# Patient Record
Sex: FEMALE | Race: WHITE | NOT HISPANIC OR LATINO | Employment: FULL TIME | URBAN - METROPOLITAN AREA
[De-identification: names, ages, dates, MRNs, and addresses within clinical notes are randomized per-mention and may not be internally consistent; named-entity substitution may affect disease eponyms.]

---

## 2017-10-31 ENCOUNTER — LAB VISIT (OUTPATIENT)
Dept: LAB | Facility: OTHER | Age: 52
End: 2017-10-31
Attending: INTERNAL MEDICINE
Payer: COMMERCIAL

## 2017-10-31 DIAGNOSIS — R19.7 DIARRHEA: ICD-10-CM

## 2017-10-31 DIAGNOSIS — R10.31 ABDOMINAL PAIN, RIGHT LOWER QUADRANT: Primary | ICD-10-CM

## 2017-10-31 LAB
ALBUMIN SERPL BCP-MCNC: 4.1 G/DL
ALP SERPL-CCNC: 105 U/L
ALT SERPL W/O P-5'-P-CCNC: 23 U/L
ANION GAP SERPL CALC-SCNC: 9 MMOL/L
AST SERPL-CCNC: 16 U/L
BASOPHILS # BLD AUTO: 0.06 K/UL
BASOPHILS NFR BLD: 0.9 %
BILIRUB SERPL-MCNC: 1 MG/DL
BUN SERPL-MCNC: 15 MG/DL
CALCIUM SERPL-MCNC: 9.5 MG/DL
CHLORIDE SERPL-SCNC: 102 MMOL/L
CO2 SERPL-SCNC: 29 MMOL/L
CREAT SERPL-MCNC: 0.9 MG/DL
DIFFERENTIAL METHOD: NORMAL
EOSINOPHIL # BLD AUTO: 0.3 K/UL
EOSINOPHIL NFR BLD: 4.6 %
ERYTHROCYTE [DISTWIDTH] IN BLOOD BY AUTOMATED COUNT: 13 %
EST. GFR  (AFRICAN AMERICAN): >60 ML/MIN/1.73 M^2
EST. GFR  (NON AFRICAN AMERICAN): >60 ML/MIN/1.73 M^2
GLUCOSE SERPL-MCNC: 136 MG/DL
HCT VFR BLD AUTO: 43.1 %
HGB BLD-MCNC: 14.3 G/DL
LYMPHOCYTES # BLD AUTO: 2 K/UL
LYMPHOCYTES NFR BLD: 27.8 %
MCH RBC QN AUTO: 28.5 PG
MCHC RBC AUTO-ENTMCNC: 33.2 G/DL
MCV RBC AUTO: 86 FL
MONOCYTES # BLD AUTO: 0.5 K/UL
MONOCYTES NFR BLD: 7.4 %
NEUTROPHILS # BLD AUTO: 4.2 K/UL
NEUTROPHILS NFR BLD: 59.2 %
PLATELET # BLD AUTO: 319 K/UL
PMV BLD AUTO: 9.4 FL
POTASSIUM SERPL-SCNC: 3.4 MMOL/L
PROT SERPL-MCNC: 7.7 G/DL
RBC # BLD AUTO: 5.02 M/UL
SODIUM SERPL-SCNC: 140 MMOL/L
WBC # BLD AUTO: 7.02 K/UL

## 2017-10-31 PROCEDURE — 80053 COMPREHEN METABOLIC PANEL: CPT

## 2017-10-31 PROCEDURE — 85025 COMPLETE CBC W/AUTO DIFF WBC: CPT

## 2017-10-31 PROCEDURE — 36415 COLL VENOUS BLD VENIPUNCTURE: CPT

## 2017-11-09 ENCOUNTER — HOSPITAL ENCOUNTER (OUTPATIENT)
Dept: RADIOLOGY | Facility: OTHER | Age: 52
Discharge: HOME | End: 2017-11-09
Attending: INTERNAL MEDICINE
Payer: COMMERCIAL

## 2017-11-09 DIAGNOSIS — R10.31 ABDOMINAL PAIN, RIGHT LOWER QUADRANT: ICD-10-CM

## 2017-11-09 DIAGNOSIS — R19.7 DIARRHEA: ICD-10-CM

## 2017-11-09 PROCEDURE — 74177 CT ABD & PELVIS W/CONTRAST: CPT | Mod: TC

## 2017-11-09 PROCEDURE — 25500020 PHARM REV CODE 255: Performed by: INTERNAL MEDICINE

## 2017-11-09 PROCEDURE — 74177 CT ABD & PELVIS W/CONTRAST: CPT | Mod: 26,,, | Performed by: RADIOLOGY

## 2017-11-09 RX ADMIN — IOHEXOL 100 ML: 350 INJECTION, SOLUTION INTRAVENOUS at 12:11

## 2017-11-09 RX ADMIN — IOHEXOL 25 ML: 350 INJECTION, SOLUTION INTRAVENOUS at 12:11

## 2017-11-13 ENCOUNTER — HOSPITAL ENCOUNTER (OUTPATIENT)
Dept: RADIOLOGY | Facility: OTHER | Age: 52
Discharge: HOME | End: 2017-11-13
Attending: INTERNAL MEDICINE
Payer: COMMERCIAL

## 2017-11-13 DIAGNOSIS — R10.31 ABDOMINAL PAIN, RIGHT LOWER QUADRANT: ICD-10-CM

## 2017-11-13 DIAGNOSIS — R93.89 ABNORMAL CT SCAN: ICD-10-CM

## 2017-11-13 DIAGNOSIS — K56.1 INTUSSUSCEPTION OF INTESTINE: ICD-10-CM

## 2017-11-13 PROCEDURE — 74250 X-RAY XM SM INT 1CNTRST STD: CPT | Mod: 26,,, | Performed by: RADIOLOGY

## 2017-11-13 PROCEDURE — 74250 X-RAY XM SM INT 1CNTRST STD: CPT | Mod: TC

## 2017-12-07 ENCOUNTER — HOSPITAL ENCOUNTER (OUTPATIENT)
Dept: CARDIOLOGY | Facility: CLINIC | Age: 52
Discharge: HOME | End: 2017-12-07
Payer: COMMERCIAL

## 2017-12-07 ENCOUNTER — OFFICE VISIT (OUTPATIENT)
Dept: SURGERY | Facility: CLINIC | Age: 52
End: 2017-12-07
Payer: COMMERCIAL

## 2017-12-07 VITALS
DIASTOLIC BLOOD PRESSURE: 75 MMHG | BODY MASS INDEX: 41.96 KG/M2 | SYSTOLIC BLOOD PRESSURE: 130 MMHG | TEMPERATURE: 99 F | WEIGHT: 228 LBS | HEIGHT: 62 IN | HEART RATE: 86 BPM

## 2017-12-07 DIAGNOSIS — Z01.818 PREOP EXAMINATION: ICD-10-CM

## 2017-12-07 DIAGNOSIS — Z01.818 PREOP EXAMINATION: Primary | ICD-10-CM

## 2017-12-07 PROCEDURE — 99999 PR PBB SHADOW E&M-EST. PATIENT-LVL III: CPT | Mod: PBBFAC,,, | Performed by: SURGERY

## 2017-12-07 PROCEDURE — 99203 OFFICE O/P NEW LOW 30 MIN: CPT | Mod: S$GLB,,, | Performed by: SURGERY

## 2017-12-07 PROCEDURE — 93000 ELECTROCARDIOGRAM COMPLETE: CPT | Mod: S$GLB,,, | Performed by: INTERNAL MEDICINE

## 2017-12-07 RX ORDER — LOSARTAN POTASSIUM AND HYDROCHLOROTHIAZIDE 12.5; 1 MG/1; MG/1
1 TABLET ORAL DAILY
COMMUNITY
Start: 2017-11-21 | End: 2020-02-28

## 2017-12-07 RX ORDER — GLIPIZIDE 5 MG/1
10 TABLET, FILM COATED, EXTENDED RELEASE ORAL 2 TIMES DAILY
COMMUNITY
Start: 2017-11-16 | End: 2018-12-13

## 2017-12-07 RX ORDER — RIFAXIMIN 200 MG/1
TABLET ORAL
Refills: 0 | COMMUNITY
Start: 2017-11-08 | End: 2017-12-15 | Stop reason: ALTCHOICE

## 2017-12-07 RX ORDER — LEVOTHYROXINE SODIUM 100 UG/1
100 TABLET ORAL
COMMUNITY
Start: 2017-11-29 | End: 2023-01-09

## 2017-12-07 RX ORDER — ATORVASTATIN CALCIUM 20 MG/1
20 TABLET, FILM COATED ORAL DAILY
COMMUNITY
Start: 2017-11-19 | End: 2020-02-28

## 2017-12-07 RX ORDER — CANAGLIFLOZIN 300 MG/1
300 TABLET, FILM COATED ORAL DAILY
COMMUNITY
Start: 2017-11-24 | End: 2020-02-28

## 2017-12-07 RX ORDER — AZITHROMYCIN 250 MG/1
TABLET, FILM COATED ORAL
COMMUNITY
Start: 2017-11-17 | End: 2017-12-15 | Stop reason: ALTCHOICE

## 2017-12-07 RX ORDER — OMEPRAZOLE 40 MG/1
40 CAPSULE, DELAYED RELEASE ORAL EVERY MORNING
COMMUNITY
Start: 2017-11-15

## 2017-12-07 RX ORDER — DULAGLUTIDE 1.5 MG/.5ML
INJECTION, SOLUTION SUBCUTANEOUS WEEKLY
COMMUNITY
Start: 2017-11-27 | End: 2020-02-28

## 2017-12-15 ENCOUNTER — CLINICAL SUPPORT (OUTPATIENT)
Dept: CARDIOLOGY | Facility: CLINIC | Age: 52
End: 2017-12-15
Attending: INTERNAL MEDICINE
Payer: COMMERCIAL

## 2017-12-15 ENCOUNTER — OFFICE VISIT (OUTPATIENT)
Dept: CARDIOLOGY | Facility: CLINIC | Age: 52
End: 2017-12-15
Payer: COMMERCIAL

## 2017-12-15 VITALS
BODY MASS INDEX: 42.28 KG/M2 | SYSTOLIC BLOOD PRESSURE: 124 MMHG | HEART RATE: 84 BPM | WEIGHT: 229.75 LBS | DIASTOLIC BLOOD PRESSURE: 76 MMHG | HEIGHT: 62 IN

## 2017-12-15 DIAGNOSIS — R94.31 ABNORMAL ECG: ICD-10-CM

## 2017-12-15 DIAGNOSIS — I10 ESSENTIAL HYPERTENSION: ICD-10-CM

## 2017-12-15 DIAGNOSIS — E11.9 TYPE 2 DIABETES MELLITUS WITHOUT COMPLICATION, WITHOUT LONG-TERM CURRENT USE OF INSULIN: ICD-10-CM

## 2017-12-15 DIAGNOSIS — G47.33 OBSTRUCTIVE SLEEP APNEA SYNDROME: ICD-10-CM

## 2017-12-15 DIAGNOSIS — R94.31 ABNORMAL ECG: Primary | ICD-10-CM

## 2017-12-15 PROBLEM — G47.30 SLEEP APNEA: Status: ACTIVE | Noted: 2017-12-15

## 2017-12-15 LAB
DIASTOLIC DYSFUNCTION: NO
MITRAL VALVE MOBILITY: NORMAL
RETIRED EF AND QEF - SEE NOTES: 60 (ref 55–65)

## 2017-12-15 PROCEDURE — 99243 OFF/OP CNSLTJ NEW/EST LOW 30: CPT | Mod: S$GLB,,, | Performed by: INTERNAL MEDICINE

## 2017-12-15 PROCEDURE — 93351 STRESS TTE COMPLETE: CPT | Mod: S$GLB,,, | Performed by: INTERNAL MEDICINE

## 2017-12-15 PROCEDURE — 93321 DOPPLER ECHO F-UP/LMTD STD: CPT | Mod: S$GLB,,, | Performed by: INTERNAL MEDICINE

## 2017-12-15 PROCEDURE — 99999 PR PBB SHADOW E&M-EST. PATIENT-LVL III: CPT | Mod: PBBFAC,,, | Performed by: INTERNAL MEDICINE

## 2017-12-15 NOTE — Clinical Note
Dear Dr. Mei, Her stress echo is normal. She is cleared for surgery and anesthesia.  Homeyar BK Saunders, FACC, [facility]  Preventive Cardiology Tel: [phone]

## 2017-12-15 NOTE — LETTER
December 15, 2017      Lewis Lloyd MD  [address]  [place]  [place] [address]           [place] [facility]  [address] [facility]  [place] [address]  Phone: [phone]          Patient: Ailyn Ortiz   MR Number: [id number]   YOB: 1965   Date of Visit: 12/15/2017       Dear Dr. Lewis Lloyd:    Thank you for referring Ailyn Ortiz to me for evaluation. Attached you will find relevant portions of my assessment and plan of care.    If you have questions, please do not hesitate to call me. I look forward to following Ailyn Ortiz along with you.    Sincerely,    Godwin Wagoner MD    Enclosure  CC:  No Recipients    If you would like to receive this communication electronically, please contact [email or web address] or [phone] to request more information on [employer] Link access.    For providers and/or their staff who would like to refer a patient to [facility], please contact us through our one-stop-shop provider referral line, [facility], at [phone].    If you feel you have received this communication in error or would no longer like to receive these types of communications, please e-mail [email or web address]

## 2017-12-15 NOTE — PROGRESS NOTES
Subjective:     Patient ID:  Ailyn Ortiz is a 52 y.o. female who presents for evaluation of Abnormal ECG and preop clearance      Problem List:  Diabetes since her mid 40s  Hypertension since her early 40s  Sleep apnea  Obesity  Prior tobacco use - quit 2013    HPI:     Ailyn Ortiz is Dr. Lewis Lloyd's patient. She is being referred for an abnormal preop for ECG.  She is scheduled to undergo abdominal surgery by Dr. Mei in January.  The ECG abnormality consists of abnormal R wave progression. She does not have a h/o MI, CHF or CAD.She does not report chest discomfort or shortness of breath with exertion. Coronary risk factors are diabetes, hypertension, obesity and a h/o tobacco use. She has been diabetic for about 10 years. Started a statin 1 year ago.  She also has been hypertensive since her early 40s. She smoked cigarettes for 20 yrs and quit in 2013.  She has sleep apnea. Unable to tolerate CPAP. She underwent UPP and reports resolution of daytime somnolence.   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      Review of Systems   Constitution: Negative for weakness, malaise/fatigue, weight gain and weight loss.   Cardiovascular: Negative for chest pain, claudication, dyspnea on exertion, irregular heartbeat, leg swelling, near-syncope, palpitations and syncope.   Respiratory: Negative for cough, hemoptysis, sputum production and wheezing.    Hematologic/Lymphatic: Does not bruise/bleed easily.   Musculoskeletal: Positive for muscle cramps. Negative for back pain, joint pain, joint swelling and muscle weakness.   Gastrointestinal: Positive for abdominal pain and heartburn. Negative for change in bowel habit and melena.   Genitourinary: Positive for frequency and nocturia. Negative for hematuria.   Neurological: Positive for numbness. Negative for dizziness, headaches, light-headedness, loss of balance and paresthesias.  "  Psychiatric/Behavioral: Negative for depression. The patient is not nervous/anxious.          Current Outpatient Prescriptions   Medication Sig    atorvastatin (LIPITOR) 20 MG tablet Take 20 mg by mouth once daily.     glipiZIDE (GLUCOTROL) 5 MG TR24 Take 10 mg by mouth 2 (two) times daily.     INVOKANA 300 mg Tab tablet Take 300 mg by mouth once daily.     levothyroxine (SYNTHROID) 100 MCG tablet Take 100 mcg by mouth before breakfast.     losartan-hydrochlorothiazide 100-12.5 mg (HYZAAR) 100-12.5 mg Tab Take 1 tablet by mouth once daily.     omeprazole (PRILOSEC) 40 MG capsule Take 40 mg by mouth every morning.     TRULICITY 1.5 mg/0.5 mL PnIj Inject into the skin once a week.            Past Medical History:   Diagnosis Date    Diabetes mellitus, type 2 since the age of 42    Hypertension since the age of 42    JESSICA (obstructive sleep apnea)     S/P UPP         Social History   Substance Use Topics    Smoking status: Former Smoker     Packs/day: 0.00     Years: 20.00     Types: Cigarettes     Quit date: 2013    Smokeless tobacco: Never Used    Alcohol use 6 / year         Family History   Problem Relation Age of Onset    Hypertension Father     Diabetes Father     Heart disease Maternal Grandfather      CAD    Heart disease Paternal Grandmother      CAD    Heart disease Paternal Grandfather      CAD         Objective:     Physical Exam   Constitutional: She is oriented to person, place, and time. She appears well-developed and well-nourished.   /76   Pulse 84   Ht 5' 2" (1.575 m)   Wt 104.2 kg (229 lb 11.5 oz)   BMI 42.02 kg/m²      HENT:   Head: Normocephalic.   Neck: No JVD present. Carotid bruit is not present.   Cardiovascular: Normal rate, regular rhythm, S1 normal and S2 normal.  Exam reveals no gallop.    No murmur heard.  Pulses:       Radial pulses are 2+ on the right side, and 2+ on the left side.        Posterior tibial pulses are 2+ on the right side, and 1+ on the left " side.   Pulmonary/Chest: Effort normal. She has no wheezes. She has no rales. Chest wall is not dull to percussion.   Abdominal: Soft. She exhibits no abdominal bruit. There is no splenomegaly or hepatomegaly. There is no tenderness.   Musculoskeletal:        Right lower leg: She exhibits no edema.        Left lower leg: She exhibits no edema.   Neurological: She is alert and oriented to person, place, and time. Gait normal.   Skin: Skin is warm and dry. No bruising and no rash noted. No cyanosis. Nails show no clubbing.   Psychiatric: She has a normal mood and affect. Her speech is normal and behavior is normal. Judgment normal. Cognition and memory are normal.           Lab Results   Component Value Date    ALT 23 10/31/2017     Lab Results   Component Value Date    ALT 23 10/31/2017    AST 16 10/31/2017    ALKPHOS 105 10/31/2017    BILITOT 1.0 10/31/2017      Lab Results   Component Value Date    CREATININE 0.9 10/31/2017    BUN 15 10/31/2017     10/31/2017    K 3.4 (L) 10/31/2017     10/31/2017    CO2 29 10/31/2017         Assessment and Plan:     1. Abnormal ECG    2. Type 2 diabetes mellitus without complication, without long-term current use of insulin    3. Essential hypertension    4. BMI 40.0-44.9, adult    5. Obstructive sleep apnea syndrome         Abnormal ECG  -     Minimally abnormal ECG.  However in view of multiple coronary risk factors a stress test is indicated   Exercise stress echo; Future      Addendum:  Stress echo completed: normal. She is cleared for surgery and anesthesia.    CC: Lewis Lloyd MD

## 2018-01-03 ENCOUNTER — TELEPHONE (OUTPATIENT)
Dept: SURGERY | Facility: CLINIC | Age: 53
End: 2018-01-03

## 2018-01-03 NOTE — TELEPHONE ENCOUNTER
Pt calling with c/o severe body aches that is slowly getting better.  She states she has been worked up for the flu, strep, and rheumatoid arthritis.  She denies any fevers or a productive cough.  Pt had recent blood work with elevations with some levels. Pt to fax a copy.  She is wanting to make sure Dr. Mei is ok to proceed with surgery.

## 2018-01-03 NOTE — TELEPHONE ENCOUNTER
----- Message from Erica Krishnamurthy sent at 1/3/2018  1:32 PM CST -----  Contact: self  Ailyn States that she needs a call returned by a nurse in reference to her being sick before surgery.  Please call Ailyn @ [phone]. Thanks :)

## 2018-01-04 ENCOUNTER — ANESTHESIA EVENT (OUTPATIENT)
Dept: SURGERY | Facility: HOSPITAL | Age: 53
DRG: 394 | End: 2018-01-04
Payer: COMMERCIAL

## 2018-01-04 NOTE — PRE-PROCEDURE INSTRUCTIONS
LEFT MESSAGE WITH [facility] [phone]) FOR A CALL BACK IN AM.

## 2018-01-04 NOTE — ANESTHESIA PREPROCEDURE EVALUATION
2018  Ailyn Ortiz is a 52 y.o., female.    Pre-operative evaluation for Procedure(s) (LRB):  LAPAROSCOPY-DIAGNOSTIC w/ small bowel resection (N/A)  ESOPHAGOGASTRODUODENOSCOPY (EGD) intraop (N/A)    Ailyn Ortiz is a 52 y.o. female     LDA:     Prev airway:     Drips:     Patient Active Problem List   Diagnosis    Mass    Type 2 diabetes mellitus without complication, without long-term current use of insulin    Essential hypertension    BMI 40.0-44.9, adult    Sleep apnea       Review of patient's allergies indicates:   Allergen Reactions    Milk containing products      Congestion      Nuts [tree nut]     Avelox [moxifloxacin] Rash        No current facility-administered medications on file prior to encounter.      Current Outpatient Prescriptions on File Prior to Encounter   Medication Sig Dispense Refill    atorvastatin (LIPITOR) 20 MG tablet Take 20 mg by mouth once daily.       glipiZIDE (GLUCOTROL) 5 MG TR24 Take 10 mg by mouth 2 (two) times daily.       INVOKANA 300 mg Tab tablet Take 300 mg by mouth once daily.       levothyroxine (SYNTHROID) 100 MCG tablet Take 100 mcg by mouth before breakfast.       losartan-hydrochlorothiazide 100-12.5 mg (HYZAAR) 100-12.5 mg Tab Take 1 tablet by mouth once daily.       omeprazole (PRILOSEC) 40 MG capsule Take 40 mg by mouth every morning.       TRULICITY 1.5 mg/0.5 mL PnIj Inject into the skin once a week.          Past Surgical History:   Procedure Laterality Date    ADENOIDECTOMY       SECTION      times 2    CHOLECYSTECTOMY      GANGLION CYST EXCISION      TONSILLECTOMY         Social History     Social History    Marital status:      Spouse name: N/A    Number of children: N/A    Years of education: N/A     Occupational History    Not on file.     Social History Main Topics    Smoking status: Former  Smoker     Packs/day: 0.00     Years: 20.00     Types: Cigarettes     Quit date: 12/15/2013    Smokeless tobacco: Never Used    Alcohol use Not on file      Comment: rare    Drug use: Unknown    Sexual activity: Not on file     Other Topics Concern    Not on file     Social History Narrative    No narrative on file         Vital Signs Range (Last 24H):         CBC: No results for input(s): WBC, RBC, HGB, HCT, PLT, MCV, MCH, MCHC in the last 72 hours.    CMP: No results for input(s): NA, K, CL, CO2, BUN, CREATININE, GLU, MG, PHOS, CALCIUM, ALBUMIN, PROT, ALKPHOS, ALT, AST, BILITOT in the last 72 hours.    INR  No results for input(s): PT, INR, PROTIME, APTT in the last 72 hours.        Diagnostic Studies:      EKD Echo:12/15/2017  1 - Normal left ventricular systolic function (EF 60-65%).     2 - No wall motion abnormalities.     3 - Normal left ventricular diastolic function.     4 - Normal right ventricular systolic function .           Anesthesia Evaluation         Review of Systems  Anesthesia Hx:  Personal Hx of Anesthesia complications Pulmonary/Ventilatory Issues, laryngospasm       Physical Exam  General:  Well nourished, Obesity    Airway/Jaw/Neck:  Airway Findings: Mouth Opening: Normal Tongue: Normal  General Airway Assessment: Adult  Mallampati: II  Improves to II with phonation.  TM Distance: Normal, at least 6 cm            Mental Status:  Mental Status Findings:  Cooperative, Alert and Oriented         Anesthesia Plan  Type of Anesthesia, risks & benefits discussed:  Anesthesia Type:  general  Patient's Preference:   Intra-op Monitoring Plan: standard ASA monitors  Intra-op Monitoring Plan Comments:   Post Op Pain Control Plan:   Post Op Pain Control Plan Comments:   Induction:   IV  Beta Blocker:  Patient is not currently on a Beta-Blocker (No further documentation required).       Informed Consent: Patient understands risks and agrees with Anesthesia plan.  Questions answered.  Anesthesia consent signed with patient.  ASA Score: 3     Day of Surgery Review of History & Physical:    H&P update referred to the surgeon.     Anesthesia Plan Notes: Cleared by Dr. Rizwana Robertson For Surgery From Anesthesia Perspective.     PT REPORTS TO THIS RN THAT ~ 6 YEARS AGO DURING EXTUBATION SHE STARTED HAVING LARYNGOSPASMS. SHE WAS SUCCESSFULLY EXTUBATED AND DISCHARGED THE SAME DAY. THE PATIENT HAS NOT HAD GENERAL ANESTHESIA SINCE THAT SURGERY. WILL ATTEMPT TO OBTAIN ANESTHESIA RECORDS FROM [facility] [place]

## 2018-01-05 ENCOUNTER — TELEPHONE (OUTPATIENT)
Dept: SURGERY | Facility: CLINIC | Age: 53
End: 2018-01-05

## 2018-01-08 ENCOUNTER — ANESTHESIA (OUTPATIENT)
Dept: SURGERY | Facility: HOSPITAL | Age: 53
DRG: 394 | End: 2018-01-08
Payer: COMMERCIAL

## 2018-01-08 ENCOUNTER — HOSPITAL ENCOUNTER (INPATIENT)
Facility: HOSPITAL | Age: 53
LOS: 1 days | Discharge: HOME | DRG: 394 | End: 2018-01-09
Attending: SURGERY | Admitting: SURGERY
Payer: COMMERCIAL

## 2018-01-08 LAB
POCT GLUCOSE: 150 MG/DL (ref 70–110)
POCT GLUCOSE: 155 MG/DL (ref 70–110)

## 2018-01-08 PROCEDURE — 82962 GLUCOSE BLOOD TEST: CPT | Performed by: SURGERY

## 2018-01-08 PROCEDURE — 36000711: Performed by: SURGERY

## 2018-01-08 PROCEDURE — 25000003 PHARM REV CODE 250: Performed by: NURSE ANESTHETIST, CERTIFIED REGISTERED

## 2018-01-08 PROCEDURE — 36000710: Performed by: SURGERY

## 2018-01-08 PROCEDURE — D9220A PRA ANESTHESIA: Mod: CRNA,,, | Performed by: NURSE ANESTHETIST, CERTIFIED REGISTERED

## 2018-01-08 PROCEDURE — 63600175 PHARM REV CODE 636 W HCPCS: Performed by: ANESTHESIOLOGY

## 2018-01-08 PROCEDURE — 71000039 HC RECOVERY, EACH ADD'L HOUR: Performed by: SURGERY

## 2018-01-08 PROCEDURE — 71000033 HC RECOVERY, INTIAL HOUR: Performed by: SURGERY

## 2018-01-08 PROCEDURE — 88307 TISSUE EXAM BY PATHOLOGIST: CPT | Performed by: PATHOLOGY

## 2018-01-08 PROCEDURE — 27201423 OPTIME MED/SURG SUP & DEVICES STERILE SUPPLY: Performed by: SURGERY

## 2018-01-08 PROCEDURE — 36000708 HC OR TIME LEV III 1ST 15 MIN: Performed by: SURGERY

## 2018-01-08 PROCEDURE — C9113 INJ PANTOPRAZOLE SODIUM, VIA: HCPCS | Performed by: SURGERY

## 2018-01-08 PROCEDURE — 37000009 HC ANESTHESIA EA ADD 15 MINS: Performed by: SURGERY

## 2018-01-08 PROCEDURE — 25000003 PHARM REV CODE 250: Performed by: SURGERY

## 2018-01-08 PROCEDURE — 63600175 PHARM REV CODE 636 W HCPCS: Performed by: SURGERY

## 2018-01-08 PROCEDURE — 63600175 PHARM REV CODE 636 W HCPCS: Performed by: NURSE ANESTHETIST, CERTIFIED REGISTERED

## 2018-01-08 PROCEDURE — D9220A PRA ANESTHESIA: Mod: ANES,,, | Performed by: ANESTHESIOLOGY

## 2018-01-08 PROCEDURE — 37000008 HC ANESTHESIA 1ST 15 MINUTES: Performed by: SURGERY

## 2018-01-08 PROCEDURE — G0378 HOSPITAL OBSERVATION PER HR: HCPCS

## 2018-01-08 PROCEDURE — 36000709 HC OR TIME LEV III EA ADD 15 MIN: Performed by: SURGERY

## 2018-01-08 PROCEDURE — 0DB84ZX EXCISION OF SMALL INTESTINE, PERCUTANEOUS ENDOSCOPIC APPROACH, DIAGNOSTIC: ICD-10-PCS | Performed by: SURGERY

## 2018-01-08 PROCEDURE — 44202 LAP ENTERECTOMY: CPT | Mod: ,,, | Performed by: SURGERY

## 2018-01-08 PROCEDURE — 27000221 HC OXYGEN, UP TO 24 HOURS

## 2018-01-08 PROCEDURE — 88307 TISSUE EXAM BY PATHOLOGIST: CPT | Mod: 26,,, | Performed by: PATHOLOGY

## 2018-01-08 RX ORDER — DEXAMETHASONE SODIUM PHOSPHATE 4 MG/ML
INJECTION, SOLUTION INTRA-ARTICULAR; INTRALESIONAL; INTRAMUSCULAR; INTRAVENOUS; SOFT TISSUE
Status: DISCONTINUED | OUTPATIENT
Start: 2018-01-08 | End: 2018-01-08

## 2018-01-08 RX ORDER — ROCURONIUM BROMIDE 10 MG/ML
INJECTION, SOLUTION INTRAVENOUS
Status: DISCONTINUED | OUTPATIENT
Start: 2018-01-08 | End: 2018-01-08

## 2018-01-08 RX ORDER — SODIUM CHLORIDE 9 MG/ML
INJECTION, SOLUTION INTRAVENOUS CONTINUOUS
Status: DISCONTINUED | OUTPATIENT
Start: 2018-01-08 | End: 2018-01-09

## 2018-01-08 RX ORDER — FENTANYL CITRATE 50 UG/ML
INJECTION, SOLUTION INTRAMUSCULAR; INTRAVENOUS
Status: DISCONTINUED | OUTPATIENT
Start: 2018-01-08 | End: 2018-01-08

## 2018-01-08 RX ORDER — BUPIVACAINE HYDROCHLORIDE 2.5 MG/ML
INJECTION, SOLUTION EPIDURAL; INFILTRATION; INTRACAUDAL
Status: DISCONTINUED | OUTPATIENT
Start: 2018-01-08 | End: 2018-01-08 | Stop reason: HOSPADM

## 2018-01-08 RX ORDER — SODIUM CHLORIDE 9 MG/ML
INJECTION, SOLUTION INTRAVENOUS CONTINUOUS
Status: DISCONTINUED | OUTPATIENT
Start: 2018-01-08 | End: 2018-01-08

## 2018-01-08 RX ORDER — HYDROMORPHONE HCL IN 0.9% NACL 6 MG/30 ML
PATIENT CONTROLLED ANALGESIA SYRINGE INTRAVENOUS CONTINUOUS
Status: DISCONTINUED | OUTPATIENT
Start: 2018-01-08 | End: 2018-01-09

## 2018-01-08 RX ORDER — MIDAZOLAM HYDROCHLORIDE 1 MG/ML
INJECTION, SOLUTION INTRAMUSCULAR; INTRAVENOUS
Status: DISCONTINUED | OUTPATIENT
Start: 2018-01-08 | End: 2018-01-08

## 2018-01-08 RX ORDER — HYDROCODONE BITARTRATE AND ACETAMINOPHEN 7.5; 325 MG/15ML; MG/15ML
15 SOLUTION ORAL EVERY 4 HOURS PRN
Status: DISCONTINUED | OUTPATIENT
Start: 2018-01-09 | End: 2018-01-09 | Stop reason: HOSPADM

## 2018-01-08 RX ORDER — PANTOPRAZOLE SODIUM 40 MG/10ML
40 INJECTION, POWDER, LYOPHILIZED, FOR SOLUTION INTRAVENOUS DAILY
Status: DISCONTINUED | OUTPATIENT
Start: 2018-01-08 | End: 2018-01-09 | Stop reason: HOSPADM

## 2018-01-08 RX ORDER — NALOXONE HCL 0.4 MG/ML
0.02 VIAL (ML) INJECTION
Status: DISCONTINUED | OUTPATIENT
Start: 2018-01-08 | End: 2018-01-09 | Stop reason: HOSPADM

## 2018-01-08 RX ORDER — HYDROMORPHONE HYDROCHLORIDE 2 MG/ML
0.2 INJECTION, SOLUTION INTRAMUSCULAR; INTRAVENOUS; SUBCUTANEOUS EVERY 5 MIN PRN
Status: DISCONTINUED | OUTPATIENT
Start: 2018-01-08 | End: 2018-01-08 | Stop reason: HOSPADM

## 2018-01-08 RX ORDER — NEOSTIGMINE METHYLSULFATE 1 MG/ML
INJECTION, SOLUTION INTRAVENOUS
Status: DISCONTINUED | OUTPATIENT
Start: 2018-01-08 | End: 2018-01-08

## 2018-01-08 RX ORDER — LEVOTHYROXINE SODIUM 100 UG/1
100 TABLET ORAL
Status: DISCONTINUED | OUTPATIENT
Start: 2018-01-09 | End: 2018-01-09 | Stop reason: HOSPADM

## 2018-01-08 RX ORDER — ENOXAPARIN SODIUM 100 MG/ML
40 INJECTION SUBCUTANEOUS
Status: DISCONTINUED | OUTPATIENT
Start: 2018-01-08 | End: 2018-01-09 | Stop reason: HOSPADM

## 2018-01-08 RX ORDER — ACETAMINOPHEN 10 MG/ML
INJECTION, SOLUTION INTRAVENOUS
Status: DISCONTINUED | OUTPATIENT
Start: 2018-01-08 | End: 2018-01-08

## 2018-01-08 RX ORDER — DIPHENHYDRAMINE HYDROCHLORIDE 50 MG/ML
12.5 INJECTION INTRAMUSCULAR; INTRAVENOUS EVERY 4 HOURS PRN
Status: DISCONTINUED | OUTPATIENT
Start: 2018-01-08 | End: 2018-01-09 | Stop reason: HOSPADM

## 2018-01-08 RX ORDER — LIDOCAINE HYDROCHLORIDE 10 MG/ML
1 INJECTION, SOLUTION EPIDURAL; INFILTRATION; INTRACAUDAL; PERINEURAL ONCE
Status: COMPLETED | OUTPATIENT
Start: 2018-01-08 | End: 2018-01-08

## 2018-01-08 RX ORDER — GLYCOPYRROLATE 0.2 MG/ML
INJECTION INTRAMUSCULAR; INTRAVENOUS
Status: DISCONTINUED | OUTPATIENT
Start: 2018-01-08 | End: 2018-01-08

## 2018-01-08 RX ORDER — ONDANSETRON 2 MG/ML
INJECTION INTRAMUSCULAR; INTRAVENOUS
Status: DISCONTINUED | OUTPATIENT
Start: 2018-01-08 | End: 2018-01-08

## 2018-01-08 RX ORDER — LOSARTAN POTASSIUM AND HYDROCHLOROTHIAZIDE 12.5; 1 MG/1; MG/1
1 TABLET ORAL DAILY
Status: DISCONTINUED | OUTPATIENT
Start: 2018-01-09 | End: 2018-01-09 | Stop reason: HOSPADM

## 2018-01-08 RX ORDER — CEFAZOLIN SODIUM 1 G/3ML
2 INJECTION, POWDER, FOR SOLUTION INTRAMUSCULAR; INTRAVENOUS
Status: COMPLETED | OUTPATIENT
Start: 2018-01-08 | End: 2018-01-08

## 2018-01-08 RX ORDER — SODIUM CHLORIDE 0.9 % (FLUSH) 0.9 %
3 SYRINGE (ML) INJECTION
Status: DISCONTINUED | OUTPATIENT
Start: 2018-01-08 | End: 2018-01-08

## 2018-01-08 RX ORDER — LIDOCAINE HCL/PF 100 MG/5ML
SYRINGE (ML) INTRAVENOUS
Status: DISCONTINUED | OUTPATIENT
Start: 2018-01-08 | End: 2018-01-08

## 2018-01-08 RX ORDER — ATORVASTATIN CALCIUM 10 MG/1
20 TABLET, FILM COATED ORAL DAILY
Status: DISCONTINUED | OUTPATIENT
Start: 2018-01-09 | End: 2018-01-09 | Stop reason: HOSPADM

## 2018-01-08 RX ORDER — PROPOFOL 10 MG/ML
VIAL (ML) INTRAVENOUS
Status: DISCONTINUED | OUTPATIENT
Start: 2018-01-08 | End: 2018-01-08

## 2018-01-08 RX ORDER — ONDANSETRON 2 MG/ML
4 INJECTION INTRAMUSCULAR; INTRAVENOUS EVERY 6 HOURS PRN
Status: DISCONTINUED | OUTPATIENT
Start: 2018-01-08 | End: 2018-01-09 | Stop reason: HOSPADM

## 2018-01-08 RX ORDER — ACETAMINOPHEN 10 MG/ML
1000 INJECTION, SOLUTION INTRAVENOUS EVERY 8 HOURS
Status: COMPLETED | OUTPATIENT
Start: 2018-01-08 | End: 2018-01-09

## 2018-01-08 RX ADMIN — FENTANYL CITRATE 100 MCG: 50 INJECTION, SOLUTION INTRAMUSCULAR; INTRAVENOUS at 08:01

## 2018-01-08 RX ADMIN — PROPOFOL 180 MG: 10 INJECTION, EMULSION INTRAVENOUS at 09:01

## 2018-01-08 RX ADMIN — DEXAMETHASONE SODIUM PHOSPHATE 4 MG: 4 INJECTION, SOLUTION INTRAMUSCULAR; INTRAVENOUS at 09:01

## 2018-01-08 RX ADMIN — HYDROMORPHONE HYDROCHLORIDE 0.2 MG: 2 INJECTION INTRAMUSCULAR; INTRAVENOUS; SUBCUTANEOUS at 10:01

## 2018-01-08 RX ADMIN — ONDANSETRON 4 MG: 2 INJECTION INTRAMUSCULAR; INTRAVENOUS at 09:01

## 2018-01-08 RX ADMIN — ACETAMINOPHEN 1000 MG: 10 INJECTION, SOLUTION INTRAVENOUS at 09:01

## 2018-01-08 RX ADMIN — Medication: at 10:01

## 2018-01-08 RX ADMIN — GLYCOPYRROLATE 0.2 MG: 0.2 INJECTION, SOLUTION INTRAMUSCULAR; INTRAVENOUS at 08:01

## 2018-01-08 RX ADMIN — NEOSTIGMINE METHYLSULFATE 5 MG: 1 INJECTION INTRAVENOUS at 09:01

## 2018-01-08 RX ADMIN — LIDOCAINE HYDROCHLORIDE 80 MG: 20 INJECTION, SOLUTION INTRAVENOUS at 09:01

## 2018-01-08 RX ADMIN — HYDROMORPHONE HYDROCHLORIDE 0.2 MG: 2 INJECTION INTRAMUSCULAR; INTRAVENOUS; SUBCUTANEOUS at 11:01

## 2018-01-08 RX ADMIN — CEFAZOLIN 2 G: 330 INJECTION, POWDER, FOR SOLUTION INTRAMUSCULAR; INTRAVENOUS at 09:01

## 2018-01-08 RX ADMIN — SODIUM CHLORIDE: 0.9 INJECTION, SOLUTION INTRAVENOUS at 08:01

## 2018-01-08 RX ADMIN — ROCURONIUM BROMIDE 10 MG: 10 INJECTION, SOLUTION INTRAVENOUS at 09:01

## 2018-01-08 RX ADMIN — FENTANYL CITRATE 50 MCG: 50 INJECTION, SOLUTION INTRAMUSCULAR; INTRAVENOUS at 09:01

## 2018-01-08 RX ADMIN — LIDOCAINE HYDROCHLORIDE 1 MG: 10 INJECTION, SOLUTION EPIDURAL; INFILTRATION; INTRACAUDAL; PERINEURAL at 08:01

## 2018-01-08 RX ADMIN — PROPOFOL 20 MG: 10 INJECTION, EMULSION INTRAVENOUS at 09:01

## 2018-01-08 RX ADMIN — PANTOPRAZOLE SODIUM 40 MG: 40 INJECTION, POWDER, FOR SOLUTION INTRAVENOUS at 11:01

## 2018-01-08 RX ADMIN — GLYCOPYRROLATE 0.4 MG: 0.2 INJECTION, SOLUTION INTRAMUSCULAR; INTRAVENOUS at 09:01

## 2018-01-08 RX ADMIN — ROCURONIUM BROMIDE 40 MG: 10 INJECTION, SOLUTION INTRAVENOUS at 09:01

## 2018-01-08 RX ADMIN — Medication: at 11:01

## 2018-01-08 RX ADMIN — MIDAZOLAM HYDROCHLORIDE 2 MG: 1 INJECTION, SOLUTION INTRAMUSCULAR; INTRAVENOUS at 08:01

## 2018-01-08 RX ADMIN — ENOXAPARIN SODIUM 40 MG: 100 INJECTION SUBCUTANEOUS at 11:01

## 2018-01-08 RX ADMIN — SODIUM CHLORIDE: 0.9 INJECTION, SOLUTION INTRAVENOUS at 12:01

## 2018-01-08 RX ADMIN — SODIUM CHLORIDE, SODIUM GLUCONATE, SODIUM ACETATE, POTASSIUM CHLORIDE, MAGNESIUM CHLORIDE, SODIUM PHOSPHATE, DIBASIC, AND POTASSIUM PHOSPHATE: .53; .5; .37; .037; .03; .012; .00082 INJECTION, SOLUTION INTRAVENOUS at 09:01

## 2018-01-08 RX ADMIN — ACETAMINOPHEN 1000 MG: 10 INJECTION, SOLUTION INTRAVENOUS at 07:01

## 2018-01-08 NOTE — INTERVAL H&P NOTE
The patient has been examined and the H&P has been reviewed:     I concur with the findings and no changes have occurred since H&P was written.    Anesthesia/Surgery risks, benefits and alternative options discussed and understood by patient/family.          Active Hospital Problems    Diagnosis  POA    Mass [R22.9]  Yes      Resolved Hospital Problems    Diagnosis Date Resolved POA   No resolved problems to display.

## 2018-01-08 NOTE — H&P (VIEW-ONLY)
Subjective:     Patient ID:  Ailyn Ortiz is a 52 y.o. female who presents for evaluation of Abnormal ECG and preop clearance      Problem List:  Diabetes since her mid 40s  Hypertension since her early 40s  Sleep apnea  Obesity  Prior tobacco use - quit 2013    HPI:     Ailyn Ortiz is Dr. Lewis Lloyd's patient. She is being referred for an abnormal preop for ECG.  She is scheduled to undergo abdominal surgery by Dr. Mei in January.  The ECG abnormality consists of abnormal R wave progression. She does not have a h/o MI, CHF or CAD.She does not report chest discomfort or shortness of breath with exertion. Coronary risk factors are diabetes, hypertension, obesity and a h/o tobacco use. She has been diabetic for about 10 years. Started a statin 1 year ago.  She also has been hypertensive since her early 40s. She smoked cigarettes for 20 yrs and quit in 2013.  She has sleep apnea. Unable to tolerate CPAP. She underwent UPP and reports resolution of daytime somnolence.   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      Review of Systems   Constitution: Negative for weakness, malaise/fatigue, weight gain and weight loss.   Cardiovascular: Negative for chest pain, claudication, dyspnea on exertion, irregular heartbeat, leg swelling, near-syncope, palpitations and syncope.   Respiratory: Negative for cough, hemoptysis, sputum production and wheezing.    Hematologic/Lymphatic: Does not bruise/bleed easily.   Musculoskeletal: Positive for muscle cramps. Negative for back pain, joint pain, joint swelling and muscle weakness.   Gastrointestinal: Positive for abdominal pain and heartburn. Negative for change in bowel habit and melena.   Genitourinary: Positive for frequency and nocturia. Negative for hematuria.   Neurological: Positive for numbness. Negative for dizziness, headaches, light-headedness, loss of balance and paresthesias.  "  Psychiatric/Behavioral: Negative for depression. The patient is not nervous/anxious.          Current Outpatient Prescriptions   Medication Sig    atorvastatin (LIPITOR) 20 MG tablet Take 20 mg by mouth once daily.     glipiZIDE (GLUCOTROL) 5 MG TR24 Take 10 mg by mouth 2 (two) times daily.     INVOKANA 300 mg Tab tablet Take 300 mg by mouth once daily.     levothyroxine (SYNTHROID) 100 MCG tablet Take 100 mcg by mouth before breakfast.     losartan-hydrochlorothiazide 100-12.5 mg (HYZAAR) 100-12.5 mg Tab Take 1 tablet by mouth once daily.     omeprazole (PRILOSEC) 40 MG capsule Take 40 mg by mouth every morning.     TRULICITY 1.5 mg/0.5 mL PnIj Inject into the skin once a week.            Past Medical History:   Diagnosis Date    Diabetes mellitus, type 2 since the age of 42    Hypertension since the age of 42    JESSICA (obstructive sleep apnea)     S/P UPP         Social History   Substance Use Topics    Smoking status: Former Smoker     Packs/day: 0.00     Years: 20.00     Types: Cigarettes     Quit date: 2013    Smokeless tobacco: Never Used    Alcohol use 6 / year         Family History   Problem Relation Age of Onset    Hypertension Father     Diabetes Father     Heart disease Maternal Grandfather      CAD    Heart disease Paternal Grandmother      CAD    Heart disease Paternal Grandfather      CAD         Objective:     Physical Exam   Constitutional: She is oriented to person, place, and time. She appears well-developed and well-nourished.   /76   Pulse 84   Ht 5' 2" (1.575 m)   Wt 104.2 kg (229 lb 11.5 oz)   BMI 42.02 kg/m²      HENT:   Head: Normocephalic.   Neck: No JVD present. Carotid bruit is not present.   Cardiovascular: Normal rate, regular rhythm, S1 normal and S2 normal.  Exam reveals no gallop.    No murmur heard.  Pulses:       Radial pulses are 2+ on the right side, and 2+ on the left side.        Posterior tibial pulses are 2+ on the right side, and 1+ on the left " side.   Pulmonary/Chest: Effort normal. She has no wheezes. She has no rales. Chest wall is not dull to percussion.   Abdominal: Soft. She exhibits no abdominal bruit. There is no splenomegaly or hepatomegaly. There is no tenderness.   Musculoskeletal:        Right lower leg: She exhibits no edema.        Left lower leg: She exhibits no edema.   Neurological: She is alert and oriented to person, place, and time. Gait normal.   Skin: Skin is warm and dry. No bruising and no rash noted. No cyanosis. Nails show no clubbing.   Psychiatric: She has a normal mood and affect. Her speech is normal and behavior is normal. Judgment normal. Cognition and memory are normal.           Lab Results   Component Value Date    ALT 23 10/31/2017     Lab Results   Component Value Date    ALT 23 10/31/2017    AST 16 10/31/2017    ALKPHOS 105 10/31/2017    BILITOT 1.0 10/31/2017      Lab Results   Component Value Date    CREATININE 0.9 10/31/2017    BUN 15 10/31/2017     10/31/2017    K 3.4 (L) 10/31/2017     10/31/2017    CO2 29 10/31/2017         Assessment and Plan:     1. Abnormal ECG    2. Type 2 diabetes mellitus without complication, without long-term current use of insulin    3. Essential hypertension    4. BMI 40.0-44.9, adult    5. Obstructive sleep apnea syndrome         Abnormal ECG  -     Minimally abnormal ECG.  However in view of multiple coronary risk factors a stress test is indicated   Exercise stress echo; Future      Addendum:  Stress echo completed: normal. She is cleared for surgery and anesthesia.    CC: Lewis Lloyd MD

## 2018-01-08 NOTE — OP NOTE
DATE OF PROCEDURE:  01/08/2018.    DIAGNOSIS:  Small-bowel mass.    PROCEDURE PERFORMED:  Laparoscopic excision of small-bowel mass with small-bowel   resection.    SURGEON:  Stone Mei M.D.    ASSISTANT:  Stone Olivares M.D. (RES).    ANESTHESIA:  General.    PROCEDURE IN DETAIL:  The patient was placed under general anesthesia.  The   abdomen was prepped and draped in the usual manner.  Access to peritoneum was   gained through the umbilicus using Optiview trocar under direct vision.    Pneumoperitoneum to 15 mmHg with CO2 gas was obtained.  Two 5 mm trocars were   placed in the left mid abdomen and a 5 and 10 mm trocar was placed in the right   mid abdomen.  There were some omental adhesions to the midline.  These were   taken down bluntly.  The ligament of Treitz was identified.  The small bowel was   followed down approximately two-thirds of the way.  In the proximal small   bowel, we did find the lesion.  It was approximately 3.5 cm, but a little bit   hard to tell laparoscopically.  The area of the small bowel was resected with   Endo-KRISHAN.  The Harmonic scalpel was used to take the mesentery at the end of the   case.  It was placed in an EndoCatch bag and removed from the abdomen.  The two   small-bowel ends were attached to each other, enterotomies made in each limb   and Endo-KRISHAN were used to create a jejunojejunostomy.  A heel stitch was placed   and the remaining enterotomy was closed with an Endo-KRISHAN.  The mesenteric defect   was repaired with a running EndoStitch.  We inspected the abdomen for   hemostasis, removed the trocars under direct vision.  Prior to removing the last   trocar, pneumoperitoneum was allowed to escape.  The fascia at the navel was   closed with 0-Vicryl.  Skin incisions were infiltrated with Marcaine, closed   with 4-0 plain catgut and reinforced with Mastisol, Steri-Strips, and Band-Aids.    The patient tolerated procedure well and was brought to Recovery Room in    stable condition.  Sponge and needle counts were correct at the end of the case.      ARCHANA/ANAND  dd: 01/08/2018 11:02:37 (CST)  td: 01/08/2018 11:26:47 (CST)  Doc ID   #[id number]  Job ID #[id number]    CC:

## 2018-01-08 NOTE — BRIEF OP NOTE
Operative Note       Surgery Date: 1/8/2018     Surgeon(s) and Role:     * Stone Olivares MD - Resident - Assisting     * Stone Mei MD - Primary    Pre-op Diagnosis:  Mass [R22.9]    Post-op Diagnosis:  Mass [R22.9]    Procedure(s) (LRB):  LAPAROSCOPY-DIAGNOSTIC w/ small bowel resection (N/A)    Anesthesia: General    Procedure in Detail/Findings:  Proximal jejunal mass resected.    Estimated Blood Loss: Minimal           Specimens     Start     Ordered    01/08/18 1003  Specimen to Pathology - Surgery  Once      01/08/18 1002        Implants: * No implants in log *           Disposition: PACU - hemodynamically stable.           Condition: Good    Attestation:  I was present and scrubbed for the entire procedure.

## 2018-01-08 NOTE — NURSING TRANSFER
Nursing Transfer Note      1/8/2018     Transfer To: [address] From PACU    Transfer via stretcher    Transfer with IV and PCA pump    Transported by PCT    Medicines sent: PCA dilaudid    Chart send with patient: Yes    Notified: spouse    Patient reassessed at: 1/8/17 @ 1500     Upon arrival to floor:

## 2018-01-08 NOTE — ANESTHESIA POSTPROCEDURE EVALUATION
"Anesthesia Post Evaluation    Patient: Ailyn Ortiz    Procedure(s) Performed: Procedure(s) (LRB):  LAPAROSCOPY-DIAGNOSTIC w/ small bowel resection (N/A)    Final Anesthesia Type: general  Patient location during evaluation: PACU  Patient participation: Yes- Able to Participate  Level of consciousness: awake and alert  Post-procedure vital signs: reviewed and stable  Pain management: adequate  Airway patency: patent  PONV status at discharge: No PONV  Anesthetic complications: no      Cardiovascular status: hemodynamically stable  Respiratory status: room air, spontaneous ventilation and unassisted  Hydration status: euvolemic  Follow-up not needed.        Visit Vitals  BP (!) 143/63   Pulse 94   Temp 36.3 °C (97.3 °F) (Temporal)   Resp 17   Ht 5' 2" (1.575 m)   Wt 103.9 kg (229 lb)   SpO2 95%   Breastfeeding? No   BMI 41.88 kg/m²       Pain/Buddy Score: Pain Assessment Performed: Yes (1/8/2018  8:26 AM)  Presence of Pain: denies (1/8/2018  8:26 AM)      "

## 2018-01-08 NOTE — TRANSFER OF CARE
"Anesthesia Transfer of Care Note    Patient: Ailyn Ortiz    Procedure(s) Performed: Procedure(s) (LRB):  LAPAROSCOPY-DIAGNOSTIC w/ small bowel resection (N/A)    Patient location: PACU    Anesthesia Type: general    Transport from OR: Transported from OR on 6-10 L/min O2 by face mask with adequate spontaneous ventilation    Post pain: adequate analgesia    Post assessment: no apparent anesthetic complications and tolerated procedure well    Post vital signs: stable    Level of consciousness: awake    Nausea/Vomiting: no nausea/vomiting    Complications: none    Transfer of care protocol was followed      Last vitals:   Visit Vitals  BP (!) 143/63   Pulse 94   Temp 36.3 °C (97.3 °F) (Temporal)   Resp 17   Ht 5' 2" (1.575 m)   Wt 103.9 kg (229 lb)   SpO2 95%   Breastfeeding? No   BMI 41.88 kg/m²     "

## 2018-01-09 VITALS
OXYGEN SATURATION: 92 % | HEART RATE: 77 BPM | DIASTOLIC BLOOD PRESSURE: 58 MMHG | WEIGHT: 229 LBS | RESPIRATION RATE: 14 BRPM | BODY MASS INDEX: 42.14 KG/M2 | TEMPERATURE: 98 F | SYSTOLIC BLOOD PRESSURE: 125 MMHG | HEIGHT: 62 IN

## 2018-01-09 LAB
ANION GAP SERPL CALC-SCNC: 11 MMOL/L
ANION GAP SERPL CALC-SCNC: 7 MMOL/L
BASOPHILS # BLD AUTO: 0.03 K/UL
BASOPHILS # BLD AUTO: 0.03 K/UL
BASOPHILS # BLD AUTO: 0.04 K/UL
BASOPHILS NFR BLD: 0.4 %
BASOPHILS NFR BLD: 0.5 %
BASOPHILS NFR BLD: 0.5 %
BUN SERPL-MCNC: 20 MG/DL
BUN SERPL-MCNC: 22 MG/DL
CALCIUM SERPL-MCNC: 8.6 MG/DL
CALCIUM SERPL-MCNC: 9 MG/DL
CHLORIDE SERPL-SCNC: 101 MMOL/L
CHLORIDE SERPL-SCNC: 105 MMOL/L
CO2 SERPL-SCNC: 24 MMOL/L
CO2 SERPL-SCNC: 29 MMOL/L
CREAT SERPL-MCNC: 0.8 MG/DL
CREAT SERPL-MCNC: 0.8 MG/DL
DIFFERENTIAL METHOD: ABNORMAL
EOSINOPHIL # BLD AUTO: 0 K/UL
EOSINOPHIL # BLD AUTO: 0.1 K/UL
EOSINOPHIL # BLD AUTO: 0.2 K/UL
EOSINOPHIL NFR BLD: 0.5 %
EOSINOPHIL NFR BLD: 1.8 %
EOSINOPHIL NFR BLD: 2.7 %
ERYTHROCYTE [DISTWIDTH] IN BLOOD BY AUTOMATED COUNT: 12.5 %
ERYTHROCYTE [DISTWIDTH] IN BLOOD BY AUTOMATED COUNT: 12.5 %
ERYTHROCYTE [DISTWIDTH] IN BLOOD BY AUTOMATED COUNT: 12.6 %
EST. GFR  (AFRICAN AMERICAN): >60 ML/MIN/1.73 M^2
EST. GFR  (AFRICAN AMERICAN): >60 ML/MIN/1.73 M^2
EST. GFR  (NON AFRICAN AMERICAN): >60 ML/MIN/1.73 M^2
EST. GFR  (NON AFRICAN AMERICAN): >60 ML/MIN/1.73 M^2
GLUCOSE SERPL-MCNC: 100 MG/DL
GLUCOSE SERPL-MCNC: 79 MG/DL
HCT VFR BLD AUTO: 29.8 %
HCT VFR BLD AUTO: 31.4 %
HCT VFR BLD AUTO: 32 %
HGB BLD-MCNC: 10.3 G/DL
HGB BLD-MCNC: 10.4 G/DL
HGB BLD-MCNC: 9.8 G/DL
IMM GRANULOCYTES # BLD AUTO: 0.02 K/UL
IMM GRANULOCYTES # BLD AUTO: 0.02 K/UL
IMM GRANULOCYTES # BLD AUTO: 0.03 K/UL
IMM GRANULOCYTES NFR BLD AUTO: 0.2 %
IMM GRANULOCYTES NFR BLD AUTO: 0.3 %
IMM GRANULOCYTES NFR BLD AUTO: 0.4 %
LYMPHOCYTES # BLD AUTO: 0.8 K/UL
LYMPHOCYTES # BLD AUTO: 1.1 K/UL
LYMPHOCYTES # BLD AUTO: 1.2 K/UL
LYMPHOCYTES NFR BLD: 13.4 %
LYMPHOCYTES NFR BLD: 13.8 %
LYMPHOCYTES NFR BLD: 14.7 %
MAGNESIUM SERPL-MCNC: 2.2 MG/DL
MCH RBC QN AUTO: 27.4 PG
MCH RBC QN AUTO: 27.8 PG
MCH RBC QN AUTO: 28 PG
MCHC RBC AUTO-ENTMCNC: 32.5 G/DL
MCHC RBC AUTO-ENTMCNC: 32.8 G/DL
MCHC RBC AUTO-ENTMCNC: 32.9 G/DL
MCV RBC AUTO: 83 FL
MCV RBC AUTO: 85 FL
MCV RBC AUTO: 86 FL
MONOCYTES # BLD AUTO: 0.5 K/UL
MONOCYTES # BLD AUTO: 0.7 K/UL
MONOCYTES # BLD AUTO: 0.8 K/UL
MONOCYTES NFR BLD: 9.1 %
MONOCYTES NFR BLD: 9.3 %
MONOCYTES NFR BLD: 9.7 %
NEUTROPHILS # BLD AUTO: 4.3 K/UL
NEUTROPHILS # BLD AUTO: 5.7 K/UL
NEUTROPHILS # BLD AUTO: 6.1 K/UL
NEUTROPHILS NFR BLD: 74 %
NEUTROPHILS NFR BLD: 74.2 %
NEUTROPHILS NFR BLD: 74.5 %
NRBC BLD-RTO: 0 /100 WBC
PHOSPHATE SERPL-MCNC: 3.1 MG/DL
PLATELET # BLD AUTO: 299 K/UL
PLATELET # BLD AUTO: 325 K/UL
PLATELET # BLD AUTO: 331 K/UL
PMV BLD AUTO: 8.6 FL
PMV BLD AUTO: 8.7 FL
PMV BLD AUTO: 9 FL
POTASSIUM SERPL-SCNC: 3.5 MMOL/L
POTASSIUM SERPL-SCNC: 3.8 MMOL/L
RBC # BLD AUTO: 3.58 M/UL
RBC # BLD AUTO: 3.7 M/UL
RBC # BLD AUTO: 3.71 M/UL
SODIUM SERPL-SCNC: 137 MMOL/L
SODIUM SERPL-SCNC: 140 MMOL/L
WBC # BLD AUTO: 5.82 K/UL
WBC # BLD AUTO: 7.73 K/UL
WBC # BLD AUTO: 8.12 K/UL

## 2018-01-09 PROCEDURE — 80048 BASIC METABOLIC PNL TOTAL CA: CPT | Mod: 91

## 2018-01-09 PROCEDURE — 63600175 PHARM REV CODE 636 W HCPCS: Performed by: SURGERY

## 2018-01-09 PROCEDURE — C9113 INJ PANTOPRAZOLE SODIUM, VIA: HCPCS | Performed by: SURGERY

## 2018-01-09 PROCEDURE — 25000003 PHARM REV CODE 250: Performed by: STUDENT IN AN ORGANIZED HEALTH CARE EDUCATION/TRAINING PROGRAM

## 2018-01-09 PROCEDURE — 83735 ASSAY OF MAGNESIUM: CPT

## 2018-01-09 PROCEDURE — 12000002 HC ACUTE/MED SURGE SEMI-PRIVATE ROOM

## 2018-01-09 PROCEDURE — 85025 COMPLETE CBC W/AUTO DIFF WBC: CPT | Mod: 91

## 2018-01-09 PROCEDURE — 80048 BASIC METABOLIC PNL TOTAL CA: CPT

## 2018-01-09 PROCEDURE — 25000003 PHARM REV CODE 250: Performed by: SURGERY

## 2018-01-09 PROCEDURE — 36415 COLL VENOUS BLD VENIPUNCTURE: CPT

## 2018-01-09 PROCEDURE — 84100 ASSAY OF PHOSPHORUS: CPT

## 2018-01-09 RX ORDER — HYDROCODONE BITARTRATE AND ACETAMINOPHEN 7.5; 325 MG/15ML; MG/15ML
30 SOLUTION ORAL EVERY 4 HOURS PRN
Status: DISCONTINUED | OUTPATIENT
Start: 2018-01-09 | End: 2018-01-09 | Stop reason: HOSPADM

## 2018-01-09 RX ORDER — ACETAMINOPHEN 325 MG/1
650 TABLET ORAL EVERY 4 HOURS PRN
Status: DISCONTINUED | OUTPATIENT
Start: 2018-01-09 | End: 2018-01-09 | Stop reason: HOSPADM

## 2018-01-09 RX ORDER — OXYCODONE AND ACETAMINOPHEN 5; 325 MG/1; MG/1
1-2 TABLET ORAL EVERY 4 HOURS PRN
Qty: 31 TABLET | Refills: 0 | Status: SHIPPED | OUTPATIENT
Start: 2018-01-09 | End: 2018-06-19

## 2018-01-09 RX ORDER — DOCUSATE SODIUM 100 MG/1
100 CAPSULE, LIQUID FILLED ORAL 2 TIMES DAILY
Refills: 0 | COMMUNITY
Start: 2018-01-09 | End: 2018-06-19

## 2018-01-09 RX ADMIN — PANTOPRAZOLE SODIUM 40 MG: 40 INJECTION, POWDER, FOR SOLUTION INTRAVENOUS at 08:01

## 2018-01-09 RX ADMIN — ONDANSETRON 4 MG: 2 INJECTION INTRAMUSCULAR; INTRAVENOUS at 07:01

## 2018-01-09 RX ADMIN — LEVOTHYROXINE SODIUM 100 MCG: 100 TABLET ORAL at 06:01

## 2018-01-09 RX ADMIN — SODIUM CHLORIDE: 0.9 INJECTION, SOLUTION INTRAVENOUS at 03:01

## 2018-01-09 RX ADMIN — ENOXAPARIN SODIUM 40 MG: 100 INJECTION SUBCUTANEOUS at 01:01

## 2018-01-09 RX ADMIN — ACETAMINOPHEN 650 MG: 325 TABLET ORAL at 05:01

## 2018-01-09 RX ADMIN — LOSARTAN POTASSIUM AND HYDROCHLOROTHIAZIDE 1 TABLET: 12.5; 1 TABLET ORAL at 08:01

## 2018-01-09 RX ADMIN — ACETAMINOPHEN 1000 MG: 10 INJECTION, SOLUTION INTRAVENOUS at 08:01

## 2018-01-09 RX ADMIN — ACETAMINOPHEN 1000 MG: 10 INJECTION, SOLUTION INTRAVENOUS at 12:01

## 2018-01-09 NOTE — ASSESSMENT & PLAN NOTE
POD1 small bowel resection.  Doing well.  Some nausea. Possibly related to anesthesia vs narcotics    Continue IV antiemetics  Start sips of clears transition to fulls once better PO   OOB   Hycet  Transition to PO Meds    Possible DC later today pending PO tolerance

## 2018-01-09 NOTE — PLAN OF CARE
Patient lives in a 2 story house w/spouse & family. Spouse at . Not medically stable for discharge;POD # 1: + nausea, clear liquid diet, labs repeated at 12 Noon. No needs determined.        01/09/18 1255   Discharge Assessment   Assessment Type Discharge Planning Assessment   Confirmed/corrected address and phone number on facesheet? Yes   Assessment information obtained from? Patient;Medical Record   Expected Length of Stay (days) (2+)   Communicated expected length of stay with patient/caregiver no  (Per MD)   Prior to hospitilization cognitive status: Alert/Oriented;No Deficits   Prior to hospitalization functional status: Independent   Current cognitive status: Alert/Oriented;No Deficits   Current Functional Status: Independent;Needs Assistance   Facility Arrived From: (N/A)   Lives With spouse;child(kena), adult;grandchild(kena)  (Adult daughter, grandchild)   Able to Return to Prior Arrangements yes   Is patient able to care for self after discharge? Yes   Who are your caregiver(s) and their phone number(s)? (Perez Ortiz Spouse     [phone] )   Patient's perception of discharge disposition home or selfcare   Readmission Within The Last 30 Days no previous admission in last 30 days   Patient currently being followed by outpatient case management? No   Patient currently receives any other outside agency services? No   Equipment Currently Used at Home none   Do you have any problems affording any of your prescribed medications? No   Is the patient taking medications as prescribed? yes   Does the patient have transportation home? Yes   Transportation Available car;family or friend will provide   Dialysis Name and Scheduled days (N/A)   Does the patient receive services at the [facility]? No   Discharge Plan A Home with family   Discharge Plan B Home with family   Patient/Family In Agreement With Plan yes

## 2018-01-09 NOTE — PLAN OF CARE
Problem: Patient Care Overview  Goal: Plan of Care Review  Outcome: Ongoing (interventions implemented as appropriate)  Pt AAOx3, VSS, and has pain well controlled with PCA pump.  Pt able to ambulate and void without difficulty and is NPO.  Remains free from falls or injuries at this time, will continue to monitor.

## 2018-01-09 NOTE — SUBJECTIVE & OBJECTIVE
Interval History: No events.  Nauseated, likely 2/2 to anesthesia vs narcotics.  Otherwise feeling ok    Medications:  Continuous Infusions:  Scheduled Meds:   acetaminophen  1,000 mg Intravenous Q8H    atorvastatin  20 mg Oral Daily    enoxparin  40 mg Subcutaneous Q24H    levothyroxine  100 mcg Oral Before breakfast    losartan-hydrochlorothiazide 100-12.5 mg  1 tablet Oral Daily    pantoprazole  40 mg Intravenous Daily     PRN Meds:diphenhydrAMINE, hydrocodone-apap 7.5-325 MG/15 ML, hydrocodone-apap 7.5-325 MG/15 ML, naloxone, ondansetron, promethazine (PHENERGAN) IVPB     Review of patient's allergies indicates:   Allergen Reactions    Milk containing products      Congestion      Nuts [tree nut]     Avelox [moxifloxacin] Rash     Objective:     Vital Signs (Most Recent):  Temp: 96.8 °F (36 °C) (01/09/18 0013)  Pulse: 76 (01/09/18 0411)  Resp: 15 (01/09/18 0411)  BP: 127/67 (01/09/18 0411)  SpO2: (!) 92 % (01/09/18 0411) Vital Signs (24h Range):  Temp:  [96.7 °F (35.9 °C)-98.5 °F (36.9 °C)] 96.8 °F (36 °C)  Pulse:  [] 76  Resp:  [12-19] 15  SpO2:  [90 %-99 %] 92 %  BP: ()/(50-74) 127/67     Weight: 103.9 kg (229 lb)  Body mass index is 41.88 kg/m².    Intake/Output - Last 3 Shifts       01/07 0700 - 01/08 0659 01/08 0700 - 01/09 0659 01/09 0700 - 01/10 0659    I.V. (mL/kg)  1500 (14.4)     Total Intake(mL/kg)  1500 (14.4)     Urine (mL/kg/hr)  500     Total Output   500      Net   +1000                   Physical Exam   Gen: NAD  Abd: Soft, ATTP    Significant Labs:  CBC:   Recent Labs  Lab 01/09/18  0345   WBC 8.12   RBC 3.71*   HGB 10.4*   HCT 32.0*      MCV 86   MCH 28.0   MCHC 32.5     CMP:   Recent Labs  Lab 01/09/18  0345      CALCIUM 8.6*      K 3.8   CO2 24      BUN 22*   CREATININE 0.8

## 2018-01-09 NOTE — DISCHARGE SUMMARY
[facility]  General Surgery  Discharge Summary      Patient Name: Ailyn Ortiz  MRN: [id number]  Admission Date: 1/8/2018  Hospital Length of Stay: 0 days   Discharge Date and Time:  01/09/2018 5:55 PM  Attending Physician: Stone Mei MD   Discharging Provider: Giudo Burdick MD  Primary Care Provider: Lewis Lloyd MD     HPI: 51 y/o F presented for elective Mass resection.  Did well post op.  Tolerating PO. H/H trended given acute drop without other systemic signs of instability.  Pain controlled.  Ambulating.  Stable for DC    Procedure(s) (LRB):  LAPAROSCOPY-DIAGNOSTIC w/ small bowel resection (N/A)  RESECTION-SMALL BOWEL-LAPAROSCOPIC     Hospital Course: As above    Consults:     Significant Diagnostic Studies: Labs:   CMP   Recent Labs  Lab 01/09/18  0345      K 3.8      CO2 24      BUN 22*   CREATININE 0.8   CALCIUM 8.6*   ANIONGAP 11   ESTGFRAFRICA >60.0   EGFRNONAA >60.0    and CBC   Recent Labs  Lab 01/09/18  0345 01/09/18  1147 01/09/18  1654   WBC 8.12 7.73 5.82   HGB 10.4* 9.8* 10.3*   HCT 32.0* 29.8* 31.4*    331 299       Pending Diagnostic Studies:     Procedure Component Value Units Date/Time    Basic metabolic panel [[id number]] Collected:  01/09/18 1653    Order Status:  Sent Lab Status:  In process Updated:  01/09/18 1741    Specimen:  Blood from Blood         Final Active Diagnoses:    Diagnosis Date Noted POA    PRINCIPAL PROBLEM:  Mass [R22.9] 12/07/2017 Yes    Type 2 diabetes mellitus without complication, without long-term current use of insulin [E11.9] 12/15/2017 Yes      Problems Resolved During this Admission:    Diagnosis Date Noted Date Resolved POA      Discharged Condition: good    Disposition: Home or Self Care    Follow Up:  Follow-up Information     Stone Mei MD On 1/23/2018.    Specialties:  General Surgery, Bariatrics  Why:  For wound re-check/Appt 1/23/18 at 10:45 am  Contact information:  [address]  [facility] [address]  [phone]                 Patient Instructions:     Diet full liquid     Lifting restrictions   Order Comments: No more than 10 lbs     Notify your health care provider if you experience any of the following:  increased confusion or weakness     Notify your health care provider if you experience any of the following:  persistent dizziness, light-headedness, or visual disturbances     Notify your health care provider if you experience any of the following:  worsening rash     Notify your health care provider if you experience any of the following:  severe persistent headache     Notify your health care provider if you experience any of the following:  difficulty breathing or increased cough     Notify your health care provider if you experience any of the following:  redness, tenderness, or signs of infection (pain, swelling, redness, odor or green/yellow discharge around incision site)     Notify your health care provider if you experience any of the following:  severe uncontrolled pain     Notify your health care provider if you experience any of the following:  persistent nausea and vomiting or diarrhea     Notify your health care provider if you experience any of the following:  temperature >100.4     No dressing needed   Order Comments: Ok to remove outer dressing tomorrow and shower.  Steri strips stay on for 10-14 days or until they fall off on their own.  No soaking       Medications:  Reconciled Home Medications:   Current Discharge Medication List      START taking these medications    Details   docusate sodium (COLACE) 100 MG capsule Take 1 capsule (100 mg total) by mouth 2 (two) times daily.  Refills: 0      oxyCODONE-acetaminophen (PERCOCET) 5-325 mg per tablet Take 1-2 tablets by mouth every 4 (four) hours as needed.  Qty: 31 tablet, Refills: 0         CONTINUE these medications which have NOT CHANGED    Details   atorvastatin (LIPITOR) 20 MG tablet Take 20 mg by mouth once  daily.       glipiZIDE (GLUCOTROL) 5 MG TR24 Take 10 mg by mouth 2 (two) times daily.       INVOKANA 300 mg Tab tablet Take 300 mg by mouth once daily.       levothyroxine (SYNTHROID) 100 MCG tablet Take 100 mcg by mouth before breakfast.       losartan-hydrochlorothiazide 100-12.5 mg (HYZAAR) 100-12.5 mg Tab Take 1 tablet by mouth once daily.       omeprazole (PRILOSEC) 40 MG capsule Take 40 mg by mouth every morning.       TRULICITY 1.5 mg/0.5 mL PnIj Inject into the skin once a week.              Guido Burdick MD  General Surgery  [facility]

## 2018-01-12 ENCOUNTER — TELEPHONE (OUTPATIENT)
Dept: SURGERY | Facility: CLINIC | Age: 53
End: 2018-01-12

## 2018-01-12 NOTE — TELEPHONE ENCOUNTER
----- Message from Stone Mei MD sent at 1/11/2018  4:46 PM CST -----  Please let her know pathology is only lipoma, completely removed.

## 2018-01-23 ENCOUNTER — HOSPITAL ENCOUNTER (OUTPATIENT)
Dept: RADIOLOGY | Facility: HOSPITAL | Age: 53
Discharge: HOME | End: 2018-01-23
Attending: INTERNAL MEDICINE
Payer: COMMERCIAL

## 2018-01-23 ENCOUNTER — OFFICE VISIT (OUTPATIENT)
Dept: SURGERY | Facility: CLINIC | Age: 53
End: 2018-01-23
Payer: COMMERCIAL

## 2018-01-23 ENCOUNTER — OFFICE VISIT (OUTPATIENT)
Dept: RHEUMATOLOGY | Facility: CLINIC | Age: 53
End: 2018-01-23
Payer: COMMERCIAL

## 2018-01-23 VITALS
BODY MASS INDEX: 40.33 KG/M2 | HEART RATE: 101 BPM | HEIGHT: 62 IN | WEIGHT: 219.13 LBS | DIASTOLIC BLOOD PRESSURE: 72 MMHG | TEMPERATURE: 98 F | SYSTOLIC BLOOD PRESSURE: 130 MMHG

## 2018-01-23 VITALS
HEIGHT: 62 IN | BODY MASS INDEX: 40.76 KG/M2 | DIASTOLIC BLOOD PRESSURE: 69 MMHG | WEIGHT: 221.5 LBS | HEART RATE: 112 BPM | SYSTOLIC BLOOD PRESSURE: 119 MMHG

## 2018-01-23 DIAGNOSIS — M25.50 POLYARTHRALGIA: ICD-10-CM

## 2018-01-23 DIAGNOSIS — M25.50 POLYARTHRALGIA: Primary | ICD-10-CM

## 2018-01-23 DIAGNOSIS — Z09 POSTOP CHECK: Primary | ICD-10-CM

## 2018-01-23 PROCEDURE — 73562 X-RAY EXAM OF KNEE 3: CPT | Mod: 26,50,, | Performed by: RADIOLOGY

## 2018-01-23 PROCEDURE — 99999 PR PBB SHADOW E&M-EST. PATIENT-LVL III: CPT | Mod: PBBFAC,,, | Performed by: SURGERY

## 2018-01-23 PROCEDURE — 73130 X-RAY EXAM OF HAND: CPT | Mod: 26,50,, | Performed by: RADIOLOGY

## 2018-01-23 PROCEDURE — 73610 X-RAY EXAM OF ANKLE: CPT | Mod: 50,TC

## 2018-01-23 PROCEDURE — 73562 X-RAY EXAM OF KNEE 3: CPT | Mod: 50,TC

## 2018-01-23 PROCEDURE — 73610 X-RAY EXAM OF ANKLE: CPT | Mod: 26,50,, | Performed by: RADIOLOGY

## 2018-01-23 PROCEDURE — 99999 PR PBB SHADOW E&M-EST. PATIENT-LVL III: CPT | Mod: PBBFAC,,, | Performed by: INTERNAL MEDICINE

## 2018-01-23 PROCEDURE — 73630 X-RAY EXAM OF FOOT: CPT | Mod: 26,50,, | Performed by: RADIOLOGY

## 2018-01-23 PROCEDURE — 99024 POSTOP FOLLOW-UP VISIT: CPT | Mod: S$GLB,,, | Performed by: SURGERY

## 2018-01-23 PROCEDURE — 73630 X-RAY EXAM OF FOOT: CPT | Mod: 50,TC

## 2018-01-23 PROCEDURE — 73130 X-RAY EXAM OF HAND: CPT | Mod: 50,TC

## 2018-01-23 PROCEDURE — 99204 OFFICE O/P NEW MOD 45 MIN: CPT | Mod: S$GLB,,, | Performed by: INTERNAL MEDICINE

## 2018-01-23 RX ORDER — PREDNISONE 10 MG/1
TABLET ORAL
Qty: 120 TABLET | Refills: 1 | Status: SHIPPED | OUTPATIENT
Start: 2018-01-23 | End: 2018-03-06 | Stop reason: SDUPTHER

## 2018-01-23 RX ORDER — TRIAMCINOLONE ACETONIDE 1 MG/G
CREAM TOPICAL
Refills: 0 | COMMUNITY
Start: 2017-12-26 | End: 2018-02-27 | Stop reason: ALTCHOICE

## 2018-01-23 RX ORDER — AMOXICILLIN 500 MG/1
CAPSULE ORAL
Refills: 0 | COMMUNITY
Start: 2018-01-02 | End: 2018-01-23

## 2018-01-23 RX ORDER — PREDNISONE 10 MG/1
TABLET ORAL
Qty: 120 TABLET | Refills: 1 | Status: SHIPPED | OUTPATIENT
Start: 2018-01-23 | End: 2018-01-23 | Stop reason: SDUPTHER

## 2018-01-23 ASSESSMENT — ROUTINE ASSESSMENT OF PATIENT INDEX DATA (RAPID3)
TOTAL RAPID3 SCORE: 7.22
PAIN SCORE: 9
PSYCHOLOGICAL DISTRESS SCORE: 1.1
AM STIFFNESS SCORE: 1, YES
MDHAQ FUNCTION SCORE: 1.4
PATIENT GLOBAL ASSESSMENT SCORE: 8
FATIGUE SCORE: 9

## 2018-01-23 NOTE — LETTER
January 23, 2018        Lewis Lloyd MD  [address]  [place]  [place] [address]             [facility] - General Surgery  [address] [facility] [place] [address]  Phone: [phone]   Patient: Ailyn Ortiz   MR Number: [id number]   YOB: 1965   Date of Visit: 1/23/2018     Dear Dr. Lloyd:    Thank you for referring Ailyn Ortiz to me for evaluation. Below are the relevant portions of my assessment and plan of care.    Ailyn Ortiz is a 52-year-old female patient small bowel resection 1/8/18. She is having occasional cramping and otherwise no postop pain.  The bloating, nausea and vomiting she had prior to surgery is resolved but she continues to have RLQ pain.      1. Postop check      PLAN: Doing well after surgery.  Regular duty and RTC PRN.  If you have questions, please do not hesitate to call me. I look forward to following Ailyn along with you.    Sincerely,      Stone Mei MD   Section Head - General, Laparoscopic, Bariatric  Acute Care and Oncologic Surgery   - Surgical Weight Loss Program  [facility]    [email or web address]

## 2018-01-23 NOTE — PROGRESS NOTES
Chief Complaint   Patient presents with    Pain       Patient was referred by Dr.Jay JUDITH Lloyd     History of presenting illness    52 year old white female comes with the following presentation     December around Christmas she had the upper respiratory tract symptoms with myalgia and nasal congestion  Following this she started to experience severe joint pain  All her joints in the hands,elbows,knees,feet started to hurt and swell  EMS for 1 hour  Alleve helps minimally    Hands : thumbs and index fingers and the 3rd digits hurt the worse  She cant make a fist  Thinks that the left 2nd finger swells like a sausage  Swelling along the tendons as per her PCP,she says he thought she has tenosynovitis     Hips and shoulders ache but no stiffness,good ROM in them    Cold weather makes her symptoms worse     Claims to have had labs : she had ESR,CRP elevation apparently     Past history: asthma,diabetes,JESSICA,thyroid disease,HTN  Occasional tendinitis feet b/l    Has a rash every winter,dermatology thinks it is eczema  This time started in November on the elbows,back,torso,legs,didnt get better,but derm suggested stronger steroids  Has not been told to have psoriasis     Recently had small bowel resection for lipoma    Family history : psoriasis and psoriatic arthritis runs in the family     Social history : quit smoking a while ago,drinks occasionally     Review of Systems   Constitutional: Negative for activity change, appetite change, chills, diaphoresis, fatigue, fever and unexpected weight change.   HENT: Negative for congestion, dental problem, drooling, ear discharge, ear pain, facial swelling, hearing loss, mouth sores, nosebleeds, postnasal drip, rhinorrhea, sinus pain, sinus pressure, sneezing, sore throat, tinnitus, trouble swallowing and voice change.    Eyes: Negative for photophobia, pain, discharge, redness, itching and visual disturbance.   Respiratory: Negative for apnea, cough, choking, chest tightness,  shortness of breath, wheezing and stridor.    Cardiovascular: Negative for chest pain, palpitations and leg swelling.   Gastrointestinal: Negative for abdominal distention, abdominal pain, anal bleeding, blood in stool, constipation, diarrhea, nausea, rectal pain and vomiting.   Endocrine: Negative for cold intolerance, heat intolerance, polydipsia, polyphagia and polyuria.   Genitourinary: Negative for decreased urine volume, difficulty urinating, dysuria, enuresis, flank pain, frequency, genital sores, hematuria and urgency.   Musculoskeletal: Positive for arthralgias and joint swelling. Negative for back pain, gait problem, myalgias, neck pain and neck stiffness.   Skin: Positive for rash. Negative for color change, pallor and wound.   Allergic/Immunologic: Negative for environmental allergies, food allergies and immunocompromised state.   Neurological: Negative for dizziness, tremors, seizures, syncope, facial asymmetry, speech difficulty, weakness, light-headedness, numbness and headaches.   Hematological: Negative for adenopathy. Does not bruise/bleed easily.   Psychiatric/Behavioral: Negative for agitation, behavioral problems, confusion, decreased concentration, dysphoric mood, hallucinations, self-injury, sleep disturbance and suicidal ideas. The patient is not nervous/anxious and is not hyperactive.       Has dry eyes  Vision has been blurry  Has blood shot eyes    Physical Exam     Tenderness:   RUE: glenohumeral and ulnohumeral and radiohumeral  LUE: ulnohumeral and radiohumeral  Right hand: 1st MCP, 2nd MCP, 3rd MCP, 4th MCP, 5th MCP, 2nd PIP, 3rd PIP, 4th PIP, 5th PIP and 2nd DIP  Left hand: 2nd MCP, 3rd MCP, 4th MCP, 2nd PIP, 3rd PIP, 4th PIP, 5th PIP and 2nd DIP  RLE: tibiofemoral  LLE: tibiofemoral  Right foot: 1st MTP, 2nd MTP, 3rd MTP, 4th MTP and 5th MTP  Left foot: 1st MTP, 2nd MTP, 3rd MTP, 4th MTP and 5th MTP    Swelling:   Right hand: 3rd MCP, 4th MCP, 2nd PIP and 3rd PIP  Left hand: 2nd MCP,  3rd MCP, 4th MCP, 2nd PIP and 3rd PIP  Right foot: 1st MTP, 2nd MTP and 3rd MTP  Left foot: 1st MTP and 2nd MTP    MARTE-28 tender joint count: 21  MARTE-28 swollen joint count: 9       Physical Exam   Constitutional: She is oriented to person, place, and time and well-developed, well-nourished, and in no distress. No distress.   HENT:   Head: Normocephalic.   Mouth/Throat: Oropharynx is clear and moist.   Eyes: Conjunctivae are normal. Pupils are equal, round, and reactive to light. Right eye exhibits no discharge. Left eye exhibits no discharge. No scleral icterus.   Neck: Normal range of motion. No thyromegaly present.   Cardiovascular: Normal rate, regular rhythm, normal heart sounds and intact distal pulses.    Pulmonary/Chest: Effort normal and breath sounds normal. No stridor.   Abdominal: Soft. Bowel sounds are normal.   Lymphadenopathy:     She has no cervical adenopathy.   Neurological: She is alert and oriented to person, place, and time.   Skin: Skin is warm. No rash noted. She is not diaphoretic.     Psychiatric: Affect and judgment normal.   Musculoskeletal: She exhibits tenderness.       Laboratory abnormalities    Anemia 10.3/31.4  Normal BMP  We dont have her labs from PCP    Assessment     52 year old white female comes in with 1 month of polyarthralgias in the hands,elbows,knees and feet : s/p URT symptoms during [place] : is also s/p bowel resection for a lipoma : reports swelling in addition to pain and stiffness in her joints : significant EMS : symmetric involvement : in addition reports to having dactylitis   On exam : synovitis noted in many joints,no evidence of dactylitis.She is obese that makes joint exam less reliable.    Possibilities :  Inflammatory arthritis : rheumatoid vs psoriatic arthritis : rash flaring since November,thinks it is eczema,but unclear  Viral arthritis  Reactive arthritis    1. Polyarthralgia        New problem     Plan    Work up : RA panel,inflammatory  markers,HLAB27,uric acid    Xrays : hands,feet,ankles and wrists,knees look for arthritic changes to suggest RA vs psoriatic arthritis    Prednisone 20 mg bid for few weeks and then taper  No NSAIDs while on prednisone     Call and discuss labs and initiate DMARDs in 2 days if needed     Ailyn was seen today for pain.    Diagnoses and all orders for this visit:    Polyarthralgia  -     TAQUERIA; Future  -     Hepatitis B surface antibody; Future  -     Hepatitis B core antibody, IgM; Future  -     Hepatitis B surface antigen; Future  -     HLA B27 Antigen; Future  -     C-reactive protein; Future  -     CBC w/ Auto Differential; Future  -     Comprehensive metabolic panel; Future  -     Rheumatoid factor; Future  -     Sedimentation rate, manual; Future  -     Protein electrophoresis, serum; Future  -     Uric acid; Future  -     Vitamin D 25 hydroxy; Future  -     TSH; Future  -     Cyclic citrul peptide antibody, IgG - ACPA; Future  -     X-Ray Hand 3 View Bilateral; Future  -     X-Ray Foot Complete Bilateral; Future  -     X-Ray Ankle Complete Bilateral; Future  -     X-Ray Knee 3 View Bilateral; Future  -     CK; Future  -     Aldolase; Future    Other orders  -     Discontinue: predniSONE (DELTASONE) 10 MG tablet; 20 mg bid for 2 weeks,10 mg bid for 2 weeks  -     predniSONE (DELTASONE) 10 MG tablet; 20 mg bid for 2 weeks,10 mg bid for 2 weeks        Medication changes made  Refilled medications  Toxicity issues discussed    Old records reviewed and summarised  Records are from       Follow up in     Notes will be sent to PCP    Preventive medicine

## 2018-01-23 NOTE — LETTER
January 24, 2018      Lewis Lloyd MD  [address]  [place]  [place] [address]           [facility] - Rheumatology  [address]  [facility] [address]  Phone: [phone]  Fax: [phone]          Patient: Ailyn Ortiz   MR Number: [id number]   YOB: 1965   Date of Visit: 1/23/2018       Dear Dr. Lewis Lloyd:    Thank you for referring Ailyn Ortiz to me for evaluation. Attached you will find relevant portions of my assessment and plan of care.    If you have questions, please do not hesitate to call me. I look forward to following Ailyn Ortiz along with you.    Sincerely,    Brodie Fulton MD    Enclosure  CC:  No Recipients    If you would like to receive this communication electronically, please contact [email or web address] or [phone] to request more information on [employer] Link access.    For providers and/or their staff who would like to refer a patient to [facility], please contact us through our one-stop-shop provider referral line, [facility] [place], at [phone].    If you feel you have received this communication in error or would no longer like to receive these types of communications, please e-mail [email or web address]

## 2018-01-23 NOTE — PHYSICIAN QUERY
"PT Name: Ailyn Ortiz  MR #: [id number]    Physician Query Form - Pathology Findings Clarification     Cassie Batista RN, CCDS  Contact Info: [phone] [email or web address]      This form is a permanent document in the medical record.     Query Date: January 23, 2018      By submitting this query, we are merely seeking further clarification of documentation.  Please utilize your independent clinical judgment when addressing the question(s) below.      The medical record contains the following:     Findings Supporting Clinical Information Location in Medical Record   FINAL PATHOLOGIC DIAGNOSIS    Small bowel, resection:    - Benign submucosal lipoma with chronic reactive changes and ulceration.    - Surgical margin with no significant histologic abnormality.    - Negative for dysplasia and malignancy.    Per Pathology Results 1/8/2018 DIAGNOSIS:  Small-bowel mass.   PROCEDURE PERFORMED:  Laparoscopic excision of small-bowel mass with small-bowel  resection.     Op Note 1/108/2018     Please document the clinical significance of the Pathologists findings of "Benign submucosal lipoma".          [  x] I agree with the Pathology Findings        [  ] I do not agree with the Pathology Findings        [  ] Clinically Insignificant        [  ] Clinically Undetermined        [  ] Other/Clarification of Findings: ______________________________________________    Please document in your progress notes daily for the duration of treatment until resolved and include in your discharge summary.                   "

## 2018-01-23 NOTE — PROGRESS NOTES
"Ailyn Ortiz is a 52 y.o. female patient.   1. Postop check      Past Medical History:   Diagnosis Date    Asthma     Diabetes mellitus, type 2 since the age of 42    Hypertension since the age of 42    JESSICA (obstructive sleep apnea)     S/P UPP    Thyroid disease      No past surgical history pertinent negatives on file.  Scheduled Meds:  Continuous Infusions:  PRN Meds:    Review of patient's allergies indicates:   Allergen Reactions    Milk containing products      Congestion      Nuts [tree nut]     Avelox [moxifloxacin] Rash     There are no hospital problems to display for this patient.    Blood pressure 130/72, pulse 101, temperature 98 °F (36.7 °C), height 5' 2" (1.575 m), weight 99.4 kg (219 lb 2.2 oz).    Subjective S/p small bowel resection 1/8/18.  She is having occasional cramping and otherwise no postop pain.  The bloating, nausea and vomiting she had prior to surgery is resolved but she continues to have rlq pain.  Objective Abdomen benign, wounds clear, path is lipoma.   Assessment & Plan Doing well after surgery.  Regular duty and rtc prn.       Stone Mei MD  1/23/2018  "

## 2018-01-24 RX ORDER — ERGOCALCIFEROL 1.25 MG/1
50000 CAPSULE ORAL
Qty: 12 CAPSULE | Refills: 0 | Status: SHIPPED | OUTPATIENT
Start: 2018-01-24 | End: 2018-04-12

## 2018-01-24 NOTE — PROGRESS NOTES
Labs : very high ESR,CRP  Low vit d  Low K  Anemia  plt high    Called her : told her : eat potassium rich foods  Sent vit d    Prednisone is helping,so this is an inflammatory process  CCP negative    She says she has new blisters on her hands  She will call derm today

## 2018-01-26 ENCOUNTER — DOCUMENTATION ONLY (OUTPATIENT)
Dept: RHEUMATOLOGY | Facility: CLINIC | Age: 53
End: 2018-01-26

## 2018-01-26 ENCOUNTER — PATIENT MESSAGE (OUTPATIENT)
Dept: RHEUMATOLOGY | Facility: CLINIC | Age: 53
End: 2018-01-26

## 2018-01-26 NOTE — PROGRESS NOTES
Patient responding to steroids  RA panel negative  Told her 20 mg bid prednisone for 2 weeks 10 mg bid prednisone for 2 weeks and then come back in 4 weeks

## 2018-02-27 ENCOUNTER — OFFICE VISIT (OUTPATIENT)
Dept: RHEUMATOLOGY | Facility: CLINIC | Age: 53
End: 2018-02-27
Payer: COMMERCIAL

## 2018-02-27 VITALS
HEIGHT: 62 IN | WEIGHT: 211.38 LBS | SYSTOLIC BLOOD PRESSURE: 134 MMHG | BODY MASS INDEX: 38.9 KG/M2 | DIASTOLIC BLOOD PRESSURE: 86 MMHG | HEART RATE: 93 BPM

## 2018-02-27 DIAGNOSIS — M19.90 INFLAMMATORY ARTHRITIS: Primary | ICD-10-CM

## 2018-02-27 PROCEDURE — 99214 OFFICE O/P EST MOD 30 MIN: CPT | Mod: S$GLB,,, | Performed by: INTERNAL MEDICINE

## 2018-02-27 PROCEDURE — 3008F BODY MASS INDEX DOCD: CPT | Mod: S$GLB,,, | Performed by: INTERNAL MEDICINE

## 2018-02-27 PROCEDURE — 99999 PR PBB SHADOW E&M-EST. PATIENT-LVL IV: CPT | Mod: PBBFAC,,, | Performed by: INTERNAL MEDICINE

## 2018-02-27 RX ORDER — METHOTREXATE 2.5 MG/1
TABLET ORAL
Qty: 25 TABLET | Refills: 3 | Status: SHIPPED | OUTPATIENT
Start: 2018-02-27 | End: 2018-07-04 | Stop reason: SDUPTHER

## 2018-02-27 RX ORDER — PREDNISONE 1 MG/1
TABLET ORAL
Qty: 120 TABLET | Refills: 2 | Status: SHIPPED | OUTPATIENT
Start: 2018-02-27 | End: 2018-06-19

## 2018-02-27 RX ORDER — PREDNISONE 5 MG/1
TABLET ORAL
Qty: 30 TABLET | Refills: 2 | Status: SHIPPED | OUTPATIENT
Start: 2018-02-27 | End: 2018-06-19

## 2018-02-27 RX ORDER — FOLIC ACID 1 MG/1
1 TABLET ORAL DAILY
Qty: 30 TABLET | Refills: 3 | Status: SHIPPED | OUTPATIENT
Start: 2018-02-27 | End: 2018-07-05 | Stop reason: SDUPTHER

## 2018-02-27 ASSESSMENT — ROUTINE ASSESSMENT OF PATIENT INDEX DATA (RAPID3)
MDHAQ FUNCTION SCORE: 0
AM STIFFNESS SCORE: 0, NO
FATIGUE SCORE: 0
TOTAL RAPID3 SCORE: 0
PSYCHOLOGICAL DISTRESS SCORE: 0
PATIENT GLOBAL ASSESSMENT SCORE: 0
PAIN SCORE: 0

## 2018-02-27 NOTE — PROGRESS NOTES
Chief Complaint   Patient presents with    Appointment       Patient is here for a follow up    History of presenting illness    Labs     CBC : H/H 11.2/34.9  Plts 500 k/mcl  CMP : Low potassium,low albumin,normal liver and kidneys    TAQUERIA negative  RF,CCP negative  Hepatitis b and c negative  HLAB27 negative    CRP 96.9  ESR 92    SPEP no M spike    Uric acid 2.2    Vit d 8,started on supplements  TSH normal    Ck,aldolase normal    Xrays    Hands degenerative changes  Ankles degenerative changes and bone spur  Knees degenerative changes  Feet : degenerative changes     She has done well on prednisone 20 mg bid   She tried to taper the dose and she says she could go down to 10 mg daily but further down she started to experience joint pain,swelling and stiffness  She is on 10 mg daily prednisone    52 year old white female presented with the following symptoms :    December around Christmas she had the upper respiratory tract symptoms with myalgia and nasal congestion  Following this she started to experience severe joint pain  All her joints in the hands,elbows,knees,feet started to hurt and swell  EMS for 1 hour  Alleve helps minimally    Hands : thumbs and index fingers and the 3rd digits hurt the worse  She cant make a fist  Thinks that the left 2nd finger swells like a sausage  Swelling along the tendons as per her PCP,she says he thought she has tenosynovitis     Hips and shoulders ache but no stiffness,good ROM in them    Cold weather makes her symptoms worse     Claims to have had labs : she had ESR,CRP elevation apparently     Past history: asthma,diabetes,JESSICA,thyroid disease,HTN  Occasional tendinitis feet b/l    Has a rash every winter,dermatology thinks it is eczema  This time started in November on the elbows,back,torso,legs,didnt get better,but derm suggested stronger steroids  Has not been told to have psoriasis     Recently had small bowel resection for lipoma    Family history : psoriasis and  psoriatic arthritis runs in the family     Social history : quit smoking a while ago,drinks occasionally     Review of Systems   Constitutional: Negative for activity change, appetite change, chills, diaphoresis, fatigue, fever and unexpected weight change.   HENT: Negative for congestion, dental problem, drooling, ear discharge, ear pain, facial swelling, hearing loss, mouth sores, nosebleeds, postnasal drip, rhinorrhea, sinus pain, sinus pressure, sneezing, sore throat, tinnitus, trouble swallowing and voice change.    Eyes: Negative for photophobia, pain, discharge, redness, itching and visual disturbance.   Respiratory: Negative for apnea, cough, choking, chest tightness, shortness of breath, wheezing and stridor.    Cardiovascular: Negative for chest pain, palpitations and leg swelling.   Gastrointestinal: Negative for abdominal distention, abdominal pain, anal bleeding, blood in stool, constipation, diarrhea, nausea, rectal pain and vomiting.   Endocrine: Negative for cold intolerance, heat intolerance, polydipsia, polyphagia and polyuria.   Genitourinary: Negative for decreased urine volume, difficulty urinating, dysuria, enuresis, flank pain, frequency, genital sores, hematuria and urgency.   Musculoskeletal: Negative for back pain, gait problem, myalgias, neck pain and neck stiffness.   Skin: Negative for color change, pallor and wound.   Allergic/Immunologic: Negative for environmental allergies, food allergies and immunocompromised state.   Neurological: Negative for dizziness, tremors, seizures, syncope, facial asymmetry, speech difficulty, weakness, light-headedness, numbness and headaches.   Hematological: Negative for adenopathy. Does not bruise/bleed easily.   Psychiatric/Behavioral: Negative for agitation, behavioral problems, confusion, decreased concentration, dysphoric mood, hallucinations, self-injury, sleep disturbance and suicidal ideas. The patient is not nervous/anxious and is not  hyperactive.       Has dry eyes  Vision has been blurry  Has blood shot eyes    Physical Exam     MARTE-28 tender joint count: 0  MARTE-28 swollen joint count: 0       Physical Exam   Constitutional: She is oriented to person, place, and time and well-developed, well-nourished, and in no distress. No distress.   HENT:   Head: Normocephalic.   Mouth/Throat: Oropharynx is clear and moist.   Eyes: Conjunctivae are normal. Pupils are equal, round, and reactive to light. Right eye exhibits no discharge. Left eye exhibits no discharge. No scleral icterus.   Neck: Normal range of motion. No thyromegaly present.   Cardiovascular: Normal rate, regular rhythm, normal heart sounds and intact distal pulses.    Pulmonary/Chest: Effort normal and breath sounds normal. No stridor.   Abdominal: Soft. Bowel sounds are normal.   Lymphadenopathy:     She has no cervical adenopathy.   Neurological: She is alert and oriented to person, place, and time.   Skin: Skin is warm. No rash noted. She is not diaphoretic.     Psychiatric: Affect and judgment normal.   Musculoskeletal: She exhibits tenderness.         Assessment     52 year old white female presented to me with 1 month of polyarthralgias in the hands,elbows,knees and feet : s/p URT symptoms during [facility] : is also s/p bowel resection for a lipoma : reported swelling in addition to pain and stiffness in her joints : significant EMS : symmetric involvement : in addition reported to having dactylitis   On exam : synovitis noted in many joints,no evidence of dactylitis.    She has inflammatory arthritis,seronegative,unclear yet if she has psoriatic arthritis since the rash might be eczema vs psoriasis  Reactive arthritis possible too    Labs revealed very high inflammatory markers with negative RA and TAQUERIA and HLAB27 panel  Xrays with degenerative arthritis     Has done well on prednisone,but needs 10 mg daily prednisone  Seems like she needs a steroid sparing agent    1. Inflammatory  arthritis          Plan    Discussed initiating mtx 5 tabs weekly with folic acid  Common side effects of mtx discussed  Bone marrow suppression,aplastic anemia,pancytopenia  No renal dysfunction,ascites and pleural effusions limiting clearance of the drug  Hepatotoxicity,fibrosis and cirrhosis  Lung disease,cardiac issues like chest pain,thrombosis,effusions,pericarditis  Nausea,vomiting,diarrhea,ulcerative stomatitis, hemorrhagic enteritis and death from intestinal perforation   Malignant lymphomas  Rash,alopecia  Opportunistic infections like PCP pneumonia  Dizziness,headaches,cognitive issues    Taper prednisone slowly,1 mg a week only  She does mention that every time she tapered the dose she had a flare and also had muscle pains    CXR today  ID referral,tb/hiv/rpr ordered    Need for mtx labs discussed     Ailyn was seen today for appointment.    Diagnoses and all orders for this visit:    Inflammatory arthritis  -     HIV-1 and HIV-2 antibodies; Future  -     Quantiferon Gold TB; Future  -     RPR; Future  -     Ambulatory referral to Rheumatology  -     Ambulatory referral to Infectious Disease  -     X-Ray Chest PA And Lateral; Future    Other orders  -     methotrexate 2.5 MG Tab; 5 tabs weekly  -     folic acid (FOLVITE) 1 MG tablet; Take 1 tablet (1 mg total) by mouth once daily.  -     predniSONE (DELTASONE) 5 MG tablet; 9 mg for a week 8 for a week 7 for a week and so on down to 0  -     predniSONE (DELTASONE) 1 MG tablet; 9 mg for a week 8 for a week 7 for a week and so on down to 0      rtc in 2 months

## 2018-03-01 ENCOUNTER — HOSPITAL ENCOUNTER (OUTPATIENT)
Dept: RADIOLOGY | Facility: HOSPITAL | Age: 53
Discharge: HOME | End: 2018-03-01
Attending: INTERNAL MEDICINE
Payer: COMMERCIAL

## 2018-03-01 DIAGNOSIS — M19.90 INFLAMMATORY ARTHRITIS: ICD-10-CM

## 2018-03-01 PROCEDURE — 71046 X-RAY EXAM CHEST 2 VIEWS: CPT | Mod: 26,,, | Performed by: RADIOLOGY

## 2018-03-01 PROCEDURE — 71046 X-RAY EXAM CHEST 2 VIEWS: CPT | Mod: TC,FY

## 2018-03-06 ENCOUNTER — CLINICAL SUPPORT (OUTPATIENT)
Dept: INFECTIOUS DISEASES | Facility: CLINIC | Age: 53
End: 2018-03-06
Payer: COMMERCIAL

## 2018-03-06 ENCOUNTER — OFFICE VISIT (OUTPATIENT)
Dept: INFECTIOUS DISEASES | Facility: CLINIC | Age: 53
End: 2018-03-06
Payer: COMMERCIAL

## 2018-03-06 VITALS
HEART RATE: 89 BPM | WEIGHT: 213.63 LBS | BODY MASS INDEX: 39.31 KG/M2 | HEIGHT: 62 IN | DIASTOLIC BLOOD PRESSURE: 89 MMHG | SYSTOLIC BLOOD PRESSURE: 138 MMHG | TEMPERATURE: 98 F

## 2018-03-06 DIAGNOSIS — M19.90 INFLAMMATORY ARTHRITIS: ICD-10-CM

## 2018-03-06 DIAGNOSIS — Z23 NEED FOR VACCINATION FOR DTAP: ICD-10-CM

## 2018-03-06 DIAGNOSIS — Z23 NEED FOR VACCINATION: ICD-10-CM

## 2018-03-06 DIAGNOSIS — Z84.0 FAMILY HISTORY OF PSORIATIC ARTHRITIS: ICD-10-CM

## 2018-03-06 DIAGNOSIS — Z23 NEED FOR VACCINATION AGAINST HEPATITIS B VIRUS: ICD-10-CM

## 2018-03-06 PROCEDURE — 90715 TDAP VACCINE 7 YRS/> IM: CPT | Mod: S$GLB,,, | Performed by: INTERNAL MEDICINE

## 2018-03-06 PROCEDURE — 99203 OFFICE O/P NEW LOW 30 MIN: CPT | Mod: 25,S$GLB,, | Performed by: INTERNAL MEDICINE

## 2018-03-06 PROCEDURE — 3079F DIAST BP 80-89 MM HG: CPT | Mod: S$GLB,,, | Performed by: INTERNAL MEDICINE

## 2018-03-06 PROCEDURE — 90472 IMMUNIZATION ADMIN EACH ADD: CPT | Mod: S$GLB,,, | Performed by: INTERNAL MEDICINE

## 2018-03-06 PROCEDURE — 99999 PR PBB SHADOW E&M-EST. PATIENT-LVL III: CPT | Mod: PBBFAC,,, | Performed by: INTERNAL MEDICINE

## 2018-03-06 PROCEDURE — 3075F SYST BP GE 130 - 139MM HG: CPT | Mod: S$GLB,,, | Performed by: INTERNAL MEDICINE

## 2018-03-06 PROCEDURE — 90636 HEP A/HEP B VACC ADULT IM: CPT | Mod: S$GLB,,, | Performed by: INTERNAL MEDICINE

## 2018-03-06 PROCEDURE — 90471 IMMUNIZATION ADMIN: CPT | Mod: S$GLB,,, | Performed by: INTERNAL MEDICINE

## 2018-03-06 NOTE — PROGRESS NOTES
Subjective:      Patient ID: Ailyn Ortiz is a 52 y.o. female.    Chief Complaint:No chief complaint on file.      History of Present Illness    52-year-old female with inflammatory arthritis.  She is waiting to start methotrexate in conjunction with her prednisone.  She is sent here for evaluation for immunomodulator treatment.  She began having myalgias and joint pain after Antony - both hands elbows and knees.  She has a family history of psoriasis and psoriatic arthritis.  She was placed on methotrexate on February 27.  Her immunizations are not available for review in Epic.  On review of her laboratories she has extremely low vitamin D, ESR 92, CRP 96.9, negative hepatitis B, HIV, RPR, TAQUERIA, HLA-B27.  QuantiFERON gold is pending.Works for [employer] [employer]. Last TB test in January was negative.    Vaccinated for HBV 24 years ago - not effective.  Overdue for tetanus  Never vaccinated for shingles  Had mumps, chicken pox as child.    Always has diarrhea - thinks it is from food. Trying to control sugars on steroids.    Review of Systems   Constitution: Negative for chills, decreased appetite, fever, weakness, malaise/fatigue, night sweats, weight gain and weight loss.   HENT: Negative for congestion, ear pain, hearing loss, hoarse voice, sore throat and tinnitus.    Eyes: Negative for blurred vision, redness and visual disturbance.   Cardiovascular: Negative for chest pain, leg swelling and palpitations.   Respiratory: Negative for cough, hemoptysis, shortness of breath and sputum production.    Hematologic/Lymphatic: Negative for adenopathy. Does not bruise/bleed easily.   Skin: Negative for dry skin, itching, rash and suspicious lesions.   Musculoskeletal: Negative for back pain, joint pain, myalgias and neck pain.   Gastrointestinal: Positive for diarrhea. Negative for abdominal pain, constipation, heartburn, nausea and vomiting.   Genitourinary: Negative for dysuria, flank pain, frequency,  hematuria, hesitancy and urgency.   Neurological: Negative for dizziness, headaches, numbness and paresthesias.   Psychiatric/Behavioral: Negative for depression and memory loss. The patient does not have insomnia and is not nervous/anxious.      Objective:   Physical Exam   Constitutional: She is oriented to person, place, and time. She appears well-developed and well-nourished.   HENT:   Head: Normocephalic and atraumatic.   Eyes: Conjunctivae and EOM are normal. Pupils are equal, round, and reactive to light.   Neck: Normal range of motion. No thyromegaly present.   Cardiovascular: Normal rate, regular rhythm and normal heart sounds.    Pulmonary/Chest: Effort normal and breath sounds normal.   Abdominal: Soft. Bowel sounds are normal.   Musculoskeletal: Normal range of motion. She exhibits no edema.   Neurological: She is alert and oriented to person, place, and time.   Skin: Skin is warm and dry. No rash noted.   Nursing note and vitals reviewed.    Assessment:       1. Inflammatory arthritis    2. Family history of psoriatic arthritis    3. Need for vaccination against hepatitis B virus    4. Need for vaccination for DTaP    5. Need for vaccination          Plan:       Discussed need for vaccinations. Explained food precautions. Discussed need for shingles vaccine when available.  Patient will start MTX today. She will receive Twinrix, Tdap today. Encouraged patient to try elimination diet while symptoms are controlled.  I spent over 50% of a 30 minute encounter counseling the patient.

## 2018-03-06 NOTE — LETTER
March 6, 2018      Brodie Fulton MD  [address] [facility] [address]           [facility] - Infectious Diseases  [address]  [place] [address]  Phone: [phone]  Fax: [phone]          Patient: Ailyn Ortiz   MR Number: [id number]   YOB: 1965   Date of Visit: 3/6/2018       Dear Dr. Brodie Fulton:    Thank you for referring Ailyn Ortiz to me for evaluation. Attached you will find relevant portions of my assessment and plan of care.    If you have questions, please do not hesitate to call me. I look forward to following Ailyn Ortiz along with you.    Sincerely,    José Antonio Orozco MD    Enclosure  CC:  No Recipients    If you would like to receive this communication electronically, please contact [email or web address] or [phone] to request more information on [employer] Link access.    For providers and/or their staff who would like to refer a patient to [facility], please contact us through our one-stop-shop provider referral line, [facility], at [phone].    If you feel you have received this communication in error or would no longer like to receive these types of communications, please e-mail [email or web address]

## 2018-03-06 NOTE — PROGRESS NOTES
Pt received the Tdap and Hepatitis A/B vaccination. Return appts provided. Pt tolerated the injections well. Pt left the unit in NAD.

## 2018-04-03 ENCOUNTER — CLINICAL SUPPORT (OUTPATIENT)
Dept: INFECTIOUS DISEASES | Facility: CLINIC | Age: 53
End: 2018-04-03
Payer: COMMERCIAL

## 2018-04-03 PROCEDURE — 90471 IMMUNIZATION ADMIN: CPT | Mod: S$GLB,,, | Performed by: INTERNAL MEDICINE

## 2018-04-03 PROCEDURE — 90636 HEP A/HEP B VACC ADULT IM: CPT | Mod: S$GLB,,, | Performed by: INTERNAL MEDICINE

## 2018-04-03 NOTE — PROGRESS NOTES
Pt received the second dose of her Hepatitis A/B vaccination. Pt tolerated the injection well. Pt left the unit in NAD.

## 2018-04-26 ENCOUNTER — OFFICE VISIT (OUTPATIENT)
Dept: RHEUMATOLOGY | Facility: CLINIC | Age: 53
End: 2018-04-26
Payer: COMMERCIAL

## 2018-04-26 ENCOUNTER — PATIENT MESSAGE (OUTPATIENT)
Dept: RHEUMATOLOGY | Facility: CLINIC | Age: 53
End: 2018-04-26

## 2018-04-26 ENCOUNTER — LAB VISIT (OUTPATIENT)
Dept: LAB | Facility: HOSPITAL | Age: 53
End: 2018-04-26
Attending: INTERNAL MEDICINE
Payer: COMMERCIAL

## 2018-04-26 VITALS
SYSTOLIC BLOOD PRESSURE: 115 MMHG | HEART RATE: 88 BPM | BODY MASS INDEX: 39.56 KG/M2 | HEIGHT: 62 IN | DIASTOLIC BLOOD PRESSURE: 79 MMHG | WEIGHT: 215 LBS

## 2018-04-26 DIAGNOSIS — M19.90 INFLAMMATORY ARTHRITIS: Primary | ICD-10-CM

## 2018-04-26 DIAGNOSIS — M19.90 INFLAMMATORY ARTHRITIS: ICD-10-CM

## 2018-04-26 LAB
25(OH)D3+25(OH)D2 SERPL-MCNC: 24 NG/ML
ALBUMIN SERPL BCP-MCNC: 4.3 G/DL
ALP SERPL-CCNC: 105 U/L
ALT SERPL W/O P-5'-P-CCNC: 26 U/L
ANION GAP SERPL CALC-SCNC: 13 MMOL/L
AST SERPL-CCNC: 20 U/L
BASOPHILS # BLD AUTO: 0.06 K/UL
BASOPHILS NFR BLD: 0.8 %
BILIRUB SERPL-MCNC: 1 MG/DL
BUN SERPL-MCNC: 16 MG/DL
CALCIUM SERPL-MCNC: 10.5 MG/DL
CHLORIDE SERPL-SCNC: 99 MMOL/L
CO2 SERPL-SCNC: 28 MMOL/L
CREAT SERPL-MCNC: 0.9 MG/DL
CRP SERPL-MCNC: 3.2 MG/L
DIFFERENTIAL METHOD: ABNORMAL
EOSINOPHIL # BLD AUTO: 0.2 K/UL
EOSINOPHIL NFR BLD: 2.5 %
ERYTHROCYTE [DISTWIDTH] IN BLOOD BY AUTOMATED COUNT: 15.2 %
ERYTHROCYTE [SEDIMENTATION RATE] IN BLOOD BY WESTERGREN METHOD: 39 MM/HR
EST. GFR  (AFRICAN AMERICAN): >60 ML/MIN/1.73 M^2
EST. GFR  (NON AFRICAN AMERICAN): >60 ML/MIN/1.73 M^2
GLUCOSE SERPL-MCNC: 120 MG/DL
HCT VFR BLD AUTO: 43.5 %
HGB BLD-MCNC: 14 G/DL
IMM GRANULOCYTES # BLD AUTO: 0.02 K/UL
IMM GRANULOCYTES NFR BLD AUTO: 0.3 %
LYMPHOCYTES # BLD AUTO: 1.1 K/UL
LYMPHOCYTES NFR BLD: 14.3 %
MCH RBC QN AUTO: 27.9 PG
MCHC RBC AUTO-ENTMCNC: 32.2 G/DL
MCV RBC AUTO: 87 FL
MONOCYTES # BLD AUTO: 0.6 K/UL
MONOCYTES NFR BLD: 8.3 %
NEUTROPHILS # BLD AUTO: 5.5 K/UL
NEUTROPHILS NFR BLD: 73.8 %
NRBC BLD-RTO: 0 /100 WBC
PLATELET # BLD AUTO: 353 K/UL
PMV BLD AUTO: 8.7 FL
POTASSIUM SERPL-SCNC: 3.9 MMOL/L
PROT SERPL-MCNC: 7.8 G/DL
RBC # BLD AUTO: 5.01 M/UL
SODIUM SERPL-SCNC: 140 MMOL/L
WBC # BLD AUTO: 7.46 K/UL

## 2018-04-26 PROCEDURE — 3074F SYST BP LT 130 MM HG: CPT | Mod: CPTII,S$GLB,, | Performed by: INTERNAL MEDICINE

## 2018-04-26 PROCEDURE — 85651 RBC SED RATE NONAUTOMATED: CPT

## 2018-04-26 PROCEDURE — 85025 COMPLETE CBC W/AUTO DIFF WBC: CPT

## 2018-04-26 PROCEDURE — 80053 COMPREHEN METABOLIC PANEL: CPT

## 2018-04-26 PROCEDURE — 99999 PR PBB SHADOW E&M-EST. PATIENT-LVL III: CPT | Mod: PBBFAC,,, | Performed by: INTERNAL MEDICINE

## 2018-04-26 PROCEDURE — 99214 OFFICE O/P EST MOD 30 MIN: CPT | Mod: S$GLB,,, | Performed by: INTERNAL MEDICINE

## 2018-04-26 PROCEDURE — 3078F DIAST BP <80 MM HG: CPT | Mod: CPTII,S$GLB,, | Performed by: INTERNAL MEDICINE

## 2018-04-26 PROCEDURE — 86140 C-REACTIVE PROTEIN: CPT

## 2018-04-26 PROCEDURE — 36415 COLL VENOUS BLD VENIPUNCTURE: CPT

## 2018-04-26 PROCEDURE — 82306 VITAMIN D 25 HYDROXY: CPT

## 2018-04-26 RX ORDER — ERGOCALCIFEROL 1.25 MG/1
50000 CAPSULE ORAL
Qty: 12 CAPSULE | Refills: 0 | Status: SHIPPED | OUTPATIENT
Start: 2018-04-26 | End: 2018-07-26 | Stop reason: SDUPTHER

## 2018-04-26 ASSESSMENT — ROUTINE ASSESSMENT OF PATIENT INDEX DATA (RAPID3)
PSYCHOLOGICAL DISTRESS SCORE: 1.1
FATIGUE SCORE: 0
AM STIFFNESS SCORE: 0, NO
MDHAQ FUNCTION SCORE: 0
PAIN SCORE: 0
TOTAL RAPID3 SCORE: 1.17
PATIENT GLOBAL ASSESSMENT SCORE: 3.5

## 2018-04-26 NOTE — PROGRESS NOTES
Chief Complaint   Patient presents with    Disease Management     rheumatoid arthritis       Patient is here for a follow up    History of presenting illness    52 year old female with seronegative rheumatoid arthritis for a follow up  She has done well with no main complaints     She was on 20 mg prednisone bid at one point  Now down to 2 mg prednisone  On mtx 5 tabs weekly  She says she is doing better    Right wrist swelling didn't get better  It doesn't get larger  Computer work makes the hand fatigued    Dealing with some GI issues will get EGD soon    Labs     CBC : H/H 11.2/34.9  Plts 500 k/mcl  CMP : Low potassium,low albumin,normal liver and kidneys    TAQUERIA negative  RF,CCP negative  Hepatitis b and c negative  HLAB27 negative    CRP 96.9  ESR 92    SPEP no M spike    Uric acid 2.2    Vit d 8,started on supplements  TSH normal    Ck,aldolase normal    Xrays    Hands degenerative changes  Ankles degenerative changes and bone spur  Knees degenerative changes  Feet : degenerative changes     ID clearance done  Doing hepatitis A and B vaccine  Waiting for shingrix vaccine    52 year old white female presented with the following symptoms :    December around Christmas she had the upper respiratory tract symptoms with myalgia and nasal congestion  Following this she started to experience severe joint pain  All her joints in the hands,elbows,knees,feet started to hurt and swell  EMS for 1 hour  Alleve helps minimally    Hands : thumbs and index fingers and the 3rd digits hurt the worse  She cant make a fist  Thinks that the left 2nd finger swells like a sausage  Swelling along the tendons as per her PCP,she says he thought she has tenosynovitis     Hips and shoulders ache but no stiffness,good ROM in them    Cold weather makes her symptoms worse     Claims to have had labs : she had ESR,CRP elevation apparently     Past history: asthma,diabetes,JESSICA,thyroid disease,HTN  Occasional tendinitis feet b/l    Has a rash  every winter,dermatology thinks it is eczema  This time started in November on the elbows,back,torso,legs,didnt get better,but derm suggested stronger steroids  Has not been told to have psoriasis     Recently had small bowel resection for lipoma    Family history : psoriasis and psoriatic arthritis runs in the family     Social history : quit smoking a while ago,drinks occasionally     Review of Systems   Constitutional: Negative for activity change, appetite change, chills, diaphoresis, fatigue, fever and unexpected weight change.   HENT: Negative for congestion, dental problem, drooling, ear discharge, ear pain, facial swelling, hearing loss, mouth sores, nosebleeds, postnasal drip, rhinorrhea, sinus pain, sinus pressure, sneezing, sore throat, tinnitus, trouble swallowing and voice change.    Eyes: Negative for photophobia, pain, discharge, redness, itching and visual disturbance.   Respiratory: Negative for apnea, cough, choking, chest tightness, shortness of breath, wheezing and stridor.    Cardiovascular: Negative for chest pain, palpitations and leg swelling.   Gastrointestinal: Negative for abdominal distention, abdominal pain, anal bleeding, blood in stool, constipation, diarrhea, nausea, rectal pain and vomiting.   Endocrine: Negative for cold intolerance, heat intolerance, polydipsia, polyphagia and polyuria.   Genitourinary: Negative for decreased urine volume, difficulty urinating, dysuria, enuresis, flank pain, frequency, genital sores, hematuria and urgency.   Musculoskeletal: Negative for back pain, gait problem, myalgias, neck pain and neck stiffness.   Skin: Negative for color change, pallor and wound.   Allergic/Immunologic: Negative for environmental allergies, food allergies and immunocompromised state.   Neurological: Negative for dizziness, tremors, seizures, syncope, facial asymmetry, speech difficulty, weakness, light-headedness, numbness and headaches.   Hematological: Negative for  adenopathy. Does not bruise/bleed easily.   Psychiatric/Behavioral: Negative for agitation, behavioral problems, confusion, decreased concentration, dysphoric mood, hallucinations, self-injury, sleep disturbance and suicidal ideas. The patient is not nervous/anxious and is not hyperactive.       Has dry eyes  Vision has been blurry  Has blood shot eyes    Physical Exam     MARTE-28 tender joint count: 0  MARTE-28 swollen joint count: 0     Right wrist has a huge swelling which has clear borders,harder than soft     Physical Exam   Constitutional: She is oriented to person, place, and time and well-developed, well-nourished, and in no distress. No distress.   HENT:   Head: Normocephalic.   Mouth/Throat: Oropharynx is clear and moist.   Eyes: Conjunctivae are normal. Pupils are equal, round, and reactive to light. Right eye exhibits no discharge. Left eye exhibits no discharge. No scleral icterus.   Neck: Normal range of motion. No thyromegaly present.   Cardiovascular: Normal rate, regular rhythm, normal heart sounds and intact distal pulses.    Pulmonary/Chest: Effort normal and breath sounds normal. No stridor.   Abdominal: Soft. Bowel sounds are normal.   Lymphadenopathy:     She has no cervical adenopathy.   Neurological: She is alert and oriented to person, place, and time.   Skin: Skin is warm. No rash noted. She is not diaphoretic.     Psychiatric: Affect and judgment normal.   Musculoskeletal: She exhibits tenderness.         Assessment     52 year old white female presented to me with 1 month of polyarthralgias in the hands,elbows,knees and feet : s/p URT symptoms during Antony : is also s/p bowel resection for a lipoma : reported swelling in addition to pain and stiffness in her joints : significant EMS : symmetric involvement : in addition reported to having dactylitis   On exam : synovitis noted in many joints,no evidence of dactylitis.    She has inflammatory arthritis,seronegative,unclear yet if she has  psoriatic arthritis since the rash might be eczema vs psoriasis  Reactive arthritis possible too    Labs revealed very high inflammatory markers with negative RA and TAQUERIA and HLAB27 panel  Xrays with degenerative arthritis     She did well on prednisone  I then introduced mtx 5 tabs weekly and tapered her prednisone   She is down to 2 mg prednisone  She has done really well     1. Inflammatory arthritis          Plan    Will continue mtx 5 tabs weekly with folic acid     mtx labs today    Taper off prednisone completely    ID follow ups for the hepatitis and shingrix vaccines    Vit d follow ups  Labs today  She took 12 weeks of vit d    Right wrist MRI with/without for evaluation of the swelling     Ailyn was seen today for disease management.    Diagnoses and all orders for this visit:    Inflammatory arthritis  -     CBC auto differential; Future  -     Comprehensive metabolic panel; Future  -     Sedimentation rate, manual; Future  -     C-reactive protein; Standing  -     Vitamin D; Future  -     MRI Wrist Joint W WO Contrast Right; Future      rtc in 3 months  Refill requests updated

## 2018-05-10 ENCOUNTER — HOSPITAL ENCOUNTER (OUTPATIENT)
Dept: RADIOLOGY | Facility: HOSPITAL | Age: 53
Discharge: HOME | End: 2018-05-10
Attending: INTERNAL MEDICINE
Payer: COMMERCIAL

## 2018-05-10 DIAGNOSIS — M19.90 INFLAMMATORY ARTHRITIS: ICD-10-CM

## 2018-05-10 PROCEDURE — 73223 MRI JOINT UPR EXTR W/O&W/DYE: CPT | Mod: TC,RT

## 2018-05-10 PROCEDURE — A9585 GADOBUTROL INJECTION: HCPCS | Performed by: INTERNAL MEDICINE

## 2018-05-10 PROCEDURE — 25500020 PHARM REV CODE 255: Performed by: INTERNAL MEDICINE

## 2018-05-10 PROCEDURE — 73223 MRI JOINT UPR EXTR W/O&W/DYE: CPT | Mod: 26,RT,, | Performed by: RADIOLOGY

## 2018-05-10 RX ORDER — GADOBUTROL 604.72 MG/ML
10 INJECTION INTRAVENOUS
Status: COMPLETED | OUTPATIENT
Start: 2018-05-10 | End: 2018-05-10

## 2018-05-10 RX ADMIN — GADOBUTROL 10 ML: 604.72 INJECTION INTRAVENOUS at 12:05

## 2018-05-11 ENCOUNTER — PATIENT MESSAGE (OUTPATIENT)
Dept: RHEUMATOLOGY | Facility: CLINIC | Age: 53
End: 2018-05-11

## 2018-05-14 ENCOUNTER — DOCUMENTATION ONLY (OUTPATIENT)
Dept: RHEUMATOLOGY | Facility: CLINIC | Age: 53
End: 2018-05-14

## 2018-05-14 DIAGNOSIS — M25.431 SWELLING OF JOINT OF RIGHT WRIST: Primary | ICD-10-CM

## 2018-05-14 NOTE — PROGRESS NOTES
MRI wrist :     There is a masslike area of abnormal signal and enhancement within the dorsal subcutaneous fat of the wrist.  Diagnostic considerations include inflammatory process/cellulitis as well as neoplasm, primarily extra-abdominal desmoid tumor.    I will get her into ortho and see what their recommendations are

## 2018-06-12 ENCOUNTER — PATIENT MESSAGE (OUTPATIENT)
Dept: RHEUMATOLOGY | Facility: CLINIC | Age: 53
End: 2018-06-12

## 2018-06-13 RX ORDER — FOLIC ACID 1 MG/1
1 TABLET ORAL 3 TIMES DAILY
Qty: 90 TABLET | Refills: 3 | Status: SHIPPED | OUTPATIENT
Start: 2018-06-13 | End: 2018-12-06 | Stop reason: SDUPTHER

## 2018-06-19 ENCOUNTER — OFFICE VISIT (OUTPATIENT)
Dept: ORTHOPEDICS | Facility: CLINIC | Age: 53
End: 2018-06-19
Payer: COMMERCIAL

## 2018-06-19 VITALS
HEIGHT: 62 IN | WEIGHT: 215 LBS | DIASTOLIC BLOOD PRESSURE: 79 MMHG | HEART RATE: 83 BPM | SYSTOLIC BLOOD PRESSURE: 127 MMHG | BODY MASS INDEX: 39.56 KG/M2

## 2018-06-19 DIAGNOSIS — G56.03 BILATERAL CARPAL TUNNEL SYNDROME: Primary | ICD-10-CM

## 2018-06-19 PROCEDURE — 3008F BODY MASS INDEX DOCD: CPT | Mod: CPTII,S$GLB,, | Performed by: ORTHOPAEDIC SURGERY

## 2018-06-19 PROCEDURE — 99204 OFFICE O/P NEW MOD 45 MIN: CPT | Mod: S$GLB,,, | Performed by: ORTHOPAEDIC SURGERY

## 2018-06-19 PROCEDURE — 99999 PR PBB SHADOW E&M-EST. PATIENT-LVL III: CPT | Mod: PBBFAC,,, | Performed by: ORTHOPAEDIC SURGERY

## 2018-06-19 PROCEDURE — 3074F SYST BP LT 130 MM HG: CPT | Mod: CPTII,S$GLB,, | Performed by: ORTHOPAEDIC SURGERY

## 2018-06-19 PROCEDURE — 3078F DIAST BP <80 MM HG: CPT | Mod: CPTII,S$GLB,, | Performed by: ORTHOPAEDIC SURGERY

## 2018-06-19 NOTE — PROGRESS NOTES
Subjective:      Patient ID: Ailyn Ortiz is a 52 y.o. female.    Chief Complaint: Numbness of the Right Hand      HPI  Ailyn Ortiz is a right hand dominant 52 y.o. female presenting today for right wrist mass. Pt notes onset around 12/2017 of multiple areas of swollen joints in bilateral hands. She has been see rheumatology and was dx with seronegative RA. She is currently on Prednisone and MTX. She notes the areas of swelling and nodules in the hands have all resolved with the exception of the right dorsal wrist mass. Her rheumatologist obtained an MRI, results below. She has intermittent numbness/tingling.         Review of patient's allergies indicates:   Allergen Reactions    Milk containing products      Congestion      Nuts [tree nut]     Avelox [moxifloxacin] Rash         Current Outpatient Prescriptions   Medication Sig Dispense Refill    atorvastatin (LIPITOR) 20 MG tablet Take 20 mg by mouth once daily.       ergocalciferol (ERGOCALCIFEROL) 50,000 unit Cap Take 1 capsule (50,000 Units total) by mouth every 7 days. 12 capsule 0    folic acid (FOLVITE) 1 MG tablet Take 1 tablet (1 mg total) by mouth 3 (three) times daily. 90 tablet 3    glipiZIDE (GLUCOTROL) 5 MG TR24 Take 10 mg by mouth 2 (two) times daily.       INVOKANA 300 mg Tab tablet Take 300 mg by mouth once daily.       levothyroxine (SYNTHROID) 100 MCG tablet Take 100 mcg by mouth before breakfast.       losartan-hydrochlorothiazide 100-12.5 mg (HYZAAR) 100-12.5 mg Tab Take 1 tablet by mouth once daily.       methotrexate 2.5 MG Tab 5 tabs weekly 25 tablet 3    omeprazole (PRILOSEC) 40 MG capsule Take 40 mg by mouth every morning.       TRULICITY 1.5 mg/0.5 mL PnIj Inject into the skin once a week.        No current facility-administered medications for this visit.        Past Medical History:   Diagnosis Date    Asthma     Diabetes mellitus, type 2 since the age of 42    Hypertension since the age of 42    JESSICA  "(obstructive sleep apnea)     S/P UPP    Thyroid disease        Past Surgical History:   Procedure Laterality Date    ADENOIDECTOMY       SECTION      times 2    CHOLECYSTECTOMY      GANGLION CYST EXCISION      small bowel resection (lipoma)      TONSILLECTOMY      UVULOPALATOPHARYNGOPLASTY         Review of Systems:  Constitutional: Negative for chills and fever.   Respiratory: Negative for cough and shortness of breath.    Gastrointestinal: Negative for nausea and vomiting.   Skin: Negative for rash.   Neurological: Negative for dizziness and headaches.   Psychiatric/Behavioral: Negative for depression.   MSK as in HPI       OBJECTIVE:     PHYSICAL EXAM:  /79 (BP Location: Left arm, Patient Position: Sitting, BP Method: Small (Automatic))   Pulse 83   Ht 5' 2" (1.575 m)   Wt 97.5 kg (215 lb)   BMI 39.32 kg/m²     GEN:  NAD, well-developed, well-groomed.  NEURO: Awake, alert, and oriented. Normal attention and concentration.    PSYCH: Normal mood and affect. Behavior is normal.  HEENT: No cervical lymphadenopathy noted.  CARDIOVASCULAR: Radial pulses 2+ bilaterally. No LE edema noted.  PULMONARY: Breath sounds normal. No respiratory distress.  SKIN: Intact, no rashes.      MSK:   RUE:  Good active ROM of the wrist and fingers. There is a palpable mass over the dorsal wrist. It is located over the joint line. it is about 3 cm in length, firm, slightly mobile. AIN/PIN/Radial/Median/Ulnar Nerves assessed in isolation without deficit. Radial & Ulnar arteries palpated 2+. Capillary Refill <3s. Positive tinels at the wrist, positive durkans.       RADIOGRAPHS:  MRI right wrist 5/10/18   Impression   There is a masslike area of abnormal signal and enhancement within the dorsal subcutaneous fat of the wrist.  Diagnostic considerations include inflammatory process/cellulitis as well as neoplasm, primarily extra-abdominal desmoid tumor.     Xray bl hands 18  Narrative   3 views bilateral .  No " fracture or dislocation.  No bone destruction identified.  Mild DJD     Comments: I have personally reviewed the imaging and I agree with the above radiologist's report.    ASSESSMENT/PLAN:       ICD-10-CM ICD-9-CM   1. Bilateral carpal tunnel syndrome G56.03 354.0       Orders Placed This Encounter    EMG W/ ULTRASOUND AND NERVE CONDUCTION TEST 2 Extremities        Plan:   -EMG   -RTC for EMG results, mass f/u        The patient indicates understanding of these issues and agrees to the plan.    Ira Leonardo PA-C  [facility]   [facility]  [place], [place]

## 2018-06-19 NOTE — LETTER
June 20, 2018      Brodie Fulton MD  [address] [facility] [address]           [facility]  [address], [address]  [facility] [address]  Phone: [phone]          Patient: Ailyn Ortiz   MR Number: [id number]   YOB: 1965   Date of Visit: 6/19/2018       Dear Dr. Brodie Fulton:    Thank you for referring Ailyn Ortiz to me for evaluation. Attached you will find relevant portions of my assessment and plan of care.    If you have questions, please do not hesitate to call me. I look forward to following Ailyn Ortiz along with you.    Sincerely,    Anai Choudhary MD    Enclosure  CC:  No Recipients    If you would like to receive this communication electronically, please contact [email or web address] or [phone] to request more information on [employer] access.    For providers and/or their staff who would like to refer a patient to [facility], please contact us through our one-stop-shop provider referral line, [facility], at [phone].    If you feel you have received this communication in error or would no longer like to receive these types of communications, please e-mail [email or web address]

## 2018-06-20 NOTE — PROGRESS NOTES
I have personally taken the history and examined the patient. I agree with the Hand Surgery PA's note. The plan will be EMG/NCS. After testing will schedule for surgical excision of mass. Pt will RTC after testing

## 2018-06-22 ENCOUNTER — DOCUMENTATION ONLY (OUTPATIENT)
Dept: RHEUMATOLOGY | Facility: CLINIC | Age: 53
End: 2018-06-22

## 2018-06-22 NOTE — PROGRESS NOTES
Called patient  Fingers hurting more  Off prednisone  Pain worse over the 2 weeks  Titrate mtx suggested  6,then 7 and then 8   Max  Prn prednisone

## 2018-07-04 RX ORDER — METHOTREXATE 2.5 MG/1
TABLET ORAL
Qty: 25 TABLET | Refills: 3 | Status: SHIPPED | OUTPATIENT
Start: 2018-07-04 | End: 2018-07-05 | Stop reason: SDUPTHER

## 2018-07-05 ENCOUNTER — PATIENT MESSAGE (OUTPATIENT)
Dept: RHEUMATOLOGY | Facility: CLINIC | Age: 53
End: 2018-07-05

## 2018-07-05 ENCOUNTER — OFFICE VISIT (OUTPATIENT)
Dept: RHEUMATOLOGY | Facility: CLINIC | Age: 53
End: 2018-07-05
Attending: INTERNAL MEDICINE
Payer: COMMERCIAL

## 2018-07-05 VITALS
HEIGHT: 62 IN | HEART RATE: 81 BPM | DIASTOLIC BLOOD PRESSURE: 68 MMHG | WEIGHT: 218.31 LBS | SYSTOLIC BLOOD PRESSURE: 100 MMHG | BODY MASS INDEX: 40.17 KG/M2

## 2018-07-05 DIAGNOSIS — M65.312 TRIGGER FINGER OF LEFT THUMB: ICD-10-CM

## 2018-07-05 DIAGNOSIS — M06.09 RHEUMATOID ARTHRITIS OF MULTIPLE SITES WITH NEGATIVE RHEUMATOID FACTOR: Primary | ICD-10-CM

## 2018-07-05 PROCEDURE — 3074F SYST BP LT 130 MM HG: CPT | Mod: CPTII,S$GLB,, | Performed by: INTERNAL MEDICINE

## 2018-07-05 PROCEDURE — 3008F BODY MASS INDEX DOCD: CPT | Mod: CPTII,S$GLB,, | Performed by: INTERNAL MEDICINE

## 2018-07-05 PROCEDURE — 99999 PR PBB SHADOW E&M-EST. PATIENT-LVL III: CPT | Mod: PBBFAC,,, | Performed by: INTERNAL MEDICINE

## 2018-07-05 PROCEDURE — 99214 OFFICE O/P EST MOD 30 MIN: CPT | Mod: 25,S$GLB,, | Performed by: INTERNAL MEDICINE

## 2018-07-05 PROCEDURE — 3078F DIAST BP <80 MM HG: CPT | Mod: CPTII,S$GLB,, | Performed by: INTERNAL MEDICINE

## 2018-07-05 PROCEDURE — 20600 DRAIN/INJ JOINT/BURSA W/O US: CPT | Mod: LT,S$GLB,, | Performed by: INTERNAL MEDICINE

## 2018-07-05 RX ORDER — TRIAMCINOLONE ACETONIDE 40 MG/ML
10 INJECTION, SUSPENSION INTRA-ARTICULAR; INTRAMUSCULAR
Status: DISCONTINUED | OUTPATIENT
Start: 2018-07-05 | End: 2019-04-10 | Stop reason: CLARIF

## 2018-07-05 RX ORDER — METHOTREXATE 2.5 MG/1
TABLET ORAL
Qty: 40 TABLET | Refills: 3 | Status: SHIPPED | OUTPATIENT
Start: 2018-07-05 | End: 2018-11-27 | Stop reason: SDUPTHER

## 2018-07-05 RX ORDER — FOLIC ACID 1 MG/1
1 TABLET ORAL DAILY
Qty: 30 TABLET | Refills: 3 | Status: SHIPPED | OUTPATIENT
Start: 2018-07-05 | End: 2018-09-25

## 2018-07-05 RX ORDER — LIDOCAINE HYDROCHLORIDE 10 MG/ML
0.1 INJECTION INFILTRATION; PERINEURAL
Status: DISCONTINUED | OUTPATIENT
Start: 2018-07-05 | End: 2019-04-10 | Stop reason: CLARIF

## 2018-07-05 RX ORDER — FOLIC ACID 1 MG/1
1 TABLET ORAL DAILY
Qty: 30 TABLET | Refills: 3 | Status: SHIPPED | OUTPATIENT
Start: 2018-07-05 | End: 2018-07-05 | Stop reason: SDUPTHER

## 2018-07-05 NOTE — PROGRESS NOTES
Chief Complaint   Patient presents with    Follow-up     rheumatoid arthritis       Patient is here for a follow up    History of presenting illness    52 year old female with seronegative rheumatoid arthritis for a follow up    She is on mtx 6 tabs weekly with folic acid  On prednisone 5 mg daily  She has trigger finger in the thumb left hand    She was doing well on 5 tabs weekly mtx  Few weeks ago,she flared : polyarthralgias with swelling and stiffness,now on prednisone  Interestingly,also started to notice vision changes,saw opthalmology,thinks she has uveitis,she is not sure,but she needed steroid drops.  High dose prednisone 20 mg or so also offered for the joints and eyes and she has done well  She will follow with opthalmology next week  Joints better on 6 tabs weekly mtx and prednisone 5 mg  She will titrate mtx to 8 tabs weekly in the next few weeks      Right wrist MRI : mass like area of abnormal signal and enhancement within dorsal subcutaneous fat of the wrist : inflammatory process vs cellulitis vs neoplasm/desmoid tumor  Sent her to ortho : she wants to do EMG/NCS first for CTS and both surgeries will be done at the same time    Dealing with some GI issues will get EGD soon    Labs     CBC : H/H 11.2/34.9  Plts 500 k/mcl  CMP : Low potassium,low albumin,normal liver and kidneys    TAQUERIA negative  RF,CCP negative  Hepatitis b and c negative  HLAB27 negative    CRP 96.9  ESR 92    SPEP no M spike    Uric acid 2.2    Vit d 8,started on supplements  TSH normal    Ck,aldolase normal    Xrays    Hands degenerative changes  Ankles degenerative changes and bone spur  Knees degenerative changes  Feet : degenerative changes     ID clearance done  Doing hepatitis A and B vaccine  Waiting for shingrix vaccine    52 year old white female presented with the following symptoms :    December around Christmas she had the upper respiratory tract symptoms with myalgia and nasal congestion  Following this she started to  experience severe joint pain  All her joints in the hands,elbows,knees,feet started to hurt and swell  EMS for 1 hour  Alleve helps minimally    Hands : thumbs and index fingers and the 3rd digits hurt the worse  She cant make a fist  Thinks that the left 2nd finger swells like a sausage  Swelling along the tendons as per her PCP,she says he thought she has tenosynovitis     Hips and shoulders ache but no stiffness,good ROM in them    Cold weather makes her symptoms worse     Claims to have had labs : she had ESR,CRP elevation apparently     Past history: asthma,diabetes,JESSICA,thyroid disease,HTN  Occasional tendinitis feet b/l    Has a rash every winter,dermatology thinks it is eczema  This time started in November on the elbows,back,torso,legs,didnt get better,but derm suggested stronger steroids  Has not been told to have psoriasis     Recently had small bowel resection for lipoma    Family history : psoriasis and psoriatic arthritis runs in the family     Social history : quit smoking a while ago,drinks occasionally     Review of Systems   Constitutional: Negative for activity change, appetite change, chills, diaphoresis, fatigue, fever and unexpected weight change.   HENT: Negative for congestion, dental problem, drooling, ear discharge, ear pain, facial swelling, hearing loss, mouth sores, nosebleeds, postnasal drip, rhinorrhea, sinus pain, sinus pressure, sneezing, sore throat, tinnitus, trouble swallowing and voice change.    Eyes: Negative for photophobia, pain, discharge, redness, itching and visual disturbance.   Respiratory: Negative for apnea, cough, choking, chest tightness, shortness of breath, wheezing and stridor.    Cardiovascular: Negative for chest pain, palpitations and leg swelling.   Gastrointestinal: Negative for abdominal distention, abdominal pain, anal bleeding, blood in stool, constipation, diarrhea, nausea, rectal pain and vomiting.   Endocrine: Negative for cold intolerance, heat  intolerance, polydipsia, polyphagia and polyuria.   Genitourinary: Negative for decreased urine volume, difficulty urinating, dysuria, enuresis, flank pain, frequency, genital sores, hematuria and urgency.   Musculoskeletal: Negative for back pain, gait problem, myalgias, neck pain and neck stiffness.   Skin: Negative for color change, pallor, rash and wound.   Allergic/Immunologic: Negative for environmental allergies, food allergies and immunocompromised state.   Neurological: Negative for dizziness, tremors, seizures, syncope, facial asymmetry, speech difficulty, weakness, light-headedness, numbness and headaches.   Hematological: Negative for adenopathy. Does not bruise/bleed easily.   Psychiatric/Behavioral: Negative for agitation, behavioral problems, confusion, decreased concentration, dysphoric mood, hallucinations, self-injury, sleep disturbance and suicidal ideas. The patient is not nervous/anxious and is not hyperactive.        Physical Exam     Tenderness:   RUE: glenohumeral  Right hand: 4th MCP and 3rd PIP  Left hand: 3rd PIP    MARTE-28 tender joint count: 4  MARTE-28 swollen joint count: 0     Right wrist has a huge swelling which has clear borders,harder than soft     Left thumb triggering,tender flexor tendon noted     Physical Exam   Constitutional: She is oriented to person, place, and time and well-developed, well-nourished, and in no distress. No distress.   HENT:   Head: Normocephalic.   Mouth/Throat: Oropharynx is clear and moist.   Eyes: Conjunctivae are normal. Pupils are equal, round, and reactive to light. Right eye exhibits no discharge. Left eye exhibits no discharge. No scleral icterus.   Neck: Normal range of motion. No thyromegaly present.   Cardiovascular: Normal rate, regular rhythm, normal heart sounds and intact distal pulses.    Pulmonary/Chest: Effort normal and breath sounds normal. No stridor.   Abdominal: Soft. Bowel sounds are normal.   Lymphadenopathy:     She has no cervical  adenopathy.   Neurological: She is alert and oriented to person, place, and time.   Skin: Skin is warm. No rash noted. She is not diaphoretic.     Psychiatric: Affect and judgment normal.   Musculoskeletal: She exhibits tenderness.         Assessment     52 year old white female presented to me with 1 month of polyarthralgias in the hands,elbows,knees and feet : s/p URT symptoms during [place] : is also s/p bowel resection for a lipoma : reported swelling in addition to pain and stiffness in her joints : significant EMS : symmetric involvement : in addition reported to having dactylitis   On exam : synovitis noted in many joints,no evidence of dactylitis.    She has inflammatory arthritis,seronegative,unclear yet if she has psoriatic arthritis since the rash might be eczema vs psoriasis  Reactive arthritis possible too    Labs revealed very high inflammatory markers with negative RA and TAQUERIA and HLAB27 panel  Xrays with degenerative arthritis     She did well on prednisone  I then introduced mtx 5 tabs weekly and tapered her prednisone   She was doing well until she had a polyarticular flare in addition to visual disturbances which she thinks is related to uveitis    We titrated mtx,she will take 8 tabs weekly with folic acid in the next few weeks  We have given her high dose prednisone,which we will taper down to 0 mg   She is taking 5 mg prednisone now    1. Rheumatoid arthritis of multiple sites with negative rheumatoid factor    2. Trigger finger of left thumb          Plan    Get all records from opthalmology    Discussed that she might be a candidate for biologics given the eye involvement    mtx labs today    Taper off prednisone once symptoms resolve     ID follow ups for the hepatitis and shingrix vaccines    Vit d follow ups  Labs today    Right wrist f/u as per ortho    Inject trigger finger today    Ailyn was seen today for follow-up.    Diagnoses and all orders for this visit:    Rheumatoid arthritis  of multiple sites with negative rheumatoid factor  -     CBC auto differential; Future  -     Comprehensive metabolic panel; Future  -     Sedimentation rate; Future  -     C-reactive protein; Standing  -     Vitamin D; Future  -     Ambulatory Referral to Physical/Occupational Therapy    Trigger finger of left thumb  -     Ambulatory Referral to Physical/Occupational Therapy    Other orders  -     methotrexate 2.5 MG Tab; 8 TABS WEEKLY  -     folic acid (FOLVITE) 1 MG tablet; Take 1 tablet (1 mg total) by mouth once daily.  -     lidocaine HCL 10 mg/ml (1%) injection 0.1 mL; 0.1 mLs by Other route one time.  -     triamcinolone acetonide injection 12 mg; Inject 0.3 mLs (12 mg total) into the muscle one time.      rtc in 3 months  Refill requests updated     Procedure note    Left thumb trigger finger injection  Verbal consent taken  Risks and benefits explained  Point of maximal tenderness identified  Site marked  Local anesthetic sprayed  0.1 ml lidocaine with 10 mg kenalog injected  She did well  No complications

## 2018-07-09 ENCOUNTER — TELEPHONE (OUTPATIENT)
Dept: ENDOCRINOLOGY | Facility: CLINIC | Age: 53
End: 2018-07-09

## 2018-07-09 NOTE — TELEPHONE ENCOUNTER
----- Message from Stephanie Machuca sent at 7/6/2018  4:06 PM CDT -----  Contact: patient  Patient called to schedule an appointment specifically with Dr Cobian  And wishes to speak with a nurse regarding this matter.    She can be reached at [phone]    Thanks  KB

## 2018-07-19 ENCOUNTER — CLINICAL SUPPORT (OUTPATIENT)
Dept: REHABILITATION | Facility: HOSPITAL | Age: 53
End: 2018-07-19
Attending: INTERNAL MEDICINE
Payer: COMMERCIAL

## 2018-07-19 DIAGNOSIS — M06.09 RHEUMATOID ARTHRITIS OF MULTIPLE SITES WITH NEGATIVE RHEUMATOID FACTOR: ICD-10-CM

## 2018-07-19 DIAGNOSIS — M79.641 PAIN IN BOTH HANDS: ICD-10-CM

## 2018-07-19 DIAGNOSIS — M79.642 PAIN IN BOTH HANDS: ICD-10-CM

## 2018-07-19 DIAGNOSIS — M65.312 TRIGGER FINGER OF LEFT THUMB: ICD-10-CM

## 2018-07-19 PROCEDURE — 97018 PARAFFIN BATH THERAPY: CPT

## 2018-07-19 PROCEDURE — 97165 OT EVAL LOW COMPLEX 30 MIN: CPT

## 2018-07-19 PROCEDURE — L3933 FO W/O JOINTS CF: HCPCS

## 2018-07-19 NOTE — PATIENT INSTRUCTIONS
[facility]  Orthosis Instructions and Proof of Delivery        Date: 7/19/2018  Name: Ailyn Ortiz  MRN: [id number]  YOB: 1965  Referring Provider: Brodie Fulton*  Diagnosis: Left trigger thumb        [facility] and Description      Orthosis Information  (x) Custom Fabricated              () Prefabricated  () Right                                    (x) Left                                         () Bilateral  (x) Static                                   () Static Progressive                  () Dynamic    Orthosis Instructions    (x) Wear the orthosis as needed to minimize your symptoms  (x) Remove for hygiene, exercises, dressing changes    Cleaning and Maintenance  Keep your orthosis away from any heat sources. Do not leave in your car.  Keep your orthosis away from pets.  You may clean your orthosis with cool water and soap or a mild cleanser.  Your orthosis may need adjustments due to changes in your medical condition (swelling, dressing size, additional surgery, etc.)  Monitor your skin color and integrity.  Do not try to adjust the orthosis on your own.     If you have any questions or concerns, please contact the therapy office at [phone].     I, Ailyn Ortiz , have personally received the above described orthosis along with instructions on the wear, care, and precautions related to this orthosis. I understand that I am responsible for payment to [facility] of my deductible, coinsurance, or other portion of my charges not paid in full by my insurance plans, including any item that is not covered under the insurance plan.     Signature: _________________________________ Date: __________________    Therapist: JANY Stephenson CHT

## 2018-07-19 NOTE — PLAN OF CARE
"[facility] Occupational Therapy  Initial Evaluation     Name: Ailyn Ortiz  Clinic Number: [id number]    Medical Diagnosis:   M06.09 (ICD-10-CM) - Rheumatoid arthritis of multiple sites with negative rheumatoid factor   M65.312 (ICD-10-CM) - Trigger finger of left thumb   Date of Onset: Arthritis flare up a few weeks ago  Date of Surgery: N/A  Surgical Procedure: N/A  Therapy Diagnosis: No diagnosis found.  Precautions: Standard and Diabetes    Physician: Brodie Fulton*  Physician Orders: LEFT HAND : THUMB TRIGGERING ;NEEDS OCCUPATIONAL THERAPY  Date of Return to MD: 8/16/2018 EMG with Ultrasound and Nerve conduction test                                          8/21/2018: Dr. Rankin-Manohar EMG f/u    Evaluation Date: 7/19/2018  Plan of Care Certification Period: 7/19/2018     Visit #: 1 / Visits authorized: 20     (60 PT/OT/ST VISITS COMBINED PER BETO/YR)  Insurance Authorization period Expiration: 12/31/2018    Time In: 3:00 PM  Time Out: 4:00 PM  Total Billable Time: 60 minutes  Charges for this Visit: 1 eval, 1 Paraffin, 1  custom orthosis    Subjective   Involved Side: Bilateral  Dominant Side: Right  Imaging: Right wrist MRI on 5/10/2018: "Mass like area of abnormal signal and enhancement within dorsal subcutaneous fat of the right wrist : inflammatory process vs cellulitis vs neoplasm/desmoid tumor. Sent her to ortho: she wants to do EMG/NCS first for CTS and both surgeries will be done at the same time."  Additional Info per MD: Polyarthralgias in the hands,elbows,knees and feet. Symmetric involvement. Synovitis noted in many joints. She has inflammatory arthritis,seronegative,unclear yet if she has psoriatic arthritis. Reactive arthritis possible too. Labs revealed very high inflammatory markers with negative RA. Trigger thumb injected with cortisone 7/05/2018  Intermittent numbness/tingling in R hand.  History of Current Condition: Trigger thumb started 1 month ago. " Arthritis flare-up since Dec/2018.  Previous Therapy: None    Past Medical History/Physical Systems Review:   Past Medical History:   Diagnosis Date    Asthma     Diabetes mellitus, type 2 since the age of 42    Hypertension since the age of 42    JESSICA (obstructive sleep apnea)     S/P UPP    Thyroid disease      Ailyn Ortiz  has a past surgical history that includes Ganglion cyst excision;  section; Cholecystectomy; Adenoidectomy; Tonsillectomy; Uvulopalatopharyngoplasty; and small bowel resection (lipoma).    Ailyn has a current medication list which includes the following prescription(s): atorvastatin, folic acid, glipizide, invokana, levothyroxine, losartan-hydrochlorothiazide 100-12.5 mg, methotrexate, omeprazole, and trulicity, and the following Facility-Administered Medications: lidocaine hcl 10 mg/ml (1%) and triamcinolone acetonide.    Review of patient's allergies indicates:   Allergen Reactions    Milk containing products      Congestion      Nuts [tree nut]     Avelox [moxifloxacin] Rash      Pain:  Functional Pain Scale Rating 0-10:   0/10 at current       1/10 with activity  0/10 at best   7/10 at worst     Location: All joints in B hands   Description: stiff  Aggravating Factors: flexing and extending joints  Easing Factors: rest    Patient's Goals for Therapy: See Assessment chart below.    Occupation:  See Assessment chart below.     Functional Limitations/Social History: See Assessment chart below.     Objective     Observation/Appearance: Palpable nodule on R dorsal wrist.    Edema. None    Elbow, Wrist, and Hand ROM: WNL     Strength (Dyanmometer) and Pinch Strength (Pinch Gauge)  Measured in pounds.   2018    Left Right   Rung II 45 51   Key Pinch 14 15   3pt Pinch 11 12   2pt Pinch 9 8     Sensation: Pt reports occasional numbness in bilateral IF and LF.    Special Tests  Pain with palpation under left thumb MP and IP joints Positive     CMS  Impairment/Limitation/Restriction for FOTO Survey  Therapist reviewed FOTO scores for Ailyn Ortiz on 7/19/2018.   FOTO documents entered into [employer] - see Media section.    Limitation Score: 33%  Category: Carrying    Current : CJ = at least 20% but < 40% impaired, limited or restricted  Goal: CJ = at least 20% but < 40% impaired, limited or restricted  Discharge: CJ = at least 20% but < 40% impaired, limited or restricted     Treatment     Treatment Time In (separate from total time): 3:30 PM  Treatment Time Out (separate from total time: 4:00 PM  Total Treatment Time: 30 min    Paraffin with MHP X 10 min to   For improved circulation and increased tissue flexibility prior to exercises     Pt provided with arthritis and trigger finger educational booklets.     Recommend home paraffin bath for arthritis pain relief. Educated pt about paraffin bath. Recommended built-up pens to decrease pain with writing.    Fabricated  static custom trigger thumb orthosis to prevent triggering of left thumb. Educated pt regarding wear schedule and precautions. Pt advised to call clinic if she had any problems with the orthosis. Pt instructed to wear orthosis during the day, christopher. when driving. Pt demonstrated independence with donning and doffing orthosis.     Issued HEP and educated on modality use for pain management (see patient instructions). Pt verbally understood the instructions as they were given and demonstrated proper form and technique during therapy. Pt was advised to perform these exercises free of pain, and to stop performing them if pain occurs.    Patient/Family Education: role of OT, goals for OT, scheduling/cancellations - pt verbalized understanding. Discussed insurance limitations with patient.    Assessment     Ailyn Ortiz is a 52 y.o. female referred to outpatient occupational/hand therapy and presents with a medical diagnosis of Rheumatoid arthritis of multiple sites with negative rheumatoid  factor and Trigger finger of left thumb and demonstrates limitations as described in the chart below. Following brief medical record review it is determined that pt will benefit from occupational therapy services in order to maximize pain free and/or functional use of bilateral hands/wrists. The following goals were discussed with the patient and patient is in agreement with them as to be addressed in the treatment plan. The patient's rehab potential is Good   Anticipated barriers to occupational therapy: none  Pt has no cultural, educational or language barriers to learning provided.    Profile and History Assessment of Occupational Performance Level of Clinical Decision Making Complexity Score   Occupational Profile:   Ailyn Ortiz is a 52 y.o. 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 Job duties include Taking blood pressure,  Dressing changes,  Typing on tablet, and   Driving.    Ailyn Ortiz has difficulty withdriving/transportation management, shopping and housework/household chores   Folding laundry  Picking up Laundry basket  affecting his/her daily functional abilities. His/her main goal for therapy is reduced triggering and reduced pain.     Previous functional status: Independent with all ADLs.    Comorbidities:   See PMH and Physical Systems Review Section above.    Medical and Therapy History Review:   Brief               Performance Deficits    Physical:  Pain    Cognitive:  No Deficits    Psychosocial:    No Deficits     Clinical Decision Making:  low    Assessment Process:  Problem-Focused Assessments    Modification/Need for Assistance:  Not Necessary    Intervention Selection:  Limited Treatment Options       low  Based on PMHX, co morbidities , data from assessments and functional level of assistance required with task and clinical presentation directly impacting function.       Goals     Short Term Goals (STGs); to be met  by discharge.  STG #1: Pt will demonstrate independence with issued HEP. MET  STG #2: Pt will verbalize good understanding of joint protection techniques. MET  STG #3: Fabricate trigger finger orthosis to reduce triggering of left thumb. MET    Plan     Pt was treated by Occupational Therapy 1 time during the certification period on 7/19/2018 to achieve the established goals.     Discharge to HEP and custom fabricated trigger finger orthosis. Pt instructed to call clinic if she has any difficulties with the orthosis.     Therapist: ASRAH Dao     I certify the need for these services furnished under this plan of treatment and while under my care    ____________________________________                         __________________  Physician/Referring Practitioner                                               Date of Signature

## 2018-07-20 DIAGNOSIS — M65.312 TRIGGER FINGER OF LEFT THUMB: Primary | ICD-10-CM

## 2018-07-20 PROBLEM — M79.642 PAIN IN BOTH HANDS: Status: ACTIVE | Noted: 2018-07-20

## 2018-07-20 PROBLEM — M79.641 PAIN IN BOTH HANDS: Status: ACTIVE | Noted: 2018-07-20

## 2018-07-26 RX ORDER — ERGOCALCIFEROL 1.25 MG/1
50000 CAPSULE ORAL
Qty: 12 CAPSULE | Refills: 0 | Status: SHIPPED | OUTPATIENT
Start: 2018-07-26 | End: 2018-09-17 | Stop reason: SDUPTHER

## 2018-08-16 ENCOUNTER — PROCEDURE VISIT (OUTPATIENT)
Dept: NEUROLOGY | Facility: CLINIC | Age: 53
End: 2018-08-16
Payer: COMMERCIAL

## 2018-08-16 DIAGNOSIS — G56.03 BILATERAL CARPAL TUNNEL SYNDROME: ICD-10-CM

## 2018-08-16 PROCEDURE — 95885 MUSC TST DONE W/NERV TST LIM: CPT | Mod: 59,LT,S$GLB, | Performed by: PSYCHIATRY & NEUROLOGY

## 2018-08-16 PROCEDURE — 95911 NRV CNDJ TEST 9-10 STUDIES: CPT | Mod: S$GLB,,, | Performed by: PSYCHIATRY & NEUROLOGY

## 2018-08-16 PROCEDURE — 95886 MUSC TEST DONE W/N TEST COMP: CPT | Mod: RT,S$GLB,, | Performed by: PSYCHIATRY & NEUROLOGY

## 2018-08-21 ENCOUNTER — OFFICE VISIT (OUTPATIENT)
Dept: ORTHOPEDICS | Facility: CLINIC | Age: 53
End: 2018-08-21
Payer: COMMERCIAL

## 2018-08-21 VITALS
WEIGHT: 218.25 LBS | DIASTOLIC BLOOD PRESSURE: 78 MMHG | BODY MASS INDEX: 40.16 KG/M2 | HEIGHT: 62 IN | HEART RATE: 88 BPM | SYSTOLIC BLOOD PRESSURE: 114 MMHG

## 2018-08-21 DIAGNOSIS — G56.03 BILATERAL CARPAL TUNNEL SYNDROME: Primary | ICD-10-CM

## 2018-08-21 PROCEDURE — 3008F BODY MASS INDEX DOCD: CPT | Mod: CPTII,S$GLB,, | Performed by: ORTHOPAEDIC SURGERY

## 2018-08-21 PROCEDURE — 3074F SYST BP LT 130 MM HG: CPT | Mod: CPTII,S$GLB,, | Performed by: ORTHOPAEDIC SURGERY

## 2018-08-21 PROCEDURE — 99999 PR PBB SHADOW E&M-EST. PATIENT-LVL III: CPT | Mod: PBBFAC,,, | Performed by: ORTHOPAEDIC SURGERY

## 2018-08-21 PROCEDURE — 3078F DIAST BP <80 MM HG: CPT | Mod: CPTII,S$GLB,, | Performed by: ORTHOPAEDIC SURGERY

## 2018-08-21 PROCEDURE — 99214 OFFICE O/P EST MOD 30 MIN: CPT | Mod: S$GLB,,, | Performed by: ORTHOPAEDIC SURGERY

## 2018-08-21 NOTE — PROGRESS NOTES
Subjective:      Patient ID: Ailyn Ortiz is a 52 y.o. female.    Chief Complaint: Pain of the Left Wrist and Pain of the Right Wrist      HPI  Ailyn Ortiz is a right hand dominant 52 y.o. female presenting today for right wrist mass. Pt notes onset around 12/2017 of multiple areas of swollen joints in bilateral hands. She has been see rheumatology and was dx with seronegative RA. She is currently on Prednisone and MTX. She notes the areas of swelling and nodules in the hands have all resolved with the exception of the right dorsal wrist mass. Her rheumatologist obtained an MRI, results below. She has intermittent numbness/tingling.     Interval   Pt presents for EMG results. She states the mass over the dorsum of the right wrist has decreased in size. Denies pain. She does continues to have numbness and tingling in bl hands, worse on the right. Numbness is frequent but not constant on the right.       Review of patient's allergies indicates:   Allergen Reactions    Milk containing products      Congestion      Nuts [tree nut]     Avelox [moxifloxacin] Rash         Current Outpatient Medications   Medication Sig Dispense Refill    atorvastatin (LIPITOR) 20 MG tablet Take 20 mg by mouth once daily.       folic acid (FOLVITE) 1 MG tablet Take 1 tablet (1 mg total) by mouth once daily. 30 tablet 3    glipiZIDE (GLUCOTROL) 5 MG TR24 Take 10 mg by mouth 2 (two) times daily.       INVOKANA 300 mg Tab tablet Take 300 mg by mouth once daily.       levothyroxine (SYNTHROID) 100 MCG tablet Take 100 mcg by mouth before breakfast.       losartan-hydrochlorothiazide 100-12.5 mg (HYZAAR) 100-12.5 mg Tab Take 1 tablet by mouth once daily.       methotrexate 2.5 MG Tab 8 TABS WEEKLY 40 tablet 3    omeprazole (PRILOSEC) 40 MG capsule Take 40 mg by mouth every morning.       TRULICITY 1.5 mg/0.5 mL PnIj Inject into the skin once a week.       VITAMIN D2 50,000 unit capsule TAKE 1 CAPSULE (50,000 UNITS TOTAL)  "BY MOUTH EVERY 7 DAYS. 12 capsule 0     Current Facility-Administered Medications   Medication Dose Route Frequency Provider Last Rate Last Dose    lidocaine HCL 10 mg/ml (1%) injection 0.1 mL  0.1 mL Other 1 time in Clinic/HOD Brodie Fulton MD        triamcinolone acetonide injection 12 mg  12 mg Intramuscular 1 time in Clinic/HOD Brodie Fulton MD           Past Medical History:   Diagnosis Date    Asthma     Diabetes mellitus, type 2 since the age of 42    Hypertension since the age of 42    JESSICA (obstructive sleep apnea)     S/P UPP    Thyroid disease        Past Surgical History:   Procedure Laterality Date    ADENOIDECTOMY       SECTION      times 2    CHOLECYSTECTOMY      GANGLION CYST EXCISION      small bowel resection (lipoma)      TONSILLECTOMY      UVULOPALATOPHARYNGOPLASTY         Review of Systems:  Constitutional: Negative for chills and fever.   Respiratory: Negative for cough and shortness of breath.    Gastrointestinal: Negative for nausea and vomiting.   Skin: Negative for rash.   Neurological: Negative for dizziness and headaches.   Psychiatric/Behavioral: Negative for depression.   MSK as in HPI       OBJECTIVE:     PHYSICAL EXAM:  /78   Pulse 88   Ht 5' 2" (1.575 m)   Wt 99 kg (218 lb 4.1 oz)   BMI 39.92 kg/m²     GEN:  NAD, well-developed, well-groomed.  NEURO: Awake, alert, and oriented. Normal attention and concentration.    PSYCH: Normal mood and affect. Behavior is normal.  HEENT: No cervical lymphadenopathy noted.  CARDIOVASCULAR: Radial pulses 2+ bilaterally. No LE edema noted.  PULMONARY: Breath sounds normal. No respiratory distress.  SKIN: Intact, no rashes.      MSK:   RUE:  Good active ROM of the wrist and fingers. There is a palpable mass over the dorsal wrist, decreased in size from prior, non-ttp. AIN/PIN/Radial/Median/Ulnar Nerves assessed in isolation without deficit. Radial & Ulnar arteries palpated 2+. Capillary Refill " <3s. Positive tinels at the wrist, positive durkans.       RADIOGRAPHS:  MRI right wrist 5/10/18   Impression   There is a masslike area of abnormal signal and enhancement within the dorsal subcutaneous fat of the wrist.  Diagnostic considerations include inflammatory process/cellulitis as well as neoplasm, primarily extra-abdominal desmoid tumor.     Xray bl hands 1/23/18  Narrative   3 views bilateral .  No fracture or dislocation.  No bone destruction identified.  Mild DJD     Comments: I have personally reviewed the imaging and I agree with the above radiologist's report.    ASSESSMENT/PLAN:       ICD-10-CM ICD-9-CM   1. Bilateral carpal tunnel syndrome G56.03 354.0        Plan:   -EMG reviewed with pt, treatment options discussed. She wishes to proceed with a right carpal tunnel release. She would like to do this at the end of the year.  -RTC to sign consents for right carpal tunnel         The patient indicates understanding of these issues and agrees to the plan.    Ira Leonardo PA-C  [facility]   [facility]  [place], [place]

## 2018-08-27 NOTE — PROGRESS NOTES
I have personally taken the history and examined the patient. I agree with the Hand Surgery PA's note. The plan will be surgery. Reviewed nerve testing with pt in great detail. Pt understands risks and benefits. Will RTC closer to surgery date for consents

## 2018-09-04 ENCOUNTER — CLINICAL SUPPORT (OUTPATIENT)
Dept: INFECTIOUS DISEASES | Facility: CLINIC | Age: 53
End: 2018-09-04
Payer: COMMERCIAL

## 2018-09-04 PROCEDURE — 90471 IMMUNIZATION ADMIN: CPT | Mod: S$GLB,,, | Performed by: INTERNAL MEDICINE

## 2018-09-04 PROCEDURE — 90636 HEP A/HEP B VACC ADULT IM: CPT | Mod: S$GLB,,, | Performed by: INTERNAL MEDICINE

## 2018-09-17 RX ORDER — ERGOCALCIFEROL 1.25 MG/1
50000 CAPSULE ORAL
Qty: 12 CAPSULE | Refills: 0 | Status: SHIPPED | OUTPATIENT
Start: 2018-09-17 | End: 2019-02-27 | Stop reason: SDUPTHER

## 2018-09-25 ENCOUNTER — OFFICE VISIT (OUTPATIENT)
Dept: ENDOCRINOLOGY | Facility: CLINIC | Age: 53
End: 2018-09-25
Payer: COMMERCIAL

## 2018-09-25 VITALS
HEIGHT: 62 IN | BODY MASS INDEX: 40.99 KG/M2 | SYSTOLIC BLOOD PRESSURE: 110 MMHG | WEIGHT: 222.75 LBS | HEART RATE: 68 BPM | DIASTOLIC BLOOD PRESSURE: 78 MMHG

## 2018-09-25 DIAGNOSIS — E11.628 TYPE 2 DIABETES, CONTROLLED, WITH CELLULITIS OF FOOT: Primary | ICD-10-CM

## 2018-09-25 DIAGNOSIS — I10 HYPERTENSION, UNSPECIFIED TYPE: ICD-10-CM

## 2018-09-25 DIAGNOSIS — E11.9 CONTROLLED TYPE 2 DIABETES MELLITUS WITHOUT COMPLICATION, WITHOUT LONG-TERM CURRENT USE OF INSULIN: ICD-10-CM

## 2018-09-25 DIAGNOSIS — E78.00 HYPERCHOLESTEROLEMIA: ICD-10-CM

## 2018-09-25 DIAGNOSIS — E11.65 CONTROLLED TYPE 2 DIABETES MELLITUS WITH HYPERGLYCEMIA, WITHOUT LONG-TERM CURRENT USE OF INSULIN: Primary | ICD-10-CM

## 2018-09-25 DIAGNOSIS — L03.119 TYPE 2 DIABETES, CONTROLLED, WITH CELLULITIS OF FOOT: Primary | ICD-10-CM

## 2018-09-25 PROCEDURE — 99204 OFFICE O/P NEW MOD 45 MIN: CPT | Mod: S$GLB,,, | Performed by: INTERNAL MEDICINE

## 2018-09-25 PROCEDURE — 99999 PR PBB SHADOW E&M-EST. PATIENT-LVL III: CPT | Mod: PBBFAC,,, | Performed by: INTERNAL MEDICINE

## 2018-09-25 PROCEDURE — 3078F DIAST BP <80 MM HG: CPT | Mod: CPTII,S$GLB,, | Performed by: INTERNAL MEDICINE

## 2018-09-25 PROCEDURE — 3044F HG A1C LEVEL LT 7.0%: CPT | Mod: CPTII,S$GLB,, | Performed by: INTERNAL MEDICINE

## 2018-09-25 PROCEDURE — 3074F SYST BP LT 130 MM HG: CPT | Mod: CPTII,S$GLB,, | Performed by: INTERNAL MEDICINE

## 2018-09-25 PROCEDURE — 3008F BODY MASS INDEX DOCD: CPT | Mod: CPTII,S$GLB,, | Performed by: INTERNAL MEDICINE

## 2018-09-25 NOTE — PROGRESS NOTES
Ms. Ortiz is a 33 year old woman who is here for evaluation of type 2 diabetes that has been well controlled in the recent past on three agents. Currently not taking glipizide as she feels this makes her urinate during the night.   Has gained weight over the past year, had bowel obstruction earlier in the year. Stopped metformin at the time and does not wish to restart.   Body mass index is 40.75 kg/m².  Currently on atorvastatin for hyperlipidemia  Arb and hctz for BP control - K and creat are normal with good blood pressure control.    1) ok to stop glipizide  2) ok to repeat A1C today;   3) discussed diet and exercise not sure if she can commit to a highly restrictive diet, so will consider other options.   4) stay on invokana and trulicity.     I have personally taken the history and examined this patient and agree with the resident's note as stated above.

## 2018-09-25 NOTE — PROGRESS NOTES
Clinic Note  9/25/2018      Subjective:       Patient ID:  Ailyn is a 53 y.o. female being seen for an established visit.    Chief Complaint: Diabetes and Hypothyroidism    Ailyn Ortiz is a 53 year old female with hypertension, HLD, hypothyroidism, vitamin D deficiency, psoriatic arthritis, and DM-2 who presents for better control of diabetes and diabetes medication management. She has only seen her family doctor for diabetes management up until this time. She reports her last A1c was 6.1 around the beginning of the year. Patient has been on Glipizide, Invokana, and Trulicity. Has had diabetes for about 12 years now. 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 Denies polyuria, polydipsia, blurry vision, and neuropathy.           Review of Systems   Constitutional: Negative for malaise/fatigue and weight loss.   HENT: Negative.    Eyes: Negative for blurred vision.   Respiratory: Negative.    Cardiovascular: Negative.  Negative for chest pain.   Gastrointestinal: Negative.    Genitourinary: Negative.    Musculoskeletal: Positive for joint pain.   Skin: Negative.    Neurological: Negative for sensory change, weakness and headaches.   Endo/Heme/Allergies: Negative for polydipsia.   Psychiatric/Behavioral: Negative.        Past Medical History:   Diagnosis Date    Asthma     Diabetes mellitus, type 2 since the age of 42    Hypertension since the age of 42    JESSICA (obstructive sleep apnea)     S/P UPP    Thyroid disease        Family History   Problem Relation Age of Onset    Hypertension Father     Diabetes Father     Heart disease Maternal Grandfather         CAD    Heart disease Paternal Grandmother         CAD    Diabetes Paternal Grandmother     Heart disease Paternal Grandfather         CAD        reports that she quit smoking about 4 years ago. Her smoking use included cigarettes. She smoked 0.00 packs per day for 20.00 years. she has never used smokeless tobacco. She reports that she drinks  "alcohol. She reports that she does not use drugs.       Medication List           Accurate as of 9/25/18 10:29 AM. If you have any questions, ask your nurse or doctor.               CONTINUE taking these medications    atorvastatin 20 MG tablet  Commonly known as:  LIPITOR     folic acid 1 MG tablet  Commonly known as:  FOLVITE  Take 1 tablet (1 mg total) by mouth once daily.     glipiZIDE 5 MG Tr24  Commonly known as:  GLUCOTROL     INVOKANA 300 mg Tab tablet  Generic drug:  canagliflozin     levothyroxine 100 MCG tablet  Commonly known as:  SYNTHROID     losartan-hydrochlorothiazide 100-12.5 mg 100-12.5 mg Tab  Commonly known as:  HYZAAR     methotrexate 2.5 MG Tab  8 TABS WEEKLY     omeprazole 40 MG capsule  Commonly known as:  PRILOSEC     TRULICITY 1.5 mg/0.5 mL Pnij  Generic drug:  dulaglutide     VITAMIN D2 50,000 unit Cap  Generic drug:  ergocalciferol  TAKE 1 CAPSULE (50,000 UNITS TOTAL) BY MOUTH EVERY 7 DAYS.          Review of patient's allergies indicates:   Allergen Reactions    Milk containing products      Congestion      Nuts [tree nut]     Avelox [moxifloxacin] Rash       Patient Active Problem List   Diagnosis    Type 2 diabetes mellitus without complication, without long-term current use of insulin    Essential hypertension    BMI 40.0-44.9, adult    Sleep apnea    Inflammatory arthritis    Family history of psoriatic arthritis    Need for vaccination against hepatitis B virus    Need for vaccination for DTaP    Need for vaccination    Pain in both hands           Objective:      /78   Pulse 68   Ht 5' 2" (1.575 m)   Wt 101.1 kg (222 lb 12.4 oz)   BMI 40.75 kg/m²   Estimated body mass index is 40.75 kg/m² as calculated from the following:    Height as of this encounter: 5' 2" (1.575 m).    Weight as of this encounter: 101.1 kg (222 lb 12.4 oz).      Physical Exam   Constitutional: She is oriented to person, place, and time and well-developed, well-nourished, and in no " distress. No distress.   HENT:   Head: Normocephalic and atraumatic.   Eyes: EOM are normal.   Neck: Normal range of motion. No thyromegaly present.   Cardiovascular: Normal rate, regular rhythm and normal heart sounds.   Pulmonary/Chest: Effort normal and breath sounds normal. No respiratory distress.   Abdominal: There is no tenderness.   Musculoskeletal: Normal range of motion.   Neurological: She is alert and oriented to person, place, and time.   Skin: Skin is warm and dry. No rash noted. She is not diaphoretic. No erythema. No pallor.   Psychiatric: Mood, memory, affect and judgment normal.   Nursing note and vitals reviewed.        Assessment and Plan:         Ailyn was seen today for diabetes and hypothyroidism.    Diagnoses and all orders for this visit:    Controlled type 2 diabetes mellitus with hyperglycemia, without long-term current use of insulin  -     Comprehensive metabolic panel; Future  -     TSH; Future  -     Hemoglobin A1c; Future  -     Will stop Glipizide at this time  -     Continue Invokana and Trulicity     Hypothyroidism       -      Last TSH 0.672       -      Will check TSH today        -      Continue Synthroid 100 mcg           Other Orders Placed This Visit:  Orders Placed This Encounter   Procedures    Comprehensive metabolic panel    TSH    Hemoglobin A1c             Interview, Assessment, Findings, and Plan discussed with Dr. Cobian.     No Follow-up on file.         Miles Christensen MD  Internal Medicine

## 2018-11-13 ENCOUNTER — OFFICE VISIT (OUTPATIENT)
Dept: ORTHOPEDICS | Facility: CLINIC | Age: 53
End: 2018-11-13
Payer: COMMERCIAL

## 2018-11-13 VITALS
DIASTOLIC BLOOD PRESSURE: 79 MMHG | WEIGHT: 222 LBS | SYSTOLIC BLOOD PRESSURE: 116 MMHG | BODY MASS INDEX: 40.85 KG/M2 | HEART RATE: 90 BPM | HEIGHT: 62 IN

## 2018-11-13 DIAGNOSIS — G56.01 CARPAL TUNNEL SYNDROME OF RIGHT WRIST: Primary | ICD-10-CM

## 2018-11-13 PROCEDURE — 3008F BODY MASS INDEX DOCD: CPT | Mod: CPTII,S$GLB,, | Performed by: PHYSICIAN ASSISTANT

## 2018-11-13 PROCEDURE — 99214 OFFICE O/P EST MOD 30 MIN: CPT | Mod: S$GLB,,, | Performed by: PHYSICIAN ASSISTANT

## 2018-11-13 PROCEDURE — 3074F SYST BP LT 130 MM HG: CPT | Mod: CPTII,S$GLB,, | Performed by: PHYSICIAN ASSISTANT

## 2018-11-13 PROCEDURE — 3078F DIAST BP <80 MM HG: CPT | Mod: CPTII,S$GLB,, | Performed by: PHYSICIAN ASSISTANT

## 2018-11-13 PROCEDURE — 99999 PR PBB SHADOW E&M-EST. PATIENT-LVL III: CPT | Mod: PBBFAC,,, | Performed by: PHYSICIAN ASSISTANT

## 2018-11-13 RX ORDER — MUPIROCIN 20 MG/G
OINTMENT TOPICAL
Status: CANCELLED | OUTPATIENT
Start: 2018-11-13

## 2018-11-13 NOTE — PROGRESS NOTES
Subjective:      Patient ID: Ailyn Ortiz is a 53 y.o. female.    Chief Complaint: Pain of the Right Hand      HPI  Ailyn Ortiz is a right hand dominant 53 y.o. female presenting today for follow up of surgical planning. She has intermittent numbness/tingling in both hands, worse at night.  The numbness has become constant on the right side since her last visit. She recently underwent EMG, confirming carpal tunnel syndrome bilaterally.  She also has rheumatoid arthritis, she has been followed by Rheumatology.  She was previously seen by Dr. Choudhary for a dorsal right wrist mass, it resolved prior to her last visit.        Review of patient's allergies indicates:   Allergen Reactions    Milk containing products      Congestion      Nuts [tree nut]     Avelox [moxifloxacin] Rash         Current Outpatient Medications   Medication Sig Dispense Refill    atorvastatin (LIPITOR) 20 MG tablet Take 20 mg by mouth once daily.       glipiZIDE (GLUCOTROL) 5 MG TR24 Take 10 mg by mouth 2 (two) times daily.       INVOKANA 300 mg Tab tablet Take 300 mg by mouth once daily.       levothyroxine (SYNTHROID) 100 MCG tablet Take 100 mcg by mouth before breakfast.       losartan-hydrochlorothiazide 100-12.5 mg (HYZAAR) 100-12.5 mg Tab Take 1 tablet by mouth once daily.       methotrexate 2.5 MG Tab 8 TABS WEEKLY 40 tablet 3    omeprazole (PRILOSEC) 40 MG capsule Take 40 mg by mouth every morning.       TRULICITY 1.5 mg/0.5 mL PnIj Inject into the skin once a week.       VITAMIN D2 50,000 unit capsule TAKE 1 CAPSULE (50,000 UNITS TOTAL) BY MOUTH EVERY 7 DAYS. 12 capsule 0    folic acid (FOLVITE) 1 MG tablet Take 1 tablet (1 mg total) by mouth once daily. 30 tablet 3     Current Facility-Administered Medications   Medication Dose Route Frequency Provider Last Rate Last Dose    lidocaine HCL 10 mg/ml (1%) injection 0.1 mL  0.1 mL Other 1 time in Clinic/HOD Brodie Fulton MD        triamcinolone  "acetonide injection 12 mg  12 mg Intramuscular 1 time in Clinic/HOD Brodie Fulton MD           Past Medical History:   Diagnosis Date    Asthma     Diabetes mellitus, type 2 since the age of 42    Hypertension since the age of 42    JESSICA (obstructive sleep apnea)     S/P UPP    Thyroid disease        Past Surgical History:   Procedure Laterality Date    ADENOIDECTOMY       SECTION      times 2    CHOLECYSTECTOMY      GANGLION CYST EXCISION      LAPAROSCOPY-DIAGNOSTIC w/ small bowel resection N/A 2018    [facility]formed [facility] 2ND FLR    RESECTION-SMALL BOWEL-LAPAROSCOPIC  2018    [facility] at [facility] 2ND FLR    small bowel resection (lipoma)      TONSILLECTOMY      UVULOPALATOPHARYNGOPLASTY           Review of Systems:  Review of Systems   Constitution: Negative for chills and fever.   Skin: Negative for rash and suspicious lesions.   Musculoskeletal:        See HPI   Neurological: Positive for numbness. Negative for dizziness, headaches and light-headedness.   Psychiatric/Behavioral: Negative for depression. The patient is not nervous/anxious.          OBJECTIVE:     PHYSICAL EXAM:  Height: 5' 2" (157.5 cm) Weight: 100.7 kg (222 lb)  Vitals:    18 1336   BP: 116/79   Pulse: 90   Weight: 100.7 kg (222 lb)   Height: 5' 2" (1.575 m)   PainSc:   4     General    Vitals reviewed.  Constitutional: She is oriented to person, place, and time. She appears well-developed and well-nourished.   HENT:   Head: Normocephalic and atraumatic.   Neck: Normal range of motion.   Cardiovascular: Normal rate.    Pulmonary/Chest: Effort normal. No respiratory distress.   Neurological: She is alert and oriented to person, place, and time.   Psychiatric: She has a normal mood and affect. Her behavior is normal. Judgment and thought content normal.             Musculoskeletal:  No scars or edema appreciated.  No cyst/mass noted dorsal " right wrist. Nontender to palpation at the hand and wrist, good range of motion. Mildly tender to palpation posteriorly, laterally, and medially at the right elbow.  Neurovascularly intact-decreased sensation median distribution on the right compared to left.  Good motor function bilaterally, 2+ radial pulses, good capillary refill.  No increasing right hand numbness with Tinel's, negative Tinel's on the left, negative Tinel's bilaterally at the cubital tunnel.  Positive Durkan's bilaterally.    EMG, 8/16/18:  Impression   This study is abnormal. There is electrophysiologic evidence for bilateral median neuropathies at the wrist (carpal tunnel syndrome), right worse than left.     ASSESSMENT/PLAN:   Ailyn was seen today for pain.    Diagnoses and all orders for this visit:    Carpal tunnel syndrome of right wrist        - again reviewed patient's EMG and symptoms as well as physical exam findings.  Discussed that patient numbness has increased and is now constant.  We discussed that surgery may not relieve her constant numbness.  Discussed the extent of nerve damage associated with constant numbness may not resolve after carpal tunnel release.  Patient is still interested in proceeding with carpal tunnel release surgery. Indications and risks were discussed, her questions were answered.  Consent form signed today in clinic.  - discussed continued bracing, at least nighttime use, for both wrists to attempt to improve symptoms  - discussed no current lump on the dorsal wrist, therefore she will continue to observe rather than moving forward with discussion of surgery, as discussed at her last visit with Dr. Rankin.  - call with any questions or concerns    Disclaimer: This note has been generated using voice-recognition software. There may be typographical errors that have been missed during proof-reading.

## 2018-11-21 ENCOUNTER — TELEPHONE (OUTPATIENT)
Dept: ORTHOPEDICS | Facility: CLINIC | Age: 53
End: 2018-11-21

## 2018-11-21 DIAGNOSIS — G56.01 CARPAL TUNNEL SYNDROME ON RIGHT: Primary | ICD-10-CM

## 2018-11-21 NOTE — TELEPHONE ENCOUNTER
Spoke w/pt in regards to surgery date change per LS to 12/26/18 in [facility] at [facility]. Pt verbalized understanding and okay with date change.

## 2018-11-27 ENCOUNTER — PATIENT MESSAGE (OUTPATIENT)
Dept: RHEUMATOLOGY | Facility: CLINIC | Age: 53
End: 2018-11-27

## 2018-11-27 RX ORDER — METHOTREXATE 2.5 MG/1
TABLET ORAL
Qty: 40 TABLET | Refills: 3 | Status: SHIPPED | OUTPATIENT
Start: 2018-11-27 | End: 2018-12-13

## 2018-12-06 RX ORDER — FOLIC ACID 1 MG/1
1 TABLET ORAL 3 TIMES DAILY
Qty: 90 TABLET | Refills: 3 | Status: SHIPPED | OUTPATIENT
Start: 2018-12-06 | End: 2018-12-13

## 2018-12-13 ENCOUNTER — LAB VISIT (OUTPATIENT)
Dept: LAB | Facility: HOSPITAL | Age: 53
End: 2018-12-13
Attending: INTERNAL MEDICINE
Payer: COMMERCIAL

## 2018-12-13 ENCOUNTER — OFFICE VISIT (OUTPATIENT)
Dept: RHEUMATOLOGY | Facility: CLINIC | Age: 53
End: 2018-12-13
Payer: COMMERCIAL

## 2018-12-13 VITALS
WEIGHT: 226.19 LBS | HEIGHT: 62 IN | HEART RATE: 83 BPM | DIASTOLIC BLOOD PRESSURE: 75 MMHG | BODY MASS INDEX: 41.62 KG/M2 | SYSTOLIC BLOOD PRESSURE: 116 MMHG

## 2018-12-13 DIAGNOSIS — E11.9 CONTROLLED TYPE 2 DIABETES MELLITUS WITHOUT COMPLICATION, WITHOUT LONG-TERM CURRENT USE OF INSULIN: ICD-10-CM

## 2018-12-13 DIAGNOSIS — M06.09 RHEUMATOID ARTHRITIS OF MULTIPLE SITES WITH NEGATIVE RHEUMATOID FACTOR: Primary | ICD-10-CM

## 2018-12-13 LAB
ESTIMATED AVG GLUCOSE: 140 MG/DL
HBA1C MFR BLD HPLC: 6.5 %

## 2018-12-13 PROCEDURE — 99214 OFFICE O/P EST MOD 30 MIN: CPT | Mod: S$GLB,,, | Performed by: INTERNAL MEDICINE

## 2018-12-13 PROCEDURE — 36415 COLL VENOUS BLD VENIPUNCTURE: CPT

## 2018-12-13 PROCEDURE — 3078F DIAST BP <80 MM HG: CPT | Mod: CPTII,S$GLB,, | Performed by: INTERNAL MEDICINE

## 2018-12-13 PROCEDURE — 99999 PR PBB SHADOW E&M-EST. PATIENT-LVL III: CPT | Mod: PBBFAC,,, | Performed by: INTERNAL MEDICINE

## 2018-12-13 PROCEDURE — 83036 HEMOGLOBIN GLYCOSYLATED A1C: CPT

## 2018-12-13 PROCEDURE — 3074F SYST BP LT 130 MM HG: CPT | Mod: CPTII,S$GLB,, | Performed by: INTERNAL MEDICINE

## 2018-12-13 PROCEDURE — 3008F BODY MASS INDEX DOCD: CPT | Mod: CPTII,S$GLB,, | Performed by: INTERNAL MEDICINE

## 2018-12-13 RX ORDER — FOLIC ACID 1 MG/1
1 TABLET ORAL 3 TIMES DAILY
Qty: 270 TABLET | Refills: 2 | Status: SHIPPED | OUTPATIENT
Start: 2018-12-13 | End: 2019-03-15

## 2018-12-13 RX ORDER — METHOTREXATE 2.5 MG/1
TABLET ORAL
Qty: 120 TABLET | Refills: 3 | Status: SHIPPED | OUTPATIENT
Start: 2018-12-13 | End: 2019-03-15

## 2018-12-13 RX ORDER — AZITHROMYCIN 250 MG/1
TABLET, FILM COATED ORAL
Qty: 6 TABLET | Refills: 0 | Status: SHIPPED | OUTPATIENT
Start: 2018-12-13 | End: 2018-12-18

## 2018-12-13 ASSESSMENT — ROUTINE ASSESSMENT OF PATIENT INDEX DATA (RAPID3)
MDHAQ FUNCTION SCORE: 0
PSYCHOLOGICAL DISTRESS SCORE: 0
PATIENT GLOBAL ASSESSMENT SCORE: 2
AM STIFFNESS SCORE: 1, YES
FATIGUE SCORE: 4
PAIN SCORE: 1
TOTAL RAPID3 SCORE: 1
WHEN YOU AWAKENED IN THE MORNING OVER THE LAST WEEK, PLEASE INDICATE THE AMOUNT OF TIME IT TAKES UNTIL YOU ARE AS LIMBER AS YOU WILL BE FOR THE DAY: 30 MINUTES

## 2018-12-13 NOTE — PROGRESS NOTES
Chief Complaint   Patient presents with    Disease Management       Patient is here for a follow up    History of presenting illness    53 year old female with seronegative rheumatoid arthritis for a follow up    On mtx 8 tabs weekly/folic acid 1 mg daily   Off prednisone    Joints are doing really well  Thumb aches some times    She has a diagnosis of uveitis  Has blurry vision,needs eye exam  She is not sure if the vision changes are due to uveitis     No mtx labs today    Right wrist MRI : mass like area of abnormal signal and enhancement within dorsal subcutaneous fat of the wrist : inflammatory process vs cellulitis vs neoplasm/desmoid tumor  Sent her to ortho : she wants to do EMG/NCS first for CTS and both surgeries will be done at the same time    Dealing with some GI issues will get EGD soon    TAQUERIA negative  RF,CCP negative  Hepatitis b and c negative  HLAB27 negative    CRP 96.9  ESR 92    SPEP no M spike    Uric acid 2.2    Vit d 8,went upto 26  TSH normal    Ck,aldolase normal    Xrays    Hands degenerative changes  Ankles degenerative changes and bone spur  Knees degenerative changes  Feet : degenerative changes     ID clearance done  Did hepatitis A and B vaccine  Waiting for shingrix vaccine    53 year old white female presented with the following symptoms :    December around Christmas she had the upper respiratory tract symptoms with myalgia and nasal congestion  Following this she started to experience severe joint pain  All her joints in the hands,elbows,knees,feet started to hurt and swell  EMS for 1 hour  Alleve helps minimally    Hands : thumbs and index fingers and the 3rd digits hurt the worse  She cant make a fist  Thinks that the left 2nd finger swells like a sausage  Swelling along the tendons as per her PCP,she says he thought she has tenosynovitis     Hips and shoulders ache but no stiffness,good ROM in them    Cold weather makes her symptoms worse     Claims to have had labs : she had  ESR,CRP elevation apparently     Past history: asthma,diabetes,JESSICA,thyroid disease,HTN  Occasional tendinitis feet b/l    Has a rash every winter,dermatology thinks it is eczema  This time started in November on the elbows,back,torso,legs,didnt get better,but derm suggested stronger steroids  Has not been told to have psoriasis     Recently had small bowel resection for lipoma    Family history : psoriasis and psoriatic arthritis runs in the family     Social history : quit smoking a while ago,drinks occasionally     Review of Systems   Constitutional: Negative for activity change, appetite change, chills, diaphoresis, fatigue, fever and unexpected weight change.   HENT: Negative for congestion, dental problem, drooling, ear discharge, ear pain, facial swelling, hearing loss, mouth sores, nosebleeds, postnasal drip, rhinorrhea, sinus pressure, sinus pain, sneezing, sore throat, tinnitus, trouble swallowing and voice change.    Eyes: Negative for photophobia, pain, discharge, redness, itching and visual disturbance.   Respiratory: Negative for apnea, cough, choking, chest tightness, shortness of breath, wheezing and stridor.    Cardiovascular: Negative for chest pain, palpitations and leg swelling.   Gastrointestinal: Negative for abdominal distention, abdominal pain, anal bleeding, blood in stool, constipation, diarrhea, nausea, rectal pain and vomiting.   Endocrine: Negative for cold intolerance, heat intolerance, polydipsia, polyphagia and polyuria.   Genitourinary: Negative for decreased urine volume, difficulty urinating, dysuria, enuresis, flank pain, frequency, genital sores, hematuria and urgency.   Musculoskeletal: Negative for back pain, gait problem, myalgias, neck pain and neck stiffness.   Skin: Negative for color change, pallor, rash and wound.   Allergic/Immunologic: Negative for environmental allergies, food allergies and immunocompromised state.   Neurological: Negative for dizziness, tremors,  seizures, syncope, facial asymmetry, speech difficulty, weakness, light-headedness, numbness and headaches.   Hematological: Negative for adenopathy. Does not bruise/bleed easily.   Psychiatric/Behavioral: Negative for agitation, behavioral problems, confusion, decreased concentration, dysphoric mood, hallucinations, self-injury, sleep disturbance and suicidal ideas. The patient is not nervous/anxious and is not hyperactive.        Physical Exam     MARTE-28 tender joint count: 0  MARTE-28 swollen joint count: 0     Right wrist has a huge swelling which has clear borders,harder than soft     Left thumb triggering,tender flexor tendon noted     Physical Exam   Constitutional: She is oriented to person, place, and time and well-developed, well-nourished, and in no distress. No distress.   HENT:   Head: Normocephalic.   Mouth/Throat: Oropharynx is clear and moist.   Eyes: Conjunctivae are normal. Pupils are equal, round, and reactive to light. Right eye exhibits no discharge. Left eye exhibits no discharge. No scleral icterus.   Neck: Normal range of motion. No thyromegaly present.   Cardiovascular: Normal rate, regular rhythm, normal heart sounds and intact distal pulses.    Pulmonary/Chest: Effort normal and breath sounds normal. No stridor.   Abdominal: Soft. Bowel sounds are normal.   Lymphadenopathy:     She has no cervical adenopathy.   Neurological: She is alert and oriented to person, place, and time.   Skin: Skin is warm. No rash noted. She is not diaphoretic.     Psychiatric: Affect and judgment normal.   Musculoskeletal: She exhibits tenderness.         Assessment     53 year old white female presented to me with 1 month of polyarthralgias in the hands,elbows,knees and feet : s/p URT symptoms during [facility] : is also s/p bowel resection for a lipoma : reported swelling in addition to pain and stiffness in her joints : significant EMS : symmetric involvement : in addition reported to having dactylitis   On exam  : synovitis noted in many joints,no evidence of dactylitis.    She has inflammatory arthritis,seronegative,unclear yet if she has psoriatic arthritis since the rash might be eczema vs psoriasis  Reactive arthritis possible too    Labs revealed very high inflammatory markers with negative RA and TAQUERIA and HLAB27 panel  Xrays with degenerative arthritis     She did well on prednisone  I then introduced mtx 5 tabs weekly and tapered her prednisone     She has a diagnosis of uveitis   Blurry vision unknown etiology     On mtx 8 tabs weekly/folic acid 1 mg daily     1. Rheumatoid arthritis of multiple sites with negative rheumatoid factor          Plan    See ophthalmology asap  Get all records from opthalmology    Discussed that she might be a candidate for biologics given the eye involvement    mtx labs today    ID follow ups for the hepatitis and shingrix vaccines    Vit d maintanence    Right wrist f/u as per ortho    Trigger finger doing ok    Ailyn was seen today for disease management.    Diagnoses and all orders for this visit:    Rheumatoid arthritis of multiple sites with negative rheumatoid factor  -     CBC auto differential; Future  -     Comprehensive metabolic panel; Future  -     Sedimentation rate; Future  -     C-reactive protein; Standing    Other orders  -     folic acid (FOLVITE) 1 MG tablet; Take 1 tablet (1 mg total) by mouth 3 (three) times daily.  -     methotrexate 2.5 MG Tab; TAKE 8 TABLETS BY MOUTH WEEKLY  -     azithromycin (Z-MYRA) 250 MG tablet; Take 2 tablets by mouth on day 1; Take 1 tablet by mouth on days 2-5      rtc in 3 months  Refill requests updated

## 2018-12-21 ENCOUNTER — TELEPHONE (OUTPATIENT)
Dept: ORTHOPEDICS | Facility: CLINIC | Age: 53
End: 2018-12-21

## 2018-12-21 NOTE — TELEPHONE ENCOUNTER
Ailyn Ortiz notified of arrival time 0615 for surgery on 12/26/18 with Dr. CORNELIA Choudhary. At [facility]. Post Op appointment made, slip in mail. Reminded of need for a ride home from surgery.

## 2018-12-24 RX ORDER — FLUTICASONE PROPIONATE 50 MCG
1 SPRAY, SUSPENSION (ML) NASAL DAILY
COMMUNITY
End: 2019-12-10

## 2018-12-24 RX ORDER — LORATADINE 10 MG/1
10 TABLET ORAL DAILY PRN
COMMUNITY

## 2018-12-24 RX ORDER — ASPIRIN 325 MG
50000 TABLET, DELAYED RELEASE (ENTERIC COATED) ORAL WEEKLY
COMMUNITY
End: 2019-12-10

## 2018-12-25 ENCOUNTER — ANESTHESIA EVENT (OUTPATIENT)
Dept: SURGERY | Facility: HOSPITAL | Age: 53
End: 2018-12-25
Payer: COMMERCIAL

## 2018-12-26 ENCOUNTER — HOSPITAL ENCOUNTER (OUTPATIENT)
Facility: HOSPITAL | Age: 53
Discharge: HOME | End: 2018-12-26
Attending: ORTHOPAEDIC SURGERY | Admitting: ORTHOPAEDIC SURGERY
Payer: COMMERCIAL

## 2018-12-26 ENCOUNTER — ANESTHESIA (OUTPATIENT)
Dept: SURGERY | Facility: HOSPITAL | Age: 53
End: 2018-12-26
Payer: COMMERCIAL

## 2018-12-26 VITALS
WEIGHT: 225 LBS | RESPIRATION RATE: 19 BRPM | TEMPERATURE: 98 F | BODY MASS INDEX: 41.41 KG/M2 | SYSTOLIC BLOOD PRESSURE: 120 MMHG | DIASTOLIC BLOOD PRESSURE: 69 MMHG | HEART RATE: 81 BPM | OXYGEN SATURATION: 96 % | HEIGHT: 62 IN

## 2018-12-26 DIAGNOSIS — G56.01 CARPAL TUNNEL SYNDROME OF RIGHT WRIST: Primary | ICD-10-CM

## 2018-12-26 LAB
POCT GLUCOSE: 133 MG/DL (ref 70–110)
POCT GLUCOSE: 167 MG/DL (ref 70–110)

## 2018-12-26 PROCEDURE — 63600175 PHARM REV CODE 636 W HCPCS: Performed by: NURSE ANESTHETIST, CERTIFIED REGISTERED

## 2018-12-26 PROCEDURE — 63600175 PHARM REV CODE 636 W HCPCS: Performed by: PHYSICIAN ASSISTANT

## 2018-12-26 PROCEDURE — D9220A PRA ANESTHESIA: Mod: CRNA,,, | Performed by: NURSE ANESTHETIST, CERTIFIED REGISTERED

## 2018-12-26 PROCEDURE — 25000003 PHARM REV CODE 250: Performed by: PHYSICIAN ASSISTANT

## 2018-12-26 PROCEDURE — D9220A PRA ANESTHESIA: Mod: ANES,,, | Performed by: ANESTHESIOLOGY

## 2018-12-26 PROCEDURE — 25000003 PHARM REV CODE 250: Performed by: ANESTHESIOLOGY

## 2018-12-26 PROCEDURE — 27201423 OPTIME MED/SURG SUP & DEVICES STERILE SUPPLY: Performed by: ORTHOPAEDIC SURGERY

## 2018-12-26 PROCEDURE — 25000003 PHARM REV CODE 250

## 2018-12-26 PROCEDURE — 25000003 PHARM REV CODE 250: Performed by: NURSE ANESTHETIST, CERTIFIED REGISTERED

## 2018-12-26 PROCEDURE — 71000016 HC POSTOP RECOV ADDL HR: Performed by: ORTHOPAEDIC SURGERY

## 2018-12-26 PROCEDURE — 37000009 HC ANESTHESIA EA ADD 15 MINS: Performed by: ORTHOPAEDIC SURGERY

## 2018-12-26 PROCEDURE — S0020 INJECTION, BUPIVICAINE HYDRO: HCPCS | Performed by: ORTHOPAEDIC SURGERY

## 2018-12-26 PROCEDURE — 25000003 PHARM REV CODE 250: Performed by: ORTHOPAEDIC SURGERY

## 2018-12-26 PROCEDURE — 36000706: Performed by: ORTHOPAEDIC SURGERY

## 2018-12-26 PROCEDURE — 64721 CARPAL TUNNEL SURGERY: CPT | Mod: RT,,, | Performed by: ORTHOPAEDIC SURGERY

## 2018-12-26 PROCEDURE — 71000015 HC POSTOP RECOV 1ST HR: Performed by: ORTHOPAEDIC SURGERY

## 2018-12-26 PROCEDURE — 71000044 HC DOSC ROUTINE RECOVERY FIRST HOUR: Performed by: ORTHOPAEDIC SURGERY

## 2018-12-26 PROCEDURE — 82962 GLUCOSE BLOOD TEST: CPT | Performed by: ORTHOPAEDIC SURGERY

## 2018-12-26 PROCEDURE — 37000008 HC ANESTHESIA 1ST 15 MINUTES: Performed by: ORTHOPAEDIC SURGERY

## 2018-12-26 PROCEDURE — S0028 INJECTION, FAMOTIDINE, 20 MG: HCPCS | Performed by: NURSE ANESTHETIST, CERTIFIED REGISTERED

## 2018-12-26 PROCEDURE — 36000707: Performed by: ORTHOPAEDIC SURGERY

## 2018-12-26 RX ORDER — HYDROCODONE BITARTRATE AND ACETAMINOPHEN 5; 325 MG/1; MG/1
1 TABLET ORAL EVERY 4 HOURS PRN
Status: DISCONTINUED | OUTPATIENT
Start: 2018-12-26 | End: 2018-12-26 | Stop reason: HOSPADM

## 2018-12-26 RX ORDER — PROPOFOL 10 MG/ML
VIAL (ML) INTRAVENOUS
Status: DISCONTINUED | OUTPATIENT
Start: 2018-12-26 | End: 2018-12-26

## 2018-12-26 RX ORDER — CEFAZOLIN SODIUM 1 G/3ML
2 INJECTION, POWDER, FOR SOLUTION INTRAMUSCULAR; INTRAVENOUS
Status: COMPLETED | OUTPATIENT
Start: 2018-12-26 | End: 2018-12-26

## 2018-12-26 RX ORDER — LIDOCAINE HYDROCHLORIDE 10 MG/ML
INJECTION, SOLUTION EPIDURAL; INFILTRATION; INTRACAUDAL; PERINEURAL
Status: DISCONTINUED | OUTPATIENT
Start: 2018-12-26 | End: 2018-12-26 | Stop reason: HOSPADM

## 2018-12-26 RX ORDER — SODIUM CHLORIDE 9 MG/ML
INJECTION, SOLUTION INTRAVENOUS CONTINUOUS
Status: DISCONTINUED | OUTPATIENT
Start: 2018-12-26 | End: 2018-12-26 | Stop reason: HOSPADM

## 2018-12-26 RX ORDER — BACITRACIN ZINC 500 UNIT/G
OINTMENT (GRAM) TOPICAL
Status: DISCONTINUED | OUTPATIENT
Start: 2018-12-26 | End: 2018-12-26 | Stop reason: HOSPADM

## 2018-12-26 RX ORDER — FENTANYL CITRATE 50 UG/ML
INJECTION, SOLUTION INTRAMUSCULAR; INTRAVENOUS
Status: DISCONTINUED | OUTPATIENT
Start: 2018-12-26 | End: 2018-12-26

## 2018-12-26 RX ORDER — MIDAZOLAM HYDROCHLORIDE 1 MG/ML
INJECTION, SOLUTION INTRAMUSCULAR; INTRAVENOUS
Status: DISCONTINUED | OUTPATIENT
Start: 2018-12-26 | End: 2018-12-26

## 2018-12-26 RX ORDER — BUPIVACAINE HYDROCHLORIDE 5 MG/ML
INJECTION, SOLUTION EPIDURAL; INTRACAUDAL
Status: DISCONTINUED
Start: 2018-12-26 | End: 2018-12-26 | Stop reason: HOSPADM

## 2018-12-26 RX ORDER — LIDOCAINE HYDROCHLORIDE 10 MG/ML
1 INJECTION, SOLUTION EPIDURAL; INFILTRATION; INTRACAUDAL; PERINEURAL ONCE
Status: COMPLETED | OUTPATIENT
Start: 2018-12-26 | End: 2018-12-26

## 2018-12-26 RX ORDER — PROMETHAZINE HYDROCHLORIDE 12.5 MG/1
12.5 TABLET ORAL EVERY 4 HOURS PRN
Qty: 60 TABLET | Refills: 0 | Status: SHIPPED | OUTPATIENT
Start: 2018-12-26 | End: 2019-04-10 | Stop reason: CLARIF

## 2018-12-26 RX ORDER — PROPOFOL 10 MG/ML
VIAL (ML) INTRAVENOUS CONTINUOUS PRN
Status: DISCONTINUED | OUTPATIENT
Start: 2018-12-26 | End: 2018-12-26

## 2018-12-26 RX ORDER — METOCLOPRAMIDE HYDROCHLORIDE 5 MG/ML
5 INJECTION INTRAMUSCULAR; INTRAVENOUS EVERY 6 HOURS PRN
Status: DISCONTINUED | OUTPATIENT
Start: 2018-12-26 | End: 2018-12-26 | Stop reason: HOSPADM

## 2018-12-26 RX ORDER — DOCUSATE SODIUM 100 MG/1
100 CAPSULE, LIQUID FILLED ORAL 2 TIMES DAILY
Qty: 60 CAPSULE | Refills: 0 | Status: SHIPPED | OUTPATIENT
Start: 2018-12-26 | End: 2019-01-25

## 2018-12-26 RX ORDER — ONDANSETRON 2 MG/ML
4 INJECTION INTRAMUSCULAR; INTRAVENOUS EVERY 12 HOURS PRN
Status: DISCONTINUED | OUTPATIENT
Start: 2018-12-26 | End: 2018-12-26 | Stop reason: HOSPADM

## 2018-12-26 RX ORDER — FAMOTIDINE 10 MG/ML
INJECTION INTRAVENOUS
Status: DISCONTINUED | OUTPATIENT
Start: 2018-12-26 | End: 2018-12-26

## 2018-12-26 RX ORDER — BUPIVACAINE HYDROCHLORIDE 5 MG/ML
INJECTION, SOLUTION EPIDURAL; INTRACAUDAL
Status: DISCONTINUED | OUTPATIENT
Start: 2018-12-26 | End: 2018-12-26 | Stop reason: HOSPADM

## 2018-12-26 RX ORDER — LIDOCAINE HYDROCHLORIDE 10 MG/ML
INJECTION INFILTRATION; PERINEURAL
Status: DISCONTINUED
Start: 2018-12-26 | End: 2018-12-26 | Stop reason: HOSPADM

## 2018-12-26 RX ORDER — MUPIROCIN 20 MG/G
OINTMENT TOPICAL
Status: DISCONTINUED | OUTPATIENT
Start: 2018-12-26 | End: 2018-12-26 | Stop reason: HOSPADM

## 2018-12-26 RX ORDER — LIDOCAINE HCL/PF 100 MG/5ML
SYRINGE (ML) INTRAVENOUS
Status: DISCONTINUED | OUTPATIENT
Start: 2018-12-26 | End: 2018-12-26

## 2018-12-26 RX ORDER — OXYCODONE HYDROCHLORIDE 5 MG/1
10 TABLET ORAL EVERY 4 HOURS PRN
Status: DISCONTINUED | OUTPATIENT
Start: 2018-12-26 | End: 2018-12-26 | Stop reason: HOSPADM

## 2018-12-26 RX ORDER — BACITRACIN 500 [USP'U]/G
OINTMENT TOPICAL
Status: DISCONTINUED
Start: 2018-12-26 | End: 2018-12-26 | Stop reason: HOSPADM

## 2018-12-26 RX ORDER — SODIUM CHLORIDE 0.9 % (FLUSH) 0.9 %
3 SYRINGE (ML) INJECTION
Status: DISCONTINUED | OUTPATIENT
Start: 2018-12-26 | End: 2018-12-26 | Stop reason: HOSPADM

## 2018-12-26 RX ORDER — LIDOCAINE HYDROCHLORIDE 10 MG/ML
INJECTION, SOLUTION EPIDURAL; INFILTRATION; INTRACAUDAL; PERINEURAL
Status: COMPLETED
Start: 2018-12-26 | End: 2018-12-26

## 2018-12-26 RX ORDER — ACETAMINOPHEN 325 MG/1
650 TABLET ORAL EVERY 4 HOURS PRN
Status: DISCONTINUED | OUTPATIENT
Start: 2018-12-26 | End: 2018-12-26 | Stop reason: HOSPADM

## 2018-12-26 RX ORDER — HYDROCODONE BITARTRATE AND ACETAMINOPHEN 5; 325 MG/1; MG/1
1 TABLET ORAL EVERY 6 HOURS PRN
Qty: 28 TABLET | Refills: 0 | Status: ON HOLD | OUTPATIENT
Start: 2018-12-26 | End: 2019-03-13 | Stop reason: HOSPADM

## 2018-12-26 RX ADMIN — LIDOCAINE HYDROCHLORIDE 40 MG: 20 INJECTION, SOLUTION INTRAVENOUS at 09:12

## 2018-12-26 RX ADMIN — FENTANYL CITRATE 50 MCG: 50 INJECTION, SOLUTION INTRAMUSCULAR; INTRAVENOUS at 08:12

## 2018-12-26 RX ADMIN — SODIUM CHLORIDE: 0.9 INJECTION, SOLUTION INTRAVENOUS at 07:12

## 2018-12-26 RX ADMIN — MUPIROCIN: 20 OINTMENT TOPICAL at 07:12

## 2018-12-26 RX ADMIN — FAMOTIDINE 20 MG: 10 INJECTION, SOLUTION INTRAVENOUS at 08:12

## 2018-12-26 RX ADMIN — LIDOCAINE HYDROCHLORIDE 10 MG: 10 INJECTION, SOLUTION EPIDURAL; INFILTRATION; INTRACAUDAL; PERINEURAL at 07:12

## 2018-12-26 RX ADMIN — PROPOFOL 50 MCG/KG/MIN: 10 INJECTION, EMULSION INTRAVENOUS at 09:12

## 2018-12-26 RX ADMIN — MIDAZOLAM HYDROCHLORIDE 2 MG: 1 INJECTION, SOLUTION INTRAMUSCULAR; INTRAVENOUS at 08:12

## 2018-12-26 RX ADMIN — CEFAZOLIN 2 G: 330 INJECTION, POWDER, FOR SOLUTION INTRAMUSCULAR; INTRAVENOUS at 09:12

## 2018-12-26 RX ADMIN — PROPOFOL 20 MG: 10 INJECTION, EMULSION INTRAVENOUS at 09:12

## 2018-12-26 NOTE — ANESTHESIA POSTPROCEDURE EVALUATION
"Anesthesia Post Evaluation    Patient: Ailyn Ortiz    Procedure(s) Performed: Procedure(s) (LRB):  RELEASE, CARPAL TUNNEL right (Right)    Final Anesthesia Type: general  Patient location during evaluation: PACU  Patient participation: Yes- Able to Participate  Level of consciousness: awake and alert  Post-procedure vital signs: reviewed and stable  Pain management: adequate  Airway patency: patent  PONV status at discharge: No PONV  Anesthetic complications: no      Cardiovascular status: stable  Respiratory status: unassisted and spontaneous ventilation  Hydration status: euvolemic  Follow-up not needed.        Visit Vitals  BP (!) 116/58   Pulse 79   Temp 36.4 °C (97.5 °F) (Temporal)   Resp 12   Ht 5' 2" (1.575 m)   Wt 102.1 kg (225 lb)   SpO2 95%   Breastfeeding? No   BMI 41.15 kg/m²       Pain/Buddy Score: Buddy Score: 10 (12/26/2018  9:45 AM)        "

## 2018-12-26 NOTE — TRANSFER OF CARE
"Anesthesia Transfer of Care Note    Patient: Ailyn Ortiz    Procedure(s) Performed: Procedure(s) (LRB):  RELEASE, CARPAL TUNNEL right (Right)    Patient location: PACU    Anesthesia Type: general    Transport from OR: Transported from OR on 6-10 L/min O2 by face mask with adequate spontaneous ventilation    Post pain: adequate analgesia    Post assessment: no apparent anesthetic complications and tolerated procedure well    Post vital signs: stable    Level of consciousness: awake, alert and oriented    Nausea/Vomiting: no nausea/vomiting    Complications: none          Last vitals:   Visit Vitals  /58 (BP Location: Right arm, Patient Position: Lying)   Pulse 94   Temp 36.7 °C (98.1 °F) (Oral)   Resp 18   Ht 5' 2" (1.575 m)   Wt 102.1 kg (225 lb)   SpO2 96%   Breastfeeding? No   BMI 41.15 kg/m²     "
Ambulatory

## 2018-12-26 NOTE — OP NOTE
Orthopaedic Operative Note       Surgery Date: 12/26/2018     Surgeon(s) and Role:     * Anai Choudhary MD - Primary     * Steve Hamilton MD - Resident - Assisting    Pre-op Diagnosis:  Carpal tunnel syndrome on right [G56.01]    Post-op Diagnosis:  Same    Procedure(s) (LRB):  RELEASE, CARPAL TUNNEL right (Right)    Anesthesia: Local MAC    Estimated Blood Loss: Minimal           Specimen: None    Complications: None               Condition: Stable      INDICATIONS FOR PROCEDURE: Ailyn Ortiz   is a 53 y.o. female  with numbness and tingling into the median nerve distribution of the hand. EMG was positive for carpal tunnel. Patient has failed conservative treatment therefore, operative intervention was deemed necessary. Risk and benefits were explained to the patient in clinic and consents were performed in clinic.       PROCEDURE IN DETAIL: After the correct site was marked with the patient's   participation in the holding area, the patient was brought to the Operating   Room, placed in supine position and underwent MAC anesthesia. A well-padded   nonsterile tourniquet was placed on the forearm. Timeout was called for   correct site, procedure and patient. Under sterile conditions,   an injection of 1%  lidocaine was injected into the carpal tunnel space.  The arm was then prepped and draped in normal sterile fashion.  The incision was marked out using [place] cardinal lines. The arm was exsanguinated using an Esmarch and this remained for 15 minutes. The incision was made in line with radial aspect of the 4th finger over the carpal tunnel. Careful dissection was made down to the palmar fascia. Palmar fascia was   identified, sharply incised and retracted out of the way. The transverse carpal  ligament was identified and sharply incised until the median nerve was exposed.  A mosquito hemostat was then passed on the undersurface of the transverse   ligament both proximally and distally to prevent  adherence of the nerve to the   transverse ligament. Using Metzenbaum scissors, the transverse ligament was    cut both proximally and distally ensuring not to cut past [place] Cardinal Line or injuring the Deep [place]mar Arch. Mosquito hemostat was passed proximally   and distally to confirm that it was a complete release. The area was irrigated   with copious amounts of normal saline and nylon suture was used to close the skin. Sterile dressing was applied. Tourniquet was deflated. Brisk capillary refill ensued. The   patient was transferred to the Recovery Room in stable condition.       Disposition: Discharge home with follow up in 2 weeks for suture removal

## 2018-12-26 NOTE — ANESTHESIA PREPROCEDURE EVALUATION
12/26/2018  Ailyn Ortiz is a 53 y.o., female.  Pre-operative evaluation for Procedure(s) (LRB):  RELEASE, CARPAL TUNNEL right (Right)    Ailyn rOtiz is a 53 y.o. female     Patient Active Problem List   Diagnosis    Type 2 diabetes mellitus without complication, without long-term current use of insulin    Essential hypertension    BMI 40.0-44.9, adult    Sleep apnea    Inflammatory arthritis    Family history of psoriatic arthritis    Need for vaccination against hepatitis B virus    Need for vaccination for DTaP    Need for vaccination    Pain in both hands    Carpal tunnel syndrome of right wrist       Review of patient's allergies indicates:   Allergen Reactions    Milk containing products      Congestion      Nuts [tree nut] Other (See Comments)     Nasal Congestion    Avelox [moxifloxacin] Rash       No current facility-administered medications on file prior to encounter.      Current Outpatient Medications on File Prior to Encounter   Medication Sig Dispense Refill    amoxicillin/potassium clav (AUGMENTIN ORAL) Take by mouth.      atorvastatin (LIPITOR) 20 MG tablet Take 20 mg by mouth once daily.       cholecalciferol, vitamin D3, 50,000 unit capsule Take 50,000 Units by mouth once a week.      fluticasone (FLONASE) 50 mcg/actuation nasal spray 1 spray by Each Nare route once daily.      INVOKANA 300 mg Tab tablet Take 300 mg by mouth once daily.       levothyroxine (SYNTHROID) 100 MCG tablet Take 100 mcg by mouth before breakfast.       loratadine (CLARITIN) 10 mg tablet Take 10 mg by mouth once daily.      losartan-hydrochlorothiazide 100-12.5 mg (HYZAAR) 100-12.5 mg Tab Take 1 tablet by mouth once daily.       omeprazole (PRILOSEC) 40 MG capsule Take 40 mg by mouth every morning.       TRULICITY 1.5 mg/0.5 mL PnIj Inject into the skin once a week.          Past  Surgical History:   Procedure Laterality Date    ADENOIDECTOMY       SECTION      times 2    CHOLECYSTECTOMY      GANGLION CYST EXCISION      LAPAROSCOPY-DIAGNOSTIC w/ small bowel resection N/A 2018    [facility] 2ND [facility]LR    RESECTION-SMALL BOWEL-LAPAROSCOPIC  2018    [facility] 2ND FLR    small bowel resection (lipoma)      TONSILLECTOMY      TUBAL LIGATION      UVULOPALATOPHARYNGOPLASTY         Social History     Socioeconomic History    Marital status:      Spouse name: Not on file    Number of children: Not on file    Years of education: Not on file    Highest education level: Not on file   Social Needs    Financial resource strain: Not on file    Food insecurity - worry: Not on file    Food insecurity - inability: Not on file    Transportation needs - medical: Not on file    Transportation needs - non-medical: Not on file   Occupational History    Not on file   Tobacco Use    Smoking status: Former Smoker     Packs/day: 0.00     Years: 20.00     Pack years: 0.00     Types: Cigarettes     Last attempt to quit: 12/15/2013     Years since quittin.0    Smokeless tobacco: Never Used   Substance and Sexual Activity    Alcohol use: Yes     Comment: occasionally    Drug use: No    Sexual activity: Not on file   Other Topics Concern    Not on file   Social History Narrative    Not on file         Anesthesia Evaluation    I have reviewed the Patient Summary Reports.     I have reviewed the Medications.     Review of Systems  Anesthesia Hx:  No problems with previous Anesthesia  History of prior surgery of interest to airway management or planning:  Denies Personal Hx of Anesthesia complications.   Social:  Former Smoker    Cardiovascular:   Hypertension    Pulmonary:   Sleep Apnea    Hepatic/GI:   GERD    Neurological:  Neurology Normal    Endocrine:   Diabetes, type 2        Physical  Exam  General:  Well nourished, Obesity    Airway/Jaw/Neck:  Airway Findings: Mouth Opening: Small, but > 3cm Tongue: Normal  General Airway Assessment: Adult  Mallampati: III  TM Distance: 4 - 6 cm      Dental:  Dental Findings: In tact        Mental Status:  Mental Status Findings:  Cooperative, Alert and Oriented         Anesthesia Plan  Type of Anesthesia, risks & benefits discussed:  Anesthesia Type:  general  Patient's Preference:   Intra-op Monitoring Plan: standard ASA monitors  Intra-op Monitoring Plan Comments:   Post Op Pain Control Plan: per primary service following discharge from PACU  Post Op Pain Control Plan Comments:   Induction:   IV  Beta Blocker:  Patient is not currently on a Beta-Blocker (No further documentation required).       Informed Consent: Patient understands risks and agrees with Anesthesia plan.  Questions answered. Anesthesia consent signed with patient.  ASA Score: 3     Day of Surgery Review of History & Physical:    H&P update referred to the surgeon.         Ready For Surgery From Anesthesia Perspective.

## 2018-12-26 NOTE — DISCHARGE INSTRUCTIONS
Anesthesia: General Anesthesia     You are watched continuously during your procedure by your anesthesia provider.     Youre due to have surgery. During surgery, youll be given medicine called anesthesia or anesthetic. This will keep you comfortable and pain-free. Your anesthesia provider will use general anesthesia.  What is general anesthesia?  General anesthesia puts you into a state like deep sleep. It goes into the bloodstream (IV anesthetics), into the lungs (gas anesthetics), or both. You feel nothing during the procedure. You will not remember it. During the procedure, the anesthesia provider monitors you continuously. He or she checks your heart rate and rhythm, blood pressure, breathing, and blood oxygen.  · IV anesthetics. IV anesthetics are given through an IV line in your arm. Theyre often given first. This is so you are asleep before a gas anesthetic is started. Some kinds of IV anesthetics relieve pain. Others relax you. Your doctor will decide which kind is best in your case.  · Gas anesthetics. Gas anesthetics are breathed into the lungs. They are often used to keep you asleep. They can be given through a facemask or a tube placed in your larynx or trachea (breathing tube).  ¨ If you have a facemask, your anesthesia provider will most likely place it over your nose and mouth while youre still awake. Youll breathe oxygen through the mask as your IV anesthetic is started. Gas anesthetic may be added through the mask.  ¨ If you have a tube in the larynx or trachea, it will be inserted into your throat after youre asleep.  Anesthesia tools and medicines  You will likely have:  · IV anesthetics. These are put into an IV line into your bloodstream.  · Gas anesthetics. You breathe these anesthetics into your lungs, where they pass into your bloodstream.  · Pulse oximeter. This is a small clip that is attached to the end of your finger. This measures your blood oxygen level.  · Electrocardiography  leads (electrodes). These are small sticky pads that are placed on your chest. They record your heart rate and rhythm.  · Blood pressure cuff. This reads your blood pressure.  Risks and possible complications  General anesthesia has some risks. These include:  · Breathing problems  · Nausea and vomiting  · Sore throat or hoarseness (usually temporary)  · Allergic reaction to the anesthetic  · Irregular heartbeat (rare)  · Cardiac arrest (rare)   Anesthesia safety  · Follow all instructions you are given for how long not to eat or drink before your procedure.  · Be sure your doctor knows what medicines and drugs you take. This includes over-the-counter medicines, herbs, supplements, alcohol or other drugs. You will be asked when those were last taken.  · Have an adult family member or friend drive you home after the procedure.  · For the first 24 hours after your surgery:  ¨ Do not drive or use heavy equipment.  ¨ Do not make important decisions or sign legal documents. If important decisions or signing legal documents is necessary during the first 24 hours after surgery, have a trusted family member or spouse act on your behalf.  ¨ Avoid alcohol.  ¨ Have a responsible adult stay with you. He or she can watch for problems and help keep you safe.  Date Last Reviewed: 12/1/2016  © 6358-1123 [employer]. [address] [place] [address], [place] [address]. All rights reserved. This information is not intended as a substitute for professional medical care. Always follow your healthcare professional's instructions.        Discharge Instructions for Carpal Tunnel Release  You had a carpal tunnel release procedure to help relieve the symptoms of carpal tunnel syndrome. In carpal tunnel syndrome, a nerve in the wrist is compressed and irritated. This causes numbness and pain in the fingers and hand. Carpal tunnel release relieves the compression of the nerve. Here are instructions that will help you care for your arm and  wrist when you are at home.  Home care  · Avoid gripping objects tightly or lifting with your affected arm.  · Wear your bandage, splint, or cast as directed by your doctor.  · Always keep the dressing, splint, or cast dry and clean.  · When showering, cover your hand and  wrist with plastic and use tape or rubberbands to keep the dressing, splint, or cast dry. Shower as necessary.    · Use an ice pack or bag of frozen peas -- or something similar -- wrapped in a thin towel on your wrist to reduce swelling for the first 48 hours. Leave the ice pack on for 20 minutes; then take it off for 20 minutes. Repeat as needed.  · Keep your arm elevated above your heart for 24 to 48 hours after surgery.  · Do the exercises you learned in the hospital, or as instructed by your doctor.  · Take pain medicine as directed.  · Dont drive until your doctor says its OK. Never drive while you are taking opioid pain medicine.  · Ask your doctor when you can return to work. If your job requires heavy lifting, you may not be able to begin working again for several weeks.  Follow-up care  Make a follow-up appointment as directed by your doctor.     When to seek medical care  Call 911 right away if you have any of the following:  · Chest pain  · Shortness of breath  Otherwise, call your doctor immediately if you have any of the following:  · A splint, cast, or dressing that has gotten wet  · Increased bleeding or drainage from the incision (cut)  · Opening of the incision  · Fever above 100.4°F (38.9°C) taken by mouth, or shaking chills  · Any new numbness in the fingers or thumb  · Blue hand or fingers  · Increased pain with or without activity  · Increased redness, tenderness, or swelling of the incision   Date Last Reviewed: 11/15/2015  © 2025-6183 [employer]. [address] [place] [address], [place] [address]. All rights reserved. This information is not intended as a substitute for professional medical care. Always follow your  healthcare professional's instructions.

## 2018-12-26 NOTE — PLAN OF CARE
Patient tolerated procedure well.  AVSS, tolerating p.o. No complaints of pain, discomfort.   to  meds from upstairs pharmacy with printed prescriptions.  Prepared for discharge. MD to complete [facility] paperwork for patient at bedside.  Instructions reviewed with patient and spouse. Verbalized understanding of S/S of complications, when to seek medical attention, follow up, activity restrictions, administration of meds, site care and bathing restrictions.  IV to be removed prior to departure.

## 2018-12-26 NOTE — H&P
Subjective:      Patient ID: Ailyn Ortiz is a 53 y.o. female.    Chief Complaint: No chief complaint on file.      HPI  Ailyn Ortiz is a right hand dominant 53 y.o. female presenting today for follow up of surgical planning. She has intermittent numbness/tingling in both hands, worse at night.  The numbness has become constant on the right side since her last visit. She recently underwent EMG, confirming carpal tunnel syndrome bilaterally.  She also has rheumatoid arthritis, she has been followed by Rheumatology.  She was previously seen by Dr. Choudhary for a dorsal right wrist mass, it resolved prior to her last visit.        Review of patient's allergies indicates:   Allergen Reactions    Milk containing products      Congestion      Nuts [tree nut]     Avelox [moxifloxacin] Rash         Current Facility-Administered Medications   Medication Dose Route Frequency Provider Last Rate Last Dose    lidocaine HCL 10 mg/ml (1%) injection 0.1 mL  0.1 mL Other 1 time in Clinic/HOD Brodie Fulton MD        triamcinolone acetonide injection 12 mg  12 mg Intramuscular 1 time in Clinic/HOD Brodie Fulton MD         Current Outpatient Medications   Medication Sig Dispense Refill    amoxicillin/potassium clav (AUGMENTIN ORAL) Take by mouth.      cholecalciferol, vitamin D3, 50,000 unit capsule Take 50,000 Units by mouth once a week.      fluticasone (FLONASE) 50 mcg/actuation nasal spray 1 spray by Each Nare route once daily.      loratadine (CLARITIN) 10 mg tablet Take 10 mg by mouth once daily.      atorvastatin (LIPITOR) 20 MG tablet Take 20 mg by mouth once daily.       folic acid (FOLVITE) 1 MG tablet Take 1 tablet (1 mg total) by mouth 3 (three) times daily. 270 tablet 2    INVOKANA 300 mg Tab tablet Take 300 mg by mouth once daily.       levothyroxine (SYNTHROID) 100 MCG tablet Take 100 mcg by mouth before breakfast.       losartan-hydrochlorothiazide 100-12.5 mg  (HYZAAR) 100-12.5 mg Tab Take 1 tablet by mouth once daily.       methotrexate 2.5 MG Tab TAKE 8 TABLETS BY MOUTH WEEKLY 120 tablet 3    omeprazole (PRILOSEC) 40 MG capsule Take 40 mg by mouth every morning.       TRULICITY 1.5 mg/0.5 mL PnIj Inject into the skin once a week.          Past Medical History:   Diagnosis Date    Asthma     Diabetes mellitus, type 2 since the age of 42    Hypertension since the age of 42    JESSICA (obstructive sleep apnea)     S/P UPP    Thyroid disease        Past Surgical History:   Procedure Laterality Date    ADENOIDECTOMY       SECTION      times 2    CHOLECYSTECTOMY      GANGLION CYST EXCISION      LAPAROSCOPY-DIAGNOSTIC w/ small bowel resection N/A 2018    [facility] 2ND F[facility]    RESECTION-SMALL BOWEL-LAPAROSCOPIC  2018    [facility] 2ND FLR    small bowel resection (lipoma)      TONSILLECTOMY      UVULOPALATOPHARYNGOPLASTY           Review of Systems:  Review of Systems   Constitution: Negative for chills and fever.   Skin: Negative for rash and suspicious lesions.   Musculoskeletal:        See HPI   Neurological: Positive for numbness. Negative for dizziness, headaches and light-headedness.   Psychiatric/Behavioral: Negative for depression. The patient is not nervous/anxious.          OBJECTIVE:     PHYSICAL EXAM:       There were no vitals filed for this visit.  General    Vitals reviewed.  Constitutional: She is oriented to person, place, and time. She appears well-developed and well-nourished.   HENT:   Head: Normocephalic and atraumatic.   Neck: Normal range of motion.   Cardiovascular: Normal rate.    Pulmonary/Chest: Effort normal. No respiratory distress.   Neurological: She is alert and oriented to person, place, and time.   Psychiatric: She has a normal mood and affect. Her behavior is normal. Judgment and thought content normal.             Musculoskeletal:  No  scars or edema appreciated.  No cyst/mass noted dorsal right wrist. Nontender to palpation at the hand and wrist, good range of motion. Mildly tender to palpation posteriorly, laterally, and medially at the right elbow.  Neurovascularly intact-decreased sensation median distribution on the right compared to left.  Good motor function bilaterally, 2+ radial pulses, good capillary refill.  No increasing right hand numbness with Tinel's, negative Tinel's on the left, negative Tinel's bilaterally at the cubital tunnel.  Positive Durkan's bilaterally.    EMG, 8/16/18:  Impression   This study is abnormal. There is electrophysiologic evidence for bilateral median neuropathies at the wrist (carpal tunnel syndrome), right worse than left.     ASSESSMENT/PLAN:   Ailyn was seen today for pain.    Diagnoses and all orders for this visit:    Carpal tunnel syndrome of right wrist        - to OR for right CTR

## 2018-12-26 NOTE — BRIEF OP NOTE
[facility]  Brief Operative Note     SUMMARY     Surgery Date: 12/26/2018     Surgeon(s) and Role:     * Anai Choudhary MD - Primary     * Steve Hamilton MD - Resident - Assisting        Pre-op Diagnosis:  Carpal tunnel syndrome on right [G56.01]    Post-op Diagnosis:  Post-Op Diagnosis Codes:     * Carpal tunnel syndrome on right [G56.01]    Procedure(s) (LRB):  RELEASE, CARPAL TUNNEL right (Right)    Anesthesia: Local MAC    Description of the findings of the procedure: see op note    Findings/Key Components: see op note    Estimated Blood Loss: * No values recorded between 12/26/2018  9:13 AM and 12/26/2018  9:29 AM *         Specimens:   Specimen (12h ago, onward)    None          Discharge Note    SUMMARY     Admit Date: 12/26/2018    Discharge Date and Time: 12/26/2018     Hospital Course (synopsis of major diagnoses, care, treatment, and services provided during the course of the hospital stay): Hospital Course:  On 12/26/2018, the patient arrived to pre-op area for proper pre-operative management.  Upon completion of pre-operative preparation, the patient was taken back to the operative theatre.  A CTR was performed without complication and the patient was transported to the post anesthesia care unit in stable condition. The patient suffered minimal blood loss and electrolyte imbalances during the procedure, which were correct accordingly if neccessary. After appropriate recovery from the anaesthetic agents used during the surgery the patient was discharged home.        Final Diagnosis: Post-Op Diagnosis Codes:     * Carpal tunnel syndrome on right [G56.01]    Disposition: Home or Self Care    Follow Up/Patient Instructions:     Medications:  Reconciled Home Medications:      Medication List      START taking these medications    docusate sodium 100 MG capsule  Commonly known as:  COLACE  Take 1 capsule (100 mg total) by mouth 2 (two) times daily.     HYDROcodone-acetaminophen  5-325 mg per tablet  Commonly known as:  NORCO  Take 1 tablet by mouth every 6 (six) hours as needed for Pain.     promethazine 12.5 MG Tab  Commonly known as:  PHENERGAN  Take 1 tablet (12.5 mg total) by mouth every 4 (four) hours as needed.        CONTINUE taking these medications    atorvastatin 20 MG tablet  Commonly known as:  LIPITOR  Take 20 mg by mouth once daily.     AUGMENTIN ORAL  Take by mouth.     cholecalciferol (vitamin D3) 50,000 unit capsule  Take 50,000 Units by mouth once a week.     fluticasone 50 mcg/actuation nasal spray  Commonly known as:  FLONASE  1 spray by Each Nare route once daily.     folic acid 1 MG tablet  Commonly known as:  FOLVITE  Take 1 tablet (1 mg total) by mouth 3 (three) times daily.     INVOKANA 300 mg Tab tablet  Generic drug:  canagliflozin  Take 300 mg by mouth once daily.     levothyroxine 100 MCG tablet  Commonly known as:  SYNTHROID  Take 100 mcg by mouth before breakfast.     loratadine 10 mg tablet  Commonly known as:  CLARITIN  Take 10 mg by mouth once daily.     losartan-hydrochlorothiazide 100-12.5 mg 100-12.5 mg Tab  Commonly known as:  HYZAAR  Take 1 tablet by mouth once daily.     methotrexate 2.5 MG Tab  TAKE 8 TABLETS BY MOUTH WEEKLY     omeprazole 40 MG capsule  Commonly known as:  PRILOSEC  Take 40 mg by mouth every morning.     TRULICITY 1.5 mg/0.5 mL Pnij  Generic drug:  dulaglutide  Inject into the skin once a week.          Discharge Procedure Orders   Diet general     Sponge bath only until clinic visit     Lifting restrictions   Order Comments: No lifting operative extremity     No driving, operating heavy equipment or signing legal documents while taking pain medication     Call MD for:  temperature >100.4     Call MD for:  persistent nausea and vomiting     Call MD for:  severe uncontrolled pain     Call MD for:  difficulty breathing, headache or visual disturbances     Call MD for:  redness, tenderness, or signs of infection (pain, swelling,  redness, odor or green/yellow discharge around incision site)     Call MD for:  hives     Call MD for:  persistent dizziness or light-headedness     Call MD for:  extreme fatigue     Leave dressing on - Keep it clean, dry, and intact until clinic visit

## 2019-01-04 ENCOUNTER — TELEPHONE (OUTPATIENT)
Dept: ORTHOPEDICS | Facility: CLINIC | Age: 54
End: 2019-01-04

## 2019-01-04 NOTE — TELEPHONE ENCOUNTER
----- Message from Steve Delaney sent at 1/4/2019  9:28 AM CST -----  Contact: BETTY MAZARIEGOS [[id number]]  Name of Who is Calling: BETTY MAZARIEGOS [[id number]]        What is the request in detail: Please advise pt if FMLA paperwork has been received and filled out prior to her 1.9.19 visit. Contact at your earliest convenience.  Thanks-          Can the clinic reply by [facility]: Y    What Number to Call Back if not in [facility]: [phone]

## 2019-01-07 RX ORDER — FOLIC ACID 1 MG/1
1 TABLET ORAL DAILY
Qty: 30 TABLET | Refills: 3 | Status: SHIPPED | OUTPATIENT
Start: 2019-01-07 | End: 2019-02-06

## 2019-01-09 ENCOUNTER — OFFICE VISIT (OUTPATIENT)
Dept: ORTHOPEDICS | Facility: CLINIC | Age: 54
End: 2019-01-09
Payer: COMMERCIAL

## 2019-01-09 VITALS
HEART RATE: 92 BPM | WEIGHT: 225 LBS | BODY MASS INDEX: 41.41 KG/M2 | HEIGHT: 62 IN | SYSTOLIC BLOOD PRESSURE: 125 MMHG | DIASTOLIC BLOOD PRESSURE: 88 MMHG

## 2019-01-09 DIAGNOSIS — Z47.89 ORTHOPEDIC AFTERCARE: ICD-10-CM

## 2019-01-09 DIAGNOSIS — G56.03 BILATERAL CARPAL TUNNEL SYNDROME: Primary | ICD-10-CM

## 2019-01-09 PROCEDURE — 99999 PR PBB SHADOW E&M-EST. PATIENT-LVL IV: ICD-10-PCS | Mod: PBBFAC,,, | Performed by: PHYSICIAN ASSISTANT

## 2019-01-09 PROCEDURE — 99999 PR PBB SHADOW E&M-EST. PATIENT-LVL IV: CPT | Mod: PBBFAC,,, | Performed by: PHYSICIAN ASSISTANT

## 2019-01-09 PROCEDURE — 99024 POSTOP FOLLOW-UP VISIT: CPT | Mod: S$GLB,,, | Performed by: PHYSICIAN ASSISTANT

## 2019-01-09 PROCEDURE — 99024 PR POST-OP FOLLOW-UP VISIT: ICD-10-PCS | Mod: S$GLB,,, | Performed by: PHYSICIAN ASSISTANT

## 2019-01-09 NOTE — PROGRESS NOTES
"Ms. Ortiz is here today for a post-operative visit.  She is 14 days status post right carpal tunnel release by Dr. Choudhary on 12/26/18. She reports that she is doing well, she still has numbness in the long and index fingers. Her finger numbness was constant prior to surgery.  Her left finger numbness is intermittent, not bothersome. Pain is 3/10.  She denies fever, chills, and sweats since the time of the surgery.     Physical exam:    Vitals:    01/09/19 1302   BP: 125/88   Pulse: 92   Weight: 102.1 kg (225 lb)   Height: 5' 2" (1.575 m)   PainSc:   3     Vital signs are stable, patient is afebrile.  Patient is well dressed and well groomed, no acute distress.  Alert and oriented to person, place, and time.  Post op dressing taken down.  Incision is clean, dry and intact.  There is no erythema or exudate.  There is no sign of any infection. She is NVI. Sutures removed without difficulty.      EMG, 8/2018:  Impression   This study is abnormal. There is electrophysiologic evidence for bilateral median neuropathies at the wrist (carpal tunnel syndrome), right worse than left.     Assessment: 14 days status post right carpal tunnel release    Plan:  Ailyn was seen today for post-op evaluation.    Diagnoses and all orders for this visit:    Bilateral carpal tunnel syndrome    Orthopedic aftercare        - PO instruction reviewed and provided to patient  - Gel brace provided  - Discussed scar massage  - Follow up in 4 weeks if needed  - Call with questions or concerns      "

## 2019-01-10 ENCOUNTER — TELEPHONE (OUTPATIENT)
Dept: ORTHOPEDICS | Facility: CLINIC | Age: 54
End: 2019-01-10

## 2019-01-10 NOTE — TELEPHONE ENCOUNTER
Returned call to number in message below. Notified work restrictions not noted on OV note w/PA on yesterday, but fax was received on today and to be completed and faxed back to [employer]patient. She verbalized understanding.

## 2019-01-10 NOTE — TELEPHONE ENCOUNTER
----- Message from Michelle Fitch sent at 1/10/2019 11:40 AM CST -----  Contact: Puja   Name of Who is Calling: Puja with [employer]       What is the request in detail: Puja with [employer] calling to verify any restrictions the patient has from surgery and return to work information. ref [id number]. Please contact to further discuss and advise      Can the clinic reply by [facility]: No       What Number to Call Back if not in [facility]: [phone]

## 2019-02-06 ENCOUNTER — OFFICE VISIT (OUTPATIENT)
Dept: ORTHOPEDICS | Facility: CLINIC | Age: 54
End: 2019-02-06
Payer: COMMERCIAL

## 2019-02-06 VITALS
WEIGHT: 225 LBS | HEIGHT: 62 IN | DIASTOLIC BLOOD PRESSURE: 81 MMHG | HEART RATE: 81 BPM | BODY MASS INDEX: 41.41 KG/M2 | SYSTOLIC BLOOD PRESSURE: 125 MMHG

## 2019-02-06 DIAGNOSIS — M65.312 TRIGGER FINGER OF LEFT THUMB: Primary | ICD-10-CM

## 2019-02-06 DIAGNOSIS — G56.03 BILATERAL CARPAL TUNNEL SYNDROME: ICD-10-CM

## 2019-02-06 PROCEDURE — 99024 PR POST-OP FOLLOW-UP VISIT: ICD-10-PCS | Mod: S$GLB,,, | Performed by: PHYSICIAN ASSISTANT

## 2019-02-06 PROCEDURE — 20550 NJX 1 TENDON SHEATH/LIGAMENT: CPT | Mod: 79,FA,S$GLB, | Performed by: PHYSICIAN ASSISTANT

## 2019-02-06 PROCEDURE — 99024 POSTOP FOLLOW-UP VISIT: CPT | Mod: S$GLB,,, | Performed by: PHYSICIAN ASSISTANT

## 2019-02-06 PROCEDURE — 20550 PR INJECT TENDON SHEATH/LIGAMENT: ICD-10-PCS | Mod: 79,FA,S$GLB, | Performed by: PHYSICIAN ASSISTANT

## 2019-02-06 PROCEDURE — 99999 PR PBB SHADOW E&M-EST. PATIENT-LVL IV: CPT | Mod: PBBFAC,,, | Performed by: PHYSICIAN ASSISTANT

## 2019-02-06 PROCEDURE — 99999 PR PBB SHADOW E&M-EST. PATIENT-LVL IV: ICD-10-PCS | Mod: PBBFAC,,, | Performed by: PHYSICIAN ASSISTANT

## 2019-02-06 RX ORDER — LIDOCAINE HYDROCHLORIDE 10 MG/ML
1 INJECTION, SOLUTION EPIDURAL; INFILTRATION; INTRACAUDAL; PERINEURAL
Status: COMPLETED | OUTPATIENT
Start: 2019-02-06 | End: 2019-02-06

## 2019-02-06 RX ORDER — DEXAMETHASONE SODIUM PHOSPHATE 4 MG/ML
4 INJECTION, SOLUTION INTRA-ARTICULAR; INTRALESIONAL; INTRAMUSCULAR; INTRAVENOUS; SOFT TISSUE
Status: COMPLETED | OUTPATIENT
Start: 2019-02-06 | End: 2019-02-06

## 2019-02-06 RX ADMIN — DEXAMETHASONE SODIUM PHOSPHATE 4 MG: 4 INJECTION, SOLUTION INTRA-ARTICULAR; INTRALESIONAL; INTRAMUSCULAR; INTRAVENOUS; SOFT TISSUE at 11:02

## 2019-02-06 RX ADMIN — LIDOCAINE HYDROCHLORIDE 10 MG: 10 INJECTION, SOLUTION EPIDURAL; INFILTRATION; INTRACAUDAL; PERINEURAL at 11:02

## 2019-02-06 NOTE — PROGRESS NOTES
"Ms. Ortiz is here today for a post-operative visit.  She is 14 days status post right carpal tunnel release by Dr. Choudhary on 12/26/18. She reports that she is doing well, numbness in the long and index fingers is improved. Her finger numbness was constant prior to surgery.  Her left finger numbness is intermittent, not bothersome. She has noticed triggering of the left thumb for the past 3 weeks.  She previously had one trigger finger injection. Pain is 5/10.  She denies fever, chills, and sweats since the time of the surgery.     Physical exam:    Vitals:    02/06/19 1016   BP: 125/81   Pulse: 81   Weight: 102.1 kg (225 lb)   Height: 5' 2" (1.575 m)   PainSc:   5     Vital signs are stable, patient is afebrile.  Patient is well dressed and well groomed, no acute distress.  Alert and oriented to person, place, and time.  Incision is healing well -  clean, dry and intact.  There is no erythema or exudate.  There is no sign of any infection. She is NVI.   Tender to palpation at the A1 pulley left thumb. Triggering noted on exam.     EMG, 8/2018:  Impression   This study is abnormal. There is electrophysiologic evidence for bilateral median neuropathies at the wrist (carpal tunnel syndrome), right worse than left.     Assessment: 14 days status post right carpal tunnel release    Plan:  Ailyn was seen today for post-op evaluation.    Diagnoses and all orders for this visit:    Trigger finger of left thumb  -     Ambulatory Referral to Physical/Occupational Therapy    Bilateral carpal tunnel syndrome  -     Ambulatory Referral to Physical/Occupational Therapy    Other orders  -     dexamethasone injection 4 mg  -     lidocaine (PF) 10 mg/ml (1%) injection 10 mg          - Conservative and surgical options for trigger finger and left carpal tunnel discussed  - Trigger finger injection today  - Discussed scar massage  - OT orders placed  - Follow up as needed/if not improved  - Call with questions or " concerns    PROCEDURE NOTE:  I have explained the risks, benefits, and alternatives of the procedure in detail.  The patient voices understanding and all questions have been answered, consents to injection. Pause for timeout.  After a steril prep of the skin in the normal fashion, the level of the A-1 pulley of the left thumb is injected using a 25 gauge needle from the volar approach with a  combination of 0.5 cc 1% plain Lidocaine and 4 mg of dexamethasone.  The patient is cautioned and immediate relief of pain is secondary to the local anesthetic and will be temporary.  After the anesthetic wears off there may be a increase in pain that may last for a few hours or a few days and they should use ice to help alleviate this flair up of pain.

## 2019-02-12 ENCOUNTER — PATIENT MESSAGE (OUTPATIENT)
Dept: ORTHOPEDICS | Facility: CLINIC | Age: 54
End: 2019-02-12

## 2019-02-18 ENCOUNTER — CLINICAL SUPPORT (OUTPATIENT)
Dept: REHABILITATION | Facility: HOSPITAL | Age: 54
End: 2019-02-18
Attending: PHYSICIAN ASSISTANT
Payer: COMMERCIAL

## 2019-02-18 DIAGNOSIS — Z98.890 HISTORY OF CARPAL TUNNEL RELEASE: ICD-10-CM

## 2019-02-18 PROCEDURE — 97110 THERAPEUTIC EXERCISES: CPT

## 2019-02-18 NOTE — PLAN OF CARE
[facility]  Initial Evaluation     Name: Ailyn Ortiz  Clinic Number: [id number]    Therapy Diagnosis:   Encounter Diagnosis   Name Primary?    History of carpal tunnel release      Physician: Lianet Castillo PA    Physician Orders: eval and treat   Medical Diagnosis: M65.312 trigger finger of left thumb      G56.03 bilateral carpal tunnel release   Surgical Procedure/ Date :12/26/18 right CTR  Evaluation Date: 2/18/2019  Insurance:[employer]  Insurance Authorization period Expiration: 12/31/19  Plan of Care Certification Period: 5/30/19    Visit # / Visits Authortized: 1 / 60  Time In:2:00pm  Time Out: 3:00pm  Total Billable Time: 60 minutes    Precautions: Diabetes    Subjective     Involved Side:  right  Dominant Side: Right  Date of Onset:   Dec 2018  Mechanism of Injury: started with Carpal tunnel issues , then 4 weeks ago left thumb started hurting again , she did have therapy for left thumb July 2018     History of Current Condition: CTR 12/26/18 ,     Imaging: none   Previous Therapy: July 2018  Left trigger finger thumb therapy     Patient's Goals for Therapy: pain free left thumb and decrease pain on right wrist     Pain:  Functional Pain Scale Rating 0-10:   3/10 on average  3/10 at best  9/10 at worst left thumb , 4/10 right wrist   Locationleft:thumb , and right wrist   Description: Burning  Aggravating Factors: Bending  Easing Factors: massage    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     Current FunctionalStatus   Home/Living environment : lives with their family      Limitation of Functional Status as follows:   ADLs/IADLs:     - Feeding:Not Necessary    - Bathing:Not Necessary    - Dressing/Grooming: Minimal-Moderate Modifications/Assistance, trouble hooking bra     - Driving:  Minimal-Moderate Modifications/Assistance     Leisure: Driving      Past Medical History/Physical Systems Review:   Ailyn Ortiz  has a past medical history of Asthma, Diabetes mellitus, type 2, Hypertension, JESSICA (obstructive sleep apnea), and Thyroid disease.    Ailyn Ortiz  has a past surgical history that includes Ganglion cyst excision;  section; Cholecystectomy; Adenoidectomy; Tonsillectomy; Uvulopalatopharyngoplasty; small bowel resection (lipoma); Tubal ligation; and Carpal tunnel release (Right, 2018).    Ailyn has a current medication list which includes the following prescription(s): amoxicillin/potassium clav, atorvastatin, cholecalciferol (vitamin d3), fluticasone, folic acid, hydrocodone-acetaminophen, invokana, levothyroxine, loratadine, losartan-hydrochlorothiazide 100-12.5 mg, methotrexate, omeprazole, promethazine, and trulicity, and the following Facility-Administered Medications: lidocaine hcl 10 mg/ml (1%) and triamcinolone acetonide.    Review of patient's allergies indicates:   Allergen Reactions    Milk containing products      Congestion      Nuts [tree nut] Other (See Comments)     Nasal Congestion    Avelox [moxifloxacin] Rash          Objective     Observation/Appearance:  Skin intact and Skin dry  Right wrist scar     Edema. Measured in centimeters. No edema noted    Hand ROM. Measured in degrees.   2019       Left Right      Thumb: MP 0/20 0/40                   IP 0/20pain  0/40          Rad ADD/ABD 20pain  20          Pal ADD/ABD 40 60          Opposition  2cm fromC wnl               WRIST          flexion 60 55      Ext  55 45      RD 15 20      UD 15 15      Pro 80 80      Sup  80 80                             Sensation  Median Nerve Distribution 2019    Left Right   [place]     Normal 1.65-2.83     Diminished Light Touch 3.22-3.61  X   Diminished Protective 3.84-4.31     Loss of Protective 4.56-6.65      Untestable >6.65     2 Point Discrimination     Static     Dynamic           Special Tests:   Left   2/18/2019   Thumb CMC Grind Test Left pos    Finkelstein's Test Left pos    Phalen's Test    Tinel's Test    Fuad's Test    Extrinsic Tightness Test    Intrinsic Tightness Test    ORL Test    Froment's Sign    Alicia's Sign     [place] Sign     Clamp Sign     Scaphoid Thrust Test    Linscheid's Test    Metacarpal Stress Test    Piano Key Test    ECU Synergy Test    Ulnar Compression Test    TFCC Load Test    Ulnocarpal Stress Test    Midcarpal Shift Test    Pisiform Boost Test         Strength (Dyanmometer) and Pinch Strength (Pinch Gauge)  Measured in pounds and psi. Average of three trials.   2/18/2019 2/18/2019        Left Right        Rung II 32 30       Key Pinch 11 nt pain        3pt Pinch 5 nt       2pt Pinch 6 nt           CMS Impairment/Limitation/Restriction for FOTO initial  Survey    Therapist reviewed FOTO scores for Ailyn Ortiz on 2/18/2019.   FOTO documents entered into [employer] - see Media section.                    2/18/2019   Limitation Score:   Category: Self care         Current : CK = at least 40% but < 60% impaired, limited or restricted  Goal: CJ = at least 20% but < 40% impaired, limited or restricted  Discharge:          Treatment     Treatment Time In: 2:30pm   Treatment Time Out: 3;30   Total Treatment time separate from Evaluation time:30    Ailyn received the following supervised modalities after being cleared for contradictions for 15 minutes:   -fluido    Ailyn received the following manual therapy techniques for 15 minutes:   -massage right wrist scar and left thumb volar side     Ailyn received therapeutic exercises for 20  AROM wrist flexion/ extension 15 reps    thumb circles 15 reps    thumb opposition 15 reps          Home Exercise Program/Education:  Issued HEP:AROM wrist flexion/ extension 15 reps    thumb circles 15 reps    thumb opposition 15 reps     and  educated on modality use for pain management . Exercises were reviewed and Ailyn was able to demonstrate them prior to the end of the session.   Pt received a written copy of exercises to perform at home. Ailyn demonstrated good  understanding of the education provided.  Pt was advised to perform these exercises free of pain, and to stop performing them if pain occurs.    Patient/Family Education: role of OT, goals for OT, scheduling/cancellations - pt verbalized understanding. Discussed insurance limitations with patient.    Additional Education provided: none     Assessment     Ailyn Ortiz is a 53 y.o. female referred to outpatient occupational/hand therapy and presents with a medical diagnosis of  Right CTR 12/26/18 and left trigger thumb , resulting in decrease rom, strength  and demonstrates limitations as described in the chart below. Following  medical record review it is determined that pt will benefit from occupational therapy services in order to maximize pain free and/or functional use of bilateral hands .     The patient's rehab potential is Good.     Anticipated barriers to occupational therapy: none   Pt has no cultural, educational or language barriers to learning provided.    Profile and History Assessment of Occupational Performance Level of Clinical Decision Making Complexity Score   Occupational Profile:   Ailyn Ortiz is a 53 y.o. female who lives with their family and is currently employed . Ailyn Ortiz has difficulty with  Dressing, fine motor to manage clothing   driving/transportation management  affecting his/her daily functional abilities. His/her main goal for therapy is  Full us e of hands .     Comorbidities:    has a past medical history of Asthma, Diabetes mellitus, type 2, Hypertension, JESSICA (obstructive sleep apnea), and Thyroid disease.    Medical and Therapy History Review:   Brief               Performance Deficits    Physical:  Joint  Mobility    Cognitive:  No Deficits    Psychosocial:    No Deficits     Clinical Decision Making:  low    Assessment Process:  Problem-Focused Assessments    Modification/Need for Assistance:  Not Necessary    Intervention Selection:  Limited Treatment Options       low  Based on PMHX, co morbidities , data from assessments and functional level of assistance required with task and clinical presentation directly impacting function.       The following goals were discussed with the patient and patient is in agreement with them as to be addressed in the treatment plan.          GOALS: 6 weeks. Pt agrees with goals set.  Goals:     Short Term (4 weeks on 3/18/19 ):  1)   Patient to be IND with HEP and modalities for pain management, progressing   2)   Increase ROM 10 degrees  For  Left thumb Mp flexion and Ip flexion   motion to increase functional hand use for left right hand  , progressing   3)   Increase  strength by  5 lbs. to grasp bilateral hand , progressing   4. Decrease sensnsitivty of right wrist scar     Long Term (by discharge):  1)   Pt will report 0 out of 10 pain with right and left hands  ., progressing   2)   Patient to score of CJ on FOTO to demonstrate improved perception of functionalbilateral hand/ arm  Use. Progressing   3)   Pt will return to prior level of function for ADLs and household management, progressing              Plan   Certification Period/Plan of care expiration: 2/18/2019 to 4/30/19 .    Outpatient Occupational Therapy 2 times weekly for 5 weeks may include the following interventions: Paraffin, Modalities for pain management, US 3 mhz, Therapeutic exercises/activities. and Strengthening.      Sujata Villanueva OT

## 2019-02-21 ENCOUNTER — CLINICAL SUPPORT (OUTPATIENT)
Dept: REHABILITATION | Facility: HOSPITAL | Age: 54
End: 2019-02-21
Attending: PHYSICIAN ASSISTANT
Payer: COMMERCIAL

## 2019-02-21 DIAGNOSIS — Z98.890 HISTORY OF CARPAL TUNNEL RELEASE: Primary | ICD-10-CM

## 2019-02-21 PROCEDURE — 97110 THERAPEUTIC EXERCISES: CPT

## 2019-02-21 PROCEDURE — 97035 APP MDLTY 1+ULTRASOUND EA 15: CPT

## 2019-02-21 PROCEDURE — 97022 WHIRLPOOL THERAPY: CPT

## 2019-02-21 NOTE — PROGRESS NOTES
Occupational Therapy Daily Treatment Note   Date 2/21/2019    Name: Ailyn Ortiz  Clinic Number: [id number]  Name: Ailyn Ortiz  Clinic Number: [id number]     Therapy Diagnosis:        Encounter Diagnosis   Name Primary?    History of carpal tunnel release        Physician: Lianet Castillo PA     Physician Orders: eval and treat   Medical Diagnosis: M65.312 trigger finger of left thumb                                       G56.03 bilateral carpal tunnel release     Surgical Procedure/ Date :12/26/18 right CTR  Evaluation Date: 2/18/2019  Insurance:[employer]  Insurance Authorization period Expiration: 12/31/19  Plan of Care Certification Period: 5/30/19     Visit # / Visits Authortized: 1 / 60  Time In:2:00pm  Time Out: 3:00pm  Total Billable Time: 60 minutes     Precautions: Diabetes         Subjective     Pt reports: she was compliant with home exercise program given last session.  She reports that her thumb felt good yesterday , today she comes to therapy and is in a little more pain .     Response to previous treatment:slightly omproved   Functional change: has slightly more motion in hand     Pain: 4/ 10  Location: bilateral fingers      Objective     Ailyn received the following supervised modalities after being cleared for contradictions for 15 minutes:   -fluido   direct contact modalities X 6 minutes 1.0w/cm2 to right CTR scar and 6 minutes to left volar thumb at Mp joint       Ailyn received the following manual therapy techniques for 10 minutes:   -PROM left thumb ROM       Ailyn received therapeutic exercises for 18 minutes including:  -AROM wrist right  motion   wrist flexion 1#,   wrist extension 1#  Hand gripper (R) 2 yellow rubberbands         Post exercise 10 minutes MH , bilateral hands       Home Exercises and Education Provided     Education provided:     -  - Progress towards goals     Written Home Exercises Provided: Patient instructed to cont prior  HEP.  Exercises were reviewed and Ailyn was able to demonstrate them prior to the end of the session.  Ailyn demonstrated fair  understanding of the education provided.   .   See EMR under Patient Instructions for exercises provided prior visit.       Assessment     Pt would continue to benefit from skilled OT. Yes     Ailyn is progressing well towards her goals and there are no updates to goals at this time. Pt prognosis is Good.     Pt will continue to benefit from skilled outpatient occupational therapy to address the deficits listed in the problem list on initial evaluation provide pt/family education and to maximize pt's level of independence in the home and community environment.     Anticipated barriers to occupational therapy: none     Pt's spiritual, cultural and educational needs considered and pt agreeable to plan of care and goals.          GOALS: 6 weeks. Pt agrees with goals set.        Short Term (4 weeks on 3/18/19 ):  1)   Patient to be IND with HEP and modalities for pain management, progressing   2)   Increase ROM 10 degrees  For  Left thumb Mp flexion and Ip flexion   motion to increase functional hand use for left right hand  , progressing   3)   Increase  strength by  5 lbs. to grasp bilateral hand , progressing   4. Decrease sensnsitivty of right wrist scar      Long Term (by discharge):  1)   Pt will report 0 out of 10 pain with right and left hands  ., progressing   2)   Patient to score of CJ on FOTO to demonstrate improved perception of functionalbilateral hand/ arm  Use. Progressing   3)   Pt will return to prior level of function for ADLs and household management, progressing                  Plan   Certification Period/Plan of care expiration: 2/18/2019 to 4/30/19 .     Outpatient Occupational Therapy 2 times weekly for 5 weeks may include the following interventions: Paraffin, Modalities for pain management, US 3 mhz, Therapeutic exercises/activities. and Strengthening.         Discussed Plan of Care with patient: Yes  Updates/Grading for next session: continue       Sujata Villanueva, OT

## 2019-02-27 ENCOUNTER — CLINICAL SUPPORT (OUTPATIENT)
Dept: REHABILITATION | Facility: HOSPITAL | Age: 54
End: 2019-02-27
Attending: PHYSICIAN ASSISTANT
Payer: COMMERCIAL

## 2019-02-27 DIAGNOSIS — M65.312 TRIGGER FINGER OF LEFT THUMB: Primary | ICD-10-CM

## 2019-02-27 DIAGNOSIS — Z98.890 HISTORY OF CARPAL TUNNEL RELEASE: ICD-10-CM

## 2019-02-27 PROCEDURE — 97022 WHIRLPOOL THERAPY: CPT

## 2019-02-27 PROCEDURE — 97035 APP MDLTY 1+ULTRASOUND EA 15: CPT

## 2019-02-27 PROCEDURE — 97110 THERAPEUTIC EXERCISES: CPT

## 2019-02-27 RX ORDER — ERGOCALCIFEROL 1.25 MG/1
50000 CAPSULE ORAL
Qty: 12 CAPSULE | Refills: 0 | Status: SHIPPED | OUTPATIENT
Start: 2019-02-27 | End: 2019-05-20 | Stop reason: SDUPTHER

## 2019-02-27 NOTE — PROGRESS NOTES
Occupational Therapy Daily Treatment Note   Date 2/27/2019    Name: Ailyn Ortiz  Clinic Number: [id number]  Name: Ailyn Ortiz  Clinic Number: [id number]     Therapy Diagnosis:        Encounter Diagnosis   Name Primary?    History of carpal tunnel release        Physician: Lianet Castillo PA     Physician Orders: eval and treat   Medical Diagnosis: M65.312 trigger finger of left thumb                                       G56.03 bilateral carpal tunnel release     Surgical Procedure/ Date :12/26/18 right CTR  Evaluation Date: 2/18/2019  Insurance:[employer]  Insurance Authorization period Expiration: 12/31/19  Plan of Care Certification Period: 5/30/19     Visit # / Visits Authortized: 1 / 60  Time In:2:00pm  Time Out: 3:00pm  Total Billable Time: 60 minutes     Precautions: Diabetes         Subjective     Pt reports: she was compliant with home exercise program given last session.  She reports that her thumb felt good yesterday , today she comes to therapy and is in a little more pain .     Response to previous treatment:slightly omproved   Functional change: has slightly more motion in hand     Pain: 4/ 10  Location: bilateral fingers      Objective     Ailyn received the following supervised modalities after being cleared for contradictions for 15 minutes:   -fluido   direct contact modalities X 6 minutes 1.0w/cm2 to right CTR scar and 6 minutes to left volar thumb at Mp joint       Ailyn received the following manual therapy techniques for 10 minutes:   -PROM left thumb ROM       Ailyn received therapeutic exercises for 18 minutes including:  -AROM wrist right  motion   wrist flexion 1#,   wrist extension 1#  Hand gripper (R) 2 yellow rubberbands     Fabricated new left trigger finger thumb splint , to be worn during the day     Post exercise 10 minutes  , bilateral hands       Home Exercises and Education Provided     Education provided:     -  - Progress towards  goals     Written Home Exercises Provided: Patient instructed to cont prior HEP.  Exercises were reviewed and Ailyn was able to demonstrate them prior to the end of the session.  Ailyn demonstrated fair  understanding of the education provided.   .   See EMR under Patient Instructions for exercises provided prior visit.       Assessment     Pt would continue to benefit from skilled OT. Yes     Ailyn is progressing well towards her goals and there are no updates to goals at this time. Pt prognosis is Good.     Pt will continue to benefit from skilled outpatient occupational therapy to address the deficits listed in the problem list on initial evaluation provide pt/family education and to maximize pt's level of independence in the home and community environment.     Anticipated barriers to occupational therapy: none     Pt's spiritual, cultural and educational needs considered and pt agreeable to plan of care and goals.          GOALS: 6 weeks. Pt agrees with goals set.        Short Term (4 weeks on 3/18/19 ):  1)   Patient to be IND with HEP and modalities for pain management, progressing   2)   Increase ROM 10 degrees  For  Left thumb Mp flexion and Ip flexion   motion to increase functional hand use for left right hand  , progressing   3)   Increase  strength by  5 lbs. to grasp bilateral hand , progressing   4. Decrease sensnsitivty of right wrist scar      Long Term (by discharge):  1)   Pt will report 0 out of 10 pain with right and left hands  ., progressing   2)   Patient to score of CJ on FOTO to demonstrate improved perception of functionalbilateral hand/ arm  Use. Progressing   3)   Pt will return to prior level of function for ADLs and household management, progressing                  Plan   Certification Period/Plan of care expiration: 2/18/2019 to 4/30/19 .     Outpatient Occupational Therapy 2 times weekly for 5 weeks may include the following interventions: Paraffin, Modalities for pain  management, US 3 mhz, Therapeutic exercises/activities. and Strengthening.        Discussed Plan of Care with patient: Yes  Updates/Grading for next session: continue       Sujata Villanueva, OT

## 2019-03-05 ENCOUNTER — LAB VISIT (OUTPATIENT)
Dept: LAB | Facility: HOSPITAL | Age: 54
End: 2019-03-05
Attending: INTERNAL MEDICINE
Payer: COMMERCIAL

## 2019-03-05 DIAGNOSIS — R19.4 CHANGE IN BOWEL HABITS: Primary | ICD-10-CM

## 2019-03-05 DIAGNOSIS — R19.7 DIARRHEA: ICD-10-CM

## 2019-03-05 PROCEDURE — 87045 FECES CULTURE AEROBIC BACT: CPT

## 2019-03-05 PROCEDURE — 87449 NOS EACH ORGANISM AG IA: CPT

## 2019-03-05 PROCEDURE — 87329 GIARDIA AG IA: CPT

## 2019-03-05 PROCEDURE — 87046 STOOL CULTR AEROBIC BACT EA: CPT

## 2019-03-05 PROCEDURE — 87209 SMEAR COMPLEX STAIN: CPT

## 2019-03-05 PROCEDURE — 87427 SHIGA-LIKE TOXIN AG IA: CPT | Mod: 59

## 2019-03-05 PROCEDURE — 89055 LEUKOCYTE ASSESSMENT FECAL: CPT

## 2019-03-06 ENCOUNTER — HOSPITAL ENCOUNTER (INPATIENT)
Facility: HOSPITAL | Age: 54
LOS: 7 days | Discharge: HOME | DRG: 348 | End: 2019-03-13
Attending: EMERGENCY MEDICINE | Admitting: SURGERY
Payer: COMMERCIAL

## 2019-03-06 DIAGNOSIS — R00.0 TACHYCARDIA: ICD-10-CM

## 2019-03-06 DIAGNOSIS — K56.600 PARTIAL SMALL BOWEL OBSTRUCTION: Primary | ICD-10-CM

## 2019-03-06 DIAGNOSIS — K56.1 INTUSSUSCEPTION OF INTESTINE: ICD-10-CM

## 2019-03-06 LAB
ABO + RH BLD: NORMAL
ALBUMIN SERPL BCP-MCNC: 4.5 G/DL
ALP SERPL-CCNC: 134 U/L
ALT SERPL W/O P-5'-P-CCNC: 27 U/L
ANION GAP SERPL CALC-SCNC: 16 MMOL/L
APTT BLDCRRT: 22.6 SEC
AST SERPL-CCNC: 20 U/L
BASOPHILS # BLD AUTO: 0.07 K/UL
BASOPHILS NFR BLD: 0.5 %
BILIRUB SERPL-MCNC: 1.1 MG/DL
BLD GP AB SCN CELLS X3 SERPL QL: NORMAL
BUN SERPL-MCNC: 16 MG/DL
C DIFF GDH STL QL: NEGATIVE
C DIFF TOX A+B STL QL IA: NEGATIVE
CALCIUM SERPL-MCNC: 10.3 MG/DL
CHLORIDE SERPL-SCNC: 100 MMOL/L
CO2 SERPL-SCNC: 21 MMOL/L
CREAT SERPL-MCNC: 1 MG/DL
CRYPTOSP AG STL QL IA: NEGATIVE
DIFFERENTIAL METHOD: ABNORMAL
EOSINOPHIL # BLD AUTO: 0.2 K/UL
EOSINOPHIL NFR BLD: 1.1 %
ERYTHROCYTE [DISTWIDTH] IN BLOOD BY AUTOMATED COUNT: 12.8 %
EST. GFR  (AFRICAN AMERICAN): >60 ML/MIN/1.73 M^2
EST. GFR  (NON AFRICAN AMERICAN): >60 ML/MIN/1.73 M^2
G LAMBLIA AG STL QL IA: NEGATIVE
GLUCOSE SERPL-MCNC: 206 MG/DL
HCT VFR BLD AUTO: 45.3 %
HGB BLD-MCNC: 15.6 G/DL
IMM GRANULOCYTES # BLD AUTO: 0.04 K/UL
IMM GRANULOCYTES NFR BLD AUTO: 0.3 %
INR PPP: 1
LIPASE SERPL-CCNC: 15 U/L
LYMPHOCYTES # BLD AUTO: 1.2 K/UL
LYMPHOCYTES NFR BLD: 8.5 %
MCH RBC QN AUTO: 29.8 PG
MCHC RBC AUTO-ENTMCNC: 34.4 G/DL
MCV RBC AUTO: 87 FL
MONOCYTES # BLD AUTO: 1.2 K/UL
MONOCYTES NFR BLD: 8.7 %
NEUTROPHILS # BLD AUTO: 11.1 K/UL
NEUTROPHILS NFR BLD: 80.9 %
NRBC BLD-RTO: 0 /100 WBC
O+P STL TRI STN: NORMAL
PLATELET # BLD AUTO: 423 K/UL
PMV BLD AUTO: 8.7 FL
POTASSIUM SERPL-SCNC: 3 MMOL/L
PROT SERPL-MCNC: 8 G/DL
PROTHROMBIN TIME: 10.2 SEC
RBC # BLD AUTO: 5.23 M/UL
SODIUM SERPL-SCNC: 137 MMOL/L
WBC # BLD AUTO: 13.74 K/UL
WBC #/AREA STL HPF: NORMAL /[HPF]

## 2019-03-06 PROCEDURE — 80053 COMPREHEN METABOLIC PANEL: CPT

## 2019-03-06 PROCEDURE — 25000003 PHARM REV CODE 250: Performed by: STUDENT IN AN ORGANIZED HEALTH CARE EDUCATION/TRAINING PROGRAM

## 2019-03-06 PROCEDURE — 63600175 PHARM REV CODE 636 W HCPCS: Performed by: EMERGENCY MEDICINE

## 2019-03-06 PROCEDURE — 96374 THER/PROPH/DIAG INJ IV PUSH: CPT

## 2019-03-06 PROCEDURE — 93005 ELECTROCARDIOGRAM TRACING: CPT

## 2019-03-06 PROCEDURE — 99285 EMERGENCY DEPT VISIT HI MDM: CPT | Mod: ,,, | Performed by: EMERGENCY MEDICINE

## 2019-03-06 PROCEDURE — 99223 1ST HOSP IP/OBS HIGH 75: CPT | Mod: ,,, | Performed by: SURGERY

## 2019-03-06 PROCEDURE — 85025 COMPLETE CBC W/AUTO DIFF WBC: CPT

## 2019-03-06 PROCEDURE — 20600001 HC STEP DOWN PRIVATE ROOM

## 2019-03-06 PROCEDURE — 96361 HYDRATE IV INFUSION ADD-ON: CPT

## 2019-03-06 PROCEDURE — 99285 EMERGENCY DEPT VISIT HI MDM: CPT

## 2019-03-06 PROCEDURE — 96376 TX/PRO/DX INJ SAME DRUG ADON: CPT

## 2019-03-06 PROCEDURE — 83690 ASSAY OF LIPASE: CPT

## 2019-03-06 PROCEDURE — 93010 EKG 12-LEAD: ICD-10-PCS | Mod: ,,, | Performed by: INTERNAL MEDICINE

## 2019-03-06 PROCEDURE — 25500020 PHARM REV CODE 255: Performed by: EMERGENCY MEDICINE

## 2019-03-06 PROCEDURE — 93010 ELECTROCARDIOGRAM REPORT: CPT | Mod: ,,, | Performed by: INTERNAL MEDICINE

## 2019-03-06 PROCEDURE — 86850 RBC ANTIBODY SCREEN: CPT

## 2019-03-06 PROCEDURE — 99223 PR INITIAL HOSPITAL CARE,LEVL III: ICD-10-PCS | Mod: ,,, | Performed by: SURGERY

## 2019-03-06 PROCEDURE — 85610 PROTHROMBIN TIME: CPT

## 2019-03-06 PROCEDURE — 96375 TX/PRO/DX INJ NEW DRUG ADDON: CPT

## 2019-03-06 PROCEDURE — 25000003 PHARM REV CODE 250: Performed by: EMERGENCY MEDICINE

## 2019-03-06 PROCEDURE — 85730 THROMBOPLASTIN TIME PARTIAL: CPT

## 2019-03-06 PROCEDURE — 99285 PR EMERGENCY DEPT VISIT,LEVEL V: ICD-10-PCS | Mod: ,,, | Performed by: EMERGENCY MEDICINE

## 2019-03-06 RX ORDER — SODIUM CHLORIDE 0.9 % (FLUSH) 0.9 %
3 SYRINGE (ML) INJECTION
Status: DISCONTINUED | OUTPATIENT
Start: 2019-03-06 | End: 2019-03-13 | Stop reason: HOSPADM

## 2019-03-06 RX ORDER — MORPHINE SULFATE 2 MG/ML
6 INJECTION, SOLUTION INTRAMUSCULAR; INTRAVENOUS
Status: COMPLETED | OUTPATIENT
Start: 2019-03-06 | End: 2019-03-06

## 2019-03-06 RX ORDER — HYDRALAZINE HYDROCHLORIDE 20 MG/ML
10 INJECTION INTRAMUSCULAR; INTRAVENOUS EVERY 6 HOURS PRN
Status: DISCONTINUED | OUTPATIENT
Start: 2019-03-06 | End: 2019-03-13 | Stop reason: HOSPADM

## 2019-03-06 RX ORDER — DIPHENHYDRAMINE HYDROCHLORIDE 50 MG/ML
25 INJECTION INTRAMUSCULAR; INTRAVENOUS EVERY 4 HOURS PRN
Status: DISCONTINUED | OUTPATIENT
Start: 2019-03-06 | End: 2019-03-13 | Stop reason: HOSPADM

## 2019-03-06 RX ORDER — LEVOTHYROXINE SODIUM ANHYDROUS 100 UG/5ML
50 INJECTION, POWDER, LYOPHILIZED, FOR SOLUTION INTRAVENOUS DAILY
Status: DISCONTINUED | OUTPATIENT
Start: 2019-03-09 | End: 2019-03-11

## 2019-03-06 RX ORDER — HYDROMORPHONE HYDROCHLORIDE 1 MG/ML
0.5 INJECTION, SOLUTION INTRAMUSCULAR; INTRAVENOUS; SUBCUTANEOUS EVERY 4 HOURS PRN
Status: DISCONTINUED | OUTPATIENT
Start: 2019-03-06 | End: 2019-03-08

## 2019-03-06 RX ORDER — ONDANSETRON 2 MG/ML
4 INJECTION INTRAMUSCULAR; INTRAVENOUS EVERY 12 HOURS PRN
Status: DISCONTINUED | OUTPATIENT
Start: 2019-03-06 | End: 2019-03-08

## 2019-03-06 RX ORDER — ONDANSETRON 2 MG/ML
4 INJECTION INTRAMUSCULAR; INTRAVENOUS
Status: COMPLETED | OUTPATIENT
Start: 2019-03-06 | End: 2019-03-06

## 2019-03-06 RX ORDER — LIDOCAINE HYDROCHLORIDE 10 MG/ML
1 INJECTION, SOLUTION EPIDURAL; INFILTRATION; INTRACAUDAL; PERINEURAL ONCE
Status: DISCONTINUED | OUTPATIENT
Start: 2019-03-06 | End: 2019-03-08

## 2019-03-06 RX ORDER — POTASSIUM CHLORIDE 7.45 MG/ML
10 INJECTION INTRAVENOUS
Status: COMPLETED | OUTPATIENT
Start: 2019-03-07 | End: 2019-03-07

## 2019-03-06 RX ORDER — ACETAMINOPHEN 325 MG/1
650 TABLET ORAL EVERY 8 HOURS PRN
Status: DISCONTINUED | OUTPATIENT
Start: 2019-03-06 | End: 2019-03-13 | Stop reason: HOSPADM

## 2019-03-06 RX ORDER — POTASSIUM CHLORIDE 20 MEQ/1
40 TABLET, EXTENDED RELEASE ORAL ONCE
Status: COMPLETED | OUTPATIENT
Start: 2019-03-06 | End: 2019-03-06

## 2019-03-06 RX ORDER — PANTOPRAZOLE SODIUM 40 MG/10ML
40 INJECTION, POWDER, LYOPHILIZED, FOR SOLUTION INTRAVENOUS DAILY
Status: DISCONTINUED | OUTPATIENT
Start: 2019-03-07 | End: 2019-03-11

## 2019-03-06 RX ORDER — SODIUM CHLORIDE, SODIUM LACTATE, POTASSIUM CHLORIDE, CALCIUM CHLORIDE 600; 310; 30; 20 MG/100ML; MG/100ML; MG/100ML; MG/100ML
INJECTION, SOLUTION INTRAVENOUS CONTINUOUS
Status: DISCONTINUED | OUTPATIENT
Start: 2019-03-06 | End: 2019-03-07

## 2019-03-06 RX ORDER — HYDROMORPHONE HYDROCHLORIDE 1 MG/ML
1 INJECTION, SOLUTION INTRAMUSCULAR; INTRAVENOUS; SUBCUTANEOUS EVERY 4 HOURS PRN
Status: DISCONTINUED | OUTPATIENT
Start: 2019-03-06 | End: 2019-03-08

## 2019-03-06 RX ORDER — SODIUM CHLORIDE 9 MG/ML
1000 INJECTION, SOLUTION INTRAVENOUS
Status: COMPLETED | OUTPATIENT
Start: 2019-03-06 | End: 2019-03-06

## 2019-03-06 RX ORDER — SODIUM CHLORIDE 9 MG/ML
INJECTION, SOLUTION INTRAVENOUS CONTINUOUS
Status: DISCONTINUED | OUTPATIENT
Start: 2019-03-06 | End: 2019-03-06

## 2019-03-06 RX ORDER — ENOXAPARIN SODIUM 100 MG/ML
40 INJECTION SUBCUTANEOUS EVERY 24 HOURS
Status: DISCONTINUED | OUTPATIENT
Start: 2019-03-07 | End: 2019-03-13 | Stop reason: HOSPADM

## 2019-03-06 RX ADMIN — SODIUM CHLORIDE, SODIUM LACTATE, POTASSIUM CHLORIDE, AND CALCIUM CHLORIDE: .6; .31; .03; .02 INJECTION, SOLUTION INTRAVENOUS at 11:03

## 2019-03-06 RX ADMIN — SODIUM CHLORIDE: 0.9 INJECTION, SOLUTION INTRAVENOUS at 07:03

## 2019-03-06 RX ADMIN — ONDANSETRON 4 MG: 2 INJECTION INTRAMUSCULAR; INTRAVENOUS at 03:03

## 2019-03-06 RX ADMIN — MORPHINE SULFATE 6 MG: 2 INJECTION, SOLUTION INTRAMUSCULAR; INTRAVENOUS at 03:03

## 2019-03-06 RX ADMIN — MORPHINE SULFATE 6 MG: 2 INJECTION, SOLUTION INTRAMUSCULAR; INTRAVENOUS at 06:03

## 2019-03-06 RX ADMIN — POTASSIUM CHLORIDE 40 MEQ: 1500 TABLET, EXTENDED RELEASE ORAL at 06:03

## 2019-03-06 RX ADMIN — IOHEXOL 75 ML: 350 INJECTION, SOLUTION INTRAVENOUS at 05:03

## 2019-03-06 RX ADMIN — ONDANSETRON 4 MG: 2 INJECTION INTRAMUSCULAR; INTRAVENOUS at 06:03

## 2019-03-06 RX ADMIN — SODIUM CHLORIDE 1000 ML: 0.9 INJECTION, SOLUTION INTRAVENOUS at 03:03

## 2019-03-06 NOTE — ED TRIAGE NOTES
"Patient states "constant" upper abd pain x 2 days. States diarrhea x 10 days, states she saw her GI doctor and mult tests done including C-Diff, negative. H/O SBO. Diarrhea x7, positive nausea, no emesis, denies fevers.   "

## 2019-03-06 NOTE — ED PROVIDER NOTES
"Encounter Date: 3/6/2019    SCRIBE #1 NOTE: I, Dylan Frost, am scribing for, and in the presence of,  Dr. Piedra. I have scribed the following portions of the note - Other sections scribed: HPI, ROS, PE.       History     Chief Complaint   Patient presents with    Abdominal Pain     diarrhea, states "tested for C-diff and it's negative" symptoms x 1 week/      54 yo F with PMHx of IBS, GERD, type 2 diabetes, hypertension presents with chief complaint of abdominal pain.  Patient reports intermittent and worsening mid abdominal pain with radiation throughout her abdomen and occasionally to her flank x1 week.  Patient reports obtaining stool studies yesterday from her physician which came back negative today.  She endorses associated nausea but no vomiting, some blood in stool and normal passing of gas.  Denies history of kidney stones, dysuria, hematuria, cigarette smoking, drug use or EtOH use.  Patient states she has taken pepto-bismol to no effect.  She denies recent travel outside the country.  She says she ate grilled chicken last night but has not had anything to eat today.        The history is provided by the patient and medical records.     Review of patient's allergies indicates:   Allergen Reactions    Milk containing products      Congestion      Nuts [tree nut] Other (See Comments)     Nasal Congestion    Avelox [moxifloxacin] Rash     Past Medical History:   Diagnosis Date    Asthma     Diabetes mellitus, type 2 since the age of 42    Hypertension since the age of 42    JESSICA (obstructive sleep apnea)     S/P UPP    Thyroid disease      Past Surgical History:   Procedure Laterality Date    ADENOIDECTOMY       SECTION      times 2    CHOLECYSTECTOMY      GANGLION CYST EXCISION      LAPAROSCOPY-DIAGNOSTIC w/ small bowel resection N/A 2018    [facility] 2ND FLR    RELEASE, CARPAL TUNNEL right Right 2018    Performed by Anai Chuodhary, " MD at [facility] OR 1ST FLR    RESECTION-SMALL BOWEL-LAPAROSCOPIC  2018    Performed by [facility] MD at [facility] OR 2ND FLR    small bowel resection (lipoma)      TONSILLECTOMY      TUBAL LIGATION      UVULOPALATOPHARYNGOPLASTY       Family History   Problem Relation Age of Onset    Hypertension Father     Diabetes Father     Heart disease Maternal Grandfather         CAD    Heart disease Paternal Grandmother         CAD    Diabetes Paternal Grandmother     Heart disease Paternal Grandfather         CAD     Social History     Tobacco Use    Smoking status: Former Smoker     Packs/day: 0.00     Years: 20.00     Pack years: 0.00     Types: Cigarettes     Last attempt to quit: 12/15/2013     Years since quittin.2    Smokeless tobacco: Never Used   Substance Use Topics    Alcohol use: Yes     Comment: occasionally    Drug use: No     Review of Systems   Constitutional: Negative for fever.   HENT: Negative for sore throat.    Respiratory: Negative for shortness of breath.    Cardiovascular: Negative for chest pain.   Gastrointestinal: Positive for abdominal pain, blood in stool and nausea. Negative for vomiting.   Genitourinary: Negative for dysuria and hematuria.   Musculoskeletal: Positive for back pain.   Skin: Negative for rash.   Neurological: Negative for weakness.   Hematological: Does not bruise/bleed easily.   All other systems reviewed and are negative.      Physical Exam     Initial Vitals [19 1436]   BP Pulse Resp Temp SpO2   (!) 163/90 (!) 114 18 97.9 °F (36.6 °C) 100 %      MAP       --         Physical Exam    Nursing note and vitals reviewed.  Constitutional: She appears well-developed and well-nourished.   HENT:   Head: Normocephalic and atraumatic.   Eyes: Pupils are equal, round, and reactive to light.   Neck: Normal range of motion.   Cardiovascular: Normal rate and regular rhythm.   Pulmonary/Chest: Breath sounds normal.   Abdominal: Soft. There is generalized  tenderness (Diffuse tenderness to palpation). There is no rebound and no guarding.   Neurological: She is alert and oriented to person, place, and time.   Skin: Skin is warm and dry.         ED Course   Procedures  Labs Reviewed   CBC W/ AUTO DIFFERENTIAL - Abnormal; Notable for the following components:       Result Value    WBC 13.74 (*)     Platelets 423 (*)     MPV 8.7 (*)     Gran # (ANC) 11.1 (*)     Mono # 1.2 (*)     Gran% 80.9 (*)     Lymph% 8.5 (*)     All other components within normal limits   COMPREHENSIVE METABOLIC PANEL - Abnormal; Notable for the following components:    Potassium 3.0 (*)     CO2 21 (*)     Glucose 206 (*)     Total Bilirubin 1.1 (*)     All other components within normal limits   LIPASE   PROTIME-INR   APTT   TYPE AND SCREEN PREOP     EKG Readings: (Independently Interpreted)   Rhythm: Sinus Tachycardia. Heart Rate: 118.   3:00 PM  No evidence of ischemia       Imaging Results          CT Abdomen Pelvis With Contrast (Final result)     Abnormal  Result time 03/06/19 18:22:48    Final result by Nikos Wheatley MD (03/06/19 18:22:48)                 Impression:      Multiple dilated, fluid-filled loops of small bowel with short-segment small bowel intussusception and decompressed bowel distal to this point.  Findings are consistent with partial small bowel obstruction.  No mass is visualized, however underlying mass is not excluded.    This report was flagged in Epic as abnormal.    COMMUNICATION  This critical result was discovered/received at 1800. The critical information above was relayed directly by telephone to Dr. Piedra on 03/06/2019 at 1809 by Dr. Galarza on behalf of Dr. Wheatley.    Electronically signed by resident: James Galarza  Date:    03/06/2019  Time:    17:53    Electronically signed by: Nikos Wheatley MD  Date:    03/06/2019  Time:    18:22             Narrative:    EXAMINATION:  CT ABDOMEN PELVIS WITH CONTRAST    CLINICAL HISTORY:  Nausea, vomiting,  diarrhea;    TECHNIQUE:  The patient was surveyed from the lung bases through the pelvis after the administration of 100 cc Omni 350 IV contrast as well as oral contrast. The data was reconstructed for coronal, sagittal, and axial images.    COMPARISON:  CT 11/09/2017.    FINDINGS:  The lung bases demonstrate bandlike opacities within the left lower lobe consistent with atelectasis versus parenchymal scarring.  No pleural effusion is seen.    The visualized portions of the heart appear normal. No pericardial effusion.    The liver is normal in size and attenuation.  No focal hepatic abnormality is seen. The gallbladder is surgically absent.  No intrahepatic or extrahepatic biliary ductal dilatation is identified. The spleen is unremarkable.    The stomach, pancreas, and adrenal glands are unremarkable.    The kidneys are normal in size and location.  They concentrate and excrete contrast appropriately.  Subcentimeter right renal hypodensity, too small to characterize but likely a simple cyst.  No hydronephrosis is seen.  The ureters appear normal in course and caliber. The urinary bladder is unremarkable. The uterus is unremarkable.    Postsurgical changes of small bowel resection with focal small bowel dilatation at the anastomosis.  Multiple loops of distended, fluid-filled small bowel  predominantly within the right hemiabdomen.  There is a short segment intussusception (axial series 2, images 48-56), with decompressed bowel distal to this point.  No associated mass is visualized.  The appendix is unremarkable.    No ascites, free fluid, or intraperitoneal free air is identified.  There is no evidence of lymph node enlargement in the abdomen or pelvis.  The abdominal aorta is normal in course and caliber with minimal atherosclerotic calcifications.    The osseous structures demonstrate right sacroiliac sclerosis, similar to the most recent prior study.  No acute fracture or osseous destructive process.  The  extraperitoneal soft tissues are unremarkable.                                 Medical Decision Making:   History:   Old Medical Records: I decided to obtain old medical records.  Initial Assessment:   52 yo F with history of Intussusception, IBS complaining of diffuse abdominal pain with nausea, no vomiting or diarrhea.    Independently Interpreted Test(s):   I have ordered and independently interpreted EKG Reading(s) - see prior notes  Clinical Tests:   Lab Tests: Ordered and Reviewed  Radiological Study: Ordered and Reviewed  Medical Tests: Ordered and Reviewed  ED Management:  Patient found to have Intussusception with SBO on CT per radiologist.  I discussed case with Dr. Alvarado from general surgery who said she will evaluate the patient.    Other:   I have discussed this case with another health care provider.            Scribe Attestation:   Scribe #1: I performed the above scribed service and the documentation accurately describes the services I performed. I attest to the accuracy of the note.               Clinical Impression:       ICD-10-CM ICD-9-CM   1. Tachycardia R00.0 785.0   2. Partial small bowel obstruction K56.600 560.9         Disposition:   Disposition: Admitted                        Ihsan Piedra MD  03/06/19 2043

## 2019-03-06 NOTE — ED NOTES
Patient identifiers verified and correct for Ms Ortiz  C/C: Upper abd pain , positive diarrhea, SEE NN  APPEARANCE: awake and alert in NAD.  SKIN: warm, dry and intact. No breakdown or bruising.  MUSCULOSKELETAL: Patient moving all extremities spontaneously, no obvious swelling or deformities noted. Ambulates independently.  RESPIRATORY: Denies shortness of breath.Respirations unlabored. Denies fevers.   CARDIAC: Denies CP, 2+ distal pulses; no peripheral edema  ABDOMEN: Abdomen soft, upper abd pain , positive nausea, no emesis , diarrhea  : voids spontaneously, denies difficulty  Neurologic: AAO x 4; follows commands equal strength in all extremities; denies numbness/tingling. Denies dizziness Positive lightheaded, positive gen weakness

## 2019-03-07 ENCOUNTER — ANESTHESIA EVENT (OUTPATIENT)
Dept: SURGERY | Facility: HOSPITAL | Age: 54
DRG: 348 | End: 2019-03-07
Payer: COMMERCIAL

## 2019-03-07 LAB
ALBUMIN SERPL BCP-MCNC: 3.6 G/DL
ALP SERPL-CCNC: 112 U/L
ALT SERPL W/O P-5'-P-CCNC: 22 U/L
ANION GAP SERPL CALC-SCNC: 15 MMOL/L
AST SERPL-CCNC: 18 U/L
BASOPHILS # BLD AUTO: 0.08 K/UL
BASOPHILS NFR BLD: 0.8 %
BILIRUB SERPL-MCNC: 1.2 MG/DL
BUN SERPL-MCNC: 13 MG/DL
CALCIUM SERPL-MCNC: 9 MG/DL
CHLORIDE SERPL-SCNC: 99 MMOL/L
CO2 SERPL-SCNC: 20 MMOL/L
CREAT SERPL-MCNC: 0.8 MG/DL
DIFFERENTIAL METHOD: ABNORMAL
E COLI SXT1 STL QL IA: NEGATIVE
E COLI SXT2 STL QL IA: NEGATIVE
EOSINOPHIL # BLD AUTO: 0.3 K/UL
EOSINOPHIL NFR BLD: 3 %
ERYTHROCYTE [DISTWIDTH] IN BLOOD BY AUTOMATED COUNT: 13.1 %
EST. GFR  (AFRICAN AMERICAN): >60 ML/MIN/1.73 M^2
EST. GFR  (NON AFRICAN AMERICAN): >60 ML/MIN/1.73 M^2
GLUCOSE SERPL-MCNC: 125 MG/DL
HCT VFR BLD AUTO: 38.2 %
HGB BLD-MCNC: 12.7 G/DL
IMM GRANULOCYTES # BLD AUTO: 0.02 K/UL
IMM GRANULOCYTES NFR BLD AUTO: 0.2 %
LYMPHOCYTES # BLD AUTO: 1.2 K/UL
LYMPHOCYTES NFR BLD: 11.2 %
MAGNESIUM SERPL-MCNC: 2.1 MG/DL
MCH RBC QN AUTO: 29.3 PG
MCHC RBC AUTO-ENTMCNC: 33.2 G/DL
MCV RBC AUTO: 88 FL
MONOCYTES # BLD AUTO: 0.7 K/UL
MONOCYTES NFR BLD: 6.5 %
NEUTROPHILS # BLD AUTO: 8.1 K/UL
NEUTROPHILS NFR BLD: 78.3 %
NRBC BLD-RTO: 0 /100 WBC
PHOSPHATE SERPL-MCNC: 3.9 MG/DL
PLATELET # BLD AUTO: 336 K/UL
PMV BLD AUTO: 9.1 FL
POTASSIUM SERPL-SCNC: 3.7 MMOL/L
PROT SERPL-MCNC: 6.6 G/DL
RBC # BLD AUTO: 4.34 M/UL
SODIUM SERPL-SCNC: 134 MMOL/L
WBC # BLD AUTO: 10.39 K/UL

## 2019-03-07 PROCEDURE — 80053 COMPREHEN METABOLIC PANEL: CPT

## 2019-03-07 PROCEDURE — 63600175 PHARM REV CODE 636 W HCPCS: Performed by: STUDENT IN AN ORGANIZED HEALTH CARE EDUCATION/TRAINING PROGRAM

## 2019-03-07 PROCEDURE — 36415 COLL VENOUS BLD VENIPUNCTURE: CPT

## 2019-03-07 PROCEDURE — 83735 ASSAY OF MAGNESIUM: CPT

## 2019-03-07 PROCEDURE — 84100 ASSAY OF PHOSPHORUS: CPT

## 2019-03-07 PROCEDURE — 20600001 HC STEP DOWN PRIVATE ROOM

## 2019-03-07 PROCEDURE — 25000003 PHARM REV CODE 250: Performed by: STUDENT IN AN ORGANIZED HEALTH CARE EDUCATION/TRAINING PROGRAM

## 2019-03-07 PROCEDURE — 85025 COMPLETE CBC W/AUTO DIFF WBC: CPT

## 2019-03-07 PROCEDURE — C9113 INJ PANTOPRAZOLE SODIUM, VIA: HCPCS | Performed by: STUDENT IN AN ORGANIZED HEALTH CARE EDUCATION/TRAINING PROGRAM

## 2019-03-07 RX ORDER — DEXTROSE MONOHYDRATE, SODIUM CHLORIDE, AND POTASSIUM CHLORIDE 50; 1.49; 9 G/1000ML; G/1000ML; G/1000ML
INJECTION, SOLUTION INTRAVENOUS CONTINUOUS
Status: DISCONTINUED | OUTPATIENT
Start: 2019-03-07 | End: 2019-03-08

## 2019-03-07 RX ADMIN — POTASSIUM CHLORIDE 10 MEQ: 10 INJECTION, SOLUTION INTRAVENOUS at 01:03

## 2019-03-07 RX ADMIN — HYDROMORPHONE HYDROCHLORIDE 1 MG: 1 INJECTION, SOLUTION INTRAMUSCULAR; INTRAVENOUS; SUBCUTANEOUS at 03:03

## 2019-03-07 RX ADMIN — POTASSIUM CHLORIDE, DEXTROSE MONOHYDRATE AND SODIUM CHLORIDE: 150; 5; 900 INJECTION, SOLUTION INTRAVENOUS at 06:03

## 2019-03-07 RX ADMIN — POTASSIUM CHLORIDE 10 MEQ: 10 INJECTION, SOLUTION INTRAVENOUS at 03:03

## 2019-03-07 RX ADMIN — PANTOPRAZOLE SODIUM 40 MG: 40 INJECTION, POWDER, FOR SOLUTION INTRAVENOUS at 08:03

## 2019-03-07 RX ADMIN — POTASSIUM CHLORIDE 10 MEQ: 10 INJECTION, SOLUTION INTRAVENOUS at 12:03

## 2019-03-07 RX ADMIN — ENOXAPARIN SODIUM 40 MG: 100 INJECTION SUBCUTANEOUS at 04:03

## 2019-03-07 RX ADMIN — HYDROMORPHONE HYDROCHLORIDE 1 MG: 1 INJECTION, SOLUTION INTRAMUSCULAR; INTRAVENOUS; SUBCUTANEOUS at 06:03

## 2019-03-07 RX ADMIN — POTASSIUM CHLORIDE, DEXTROSE MONOHYDRATE AND SODIUM CHLORIDE: 150; 5; 900 INJECTION, SOLUTION INTRAVENOUS at 08:03

## 2019-03-07 RX ADMIN — HYDROMORPHONE HYDROCHLORIDE 1 MG: 1 INJECTION, SOLUTION INTRAMUSCULAR; INTRAVENOUS; SUBCUTANEOUS at 10:03

## 2019-03-07 NOTE — CONSULTS
[facility]  General Surgery  Consult Note    Patient Name: Ailyn Ortiz  MRN: [id number]  Code Status: Full Code  Admission Date: 3/6/2019  Hospital Length of Stay: 0 days  Attending Physician: Ihsan Piedra MD  Primary Care Provider: Lewis Lloyd MD    Patient information was obtained from patient and ER records.     Inpatient consult to General surgery  Consult performed by: John Alvarado MD  Consult ordered by: John Alvarado MD        Subjective:     Principal Problem: <principal problem not specified>    History of Present Illness: 54 yo female with hx of DM type 2, GERD, HTN, IBS (not on any medications, seen by GI at [facility]), inflammatory arthritis (on methotrexate) who presents to the ER with colicky abdominal pain for the past 10 days. She states that is has progressively been getting worse. She continues to have diarrhea, last episode just an hour ago. No currant jelly stools or melena. No vomiting. Has had nausea and some belching today. She underwent CT abdomen/pelvis with oral contrast that showed partial small bowel obstruction and small bowel intussusception. No fever/chills, dysuria, CP, lower extremity swelling.     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 Mei.    Last colonoscopy was in 2017. No polyps. Biopsies were negative for IBD.     Her other abdominal surgeries include  - with lower midline scar.     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 She denies any previous MI, CVA, DVT/PE.     Current Facility-Administered Medications on File Prior to Encounter   Medication    lidocaine HCL 10 mg/ml (1%) injection 0.1 mL    triamcinolone acetonide injection 12 mg     Current Outpatient Medications on File Prior to Encounter   Medication Sig    atorvastatin (LIPITOR) 20 MG tablet Take 20 mg  by mouth once daily.     folic acid (FOLVITE) 1 MG tablet Take 1 tablet (1 mg total) by mouth 3 (three) times daily.    INVOKANA 300 mg Tab tablet Take 300 mg by mouth once daily.     levothyroxine (SYNTHROID) 100 MCG tablet Take 100 mcg by mouth before breakfast.     losartan-hydrochlorothiazide 100-12.5 mg (HYZAAR) 100-12.5 mg Tab Take 1 tablet by mouth once daily.     methotrexate 2.5 MG Tab TAKE 8 TABLETS BY MOUTH WEEKLY    omeprazole (PRILOSEC) 40 MG capsule Take 40 mg by mouth every morning.     TRULICITY 1.5 mg/0.5 mL PnIj Inject into the skin once a week.     cholecalciferol, vitamin D3, 50,000 unit capsule Take 50,000 Units by mouth once a week.    fluticasone (FLONASE) 50 mcg/actuation nasal spray 1 spray by Each Nare route once daily.    HYDROcodone-acetaminophen (NORCO) 5-325 mg per tablet Take 1 tablet by mouth every 6 (six) hours as needed for Pain.    loratadine (CLARITIN) 10 mg tablet Take 10 mg by mouth once daily.    promethazine (PHENERGAN) 12.5 MG Tab Take 1 tablet (12.5 mg total) by mouth every 4 (four) hours as needed.    VITAMIN D2 50,000 unit capsule TAKE 1 CAPSULE (50,000 UNITS TOTAL) BY MOUTH EVERY 7 DAYS.    [DISCONTINUED] amoxicillin/potassium clav (AUGMENTIN ORAL) Take by mouth.       Review of patient's allergies indicates:   Allergen Reactions    Milk containing products      Congestion      Nuts [tree nut] Other (See Comments)     Nasal Congestion    Avelox [moxifloxacin] Rash       Past Medical History:   Diagnosis Date    Asthma     Diabetes mellitus, type 2 since the age of 42    Hypertension since the age of 42    JESSICA (obstructive sleep apnea)     S/P UPP    Thyroid disease      Past Surgical History:   Procedure Laterality Date    ADENOIDECTOMY       SECTION      times 2    CHOLECYSTECTOMY      GANGLION CYST EXCISION      LAPAROSCOPY-DIAGNOSTIC w/ small bowel resection N/A 2018    Performed by [facility] MD at [facility] OR [address] [place] [address]     RELEASE, CARPAL TUNNEL right Right 2018    Performed by [facility] at [facility] OR 1ST FLR    RESECTION-SMALL BOWEL-LAPAROSCOPIC  2018    Performed by Stone [facility]i MD at [facility] OR 2ND FLR    small bowel resection (lipoma)      TONSILLECTOMY      TUBAL LIGATION      UVULOPALATOPHARYNGOPLASTY       Family History     Problem Relation (Age of Onset)    Diabetes Father, Paternal Grandmother    Heart disease Maternal Grandfather, Paternal Grandmother, Paternal Grandfather    Hypertension Father        Tobacco Use    Smoking status: Former Smoker     Packs/day: 0.00     Years: 20.00     Pack years: 0.00     Types: Cigarettes     Last attempt to quit: 12/15/2013     Years since quittin.2    Smokeless tobacco: Never Used   Substance and Sexual Activity    Alcohol use: Yes     Comment: occasionally    Drug use: No    Sexual activity: Not on file     Review of Systems   Constitutional: Negative for chills, fatigue and fever.   Cardiovascular: Negative for chest pain, palpitations and leg swelling.   Gastrointestinal: Positive for abdominal pain, diarrhea and nausea. Negative for blood in stool, constipation and vomiting.   Genitourinary: Negative for dysuria and hematuria.   Musculoskeletal: Positive for back pain.     Objective:     Vital Signs (Most Recent):  Temp: 99.2 °F (37.3 °C) (19 161)  Pulse: 105 (19)  Resp: 18 (19)  BP: (!) 144/77 (19)  SpO2: (!) 94 % (19) Vital Signs (24h Range):  Temp:  [97.9 °F (36.6 °C)-99.2 °F (37.3 °C)] 99.2 °F (37.3 °C)  Pulse:  [105-114] 105  Resp:  [18] 18  SpO2:  [94 %-100 %] 94 %  BP: (144-163)/(77-90) 144/77     Weight: 100.7 kg (222 lb)  Body mass index is 40.6 kg/m².    Physical Exam   Constitutional: She is oriented to person, place, and time. She appears well-developed and well-nourished.   HENT:   Head: Normocephalic and atraumatic.   Eyes: Conjunctivae and EOM are normal. Pupils are equal,  round, and reactive to light.   Neck: Normal range of motion. Neck supple.   Cardiovascular: Normal rate and regular rhythm.   Pulmonary/Chest: Effort normal and breath sounds normal.   Abdominal: Soft. She exhibits no distension and no mass. There is no tenderness. There is no rebound and no guarding. No hernia.   Decreased bowel sounds. No tenderness.    Musculoskeletal: Normal range of motion.   Neurological: She is alert and oriented to person, place, and time.   Skin: Skin is warm and dry.       Significant Labs:  CBC:   Recent Labs   Lab 03/06/19  1527   WBC 13.74*   RBC 5.23   HGB 15.6   HCT 45.3   *   MCV 87   MCH 29.8   MCHC 34.4     CMP:   Recent Labs   Lab 03/06/19  1527   *   CALCIUM 10.3   ALBUMIN 4.5   PROT 8.0      K 3.0*   CO2 21*      BUN 16   CREATININE 1.0   ALKPHOS 134   ALT 27   AST 20   BILITOT 1.1*       Significant Diagnostics:  CT A/P with PO and IV contrast:   FINDINGS:  The lung bases demonstrate bandlike opacities within the left lower lobe consistent with atelectasis versus parenchymal scarring.  No pleural effusion is seen.    The visualized portions of the heart appear normal. No pericardial effusion.    The liver is normal in size and attenuation.  No focal hepatic abnormality is seen. The gallbladder is surgically absent.  No intrahepatic or extrahepatic biliary ductal dilatation is identified. The spleen is unremarkable.    The stomach, pancreas, and adrenal glands are unremarkable.    The kidneys are normal in size and location.  They concentrate and excrete contrast appropriately.  Subcentimeter right renal hypodensity, too small to characterize but likely a simple cyst.  No hydronephrosis is seen.  The ureters appear normal in course and caliber. The urinary bladder is unremarkable. The uterus is unremarkable.    Postsurgical changes of small bowel resection with focal small bowel dilatation at the anastomosis.  Multiple loops of distended, fluid-filled  small bowel  predominantly within the right hemiabdomen.  There is a short segment intussusception (axial series 2, images 48-56), with decompressed bowel distal to this point.  No associated mass is visualized.  The appendix is unremarkable.    No ascites, free fluid, or intraperitoneal free air is identified.  There is no evidence of lymph node enlargement in the abdomen or pelvis.  The abdominal aorta is normal in course and caliber with minimal atherosclerotic calcifications.    The osseous structures demonstrate right sacroiliac sclerosis, similar to the most recent prior study.  No acute fracture or osseous destructive process.  The extraperitoneal soft tissues are unremarkable.      Impression       Multiple dilated, fluid-filled loops of small bowel with short-segment small bowel intussusception and decompressed bowel distal to this point.  Findings are consistent with partial small bowel obstruction.  No mass is visualized, however underlying mass is not excluded.         Assessment/Plan:     Partial small bowel obstruction    52 yo female with hx of IBS, DM type 2, HTN who presents with pSBO and sb intussuception    - CT scan reviewed - possible swelling of old anastomotic site causing intussusception vs. New mass as lead point. Will start with conservative management   - NG to be placed, LIWS. Xray to confirm placement  - NPO. IVF @ 150  - serial abdominal exams  - strict I&o  - SCDs/lovenox  - IV hydralazine for HTN   - SSI for DM type 2          VTE Risk Mitigation (From admission, onward)        Ordered     enoxaparin injection 40 mg  Daily      03/06/19 1929     IP VTE HIGH RISK PATIENT  Once      03/06/19 1929          Thank you for your consult. I will sign off. Please contact us if you have any additional questions.    John Alvarado MD  General Surgery  [facility]

## 2019-03-07 NOTE — ASSESSMENT & PLAN NOTE
54 yo female with hx of IBS, DM type 2, HTN who presents with pSBO and sb intussuception    - CT scan reviewed - possible swelling of old anastomotic site causing intussusception vs. New mass as lead point. Will start with conservative management   - NG to be placed, LIWS. Xray to confirm placement  - NPO. IVF @ 150  - serial abdominal exams  - strict I&o  - SCDs/lovenox  - IV hydralazine for HTN   - SSI for DM type 2

## 2019-03-07 NOTE — ANESTHESIA PREPROCEDURE EVALUATION
[facility]  Anesthesia Pre-Operative Evaluation         Patient Name: Ailyn Ortiz  YOB: 1965  MRN: [id number]    SUBJECTIVE:     Pre-operative evaluation for Procedure(s) (LRB):  LAPAROSCOPY, DIAGNOSTIC (N/A)     03/07/2019    Ailyn Ortiz is a 53 y.o. female w/ a significant PMHx of obesity (BMI 40), DM type 2, GERD, HTN, IBS (not on any medications, seen by GI at [facility]), inflammatory arthritis (on methotrexate), JESSICA (not on CPAP) who presents with partial small bowel obstruction and small bowel intussusception. CT showed possible swelling of old anastomotic site causing intussusception vs. new mass as lead point.    Patient now presents for the above procedure(s).    NG tube in place.     LDA:        Peripheral IV - Single Lumen 03/06/19 1527 Left Antecubital (Active)   Site Assessment Clean;Dry;Intact;No redness;No swelling 3/7/2019  8:00 AM   Line Status Flushed;Infusing 3/7/2019  8:00 AM   Dressing Status Clean;Dry;Intact 3/7/2019  8:00 AM   Dressing Change Due 03/10/19 3/7/2019  8:00 AM   Site Change Due 03/10/19 3/7/2019  8:00 AM   Reason Not Rotated Not due 3/7/2019  8:00 AM   Number of days: 0            NG/OG Tube 03/07/19 0100 14 Fr. Left nostril (Active)   Tolerance no signs/symptoms of discomfort 3/7/2019  8:00 AM   Securement secured to nostril center 3/7/2019  8:00 AM   Clamp Status/Tolerance unclamped 3/7/2019  8:00 AM   Suction Setting/Drainage Method suction at;low;intermittent setting 3/7/2019  8:00 AM   Insertion Site Appearance no redness, warmth, tenderness, skin breakdown, drainage 3/7/2019  8:00 AM   Drainage Brown 3/7/2019  8:00 AM   Tube Output(mL)(Include Discarded Residual) 300 mL 3/7/2019  4:50 AM   Number of days: 0       Prev airway: 1/8/18 - intubated, documentation unclear/incomplete    Drips:    dextrose 5 % and 0.9 % NaCl with KCl 20 mEq 125 mL/hr at 03/07/19 0898       Patient Active Problem List   Diagnosis    Type 2 diabetes  mellitus without complication, without long-term current use of insulin    Essential hypertension    BMI 40.0-44.9, adult    Sleep apnea    Inflammatory arthritis    Family history of psoriatic arthritis    Need for vaccination against hepatitis B virus    Need for vaccination for DTaP    Need for vaccination    Pain in both hands    Bilateral carpal tunnel syndrome    History of carpal tunnel release    Trigger finger of left thumb    Partial small bowel obstruction       Review of patient's allergies indicates:   Allergen Reactions    Milk containing products      Congestion      Nuts [tree nut] Other (See Comments)     Nasal Congestion    Avelox [moxifloxacin] Rash       Current Inpatient Medications:   enoxaparin  40 mg Subcutaneous Daily    [START ON 3/9/2019] levothyroxine  50 mcg Intravenous Daily    lidocaine (PF) 10 mg/ml (1%)  1 mL Other Once    omnipaque  30 mL Oral Once    pantoprazole  40 mg Intravenous Daily       No current facility-administered medications on file prior to encounter.      Current Outpatient Medications on File Prior to Encounter   Medication Sig Dispense Refill    atorvastatin (LIPITOR) 20 MG tablet Take 20 mg by mouth once daily.       folic acid (FOLVITE) 1 MG tablet Take 1 tablet (1 mg total) by mouth 3 (three) times daily. 270 tablet 2    INVOKANA 300 mg Tab tablet Take 300 mg by mouth once daily.       levothyroxine (SYNTHROID) 100 MCG tablet Take 100 mcg by mouth before breakfast.       losartan-hydrochlorothiazide 100-12.5 mg (HYZAAR) 100-12.5 mg Tab Take 1 tablet by mouth once daily.       methotrexate 2.5 MG Tab TAKE 8 TABLETS BY MOUTH WEEKLY 120 tablet 3    omeprazole (PRILOSEC) 40 MG capsule Take 40 mg by mouth every morning.       TRULICITY 1.5 mg/0.5 mL PnIj Inject into the skin once a week.       cholecalciferol, vitamin D3, 50,000 unit capsule Take 50,000 Units by mouth once a week.      fluticasone (FLONASE) 50 mcg/actuation nasal spray  1 spray by Each Nare route once daily.      HYDROcodone-acetaminophen (NORCO) 5-325 mg per tablet Take 1 tablet by mouth every 6 (six) hours as needed for Pain. 28 tablet 0    loratadine (CLARITIN) 10 mg tablet Take 10 mg by mouth once daily.      promethazine (PHENERGAN) 12.5 MG Tab Take 1 tablet (12.5 mg total) by mouth every 4 (four) hours as needed. 60 tablet 0    VITAMIN D2 50,000 unit capsule TAKE 1 CAPSULE (50,000 UNITS TOTAL) BY MOUTH EVERY 7 DAYS. 12 capsule 0       Past Surgical History:   Procedure Laterality Date    ADENOIDECTOMY       SECTION      times 2    CHOLECYSTECTOMY      GANGLION CYST EXCISION      LAPAROSCOPY-DIAGNOSTIC w/ small bowel resection N/A 2018    Performed by [facility] 2ND FLR    RELEASE, CARPAL TUNNEL right Right 2018    Performed by Anai [facility]oudhary MD at [facility] OR 1ST FLR    RESECTION-SMALL BOWEL-LAPAROSCOPIC  2018    Performed by [facility] at [facility] OR 2ND FLR    small bowel resection (lipoma)      TONSILLECTOMY      TUBAL LIGATION      UVULOPALATOPHARYNGOPLASTY         Social History     Socioeconomic History    Marital status:      Spouse name: Not on file    Number of children: Not on file    Years of education: Not on file    Highest education level: Not on file   Social Needs    Financial resource strain: Not on file    Food insecurity - worry: Not on file    Food insecurity - inability: Not on file    Transportation needs - medical: Not on file    Transportation needs - non-medical: Not on file   Occupational History    Not on file   Tobacco Use    Smoking status: Former Smoker     Packs/day: 0.00     Years: 20.00     Pack years: 0.00     Types: Cigarettes     Last attempt to quit: 12/15/2013     Years since quittin.2    Smokeless tobacco: Never Used   Substance and Sexual Activity    Alcohol use: Yes     Comment: occasionally    Drug use: No    Sexual activity: Not on  file   Other Topics Concern    Not on file   Social History Narrative    Not on file       OBJECTIVE:     Vital Signs Range (Last 24H):  Temp:  [36.4 °C (97.6 °F)-37.3 °C (99.2 °F)]   Pulse:  []   Resp:  [16-18]   BP: (103-163)/(56-90)   SpO2:  [94 %-100 %]       Significant Labs:  Lab Results   Component Value Date    WBC 10.39 03/07/2019    HGB 12.7 03/07/2019    HCT 38.2 03/07/2019     03/07/2019    ALT 22 03/07/2019    AST 18 03/07/2019     (L) 03/07/2019    K 3.7 03/07/2019    CL 99 03/07/2019    CREATININE 0.8 03/07/2019    BUN 13 03/07/2019    CO2 20 (L) 03/07/2019    TSH 1.228 09/25/2018    INR 1.0 03/06/2019    HGBA1C 6.5 (H) 12/13/2018       Diagnostic Studies: No relevant studies.    EKG: No recent studies available.    2D ECHO:  No results found for this or any previous visit.    Exercise stress echo 12/15/17  CONCLUSIONS     1 - Normal left ventricular systolic function (EF 60-65%).     2 - No wall motion abnormalities.     3 - Normal left ventricular diastolic function.     4 - Normal right ventricular systolic function .     No evidence of stress induced myocardial ischemia.     ASSESSMENT/PLAN:         Anesthesia Evaluation    I have reviewed the Patient Summary Reports.    I have reviewed the Nursing Notes.   I have reviewed the Medications.     Review of Systems  Anesthesia Hx:  History of prior surgery of interest to airway management or planning:   Cardiovascular:   Hypertension    Pulmonary:   Sleep Apnea    Renal/:  Renal/ Normal     Hepatic/GI:   GERD SBO   Endocrine:   Diabetes, type 2        Physical Exam  General:  Well nourished, Morbid Obesity    Airway/Jaw/Neck:  Airway Findings: Mouth Opening: Normal Tongue: Normal  General Airway Assessment: Adult  Mallampati: III  TM Distance: Normal, at least 6 cm  Jaw/Neck Findings:  Neck ROM: Normal Extension, Painful      Dental:  Dental Findings: In tact   Chest/Lungs:  Chest/Lungs Findings: Clear to auscultation, Normal  Respiratory Rate     Heart/Vascular:  Heart Findings: Rate: Normal  Rhythm: Regular Rhythm        Mental Status:  Mental Status Findings:  Cooperative, Alert and Oriented         Anesthesia Plan  Type of Anesthesia, risks & benefits discussed:  Anesthesia Type:  general  Patient's Preference:   Intra-op Monitoring Plan: standard ASA monitors  Intra-op Monitoring Plan Comments:   Post Op Pain Control Plan: multimodal analgesia, IV/PO Opioids PRN and per primary service following discharge from PACU  Post Op Pain Control Plan Comments:   Induction:   IV  Beta Blocker:  Patient is not currently on a Beta-Blocker (No further documentation required).       Informed Consent: Patient understands risks and agrees with Anesthesia plan.  Questions answered. Anesthesia consent signed with patient.  ASA Score: 3     Day of Surgery Review of History & Physical:            Ready For Surgery From Anesthesia Perspective.

## 2019-03-07 NOTE — HPI
52 yo female with hx of DM type 2, GERD, HTN, IBS (not on any medications, seen by GI at [facility]) who presents to the ER with colicky abdominal pain for the past 10 days. She states that is has progressively been getting worse. She continues to have diarrhea, last episode just an hour ago. No currant jelly stools or melena. No vomiting. Has had nausea and some belching today. She underwent CT abdomen/pelvis with oral contrast that showed partial small bowel obstruction and small bowel intussusception. No fever/chills, dysuria, CP, lower extremity swelling.     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 Mei.    Last colonoscopy was in 2017. No polyps. Biopsies were negative for IBD.     Her other abdominal surgeries include  - with lower midline scar.     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 She denies any previous MI, CVA, DVT/PE.

## 2019-03-07 NOTE — PLAN OF CARE
Patient is a 53 year old female admitted through the ER 3/6/2019 with Abdominal Pain, Diarrhea.  Patient with NG tube to [facility] and is expected to discharge home with needs TBD +/- 3/12/2019.    Patient in room with  during visit.  Patient lives in a two story home with her bed and bathroom on the first floor with her , daughter and granddaughter.  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  Patient given [facility] with understanding verbalized.  Will continue to follow for needs.    PCP  Lewis Lloyd MD  [address] [place] [facility] [place] [id number] [phone]      [facility] - [place], [place] - [address]  [address]  [place] [address]  Phone: [phone] Fax: [phone]      Extended Emergency Contact Information  Primary Emergency Contact: Perez Ortiz   [place]  Mobile Phone: [phone]  Relation: Spouse  Secondary Emergency Contact: Dayami Ortiz  Mobile Phone: [phone]  Relation: Daughter  Preferred language: English   needed? No       03/07/19 1519   Discharge Assessment   Assessment Type Discharge Planning Assessment   Confirmed/corrected address and phone number on facesheet? Yes   Assessment information obtained from? Patient   Expected Length of Stay (days) 6   Communicated expected length of stay with patient/caregiver yes   Prior to hospitilization cognitive status: Alert/Oriented   Prior to hospitalization functional status: Independent   Current cognitive status: Alert/Oriented   Current Functional Status: Independent;Needs Assistance   Lives With spouse;child(kena), adult;grandchild(kena)   Able to Return to Prior Arrangements yes   Is patient able to care for self after discharge? Yes   Who are your caregiver(s) and their phone number(s)? family   Patient's perception of discharge disposition home or selfcare   Equipment Currently Used at  Home none   Do you have any problems affording any of your prescribed medications? No   Is the patient taking medications as prescribed? yes   Does the patient have transportation home? Yes   Transportation Anticipated family or friend will provide   Discharge Plan A Home with family   Discharge Plan B Home with family;Home Health   DME Needed Upon Discharge  none   Patient/Family in Agreement with Plan yes

## 2019-03-07 NOTE — SUBJECTIVE & OBJECTIVE
Current Facility-Administered Medications on File Prior to Encounter   Medication    lidocaine HCL 10 mg/ml (1%) injection 0.1 mL    triamcinolone acetonide injection 12 mg     Current Outpatient Medications on File Prior to Encounter   Medication Sig    atorvastatin (LIPITOR) 20 MG tablet Take 20 mg by mouth once daily.     folic acid (FOLVITE) 1 MG tablet Take 1 tablet (1 mg total) by mouth 3 (three) times daily.    INVOKANA 300 mg Tab tablet Take 300 mg by mouth once daily.     levothyroxine (SYNTHROID) 100 MCG tablet Take 100 mcg by mouth before breakfast.     losartan-hydrochlorothiazide 100-12.5 mg (HYZAAR) 100-12.5 mg Tab Take 1 tablet by mouth once daily.     methotrexate 2.5 MG Tab TAKE 8 TABLETS BY MOUTH WEEKLY    omeprazole (PRILOSEC) 40 MG capsule Take 40 mg by mouth every morning.     TRULICITY 1.5 mg/0.5 mL PnIj Inject into the skin once a week.     cholecalciferol, vitamin D3, 50,000 unit capsule Take 50,000 Units by mouth once a week.    fluticasone (FLONASE) 50 mcg/actuation nasal spray 1 spray by Each Nare route once daily.    HYDROcodone-acetaminophen (NORCO) 5-325 mg per tablet Take 1 tablet by mouth every 6 (six) hours as needed for Pain.    loratadine (CLARITIN) 10 mg tablet Take 10 mg by mouth once daily.    promethazine (PHENERGAN) 12.5 MG Tab Take 1 tablet (12.5 mg total) by mouth every 4 (four) hours as needed.    VITAMIN D2 50,000 unit capsule TAKE 1 CAPSULE (50,000 UNITS TOTAL) BY MOUTH EVERY 7 DAYS.    [DISCONTINUED] amoxicillin/potassium clav (AUGMENTIN ORAL) Take by mouth.       Review of patient's allergies indicates:   Allergen Reactions    Milk containing products      Congestion      Nuts [tree nut] Other (See Comments)     Nasal Congestion    Avelox [moxifloxacin] Rash       Past Medical History:   Diagnosis Date    Asthma     Diabetes mellitus, type 2 since the age of 42    Hypertension since the age of 42    JESSICA (obstructive sleep apnea)     S/P UPP     Thyroid disease      Past Surgical History:   Procedure Laterality Date    ADENOIDECTOMY       SECTION      times 2    CHOLECYSTECTOMY      GANGLION CYST EXCISION      LAPAROSCOPY-DIAGNOSTIC w/ small bowel resection N/A 2018    [facility]formed [facility] at [facility] OR 2ND FLR    RELEASE, CARPAL TUNNEL right Right 2018    Performed by Anai [facility]ary [facility] at [facility] OR 1ST FLR    RESECTION-SMALL BOWEL-LAPAROSCOPIC  2018    Performed by Stone [facility] MD at [facility] OR 2ND FLR    small bowel resection (lipoma)      TONSILLECTOMY      TUBAL LIGATION      UVULOPALATOPHARYNGOPLASTY       Family History     Problem Relation (Age of Onset)    Diabetes Father, Paternal Grandmother    Heart disease Maternal Grandfather, Paternal Grandmother, Paternal Grandfather    Hypertension Father        Tobacco Use    Smoking status: Former Smoker     Packs/day: 0.00     Years: 20.00     Pack years: 0.00     Types: Cigarettes     Last attempt to quit: 12/15/2013     Years since quittin.2    Smokeless tobacco: Never Used   Substance and Sexual Activity    Alcohol use: Yes     Comment: occasionally    Drug use: No    Sexual activity: Not on file     Review of Systems   Constitutional: Negative for chills, fatigue and fever.   Cardiovascular: Negative for chest pain, palpitations and leg swelling.   Gastrointestinal: Positive for abdominal pain, diarrhea and nausea. Negative for blood in stool, constipation and vomiting.   Genitourinary: Negative for dysuria and hematuria.   Musculoskeletal: Positive for back pain.     Objective:     Vital Signs (Most Recent):  Temp: 99.2 °F (37.3 °C) (19)  Pulse: 105 (19)  Resp: 18 (19)  BP: (!) 144/77 (19)  SpO2: (!) 94 % (19) Vital Signs (24h Range):  Temp:  [97.9 °F (36.6 °C)-99.2 °F (37.3 °C)] 99.2 °F (37.3 °C)  Pulse:  [105-114] 105  Resp:  [18] 18  SpO2:  [94 %-100 %] 94 %  BP:  (144-163)/(77-90) 144/77     Weight: 100.7 kg (222 lb)  Body mass index is 40.6 kg/m².    Physical Exam   Constitutional: She is oriented to person, place, and time. She appears well-developed and well-nourished.   HENT:   Head: Normocephalic and atraumatic.   Eyes: Conjunctivae and EOM are normal. Pupils are equal, round, and reactive to light.   Neck: Normal range of motion. Neck supple.   Cardiovascular: Normal rate and regular rhythm.   Pulmonary/Chest: Effort normal and breath sounds normal.   Abdominal: Soft. She exhibits no distension and no mass. There is no tenderness. There is no rebound and no guarding. No hernia.   Decreased bowel sounds. No tenderness.    Musculoskeletal: Normal range of motion.   Neurological: She is alert and oriented to person, place, and time.   Skin: Skin is warm and dry.       Significant Labs:  CBC:   Recent Labs   Lab 03/06/19  1527   WBC 13.74*   RBC 5.23   HGB 15.6   HCT 45.3   *   MCV 87   MCH 29.8   MCHC 34.4     CMP:   Recent Labs   Lab 03/06/19  1527   *   CALCIUM 10.3   ALBUMIN 4.5   PROT 8.0      K 3.0*   CO2 21*      BUN 16   CREATININE 1.0   ALKPHOS 134   ALT 27   AST 20   BILITOT 1.1*       Significant Diagnostics:  CT A/P with PO and IV contrast:   FINDINGS:  The lung bases demonstrate bandlike opacities within the left lower lobe consistent with atelectasis versus parenchymal scarring.  No pleural effusion is seen.    The visualized portions of the heart appear normal. No pericardial effusion.    The liver is normal in size and attenuation.  No focal hepatic abnormality is seen. The gallbladder is surgically absent.  No intrahepatic or extrahepatic biliary ductal dilatation is identified. The spleen is unremarkable.    The stomach, pancreas, and adrenal glands are unremarkable.    The kidneys are normal in size and location.  They concentrate and excrete contrast appropriately.  Subcentimeter right renal hypodensity, too small to  characterize but likely a simple cyst.  No hydronephrosis is seen.  The ureters appear normal in course and caliber. The urinary bladder is unremarkable. The uterus is unremarkable.    Postsurgical changes of small bowel resection with focal small bowel dilatation at the anastomosis.  Multiple loops of distended, fluid-filled small bowel  predominantly within the right hemiabdomen.  There is a short segment intussusception (axial series 2, images 48-56), with decompressed bowel distal to this point.  No associated mass is visualized.  The appendix is unremarkable.    No ascites, free fluid, or intraperitoneal free air is identified.  There is no evidence of lymph node enlargement in the abdomen or pelvis.  The abdominal aorta is normal in course and caliber with minimal atherosclerotic calcifications.    The osseous structures demonstrate right sacroiliac sclerosis, similar to the most recent prior study.  No acute fracture or osseous destructive process.  The extraperitoneal soft tissues are unremarkable.      Impression       Multiple dilated, fluid-filled loops of small bowel with short-segment small bowel intussusception and decompressed bowel distal to this point.  Findings are consistent with partial small bowel obstruction.  No mass is visualized, however underlying mass is not excluded.

## 2019-03-07 NOTE — ASSESSMENT & PLAN NOTE
52 yo female with hx of IBS, DM type 2, HTN who presents with pSBO and sb intussuception    - continue NGT to LIWS  - NPO. IVF @ 150  - serial abdominal exams  - strict I&o  - SCDs/lovenox  - IV hydralazine for HTN   - SSI for DM type 2   - daily labs  - will discuss transferring patient to Dr. Mei's team given history

## 2019-03-07 NOTE — PLAN OF CARE
Problem: Adult Inpatient Plan of Care  Goal: Plan of Care Review  Outcome: Ongoing (interventions implemented as appropriate)  POC reviewed with patient and  with both verbalizing understanding. AAOx4. VSS on RA. C/o pain relieved with prn meds. No c/o nausea. IVF infusing. 1 loose light brown BM. Pt. Voids per toilet with adequate UOP. Up ad maddison. NG tube to LIWS yielding 300 mLs green output. Bed low and locked. Call light within reach. No falls or acute events. WCTM.

## 2019-03-07 NOTE — PROGRESS NOTES
[facility]  General Surgery  Progress Note    Subjective:     History of Present Illness:  52 yo female with hx of DM type 2, GERD, HTN, IBS (not on any medications, seen by GI at [facility]) who presents to the ER with colicky abdominal pain for the past 10 days. She states that is has progressively been getting worse. She continues to have diarrhea, last episode just an hour ago. No currant jelly stools or melena. No vomiting. Has had nausea and some belching today. She underwent CT abdomen/pelvis with oral contrast that showed partial small bowel obstruction and small bowel intussusception. No fever/chills, dysuria, CP, lower extremity swelling.     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 Mei.    Last colonoscopy was in 2017. No polyps. Biopsies were negative for IBD.     Her other abdominal surgeries include  - with lower midline scar.     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 She denies any previous MI, CVA, DVT/PE.     Post-Op Info:  * No surgery found *         Interval History: No acute events overnight. NGT in place, 300ml output. BM overnight. UOP adequate.     Medications:  Continuous Infusions:   dextrose 5 % and 0.9 % NaCl with KCl 20 mEq       Scheduled Meds:   enoxaparin  40 mg Subcutaneous Daily    [START ON 3/9/2019] levothyroxine  50 mcg Intravenous Daily    lidocaine (PF) 10 mg/ml (1%)  1 mL Other Once    omnipaque  30 mL Oral Once    pantoprazole  40 mg Intravenous Daily     PRN Meds:acetaminophen, diphenhydrAMINE, hydrALAZINE, HYDROmorphone, HYDROmorphone, ondansetron, promethazine (PHENERGAN) IVPB, sodium chloride 0.9%     Review of patient's allergies indicates:   Allergen Reactions    Milk containing products      Congestion      Nuts [tree nut] Other (See Comments)     Nasal Congestion    Avelox  [moxifloxacin] Rash     Objective:     Vital Signs (Most Recent):  Temp: 97.6 °F (36.4 °C) (03/07/19 0751)  Pulse: 78 (03/07/19 0751)  Resp: 16 (03/07/19 0751)  BP: 117/62 (03/07/19 0751)  SpO2: (!) 94 % (03/07/19 0751) Vital Signs (24h Range):  Temp:  [97.6 °F (36.4 °C)-99.2 °F (37.3 °C)] 97.6 °F (36.4 °C)  Pulse:  [] 78  Resp:  [16-18] 16  SpO2:  [94 %-100 %] 94 %  BP: (103-163)/(56-90) 117/62     Weight: 100.7 kg (222 lb)  Body mass index is 40.6 kg/m².    Intake/Output - Last 3 Shifts       03/05 0700 - 03/06 0659 03/06 0700 - 03/07 0659 03/07 0700 - 03/08 0659    P.O.  0     I.V. (mL/kg)  1500 (14.9)     IV Piggyback  300     Total Intake(mL/kg)  1800 (17.9)     Urine (mL/kg/hr)  550     Drains  300     Total Output  850     Net  +950            Urine Occurrence  3 x     Stool Occurrence  1 x           Physical Exam   Constitutional: She is oriented to person, place, and time. She appears well-developed and well-nourished.   HENT:   Head: Normocephalic and atraumatic.   NGT in place   Cardiovascular: Normal rate.   Pulmonary/Chest: Effort normal.   Abdominal: Soft. She exhibits no distension.   Neurological: She is alert and oriented to person, place, and time.   Skin: Skin is warm and dry. Capillary refill takes less than 2 seconds.   Nursing note and vitals reviewed.      Significant Labs:  CBC:   Recent Labs   Lab 03/07/19 0355   WBC 10.39   RBC 4.34   HGB 12.7   HCT 38.2      MCV 88   MCH 29.3   MCHC 33.2     CMP:   Recent Labs   Lab 03/07/19  0355   *   CALCIUM 9.0   ALBUMIN 3.6   PROT 6.6   *   K 3.7   CO2 20*   CL 99   BUN 13   CREATININE 0.8   ALKPHOS 112   ALT 22   AST 18   BILITOT 1.2*       Significant Diagnostics:  nonen ew    Assessment/Plan:     Partial small bowel obstruction    52 yo female with hx of IBS, DM type 2, HTN who presents with pSBO and sb intussuception    - continue NGT to LIWS  - NPO. IVF @ 150  - serial abdominal exams  - strict I&o  - SCDs/lovenox  - IV  hydralazine for HTN   - SSI for DM type 2   - daily labs  - will discuss transferring patient to Dr. Mei's team given history           Irasema Mujica MD  General Surgery  [facility]

## 2019-03-07 NOTE — SUBJECTIVE & OBJECTIVE
Interval History: No acute events overnight. NGT in place, 300ml output. BM overnight. UOP adequate.     Medications:  Continuous Infusions:   dextrose 5 % and 0.9 % NaCl with KCl 20 mEq       Scheduled Meds:   enoxaparin  40 mg Subcutaneous Daily    [START ON 3/9/2019] levothyroxine  50 mcg Intravenous Daily    lidocaine (PF) 10 mg/ml (1%)  1 mL Other Once    omnipaque  30 mL Oral Once    pantoprazole  40 mg Intravenous Daily     PRN Meds:acetaminophen, diphenhydrAMINE, hydrALAZINE, HYDROmorphone, HYDROmorphone, ondansetron, promethazine (PHENERGAN) IVPB, sodium chloride 0.9%     Review of patient's allergies indicates:   Allergen Reactions    Milk containing products      Congestion      Nuts [tree nut] Other (See Comments)     Nasal Congestion    Avelox [moxifloxacin] Rash     Objective:     Vital Signs (Most Recent):  Temp: 97.6 °F (36.4 °C) (03/07/19 0751)  Pulse: 78 (03/07/19 0751)  Resp: 16 (03/07/19 0751)  BP: 117/62 (03/07/19 0751)  SpO2: (!) 94 % (03/07/19 0751) Vital Signs (24h Range):  Temp:  [97.6 °F (36.4 °C)-99.2 °F (37.3 °C)] 97.6 °F (36.4 °C)  Pulse:  [] 78  Resp:  [16-18] 16  SpO2:  [94 %-100 %] 94 %  BP: (103-163)/(56-90) 117/62     Weight: 100.7 kg (222 lb)  Body mass index is 40.6 kg/m².    Intake/Output - Last 3 Shifts       03/05 0700 - 03/06 0659 03/06 0700 - 03/07 0659 03/07 0700 - 03/08 0659    P.O.  0     I.V. (mL/kg)  1500 (14.9)     IV Piggyback  300     Total Intake(mL/kg)  1800 (17.9)     Urine (mL/kg/hr)  550     Drains  300     Total Output  850     Net  +950            Urine Occurrence  3 x     Stool Occurrence  1 x           Physical Exam   Constitutional: She is oriented to person, place, and time. She appears well-developed and well-nourished.   HENT:   Head: Normocephalic and atraumatic.   NGT in place   Cardiovascular: Normal rate.   Pulmonary/Chest: Effort normal.   Abdominal: Soft. She exhibits no distension.   Neurological: She is alert and oriented to person,  place, and time.   Skin: Skin is warm and dry. Capillary refill takes less than 2 seconds.   Nursing note and vitals reviewed.      Significant Labs:  CBC:   Recent Labs   Lab 03/07/19  0355   WBC 10.39   RBC 4.34   HGB 12.7   HCT 38.2      MCV 88   MCH 29.3   MCHC 33.2     CMP:   Recent Labs   Lab 03/07/19  0355   *   CALCIUM 9.0   ALBUMIN 3.6   PROT 6.6   *   K 3.7   CO2 20*   CL 99   BUN 13   CREATININE 0.8   ALKPHOS 112   ALT 22   AST 18   BILITOT 1.2*       Significant Diagnostics:  nonen ew

## 2019-03-08 ENCOUNTER — ANESTHESIA (OUTPATIENT)
Dept: SURGERY | Facility: HOSPITAL | Age: 54
DRG: 348 | End: 2019-03-08
Payer: COMMERCIAL

## 2019-03-08 PROBLEM — K63.89 SMALL BOWEL MASS: Status: ACTIVE | Noted: 2019-03-08

## 2019-03-08 LAB
ALBUMIN SERPL BCP-MCNC: 3.1 G/DL
ALP SERPL-CCNC: 92 U/L
ALT SERPL W/O P-5'-P-CCNC: 17 U/L
ANION GAP SERPL CALC-SCNC: 10 MMOL/L
AST SERPL-CCNC: 13 U/L
BASOPHILS # BLD AUTO: 0.06 K/UL
BASOPHILS NFR BLD: 0.9 %
BILIRUB SERPL-MCNC: 0.7 MG/DL
BUN SERPL-MCNC: 14 MG/DL
CALCIUM SERPL-MCNC: 8.7 MG/DL
CHLORIDE SERPL-SCNC: 106 MMOL/L
CO2 SERPL-SCNC: 23 MMOL/L
CREAT SERPL-MCNC: 0.8 MG/DL
DIFFERENTIAL METHOD: ABNORMAL
EOSINOPHIL # BLD AUTO: 0.3 K/UL
EOSINOPHIL NFR BLD: 5 %
ERYTHROCYTE [DISTWIDTH] IN BLOOD BY AUTOMATED COUNT: 13 %
EST. GFR  (AFRICAN AMERICAN): >60 ML/MIN/1.73 M^2
EST. GFR  (NON AFRICAN AMERICAN): >60 ML/MIN/1.73 M^2
GLUCOSE SERPL-MCNC: 124 MG/DL
HCT VFR BLD AUTO: 32.7 %
HGB BLD-MCNC: 10.8 G/DL
IMM GRANULOCYTES # BLD AUTO: 0.02 K/UL
IMM GRANULOCYTES NFR BLD AUTO: 0.3 %
LYMPHOCYTES # BLD AUTO: 1 K/UL
LYMPHOCYTES NFR BLD: 15 %
MAGNESIUM SERPL-MCNC: 1.9 MG/DL
MCH RBC QN AUTO: 29.3 PG
MCHC RBC AUTO-ENTMCNC: 33 G/DL
MCV RBC AUTO: 89 FL
MONOCYTES # BLD AUTO: 0.7 K/UL
MONOCYTES NFR BLD: 10.3 %
NEUTROPHILS # BLD AUTO: 4.5 K/UL
NEUTROPHILS NFR BLD: 68.5 %
NRBC BLD-RTO: 0 /100 WBC
PHOSPHATE SERPL-MCNC: 2.6 MG/DL
PLATELET # BLD AUTO: 262 K/UL
PMV BLD AUTO: 9.2 FL
POCT GLUCOSE: 139 MG/DL (ref 70–110)
POTASSIUM SERPL-SCNC: 3.9 MMOL/L
PROT SERPL-MCNC: 5.8 G/DL
RBC # BLD AUTO: 3.68 M/UL
SODIUM SERPL-SCNC: 139 MMOL/L
WBC # BLD AUTO: 6.58 K/UL

## 2019-03-08 PROCEDURE — D9220A PRA ANESTHESIA: ICD-10-PCS | Mod: CRNA,,, | Performed by: NURSE ANESTHETIST, CERTIFIED REGISTERED

## 2019-03-08 PROCEDURE — 27201423 OPTIME MED/SURG SUP & DEVICES STERILE SUPPLY: Performed by: SURGERY

## 2019-03-08 PROCEDURE — 88342 IMHCHEM/IMCYTCHM 1ST ANTB: CPT | Mod: 26,,, | Performed by: PATHOLOGY

## 2019-03-08 PROCEDURE — 71000033 HC RECOVERY, INTIAL HOUR: Performed by: SURGERY

## 2019-03-08 PROCEDURE — 94770 HC EXHALED C02 TEST: CPT

## 2019-03-08 PROCEDURE — 63600175 PHARM REV CODE 636 W HCPCS: Performed by: STUDENT IN AN ORGANIZED HEALTH CARE EDUCATION/TRAINING PROGRAM

## 2019-03-08 PROCEDURE — 63600175 PHARM REV CODE 636 W HCPCS: Performed by: NURSE ANESTHETIST, CERTIFIED REGISTERED

## 2019-03-08 PROCEDURE — 37000008 HC ANESTHESIA 1ST 15 MINUTES: Performed by: SURGERY

## 2019-03-08 PROCEDURE — 20600001 HC STEP DOWN PRIVATE ROOM

## 2019-03-08 PROCEDURE — 44202 LAP ENTERECTOMY: CPT | Mod: ,,, | Performed by: SURGERY

## 2019-03-08 PROCEDURE — D9220A PRA ANESTHESIA: Mod: CRNA,,, | Performed by: NURSE ANESTHETIST, CERTIFIED REGISTERED

## 2019-03-08 PROCEDURE — 88342 IMHCHEM/IMCYTCHM 1ST ANTB: CPT | Performed by: PATHOLOGY

## 2019-03-08 PROCEDURE — 36000710: Performed by: SURGERY

## 2019-03-08 PROCEDURE — 25000003 PHARM REV CODE 250: Performed by: STUDENT IN AN ORGANIZED HEALTH CARE EDUCATION/TRAINING PROGRAM

## 2019-03-08 PROCEDURE — 88341 PR IHC OR ICC EACH ADD'L SINGLE ANTIBODY  STAINPR: ICD-10-PCS | Mod: 26,,, | Performed by: PATHOLOGY

## 2019-03-08 PROCEDURE — S0028 INJECTION, FAMOTIDINE, 20 MG: HCPCS | Performed by: NURSE ANESTHETIST, CERTIFIED REGISTERED

## 2019-03-08 PROCEDURE — D9220A PRA ANESTHESIA: ICD-10-PCS | Mod: ANES,,, | Performed by: ANESTHESIOLOGY

## 2019-03-08 PROCEDURE — 36000711: Performed by: SURGERY

## 2019-03-08 PROCEDURE — 84100 ASSAY OF PHOSPHORUS: CPT

## 2019-03-08 PROCEDURE — 80053 COMPREHEN METABOLIC PANEL: CPT

## 2019-03-08 PROCEDURE — 94761 N-INVAS EAR/PLS OXIMETRY MLT: CPT

## 2019-03-08 PROCEDURE — 71000039 HC RECOVERY, EACH ADD'L HOUR: Performed by: SURGERY

## 2019-03-08 PROCEDURE — 25000003 PHARM REV CODE 250: Performed by: SURGERY

## 2019-03-08 PROCEDURE — 36415 COLL VENOUS BLD VENIPUNCTURE: CPT

## 2019-03-08 PROCEDURE — 27100025 HC TUBING, SET FLUID WARMER: Performed by: NURSE ANESTHETIST, CERTIFIED REGISTERED

## 2019-03-08 PROCEDURE — 88307 TISSUE EXAM BY PATHOLOGIST: CPT | Performed by: PATHOLOGY

## 2019-03-08 PROCEDURE — 88307 TISSUE SPECIMEN TO PATHOLOGY - SURGERY: ICD-10-PCS | Mod: 26,,, | Performed by: PATHOLOGY

## 2019-03-08 PROCEDURE — 88341 IMHCHEM/IMCYTCHM EA ADD ANTB: CPT | Mod: 26,,, | Performed by: PATHOLOGY

## 2019-03-08 PROCEDURE — 37000009 HC ANESTHESIA EA ADD 15 MINS: Performed by: SURGERY

## 2019-03-08 PROCEDURE — C9113 INJ PANTOPRAZOLE SODIUM, VIA: HCPCS | Performed by: STUDENT IN AN ORGANIZED HEALTH CARE EDUCATION/TRAINING PROGRAM

## 2019-03-08 PROCEDURE — 25000003 PHARM REV CODE 250: Performed by: NURSE ANESTHETIST, CERTIFIED REGISTERED

## 2019-03-08 PROCEDURE — 82962 GLUCOSE BLOOD TEST: CPT | Performed by: SURGERY

## 2019-03-08 PROCEDURE — 83735 ASSAY OF MAGNESIUM: CPT

## 2019-03-08 PROCEDURE — 88342 TISSUE SPECIMEN TO PATHOLOGY - SURGERY: ICD-10-PCS | Mod: 26,,, | Performed by: PATHOLOGY

## 2019-03-08 PROCEDURE — 27000221 HC OXYGEN, UP TO 24 HOURS

## 2019-03-08 PROCEDURE — D9220A PRA ANESTHESIA: Mod: ANES,,, | Performed by: ANESTHESIOLOGY

## 2019-03-08 PROCEDURE — 85025 COMPLETE CBC W/AUTO DIFF WBC: CPT

## 2019-03-08 PROCEDURE — 44202 PR LAP,SURG,ENTERECTOMY,RESECT & ANAST: ICD-10-PCS | Mod: ,,, | Performed by: SURGERY

## 2019-03-08 PROCEDURE — 88307 TISSUE EXAM BY PATHOLOGIST: CPT | Mod: 26,,, | Performed by: PATHOLOGY

## 2019-03-08 PROCEDURE — 99900035 HC TECH TIME PER 15 MIN (STAT)

## 2019-03-08 RX ORDER — HYDROMORPHONE HYDROCHLORIDE 1 MG/ML
INJECTION, SOLUTION INTRAMUSCULAR; INTRAVENOUS; SUBCUTANEOUS
Status: DISPENSED
Start: 2019-03-08 | End: 2019-03-09

## 2019-03-08 RX ORDER — ACETAMINOPHEN 10 MG/ML
1000 INJECTION, SOLUTION INTRAVENOUS ONCE
Status: COMPLETED | OUTPATIENT
Start: 2019-03-08 | End: 2019-03-08

## 2019-03-08 RX ORDER — HYDROMORPHONE HCL IN 0.9% NACL 6 MG/30 ML
PATIENT CONTROLLED ANALGESIA SYRINGE INTRAVENOUS
Status: COMPLETED
Start: 2019-03-08 | End: 2019-03-09

## 2019-03-08 RX ORDER — HYDROMORPHONE HCL IN 0.9% NACL 6 MG/30 ML
PATIENT CONTROLLED ANALGESIA SYRINGE INTRAVENOUS CONTINUOUS
Status: DISCONTINUED | OUTPATIENT
Start: 2019-03-08 | End: 2019-03-11

## 2019-03-08 RX ORDER — DEXAMETHASONE SODIUM PHOSPHATE 4 MG/ML
INJECTION, SOLUTION INTRA-ARTICULAR; INTRALESIONAL; INTRAMUSCULAR; INTRAVENOUS; SOFT TISSUE
Status: DISCONTINUED | OUTPATIENT
Start: 2019-03-08 | End: 2019-03-08

## 2019-03-08 RX ORDER — LIDOCAINE HCL/PF 100 MG/5ML
SYRINGE (ML) INTRAVENOUS
Status: DISCONTINUED | OUTPATIENT
Start: 2019-03-08 | End: 2019-03-08

## 2019-03-08 RX ORDER — SODIUM CHLORIDE 9 MG/ML
INJECTION, SOLUTION INTRAVENOUS CONTINUOUS
Status: DISCONTINUED | OUTPATIENT
Start: 2019-03-08 | End: 2019-03-11

## 2019-03-08 RX ORDER — HYDROMORPHONE HYDROCHLORIDE 1 MG/ML
0.2 INJECTION, SOLUTION INTRAMUSCULAR; INTRAVENOUS; SUBCUTANEOUS EVERY 5 MIN PRN
Status: DISCONTINUED | OUTPATIENT
Start: 2019-03-08 | End: 2019-03-08

## 2019-03-08 RX ORDER — SODIUM CHLORIDE 9 MG/ML
INJECTION, SOLUTION INTRAVENOUS CONTINUOUS PRN
Status: DISCONTINUED | OUTPATIENT
Start: 2019-03-08 | End: 2019-03-08

## 2019-03-08 RX ORDER — ONDANSETRON HYDROCHLORIDE 2 MG/ML
INJECTION, SOLUTION INTRAMUSCULAR; INTRAVENOUS
Status: DISCONTINUED | OUTPATIENT
Start: 2019-03-08 | End: 2019-03-08

## 2019-03-08 RX ORDER — HYDROMORPHONE HYDROCHLORIDE 1 MG/ML
0.2 INJECTION, SOLUTION INTRAMUSCULAR; INTRAVENOUS; SUBCUTANEOUS EVERY 5 MIN PRN
Status: DISCONTINUED | OUTPATIENT
Start: 2019-03-08 | End: 2019-03-08 | Stop reason: HOSPADM

## 2019-03-08 RX ORDER — ACETAMINOPHEN 10 MG/ML
INJECTION, SOLUTION INTRAVENOUS
Status: DISCONTINUED | OUTPATIENT
Start: 2019-03-08 | End: 2019-03-08

## 2019-03-08 RX ORDER — HYDROMORPHONE HYDROCHLORIDE 1 MG/ML
0.2 INJECTION, SOLUTION INTRAMUSCULAR; INTRAVENOUS; SUBCUTANEOUS EVERY 5 MIN PRN
Status: COMPLETED | OUTPATIENT
Start: 2019-03-08 | End: 2019-03-08

## 2019-03-08 RX ORDER — PROPOFOL 10 MG/ML
VIAL (ML) INTRAVENOUS
Status: DISCONTINUED | OUTPATIENT
Start: 2019-03-08 | End: 2019-03-08

## 2019-03-08 RX ORDER — SUCCINYLCHOLINE CHLORIDE 20 MG/ML
INJECTION INTRAMUSCULAR; INTRAVENOUS
Status: DISCONTINUED | OUTPATIENT
Start: 2019-03-08 | End: 2019-03-08

## 2019-03-08 RX ORDER — SODIUM CHLORIDE 0.9 % (FLUSH) 0.9 %
3 SYRINGE (ML) INJECTION
Status: DISCONTINUED | OUTPATIENT
Start: 2019-03-08 | End: 2019-03-08 | Stop reason: HOSPADM

## 2019-03-08 RX ORDER — ONDANSETRON 2 MG/ML
8 INJECTION INTRAMUSCULAR; INTRAVENOUS EVERY 12 HOURS PRN
Status: DISCONTINUED | OUTPATIENT
Start: 2019-03-08 | End: 2019-03-13 | Stop reason: HOSPADM

## 2019-03-08 RX ORDER — FENTANYL CITRATE 50 UG/ML
INJECTION, SOLUTION INTRAMUSCULAR; INTRAVENOUS
Status: DISCONTINUED | OUTPATIENT
Start: 2019-03-08 | End: 2019-03-08

## 2019-03-08 RX ORDER — BUPIVACAINE HYDROCHLORIDE 2.5 MG/ML
INJECTION, SOLUTION EPIDURAL; INFILTRATION; INTRACAUDAL
Status: DISCONTINUED | OUTPATIENT
Start: 2019-03-08 | End: 2019-03-08 | Stop reason: HOSPADM

## 2019-03-08 RX ORDER — NALOXONE HCL 0.4 MG/ML
0.02 VIAL (ML) INJECTION
Status: DISCONTINUED | OUTPATIENT
Start: 2019-03-08 | End: 2019-03-11

## 2019-03-08 RX ORDER — FAMOTIDINE 10 MG/ML
INJECTION INTRAVENOUS
Status: DISCONTINUED | OUTPATIENT
Start: 2019-03-08 | End: 2019-03-08

## 2019-03-08 RX ORDER — MIDAZOLAM HYDROCHLORIDE 1 MG/ML
INJECTION INTRAMUSCULAR; INTRAVENOUS
Status: DISCONTINUED | OUTPATIENT
Start: 2019-03-08 | End: 2019-03-08

## 2019-03-08 RX ORDER — ROCURONIUM BROMIDE 10 MG/ML
INJECTION, SOLUTION INTRAVENOUS
Status: DISCONTINUED | OUTPATIENT
Start: 2019-03-08 | End: 2019-03-08

## 2019-03-08 RX ORDER — SODIUM CHLORIDE 0.9 % (FLUSH) 0.9 %
3 SYRINGE (ML) INJECTION
Status: DISCONTINUED | OUTPATIENT
Start: 2019-03-08 | End: 2019-03-08

## 2019-03-08 RX ADMIN — HYDROMORPHONE HYDROCHLORIDE 1 MG: 1 INJECTION, SOLUTION INTRAMUSCULAR; INTRAVENOUS; SUBCUTANEOUS at 07:03

## 2019-03-08 RX ADMIN — ROCURONIUM BROMIDE 5 MG: 10 INJECTION, SOLUTION INTRAVENOUS at 01:03

## 2019-03-08 RX ADMIN — HYDROMORPHONE HYDROCHLORIDE 0.2 MG: 1 INJECTION, SOLUTION INTRAMUSCULAR; INTRAVENOUS; SUBCUTANEOUS at 04:03

## 2019-03-08 RX ADMIN — ACETAMINOPHEN 1000 MG: 10 INJECTION, SOLUTION INTRAVENOUS at 01:03

## 2019-03-08 RX ADMIN — ROCURONIUM BROMIDE 45 MG: 10 INJECTION, SOLUTION INTRAVENOUS at 01:03

## 2019-03-08 RX ADMIN — FENTANYL CITRATE 100 MCG: 50 INJECTION, SOLUTION INTRAMUSCULAR; INTRAVENOUS at 01:03

## 2019-03-08 RX ADMIN — HYDROMORPHONE HYDROCHLORIDE 0.2 MG: 1 INJECTION, SOLUTION INTRAMUSCULAR; INTRAVENOUS; SUBCUTANEOUS at 03:03

## 2019-03-08 RX ADMIN — POTASSIUM CHLORIDE, DEXTROSE MONOHYDRATE AND SODIUM CHLORIDE: 150; 5; 900 INJECTION, SOLUTION INTRAVENOUS at 03:03

## 2019-03-08 RX ADMIN — DEXTROSE 2 G: 50 INJECTION, SOLUTION INTRAVENOUS at 01:03

## 2019-03-08 RX ADMIN — SODIUM CHLORIDE: 0.9 INJECTION, SOLUTION INTRAVENOUS at 03:03

## 2019-03-08 RX ADMIN — ACETAMINOPHEN 1000 MG: 10 INJECTION, SOLUTION INTRAVENOUS at 09:03

## 2019-03-08 RX ADMIN — FAMOTIDINE 20 MG: 10 INJECTION, SOLUTION INTRAVENOUS at 02:03

## 2019-03-08 RX ADMIN — FENTANYL CITRATE 50 MCG: 50 INJECTION, SOLUTION INTRAMUSCULAR; INTRAVENOUS at 03:03

## 2019-03-08 RX ADMIN — ROCURONIUM BROMIDE 10 MG: 10 INJECTION, SOLUTION INTRAVENOUS at 01:03

## 2019-03-08 RX ADMIN — PROPOFOL 150 MG: 10 INJECTION, EMULSION INTRAVENOUS at 01:03

## 2019-03-08 RX ADMIN — HYDROMORPHONE HYDROCHLORIDE 1 MG: 1 INJECTION, SOLUTION INTRAMUSCULAR; INTRAVENOUS; SUBCUTANEOUS at 03:03

## 2019-03-08 RX ADMIN — SUCCINYLCHOLINE CHLORIDE 140 MG: 20 INJECTION, SOLUTION INTRAMUSCULAR; INTRAVENOUS at 01:03

## 2019-03-08 RX ADMIN — SUGAMMADEX 200 MG: 100 INJECTION, SOLUTION INTRAVENOUS at 02:03

## 2019-03-08 RX ADMIN — DEXAMETHASONE SODIUM PHOSPHATE 4 MG: 4 INJECTION, SOLUTION INTRAMUSCULAR; INTRAVENOUS at 01:03

## 2019-03-08 RX ADMIN — SODIUM CHLORIDE, SODIUM GLUCONATE, SODIUM ACETATE, POTASSIUM CHLORIDE, MAGNESIUM CHLORIDE, SODIUM PHOSPHATE, DIBASIC, AND POTASSIUM PHOSPHATE: .53; .5; .37; .037; .03; .012; .00082 INJECTION, SOLUTION INTRAVENOUS at 01:03

## 2019-03-08 RX ADMIN — SODIUM CHLORIDE: 0.9 INJECTION, SOLUTION INTRAVENOUS at 01:03

## 2019-03-08 RX ADMIN — FENTANYL CITRATE 25 MCG: 50 INJECTION, SOLUTION INTRAMUSCULAR; INTRAVENOUS at 02:03

## 2019-03-08 RX ADMIN — MIDAZOLAM HYDROCHLORIDE 2 MG: 1 INJECTION, SOLUTION INTRAMUSCULAR; INTRAVENOUS at 01:03

## 2019-03-08 RX ADMIN — Medication: at 03:03

## 2019-03-08 RX ADMIN — DIPHENHYDRAMINE HYDROCHLORIDE 25 MG: 50 INJECTION INTRAMUSCULAR; INTRAVENOUS at 06:03

## 2019-03-08 RX ADMIN — ONDANSETRON 4 MG: 2 INJECTION INTRAMUSCULAR; INTRAVENOUS at 07:03

## 2019-03-08 RX ADMIN — ONDANSETRON 4 MG: 2 INJECTION, SOLUTION INTRAMUSCULAR; INTRAVENOUS at 02:03

## 2019-03-08 RX ADMIN — LIDOCAINE HYDROCHLORIDE 100 MG: 20 INJECTION, SOLUTION INTRAVENOUS at 01:03

## 2019-03-08 RX ADMIN — PANTOPRAZOLE SODIUM 40 MG: 40 INJECTION, POWDER, FOR SOLUTION INTRAVENOUS at 09:03

## 2019-03-08 RX ADMIN — ENOXAPARIN SODIUM 40 MG: 100 INJECTION SUBCUTANEOUS at 06:03

## 2019-03-08 RX ADMIN — ROCURONIUM BROMIDE 10 MG: 10 INJECTION, SOLUTION INTRAVENOUS at 02:03

## 2019-03-08 NOTE — PROGRESS NOTES
"[facility]  General Surgery  Progress Note    Subjective:     History of Present Illness:  54 yo female with hx of DM type 2, GERD, HTN, IBS (not on any medications, seen by GI at [facility]) who presents to the ER with colicky abdominal pain for the past 10 days. She states that is has progressively been getting worse. She continues to have diarrhea, last episode just an hour ago. No currant jelly stools or melena. No vomiting. Has had nausea and some belching today. She underwent CT abdomen/pelvis with oral contrast that showed partial small bowel obstruction and small bowel intussusception. No fever/chills, dysuria, CP, lower extremity swelling.     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 Mei.    Last colonoscopy was in 2017. No polyps. Biopsies were negative for IBD.     Her other abdominal surgeries include  - with lower midline scar.     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 She denies any previous MI, CVA, DVT/PE.     Post-Op Info:  Procedure(s) (LRB):  LAPAROSCOPY, DIAGNOSTIC (N/A)         Interval History: Continues to have "throat" pain from NG tube  Denies abdominal pain  Denies flatus/BM  NG with 300 mL output past 24 hr    Medications:  Continuous Infusions:   dextrose 5 % and 0.9 % NaCl with KCl 20 mEq 125 mL/hr at 19 0347     Scheduled Meds:   enoxaparin  40 mg Subcutaneous Daily    [START ON 3/9/2019] levothyroxine  50 mcg Intravenous Daily    lidocaine (PF) 10 mg/ml (1%)  1 mL Other Once    omnipaque  30 mL Oral Once    pantoprazole  40 mg Intravenous Daily     PRN Meds:acetaminophen, diphenhydrAMINE, hydrALAZINE, HYDROmorphone, HYDROmorphone, ondansetron, promethazine (PHENERGAN) IVPB, sodium chloride 0.9%     Review of patient's allergies indicates:   Allergen Reactions    Milk containing products  "     Congestion      Nuts [tree nut] Other (See Comments)     Nasal Congestion    Avelox [moxifloxacin] Rash     Objective:     Vital Signs (Most Recent):  Temp: 98.1 °F (36.7 °C) (03/08/19 0426)  Pulse: 82 (03/08/19 0426)  Resp: 18 (03/08/19 0426)  BP: (!) 102/55 (03/08/19 0426)  SpO2: (!) 92 % (03/08/19 0426) Vital Signs (24h Range):  Temp:  [97.5 °F (36.4 °C)-98.1 °F (36.7 °C)] 98.1 °F (36.7 °C)  Pulse:  [75-82] 82  Resp:  [17-18] 18  SpO2:  [92 %-98 %] 92 %  BP: (102-122)/(55-63) 102/55     Weight: 100.7 kg (222 lb)  Body mass index is 40.6 kg/m².    Intake/Output - Last 3 Shifts       03/06 0700 - 03/07 0659 03/07 0700 - 03/08 0659 03/08 0700 - 03/09 0659    P.O. 0 0     I.V. (mL/kg) 1500 (14.9) 2483.3 (24.7)     IV Piggyback 300      Total Intake(mL/kg) 1800 (17.9) 2483.3 (24.7)     Urine (mL/kg/hr) 550 0 (0)     Drains 300 300     Total Output 850 300     Net +950 +2183.3            Urine Occurrence 3 x 7 x     Stool Occurrence 1 x            Physical Exam   Constitutional: She is oriented to person, place, and time. She appears well-developed and well-nourished.   HENT:   NGT in place   Cardiovascular: Normal rate.   Pulmonary/Chest: Effort normal.   Abdominal: Soft. She exhibits no distension. There is no tenderness.   Neurological: She is alert and oriented to person, place, and time.   Skin: Skin is warm and dry.   Psychiatric: She has a normal mood and affect.   Nursing note and vitals reviewed.      Significant Labs:  Reviewed    Significant Diagnostics:  None    Assessment/Plan:     * Partial small bowel obstruction    52 yo female with hx of IBS, DM type 2, HTN who presents with pSBO and sb intussuception    - continue NGT to [facility] NPO  -mIVF  - strict I&o  - SCDs/lovenox  - IV hydralazine for HTN   - SSI for DM type 2   - daily labs  - To OR for diagnostic laparoscopy with possible small bowel resection. Surgical consent obtained           Miles Monae MD  General Surgery  [facility]

## 2019-03-08 NOTE — BRIEF OP NOTE
Operative Note       Surgery Date: 3/8/2019     Surgeon(s) and Role:     * Stone Mei MD - Primary     * Miles Monae MD - Resident - Assisting    Pre-op Diagnosis:  Partial small bowel obstruction [K56.600]  Intussusception of intestine [K56.1]    Post-op Diagnosis:  Partial small bowel obstruction [K56.600]  Intussusception of intestine [K56.1]    Procedure(s) (LRB):  LAPAROSCOPY, DIAGNOSTIC (N/A)  EXCISION, SMALL INTESTINE, LAPAROSCOPIC    Anesthesia: General    Procedure in Detail/Findings:  Distal intussusception with likely mass requiring short segment small bowel resection.    Estimated Blood Loss: Minimal           Specimens (From admission, onward)    Start     Ordered    03/08/19 1525  Specimen to Pathology - Surgery  Once     Start Status   03/08/19 1525 Collected (03/08/19 1528)       03/08/19 1528        Implants: * No implants in log *           Disposition: PACU - hemodynamically stable.           Condition: Good    Attestation:  I was present and scrubbed for the entire procedure.

## 2019-03-08 NOTE — ANESTHESIA POSTPROCEDURE EVALUATION
"Anesthesia Post Evaluation    Patient: Ailyn Ortiz    Procedure(s) Performed: Procedure(s) (LRB):  LAPAROSCOPY, DIAGNOSTIC (N/A)  EXCISION, SMALL INTESTINE, LAPAROSCOPIC    Final Anesthesia Type: general  Patient location during evaluation: PACU  Patient participation: Yes- Able to Participate  Level of consciousness: awake and alert and oriented  Post-procedure vital signs: reviewed and stable  Pain management: adequate  Airway patency: patent  PONV status at discharge: No PONV  Anesthetic complications: no      Cardiovascular status: blood pressure returned to baseline, hemodynamically stable and stable  Respiratory status: unassisted, room air and spontaneous ventilation  Hydration status: euvolemic  Follow-up not needed.        Visit Vitals  BP (!) 105/59 (BP Location: Right arm, Patient Position: Lying)   Pulse 91   Temp 36.7 °C (98 °F) (Temporal)   Resp 19   Ht 5' 2" (1.575 m)   Wt 100.7 kg (222 lb)   SpO2 98%   Breastfeeding? No   BMI 40.60 kg/m²       Pain/Buddy Score: Pain Rating Prior to Med Admin: 9 (3/8/2019  4:03 PM)  Buddy Score: 9 (3/8/2019  3:35 PM)        "

## 2019-03-08 NOTE — TRANSFER OF CARE
"Anesthesia Transfer of Care Note    Patient: Ailyn Ortiz    Procedure(s) Performed: Procedure(s) (LRB):  LAPAROSCOPY, DIAGNOSTIC (N/A)  EXCISION, SMALL INTESTINE, LAPAROSCOPIC    Patient location: PACU    Anesthesia Type: general    Transport from OR: Transported from OR on 6-10 L/min O2 by face mask with adequate spontaneous ventilation    Post pain: adequate analgesia    Post assessment: no apparent anesthetic complications    Post vital signs: stable    Level of consciousness: awake and alert    Nausea/Vomiting: no nausea/vomiting    Complications: none          Last vitals:   Visit Vitals  BP (!) 153/72 (BP Location: Right arm, Patient Position: Sitting)   Pulse 87   Temp 36.4 °C (97.6 °F) (Oral)   Resp 20   Ht 5' 2" (1.575 m)   Wt 100.7 kg (222 lb)   SpO2 97%   Breastfeeding? No   BMI 40.60 kg/m²     "

## 2019-03-08 NOTE — PROGRESS NOTES
Report called to Senthil, pt made ready for transport to [facility] via stretcher per PCT, HOB elevated,NGT intact,laps dry and intact,VSS,  updated on pt status.

## 2019-03-08 NOTE — ASSESSMENT & PLAN NOTE
54 yo female with hx of IBS, DM type 2, HTN who presents with pSBO and sb intussuception    - continue NGT to LIWS  - NPO  -mIVF  - strict I&o  - SCDs/lovenox  - IV hydralazine for HTN   - SSI for DM type 2   - daily labs  - To OR for diagnostic laparoscopy with possible small bowel resection. Surgical consent obtained

## 2019-03-08 NOTE — SUBJECTIVE & OBJECTIVE
"Interval History: Continues to have "throat" pain from NG tube  Denies abdominal pain  Denies flatus/BM  NG with 300 mL output past 24 hr    Medications:  Continuous Infusions:   dextrose 5 % and 0.9 % NaCl with KCl 20 mEq 125 mL/hr at 03/08/19 0349     Scheduled Meds:   enoxaparin  40 mg Subcutaneous Daily    [START ON 3/9/2019] levothyroxine  50 mcg Intravenous Daily    lidocaine (PF) 10 mg/ml (1%)  1 mL Other Once    omnipaque  30 mL Oral Once    pantoprazole  40 mg Intravenous Daily     PRN Meds:acetaminophen, diphenhydrAMINE, hydrALAZINE, HYDROmorphone, HYDROmorphone, ondansetron, promethazine (PHENERGAN) IVPB, sodium chloride 0.9%     Review of patient's allergies indicates:   Allergen Reactions    Milk containing products      Congestion      Nuts [tree nut] Other (See Comments)     Nasal Congestion    Avelox [moxifloxacin] Rash     Objective:     Vital Signs (Most Recent):  Temp: 98.1 °F (36.7 °C) (03/08/19 0426)  Pulse: 82 (03/08/19 0426)  Resp: 18 (03/08/19 0426)  BP: (!) 102/55 (03/08/19 0426)  SpO2: (!) 92 % (03/08/19 0426) Vital Signs (24h Range):  Temp:  [97.5 °F (36.4 °C)-98.1 °F (36.7 °C)] 98.1 °F (36.7 °C)  Pulse:  [75-82] 82  Resp:  [17-18] 18  SpO2:  [92 %-98 %] 92 %  BP: (102-122)/(55-63) 102/55     Weight: 100.7 kg (222 lb)  Body mass index is 40.6 kg/m².    Intake/Output - Last 3 Shifts       03/06 0700 - 03/07 0659 03/07 0700 - 03/08 0659 03/08 0700 - 03/09 0659    P.O. 0 0     I.V. (mL/kg) 1500 (14.9) 2483.3 (24.7)     IV Piggyback 300      Total Intake(mL/kg) 1800 (17.9) 2483.3 (24.7)     Urine (mL/kg/hr) 550 0 (0)     Drains 300 300     Total Output 850 300     Net +950 +2183.3            Urine Occurrence 3 x 7 x     Stool Occurrence 1 x            Physical Exam   Constitutional: She is oriented to person, place, and time. She appears well-developed and well-nourished.   HENT:   NGT in place   Cardiovascular: Normal rate.   Pulmonary/Chest: Effort normal.   Abdominal: Soft. She " exhibits no distension. There is no tenderness.   Neurological: She is alert and oriented to person, place, and time.   Skin: Skin is warm and dry.   Psychiatric: She has a normal mood and affect.   Nursing note and vitals reviewed.      Significant Labs:  Reviewed    Significant Diagnostics:  None

## 2019-03-08 NOTE — PLAN OF CARE
Problem: Adult Inpatient Plan of Care  Goal: Plan of Care Review  Outcome: Ongoing (interventions implemented as appropriate)  Patient is alert and oriented. Able to make needs known to staff. PRN Dilaudid given for pain control. No s/s of respiratory/cardiac distress noted. Left nostril NG tube remains patent and hooked up to LIWS. Left PIV is patent and flushes well. IV fluids run continuously at 125 ml/hr. Patient remains strict NPO and will be going to OR for an Exp. Lap tomorrow. Patient ambulates independently in her room to the bathroom with no issues. VS stable. No issues or concerns at this time. Continue to monitor.

## 2019-03-08 NOTE — PLAN OF CARE
Problem: Adult Inpatient Plan of Care  Goal: Plan of Care Review  Outcome: Ongoing (interventions implemented as appropriate)  POC reviewed with patient and  with both verbalizing understanding. AAOx4. VSS on RA. C/o pain relieved with prn meds. No c/o nausea. IVF infusing.  Pt. Voids per toilet with adequate UOP. Up ad maddison. NG tube to LIWS yielding 200 mLs green output. Bed low and locked. Call light within reach. No falls or acute events. WCTM.

## 2019-03-09 LAB
ALBUMIN SERPL BCP-MCNC: 3.3 G/DL
ALP SERPL-CCNC: 93 U/L
ALT SERPL W/O P-5'-P-CCNC: 19 U/L
ANION GAP SERPL CALC-SCNC: 14 MMOL/L
AST SERPL-CCNC: 16 U/L
BACTERIA STL CULT: NORMAL
BASOPHILS # BLD AUTO: 0.05 K/UL
BASOPHILS NFR BLD: 0.5 %
BILIRUB SERPL-MCNC: 0.7 MG/DL
BUN SERPL-MCNC: 14 MG/DL
CALCIUM SERPL-MCNC: 9 MG/DL
CHLORIDE SERPL-SCNC: 109 MMOL/L
CO2 SERPL-SCNC: 21 MMOL/L
CREAT SERPL-MCNC: 0.8 MG/DL
DIFFERENTIAL METHOD: ABNORMAL
EOSINOPHIL # BLD AUTO: 0 K/UL
EOSINOPHIL NFR BLD: 0.1 %
ERYTHROCYTE [DISTWIDTH] IN BLOOD BY AUTOMATED COUNT: 13.1 %
EST. GFR  (AFRICAN AMERICAN): >60 ML/MIN/1.73 M^2
EST. GFR  (NON AFRICAN AMERICAN): >60 ML/MIN/1.73 M^2
GLUCOSE SERPL-MCNC: 102 MG/DL
HCT VFR BLD AUTO: 35.2 %
HGB BLD-MCNC: 10.9 G/DL
IMM GRANULOCYTES # BLD AUTO: 0.04 K/UL
IMM GRANULOCYTES NFR BLD AUTO: 0.4 %
LYMPHOCYTES # BLD AUTO: 0.9 K/UL
LYMPHOCYTES NFR BLD: 9 %
MAGNESIUM SERPL-MCNC: 1.9 MG/DL
MCH RBC QN AUTO: 29.2 PG
MCHC RBC AUTO-ENTMCNC: 31 G/DL
MCV RBC AUTO: 94 FL
MONOCYTES # BLD AUTO: 0.8 K/UL
MONOCYTES NFR BLD: 8.5 %
NEUTROPHILS # BLD AUTO: 7.7 K/UL
NEUTROPHILS NFR BLD: 81.5 %
NRBC BLD-RTO: 0 /100 WBC
PHOSPHATE SERPL-MCNC: 3.9 MG/DL
PLATELET # BLD AUTO: 299 K/UL
PMV BLD AUTO: 9.5 FL
POTASSIUM SERPL-SCNC: 4.2 MMOL/L
PROT SERPL-MCNC: 6.3 G/DL
RBC # BLD AUTO: 3.73 M/UL
SODIUM SERPL-SCNC: 144 MMOL/L
WBC # BLD AUTO: 9.45 K/UL

## 2019-03-09 PROCEDURE — 80053 COMPREHEN METABOLIC PANEL: CPT

## 2019-03-09 PROCEDURE — 83735 ASSAY OF MAGNESIUM: CPT

## 2019-03-09 PROCEDURE — 63600175 PHARM REV CODE 636 W HCPCS

## 2019-03-09 PROCEDURE — 63600175 PHARM REV CODE 636 W HCPCS: Performed by: STUDENT IN AN ORGANIZED HEALTH CARE EDUCATION/TRAINING PROGRAM

## 2019-03-09 PROCEDURE — 25000003 PHARM REV CODE 250: Performed by: STUDENT IN AN ORGANIZED HEALTH CARE EDUCATION/TRAINING PROGRAM

## 2019-03-09 PROCEDURE — 20600001 HC STEP DOWN PRIVATE ROOM

## 2019-03-09 PROCEDURE — 84100 ASSAY OF PHOSPHORUS: CPT

## 2019-03-09 PROCEDURE — 36415 COLL VENOUS BLD VENIPUNCTURE: CPT

## 2019-03-09 PROCEDURE — C9113 INJ PANTOPRAZOLE SODIUM, VIA: HCPCS | Performed by: STUDENT IN AN ORGANIZED HEALTH CARE EDUCATION/TRAINING PROGRAM

## 2019-03-09 PROCEDURE — 85025 COMPLETE CBC W/AUTO DIFF WBC: CPT

## 2019-03-09 RX ORDER — HYDROCORTISONE 1 %
CREAM (GRAM) TOPICAL 2 TIMES DAILY PRN
Status: DISCONTINUED | OUTPATIENT
Start: 2019-03-09 | End: 2019-03-13 | Stop reason: HOSPADM

## 2019-03-09 RX ADMIN — LEVOTHYROXINE SODIUM ANHYDROUS 50 MCG: 100 INJECTION, POWDER, LYOPHILIZED, FOR SOLUTION INTRAVENOUS at 09:03

## 2019-03-09 RX ADMIN — DIPHENHYDRAMINE HYDROCHLORIDE 25 MG: 50 INJECTION INTRAMUSCULAR; INTRAVENOUS at 03:03

## 2019-03-09 RX ADMIN — Medication: at 12:03

## 2019-03-09 RX ADMIN — PANTOPRAZOLE SODIUM 40 MG: 40 INJECTION, POWDER, FOR SOLUTION INTRAVENOUS at 09:03

## 2019-03-09 RX ADMIN — Medication: at 05:03

## 2019-03-09 RX ADMIN — HYDROCORTISONE: 10 CREAM TOPICAL at 12:03

## 2019-03-09 RX ADMIN — ENOXAPARIN SODIUM 40 MG: 100 INJECTION SUBCUTANEOUS at 05:03

## 2019-03-09 RX ADMIN — SODIUM CHLORIDE: 0.9 INJECTION, SOLUTION INTRAVENOUS at 02:03

## 2019-03-09 NOTE — OP NOTE
DATE OF PROCEDURE:  03/08/2019    DIAGNOSIS:  Small bowel intussusception.    PROCEDURE PERFORMED:  Diagnostic laparoscopy and small bowel resection.    SURGEON:  Stone Mei M.D.    ASSISTANT:  Miles Monae M.D. (RES)    ANESTHESIA:  General.    DESCRIPTION OF PROCEDURE:  The patient was placed under general anesthesia.  The   abdomen was prepped and draped in the usual manner.  We started with an   incision at the umbilicus, had a hard time identifying the peritoneum, so we   then used an Optiview trocar in the right mid abdomen to gain access to the   peritoneum.  Pneumoperitoneum to 15 mmHg with CO2 gas was obtained.  A 5-mm   trocar was placed in the right mid abdomen.  A second trocar was placed in the   right upper quadrant.  We then started taking down omental adhesions to the   midline and the trocar was placed through the site at the naval. A 5 mm trocar   was also placed in the left upper quadrant.  We started ligament of Treitz and   ran the bowel all the way to the cecum.  There was an area of distal small bowel   had intussusception.  Looking at it appear to have a mass, possibly attached to   it.  We made two enterotomies using Endo-KRISHAN to create a jejunojejunostomy   around the area of the mass.  Another Endo-KRISHAN was used to fire and divide the   bowel.  There was small resection and then used the Harmonic scalpel on the rest   of the mesentery.  We placed the specimen in the EndoCatch bag and removed it   from the abdomen.  The abdomen was inspected for hemostasis.  A heal stitch was   placed with an EndoStitch.  The abdomen was again inspected for hemostasis.    Trocars were removed under direct vision.  Prior to removing the last trocar,   pneumoperitoneum was allowed to escape.  The fascia at the naval was closed with   several figure-of-eight 0 Vicryl.  Skin incisions were infiltrated with   Marcaine, closed with 4-0 plain catgut and reinforced with Mastisol,   Steri-Strips, and  Band-Aids.  The patient tolerated the procedure well and was   brought to the Recovery Room in stable condition.  Sponge and needle counts were   correct at the end of the case.    BLOOD LOSS:  Minimal.    COMPLICATIONS:  None.    PATHOLOGY:  Small bowel resection.      ARCHANA/IN  dd: 03/08/2019 17:32:50 (CST)  td: 03/08/2019 18:10:47 (CST)  Doc ID   #[id number]  Job ID #[id number]    CC:

## 2019-03-09 NOTE — PROGRESS NOTES
Progress Note  General Surgery    Admit Date: 3/6/2019  Attending: Sigifredo  S/P: Procedure(s) (LRB):  LAPAROSCOPY, DIAGNOSTIC (N/A)  EXCISION, SMALL INTESTINE, LAPAROSCOPIC    Post-operative Day: 1 Day Post-Op    Hospital Day: 4    SUBJECTIVE:     No acute events overnight. Patient states pain is well controlled with PCA. Is belching.  No flatus yet.  NGT is bilious, put out 400, although she did sneak some water.      OBJECTIVE:     Vital Signs (Most Recent)  Temp: 96.4 °F (35.8 °C) (03/09/19 0830)  Pulse: 93 (03/09/19 0830)  Resp: 20 (03/09/19 0830)  BP: (!) 128/56 (03/09/19 0830)  SpO2: 95 % (03/09/19 0830)    Vital Signs Range (Last 24H):  Temp:  [96.4 °F (35.8 °C)-98.7 °F (37.1 °C)]   Pulse:  [85-95]   Resp:  [10-20]   BP: ()/(56-72)   SpO2:  [93 %-99 %]     I & O (Last 24H):    Intake/Output Summary (Last 24 hours) at 3/9/2019 1135  Last data filed at 3/9/2019 0427  Gross per 24 hour   Intake 3229.17 ml   Output 425 ml   Net 2804.17 ml       Physical Exam:  Gen: NAD   HEENT: NCAT  CV: RRR, no m/r/g   Pulm: CAB, unlabored, symmetrical   Abd: Soft, attp. No rebound, guarding. Incisions c/d/i  Extremities: no cyanosis or edema, or clubbing  Skin: Skin color, texture, turgor normal. No rashes or lesions    Laboratory:  CBC:   Recent Labs   Lab 03/09/19  0707   WBC 9.45   RBC 3.73*   HGB 10.9*   HCT 35.2*      MCV 94   MCH 29.2   MCHC 31.0*     CMP:   Recent Labs   Lab 03/09/19  0707      CALCIUM 9.0   ALBUMIN 3.3*   PROT 6.3      K 4.2   CO2 21*      BUN 14   CREATININE 0.8   ALKPHOS 93   ALT 19   AST 16   BILITOT 0.7     Coagulation:   Recent Labs   Lab 03/06/19  1527   INR 1.0   APTT 22.6     Labs within the past 24 hours have been reviewed.    ASSESSMENT/PLAN:     Assessment: Intussusception s/p diagnostic laparoscopy, [place], resection of involved segment on 3/8    Plan:  NPO except a few ice chips per hour  IVF  NGT to LIWS  Await return of bowel function  PCA PRN pain  Nausea  control  Neema Sood MD  General Surgery, PGY-5  [phone]

## 2019-03-09 NOTE — NURSING
Pt awake, alert resp even and unlabored. [place] x 4. Speech clear able to make her needs known.  Pt NPO, NG to suction. Hypoactive bowel sounds noted.  Safety measures in place.

## 2019-03-09 NOTE — PLAN OF CARE
Problem: Adult Inpatient Plan of Care  Goal: Plan of Care Review  Outcome: Ongoing (interventions implemented as appropriate)  Patient is alert and oriented. Able to make needs known to staff. PRN Dilaudid given for pain control. No s/s of respiratory/cardiac distress noted. Patient is currently in PACU after having an Exp. Lap performed today. Patient will be on her way back to the floor soon per report from PACU nurse. No issues or concerns at this time. Continue to monitor.

## 2019-03-09 NOTE — PLAN OF CARE
Problem: Fall Injury Risk  Goal: Absence of Fall and Fall-Related Injury  Outcome: Ongoing (interventions implemented as appropriate)   Pt ambulated in the melara way. Pt free from falls per shift, safety measures in place.  Will cont to monitor

## 2019-03-09 NOTE — PLAN OF CARE
Problem: Adult Inpatient Plan of Care  Goal: Plan of Care Review  Outcome: Ongoing (interventions implemented as appropriate)  POC reviewed with patient and  with both verbalizing understanding. AAOx4. VSS on RA. C/o pain relieved with PCA. No c/o nausea. IVF infusing.  Pt. Voids per toilet with adequate UOP. No BM or flatus.Up with X1 assist. NG tube to LIWS. Bed low and locked. Call light within reach. No falls or acute events. WCTM.

## 2019-03-10 LAB
ALBUMIN SERPL BCP-MCNC: 3 G/DL
ALP SERPL-CCNC: 97 U/L
ALT SERPL W/O P-5'-P-CCNC: 17 U/L
ANION GAP SERPL CALC-SCNC: 13 MMOL/L
AST SERPL-CCNC: 14 U/L
BACTERIA #/AREA URNS AUTO: ABNORMAL /HPF
BASOPHILS # BLD AUTO: 0.05 K/UL
BASOPHILS NFR BLD: 0.5 %
BILIRUB SERPL-MCNC: 0.6 MG/DL
BILIRUB UR QL STRIP: NEGATIVE
BUN SERPL-MCNC: 12 MG/DL
CALCIUM SERPL-MCNC: 9.1 MG/DL
CHLORIDE SERPL-SCNC: 111 MMOL/L
CLARITY UR REFRACT.AUTO: CLEAR
CO2 SERPL-SCNC: 14 MMOL/L
COLOR UR AUTO: ABNORMAL
CREAT SERPL-MCNC: 0.8 MG/DL
DIFFERENTIAL METHOD: ABNORMAL
EOSINOPHIL # BLD AUTO: 0.1 K/UL
EOSINOPHIL NFR BLD: 0.8 %
ERYTHROCYTE [DISTWIDTH] IN BLOOD BY AUTOMATED COUNT: 13.2 %
EST. GFR  (AFRICAN AMERICAN): >60 ML/MIN/1.73 M^2
EST. GFR  (NON AFRICAN AMERICAN): >60 ML/MIN/1.73 M^2
GLUCOSE SERPL-MCNC: 110 MG/DL
GLUCOSE UR QL STRIP: ABNORMAL
HCT VFR BLD AUTO: 32.2 %
HGB BLD-MCNC: 10.2 G/DL
HGB UR QL STRIP: ABNORMAL
IMM GRANULOCYTES # BLD AUTO: 0.12 K/UL
IMM GRANULOCYTES NFR BLD AUTO: 1.3 %
KETONES UR QL STRIP: ABNORMAL
LEUKOCYTE ESTERASE UR QL STRIP: NEGATIVE
LYMPHOCYTES # BLD AUTO: 0.6 K/UL
LYMPHOCYTES NFR BLD: 6.9 %
MAGNESIUM SERPL-MCNC: 1.9 MG/DL
MCH RBC QN AUTO: 29.9 PG
MCHC RBC AUTO-ENTMCNC: 31.7 G/DL
MCV RBC AUTO: 94 FL
MICROSCOPIC COMMENT: ABNORMAL
MONOCYTES # BLD AUTO: 0.8 K/UL
MONOCYTES NFR BLD: 8.9 %
NEUTROPHILS # BLD AUTO: 7.6 K/UL
NEUTROPHILS NFR BLD: 81.6 %
NITRITE UR QL STRIP: NEGATIVE
NRBC BLD-RTO: 0 /100 WBC
PH UR STRIP: 5 [PH] (ref 5–8)
PHOSPHATE SERPL-MCNC: 2.9 MG/DL
PLATELET # BLD AUTO: 274 K/UL
PMV BLD AUTO: 9 FL
POTASSIUM SERPL-SCNC: 4.5 MMOL/L
PROT SERPL-MCNC: 6 G/DL
PROT UR QL STRIP: NEGATIVE
RBC # BLD AUTO: 3.41 M/UL
RBC #/AREA URNS AUTO: 1 /HPF (ref 0–4)
SODIUM SERPL-SCNC: 138 MMOL/L
SP GR UR STRIP: 1.02 (ref 1–1.03)
SQUAMOUS #/AREA URNS AUTO: 1 /HPF
URN SPEC COLLECT METH UR: ABNORMAL
WBC # BLD AUTO: 9.33 K/UL
WBC #/AREA URNS AUTO: 0 /HPF (ref 0–5)
YEAST UR QL AUTO: ABNORMAL

## 2019-03-10 PROCEDURE — 83735 ASSAY OF MAGNESIUM: CPT

## 2019-03-10 PROCEDURE — 36415 COLL VENOUS BLD VENIPUNCTURE: CPT

## 2019-03-10 PROCEDURE — C9113 INJ PANTOPRAZOLE SODIUM, VIA: HCPCS | Performed by: STUDENT IN AN ORGANIZED HEALTH CARE EDUCATION/TRAINING PROGRAM

## 2019-03-10 PROCEDURE — 84100 ASSAY OF PHOSPHORUS: CPT

## 2019-03-10 PROCEDURE — 25000003 PHARM REV CODE 250: Performed by: STUDENT IN AN ORGANIZED HEALTH CARE EDUCATION/TRAINING PROGRAM

## 2019-03-10 PROCEDURE — 81001 URINALYSIS AUTO W/SCOPE: CPT

## 2019-03-10 PROCEDURE — 63600175 PHARM REV CODE 636 W HCPCS: Performed by: STUDENT IN AN ORGANIZED HEALTH CARE EDUCATION/TRAINING PROGRAM

## 2019-03-10 PROCEDURE — 20600001 HC STEP DOWN PRIVATE ROOM

## 2019-03-10 PROCEDURE — 85025 COMPLETE CBC W/AUTO DIFF WBC: CPT

## 2019-03-10 PROCEDURE — 94761 N-INVAS EAR/PLS OXIMETRY MLT: CPT

## 2019-03-10 PROCEDURE — 80053 COMPREHEN METABOLIC PANEL: CPT

## 2019-03-10 RX ADMIN — PANTOPRAZOLE SODIUM 40 MG: 40 INJECTION, POWDER, FOR SOLUTION INTRAVENOUS at 09:03

## 2019-03-10 RX ADMIN — LEVOTHYROXINE SODIUM ANHYDROUS 50 MCG: 100 INJECTION, POWDER, LYOPHILIZED, FOR SOLUTION INTRAVENOUS at 09:03

## 2019-03-10 RX ADMIN — Medication: at 12:03

## 2019-03-10 RX ADMIN — ENOXAPARIN SODIUM 40 MG: 100 INJECTION SUBCUTANEOUS at 05:03

## 2019-03-10 RX ADMIN — SODIUM CHLORIDE: 0.9 INJECTION, SOLUTION INTRAVENOUS at 08:03

## 2019-03-10 NOTE — PROGRESS NOTES
Progress Note  General Surgery    Admit Date: 3/6/2019  Attending: Sigifredo  S/P: Procedure(s) (LRB):  LAPAROSCOPY, DIAGNOSTIC (N/A)  EXCISION, SMALL INTESTINE, LAPAROSCOPIC    Post-operative Day: 2 Days Post-Op    Hospital Day: 5    SUBJECTIVE:     No acute events overnight. Patient states pain is well controlled with PCA (improving).  She is mildly more tachycardic.  Is belching.  No flatus yet.  NGT is bilious, put out 200 overnight, down from 500 over day shift.      OBJECTIVE:     Vital Signs (Most Recent)  Temp: 97.4 °F (36.3 °C) (03/10/19 0808)  Pulse: 96 (03/10/19 0808)  Resp: 17 (03/10/19 0808)  BP: (!) 128/58 (03/10/19 0808)  SpO2: 97 % (03/10/19 0850)    Vital Signs Range (Last 24H):  Temp:  [97.3 °F (36.3 °C)-99.4 °F (37.4 °C)]   Pulse:  []   Resp:  [17-20]   BP: (102-128)/(56-62)   SpO2:  [92 %-98 %]     I & O (Last 24H):    Intake/Output Summary (Last 24 hours) at 3/10/2019 1042  Last data filed at 3/10/2019 0445  Gross per 24 hour   Intake 4478.87 ml   Output 700 ml   Net 3778.87 ml       Physical Exam:  Gen: NAD   HEENT: NCAT  CV: RRR, no m/r/g   Pulm: CAB, unlabored, symmetrical   Abd: Soft, attp. No rebound, guarding. Incisions c/d/i  Extremities: no cyanosis or edema, or clubbing  Skin: Skin color, texture, turgor normal. No rashes or lesions    Laboratory:  CBC:   Recent Labs   Lab 03/10/19  0501   WBC 9.33   RBC 3.41*   HGB 10.2*   HCT 32.2*      MCV 94   MCH 29.9   MCHC 31.7*     CMP:   Recent Labs   Lab 03/10/19  0501      CALCIUM 9.1   ALBUMIN 3.0*   PROT 6.0      K 4.5   CO2 14*   *   BUN 12   CREATININE 0.8   ALKPHOS 97   ALT 17   AST 14   BILITOT 0.6     Coagulation:   Recent Labs   Lab 03/06/19  1527   INR 1.0   APTT 22.6     Labs within the past 24 hours have been reviewed.    ASSESSMENT/PLAN:     Assessment: Intussusception s/p diagnostic laparoscopy, Tawny, resection of involved segment on 3/8    Plan:  NPO except a few ice chips per hour  IVF  NGT to  LIWS- possible DC today if output remains low  Await return of bowel function  PCA PRN pain  Nausea control  Neema Sood MD  General Surgery, PGY-5  [phone]

## 2019-03-11 PROBLEM — E87.20 ACIDOSIS: Status: ACTIVE | Noted: 2019-03-11

## 2019-03-11 LAB
ALBUMIN SERPL BCP-MCNC: 3.3 G/DL
ALLENS TEST: ABNORMAL
ALP SERPL-CCNC: 106 U/L
ALT SERPL W/O P-5'-P-CCNC: 18 U/L
ANION GAP SERPL CALC-SCNC: 11 MMOL/L
ANION GAP SERPL CALC-SCNC: 13 MMOL/L
AST SERPL-CCNC: 13 U/L
BACTERIA #/AREA URNS AUTO: NORMAL /HPF
BASOPHILS # BLD AUTO: 0.08 K/UL
BASOPHILS NFR BLD: 0.5 %
BILIRUB SERPL-MCNC: 0.7 MG/DL
BILIRUB UR QL STRIP: NEGATIVE
BUN SERPL-MCNC: 12 MG/DL
BUN SERPL-MCNC: 13 MG/DL
CALCIUM SERPL-MCNC: 9.3 MG/DL
CALCIUM SERPL-MCNC: 9.5 MG/DL
CHLORIDE SERPL-SCNC: 109 MMOL/L
CHLORIDE SERPL-SCNC: 110 MMOL/L
CHLORIDE UR-SCNC: 102 MMOL/L
CLARITY UR REFRACT.AUTO: CLEAR
CO2 SERPL-SCNC: 11 MMOL/L
CO2 SERPL-SCNC: 13 MMOL/L
COLOR UR AUTO: ABNORMAL
CREAT SERPL-MCNC: 1 MG/DL
CREAT SERPL-MCNC: 1 MG/DL
DELSYS: ABNORMAL
DIFFERENTIAL METHOD: ABNORMAL
EOSINOPHIL # BLD AUTO: 0.1 K/UL
EOSINOPHIL NFR BLD: 0.8 %
ERYTHROCYTE [DISTWIDTH] IN BLOOD BY AUTOMATED COUNT: 13.4 %
EST. GFR  (AFRICAN AMERICAN): >60 ML/MIN/1.73 M^2
EST. GFR  (AFRICAN AMERICAN): >60 ML/MIN/1.73 M^2
EST. GFR  (NON AFRICAN AMERICAN): >60 ML/MIN/1.73 M^2
EST. GFR  (NON AFRICAN AMERICAN): >60 ML/MIN/1.73 M^2
GLUCOSE SERPL-MCNC: 109 MG/DL
GLUCOSE SERPL-MCNC: 114 MG/DL
GLUCOSE UR QL STRIP: ABNORMAL
HCO3 UR-SCNC: 11.7 MMOL/L (ref 24–28)
HCT VFR BLD AUTO: 35.4 %
HGB BLD-MCNC: 11.1 G/DL
HGB UR QL STRIP: ABNORMAL
HYALINE CASTS UR QL AUTO: 0 /LPF
IMM GRANULOCYTES # BLD AUTO: 0.2 K/UL
IMM GRANULOCYTES NFR BLD AUTO: 1.3 %
KETONES UR QL STRIP: ABNORMAL
LACTATE SERPL-SCNC: 0.7 MMOL/L
LEUKOCYTE ESTERASE UR QL STRIP: NEGATIVE
LYMPHOCYTES # BLD AUTO: 0.9 K/UL
LYMPHOCYTES NFR BLD: 5.5 %
MAGNESIUM SERPL-MCNC: 2.1 MG/DL
MCH RBC QN AUTO: 29.4 PG
MCHC RBC AUTO-ENTMCNC: 31.4 G/DL
MCV RBC AUTO: 94 FL
MICROSCOPIC COMMENT: NORMAL
MODE: ABNORMAL
MONOCYTES # BLD AUTO: 1.3 K/UL
MONOCYTES NFR BLD: 8.2 %
NEUTROPHILS # BLD AUTO: 12.8 K/UL
NEUTROPHILS NFR BLD: 83.7 %
NITRITE UR QL STRIP: NEGATIVE
NRBC BLD-RTO: 0 /100 WBC
PCO2 BLDA: 36 MMHG (ref 35–45)
PH SMN: 7.12 [PH] (ref 7.35–7.45)
PH UR STRIP: 5 [PH] (ref 5–8)
PHOSPHATE SERPL-MCNC: 2.7 MG/DL
PLATELET # BLD AUTO: 322 K/UL
PMV BLD AUTO: 9.1 FL
PO2 BLDA: 31 MMHG (ref 40–60)
POC BE: -18 MMOL/L
POC SATURATED O2: 42 % (ref 95–100)
POC TCO2: 13 MMOL/L (ref 24–29)
POTASSIUM SERPL-SCNC: 4 MMOL/L
POTASSIUM SERPL-SCNC: 4.5 MMOL/L
POTASSIUM UR-SCNC: 26 MMOL/L
PROT SERPL-MCNC: 7 G/DL
PROT UR QL STRIP: ABNORMAL
RBC # BLD AUTO: 3.77 M/UL
RBC #/AREA URNS AUTO: 2 /HPF (ref 0–4)
SAMPLE: ABNORMAL
SITE: ABNORMAL
SODIUM SERPL-SCNC: 133 MMOL/L
SODIUM SERPL-SCNC: 134 MMOL/L
SODIUM UR-SCNC: 83 MMOL/L
SP GR UR STRIP: 1.02 (ref 1–1.03)
SQUAMOUS #/AREA URNS AUTO: 2 /HPF
URN SPEC COLLECT METH UR: ABNORMAL
UUN UR-MCNC: 387 MG/DL
WBC # BLD AUTO: 15.33 K/UL
WBC #/AREA URNS AUTO: 1 /HPF (ref 0–5)
YEAST UR QL AUTO: NORMAL

## 2019-03-11 PROCEDURE — 63600175 PHARM REV CODE 636 W HCPCS: Performed by: STUDENT IN AN ORGANIZED HEALTH CARE EDUCATION/TRAINING PROGRAM

## 2019-03-11 PROCEDURE — 99900035 HC TECH TIME PER 15 MIN (STAT)

## 2019-03-11 PROCEDURE — 81001 URINALYSIS AUTO W/SCOPE: CPT

## 2019-03-11 PROCEDURE — 83735 ASSAY OF MAGNESIUM: CPT

## 2019-03-11 PROCEDURE — 84133 ASSAY OF URINE POTASSIUM: CPT

## 2019-03-11 PROCEDURE — 84540 ASSAY OF URINE/UREA-N: CPT

## 2019-03-11 PROCEDURE — 82803 BLOOD GASES ANY COMBINATION: CPT

## 2019-03-11 PROCEDURE — 20600001 HC STEP DOWN PRIVATE ROOM

## 2019-03-11 PROCEDURE — 84300 ASSAY OF URINE SODIUM: CPT

## 2019-03-11 PROCEDURE — 83935 ASSAY OF URINE OSMOLALITY: CPT

## 2019-03-11 PROCEDURE — 80053 COMPREHEN METABOLIC PANEL: CPT

## 2019-03-11 PROCEDURE — 36415 COLL VENOUS BLD VENIPUNCTURE: CPT

## 2019-03-11 PROCEDURE — 80048 BASIC METABOLIC PNL TOTAL CA: CPT

## 2019-03-11 PROCEDURE — 85025 COMPLETE CBC W/AUTO DIFF WBC: CPT

## 2019-03-11 PROCEDURE — 82436 ASSAY OF URINE CHLORIDE: CPT

## 2019-03-11 PROCEDURE — 84100 ASSAY OF PHOSPHORUS: CPT

## 2019-03-11 PROCEDURE — 83605 ASSAY OF LACTIC ACID: CPT

## 2019-03-11 PROCEDURE — 25000003 PHARM REV CODE 250: Performed by: SURGERY

## 2019-03-11 RX ORDER — ATORVASTATIN CALCIUM 20 MG/1
20 TABLET, FILM COATED ORAL DAILY
Status: DISCONTINUED | OUTPATIENT
Start: 2019-03-11 | End: 2019-03-13 | Stop reason: HOSPADM

## 2019-03-11 RX ORDER — HYDROCODONE BITARTRATE AND ACETAMINOPHEN 5; 325 MG/1; MG/1
1 TABLET ORAL EVERY 4 HOURS PRN
Status: DISCONTINUED | OUTPATIENT
Start: 2019-03-11 | End: 2019-03-13 | Stop reason: HOSPADM

## 2019-03-11 RX ORDER — LOSARTAN POTASSIUM AND HYDROCHLOROTHIAZIDE 12.5; 1 MG/1; MG/1
1 TABLET ORAL DAILY
Status: DISCONTINUED | OUTPATIENT
Start: 2019-03-11 | End: 2019-03-11

## 2019-03-11 RX ORDER — HYDROCODONE BITARTRATE AND ACETAMINOPHEN 10; 325 MG/1; MG/1
1 TABLET ORAL EVERY 4 HOURS PRN
Status: DISCONTINUED | OUTPATIENT
Start: 2019-03-11 | End: 2019-03-11

## 2019-03-11 RX ORDER — PANTOPRAZOLE SODIUM 40 MG/1
40 TABLET, DELAYED RELEASE ORAL DAILY
Status: DISCONTINUED | OUTPATIENT
Start: 2019-03-11 | End: 2019-03-13 | Stop reason: HOSPADM

## 2019-03-11 RX ORDER — LEVOTHYROXINE SODIUM 100 UG/1
100 TABLET ORAL
Status: DISCONTINUED | OUTPATIENT
Start: 2019-03-11 | End: 2019-03-13 | Stop reason: HOSPADM

## 2019-03-11 RX ORDER — POLYETHYLENE GLYCOL 3350 17 G/17G
17 POWDER, FOR SOLUTION ORAL 2 TIMES DAILY PRN
Status: DISCONTINUED | OUTPATIENT
Start: 2019-03-11 | End: 2019-03-13 | Stop reason: HOSPADM

## 2019-03-11 RX ORDER — IBUPROFEN 400 MG/1
400 TABLET ORAL EVERY 6 HOURS PRN
Status: DISCONTINUED | OUTPATIENT
Start: 2019-03-11 | End: 2019-03-11

## 2019-03-11 RX ORDER — SODIUM,POTASSIUM PHOSPHATES 280-250MG
2 POWDER IN PACKET (EA) ORAL
Status: COMPLETED | OUTPATIENT
Start: 2019-03-11 | End: 2019-03-12

## 2019-03-11 RX ORDER — HYDRALAZINE HYDROCHLORIDE 20 MG/ML
10 INJECTION INTRAMUSCULAR; INTRAVENOUS EVERY 6 HOURS PRN
Status: DISCONTINUED | OUTPATIENT
Start: 2019-03-11 | End: 2019-03-11

## 2019-03-11 RX ADMIN — LEVOTHYROXINE SODIUM 100 MCG: 100 TABLET ORAL at 09:03

## 2019-03-11 RX ADMIN — ENOXAPARIN SODIUM 40 MG: 100 INJECTION SUBCUTANEOUS at 05:03

## 2019-03-11 RX ADMIN — PANTOPRAZOLE SODIUM 40 MG: 40 TABLET, DELAYED RELEASE ORAL at 09:03

## 2019-03-11 RX ADMIN — Medication 800 MG: at 12:03

## 2019-03-11 RX ADMIN — ATORVASTATIN CALCIUM 20 MG: 20 TABLET, FILM COATED ORAL at 09:03

## 2019-03-11 RX ADMIN — POTASSIUM & SODIUM PHOSPHATES POWDER PACK 280-160-250 MG 2 PACKET: 280-160-250 PACK at 11:03

## 2019-03-11 RX ADMIN — POTASSIUM & SODIUM PHOSPHATES POWDER PACK 280-160-250 MG 2 PACKET: 280-160-250 PACK at 05:03

## 2019-03-11 NOTE — ASSESSMENT & PLAN NOTE
Patient is a 52 yo female presenting with acute onset acidosis. Ddx include RTA vs ketosis vs medication induced vs hypovolemia vs ischemia. Patient with an acute drop in bicarb from 21 --14 --11. No tachypnea on exam. No medication hx of salicylate use. Patient did have 3+ ketones and 3+ glucose in her urine however glucose adequately controlled in the hospital. No new medications introduced inpatient. Lactate checked on 3/11 WNL.     PLAN:   - Urine lytes ordered. Urine gap minimal with normal potassium making RTA extremely unlikely.   - Will order Urine Urea and Osms.   - Patient with mildly elevated WBC today. May be start of new infection. Recommend checking Lactate in the AM.   - Recommend discontinuing NSAIDs as it may precipitate renal failure worsening acidosis.   - Recommend repeating BMP, if acidosis worsens, recommend starting Bicarb.

## 2019-03-11 NOTE — HPI
Mrs. Ailyn Ortiz is a 52 yo female with hx of DM type 2, GERD, HTN, IBS (not on any medications, seen by GI at [facility]), inflammatory arthritis (on methotrexate) who presents to the ER with colicky abdominal pain for the past 10 days. She states that is has progressively been getting worse. She continues to have diarrhea, last episode just an hour ago. No currant jelly stools or melena. No vomiting. Has had nausea and some belching today. She underwent CT abdomen/pelvis with oral contrast that showed partial small bowel obstruction and small bowel intussusception. No fever/chills, dysuria, CP, lower extremity swelling.      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 Mei.    Nephrology was consulted for concern for RTA as patient's bicarb abruptly decreased from 21 --14 --11. Patient has not been tachypnic but an ABG was performed which showed a pH of 7.12

## 2019-03-11 NOTE — SUBJECTIVE & OBJECTIVE
Past Medical History:   Diagnosis Date    Asthma     Diabetes mellitus, type 2 since the age of 42    Hypertension since the age of 42    JESSICA (obstructive sleep apnea)     S/P UPP    Thyroid disease        Past Surgical History:   Procedure Laterality Date    ADENOIDECTOMY       SECTION      times 2    CHOLECYSTECTOMY      GANGLION CYST EXCISION      LAPAROSCOPY-DIAGNOSTIC w/ small bowel resection N/A 2018    Performed by Stone Mei MD at [facility] OR 2ND FLR    RELEASE, CARPAL TUNNEL right Right 2018    Performed by Anai Choudhary MD at [facility] OR 1ST FLR    RESECTION-SMALL BOWEL-LAPAROSCOPIC  2018    Performed by [facility]i MD at [facility] OR 2ND FLR    small bowel resection (lipoma)      TONSILLECTOMY      TUBAL LIGATION      UVULOPALATOPHARYNGOPLASTY         Review of patient's allergies indicates:   Allergen Reactions    Milk containing products      Congestion      Nuts [tree nut] Other (See Comments)     Nasal Congestion    Avelox [moxifloxacin] Rash     Current Facility-Administered Medications   Medication Frequency    acetaminophen tablet 650 mg Q8H PRN    atorvastatin tablet 20 mg Daily    diphenhydrAMINE injection 25 mg Q4H PRN    enoxaparin injection 40 mg Daily    hydrALAZINE injection 10 mg Q6H PRN    HYDROcodone-acetaminophen 5-325 mg per tablet 1 tablet Q4H PRN    hydrocortisone 1 % cream BID PRN    ibuprofen tablet 400 mg Q6H PRN    levothyroxine tablet 100 mcg Before breakfast    ondansetron injection 8 mg Q12H PRN    pantoprazole EC tablet 40 mg Daily    polyethylene glycol packet 17 g BID PRN    potassium, sodium phosphates 280-160-250 mg packet 2 packet QID (AC & HS)    promethazine (PHENERGAN) 6.25 mg in dextrose 5 % 50 mL IVPB Q6H PRN    sodium chloride 0.9% flush 3 mL PRN     Family History     Problem Relation (Age of Onset)    Diabetes Father, Paternal Grandmother    Heart disease Maternal Grandfather, Paternal  Grandmother, Paternal Grandfather    Hypertension Father        Tobacco Use    Smoking status: Former Smoker     Packs/day: 0.00     Years: 20.00     Pack years: 0.00     Types: Cigarettes     Last attempt to quit: 12/15/2013     Years since quittin.2    Smokeless tobacco: Never Used   Substance and Sexual Activity    Alcohol use: Yes     Comment: occasionally    Drug use: No    Sexual activity: Not on file     Review of Systems   Constitutional: Negative for activity change, chills, fatigue and fever.   HENT: Negative for congestion, postnasal drip, rhinorrhea and sinus pressure.    Eyes: Negative for discharge, redness and visual disturbance.   Respiratory: Negative for cough and shortness of breath.    Cardiovascular: Negative for chest pain, palpitations and leg swelling.   Gastrointestinal: Positive for abdominal pain, diarrhea and nausea. Negative for abdominal distention, blood in stool, constipation and vomiting.   Endocrine: Negative for cold intolerance and heat intolerance.   Genitourinary: Negative for dysuria and hematuria.   Musculoskeletal: Positive for back pain. Negative for arthralgias.   Skin: Negative for color change and pallor.   Neurological: Negative for dizziness, seizures, speech difficulty and headaches.   Psychiatric/Behavioral: Negative for agitation, sleep disturbance and suicidal ideas. The patient is not nervous/anxious.      Objective:     Vital Signs (Most Recent):  Temp: 97.8 °F (36.6 °C) (19 08)  Pulse: 104 (19 08)  Resp: 16 (19 08)  BP: 129/73 (19 08)  SpO2: 97 % (19 08)  O2 Device (Oxygen Therapy): nasal cannula (19 0400) Vital Signs (24h Range):  Temp:  [97 °F (36.1 °C)-98.5 °F (36.9 °C)] 97.8 °F (36.6 °C)  Pulse:  [] 104  Resp:  [16-20] 16  SpO2:  [94 %-97 %] 97 %  BP: (117-147)/(58-73) 129/73     Weight: 100.7 kg (222 lb) (19 1301)  Body mass index is 40.6 kg/m².  Body surface area is 2.1 meters  squared.    I/O last 3 completed shifts:  In: 2924.3 [I.V.:2924.3]  Out: 375 [Drains:375]    Physical Exam   Constitutional: She is oriented to person, place, and time. She appears well-developed and well-nourished.   HENT:   Head: Normocephalic and atraumatic.   Eyes: EOM are normal. Pupils are equal, round, and reactive to light.   Neck: Normal range of motion. Neck supple.   Cardiovascular: Normal rate and regular rhythm.   Pulmonary/Chest: Breath sounds normal. She is in respiratory distress. She has no wheezes. She has no rales.   Abdominal: Soft. Bowel sounds are normal. She exhibits no distension. There is no tenderness.   Musculoskeletal: Normal range of motion. She exhibits no edema or deformity.   Neurological: She is alert and oriented to person, place, and time.   Skin: Skin is warm and dry. Capillary refill takes less than 2 seconds. No erythema.     Significant Labs:  CBC:   Recent Labs   Lab 03/11/19  0554   WBC 15.33*   RBC 3.77*   HGB 11.1*   HCT 35.4*      MCV 94   MCH 29.4   MCHC 31.4*     CMP:   Recent Labs   Lab 03/11/19  0554   *   CALCIUM 9.5   ALBUMIN 3.3*   PROT 7.0   *   K 4.5   CO2 11*      BUN 12   CREATININE 1.0   ALKPHOS 106   ALT 18   AST 13   BILITOT 0.7     All labs within the past 24 hours have been reviewed.

## 2019-03-11 NOTE — PROGRESS NOTES
Progress Note  General Surgery    Admit Date: 3/6/2019  Attending: Sigifredo  S/P: Procedure(s) (LRB):  LAPAROSCOPY, DIAGNOSTIC (N/A)  EXCISION, SMALL INTESTINE, LAPAROSCOPIC    Post-operative Day: 3 Days Post-Op    Hospital Day: 6    SUBJECTIVE:     No acute events overnight.  Pain continues to improve.  Mild tachycardia is stable.  She is now passing gas.  NGT was removed and she has no nausea.      OBJECTIVE:     Vital Signs (Most Recent)  Temp: 97.8 °F (36.6 °C) (03/11/19 0801)  Pulse: 104 (03/11/19 0801)  Resp: 16 (03/11/19 0801)  BP: 129/73 (03/11/19 0801)  SpO2: 97 % (03/11/19 0801)    Vital Signs Range (Last 24H):  Temp:  [97 °F (36.1 °C)-98.5 °F (36.9 °C)]   Pulse:  []   Resp:  [16-20]   BP: (117-147)/(58-73)   SpO2:  [94 %-97 %]     I & O (Last 24H):    Intake/Output Summary (Last 24 hours) at 3/11/2019 0803  Last data filed at 3/10/2019 1800  Gross per 24 hour   Intake 1516 ml   Output 175 ml   Net 1341 ml       Physical Exam:  Gen: NAD   HEENT: NCAT  CV: RRR, no m/r/g   Pulm: CAB, unlabored, symmetrical   Abd: Soft, attp. No rebound, guarding. Incisions c/d/i  Extremities: no cyanosis or edema, or clubbing  Skin: Skin color, texture, turgor normal. No rashes or lesions    Laboratory:  CBC:   Recent Labs   Lab 03/11/19  0554   WBC 15.33*   RBC 3.77*   HGB 11.1*   HCT 35.4*      MCV 94   MCH 29.4   MCHC 31.4*     CMP:   Recent Labs   Lab 03/11/19  0554   *   CALCIUM 9.5   ALBUMIN 3.3*   PROT 7.0   *   K 4.5   CO2 11*      BUN 12   CREATININE 1.0   ALKPHOS 106   ALT 18   AST 13   BILITOT 0.7     Coagulation:   Recent Labs   Lab 03/06/19  1527   INR 1.0   APTT 22.6     Labs within the past 24 hours have been reviewed.    ASSESSMENT/PLAN:     Assessment: Intussusception s/p diagnostic laparoscopy, Tawny, resection of involved segment on 3/8    Plan:  CLD, ADAT to regular  Norco/ ibuprofen PRN pain  Nausea control  Lovenox  F/u leukocytosis.  She is afebrile, but will need this  to resolve prior to DC    Aroldo Sood MD  General Surgery, PGY-5  [phone]

## 2019-03-11 NOTE — CONSULTS
[facility]  Nephrology  Consult Note    Patient Name: Ailyn Ortiz  MRN: [id number]  Admission Date: 3/6/2019  Hospital Length of Stay: 5 days  Attending Provider: Stone Mei MD   Primary Care Physician: Lewis Lloyd MD  Principal Problem:Partial small bowel obstruction    Inpatient consult to Nephrology  Consult performed by: Tony Cardona MD  Consult ordered by: Miles Monae MD        Subjective:     HPI: Mrs. Ailyn Ortiz is a 52 yo female with hx of DM type 2, GERD, HTN, IBS (not on any medications, seen by GI at [facility]), inflammatory arthritis (on methotrexate) who presents to the ER with colicky abdominal pain for the past 10 days. She states that is has progressively been getting worse. She continues to have diarrhea, last episode just an hour ago. No currant jelly stools or melena. No vomiting. Has had nausea and some belching today. She underwent CT abdomen/pelvis with oral contrast that showed partial small bowel obstruction and small bowel intussusception. No fever/chills, dysuria, CP, lower extremity swelling.      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 Mei.    Nephrology was consulted for concern for RTA as patient's bicarb abruptly decreased from 21 --14 --11. Patient has not been tachypnic but an ABG was performed which showed a pH of 7.12        Past Medical History:   Diagnosis Date    Asthma     Diabetes mellitus, type 2 since the age of 42    Hypertension since the age of 42    JESSICA (obstructive sleep apnea)     S/P UPP    Thyroid disease        Past Surgical History:   Procedure Laterality Date    ADENOIDECTOMY       SECTION      times 2    CHOLECYSTECTOMY      GANGLION CYST EXCISION      LAPAROSCOPY-DIAGNOSTIC w/ small bowel resection N/A 2018    [facility] at  [facility] 2ND FLR    RELEASE, CARPAL TUNNEL right Right 2018    Performed by Anai [facility]ary MD at [facility] OR 1ST FLR    RESECTION-SMALL BOWEL-LAPAROSCOPIC  2018    Performed by Stone [facility] MD at [facility] OR 2ND FLR    small bowel resection (lipoma)      TONSILLECTOMY      TUBAL LIGATION      UVULOPALATOPHARYNGOPLASTY         Review of patient's allergies indicates:   Allergen Reactions    Milk containing products      Congestion      Nuts [tree nut] Other (See Comments)     Nasal Congestion    Avelox [moxifloxacin] Rash     Current Facility-Administered Medications   Medication Frequency    acetaminophen tablet 650 mg Q8H PRN    atorvastatin tablet 20 mg Daily    diphenhydrAMINE injection 25 mg Q4H PRN    enoxaparin injection 40 mg Daily    hydrALAZINE injection 10 mg Q6H PRN    HYDROcodone-acetaminophen 5-325 mg per tablet 1 tablet Q4H PRN    hydrocortisone 1 % cream BID PRN    ibuprofen tablet 400 mg Q6H PRN    levothyroxine tablet 100 mcg Before breakfast    ondansetron injection 8 mg Q12H PRN    pantoprazole EC tablet 40 mg Daily    polyethylene glycol packet 17 g BID PRN    potassium, sodium phosphates 280-160-250 mg packet 2 packet QID (AC & HS)    promethazine (PHENERGAN) 6.25 mg in dextrose 5 % 50 mL IVPB Q6H PRN    sodium chloride 0.9% flush 3 mL PRN     Family History     Problem Relation (Age of Onset)    Diabetes Father, Paternal Grandmother    Heart disease Maternal Grandfather, Paternal Grandmother, Paternal Grandfather    Hypertension Father        Tobacco Use    Smoking status: Former Smoker     Packs/day: 0.00     Years: 20.00     Pack years: 0.00     Types: Cigarettes     Last attempt to quit: 12/15/2013     Years since quittin.2    Smokeless tobacco: Never Used   Substance and Sexual Activity    Alcohol use: Yes     Comment: occasionally    Drug use: No    Sexual activity: Not on file     Review of Systems   Constitutional: Negative for activity  change, chills, fatigue and fever.   HENT: Negative for congestion, postnasal drip, rhinorrhea and sinus pressure.    Eyes: Negative for discharge, redness and visual disturbance.   Respiratory: Negative for cough and shortness of breath.    Cardiovascular: Negative for chest pain, palpitations and leg swelling.   Gastrointestinal: Positive for abdominal pain, diarrhea and nausea. Negative for abdominal distention, blood in stool, constipation and vomiting.   Endocrine: Negative for cold intolerance and heat intolerance.   Genitourinary: Negative for dysuria and hematuria.   Musculoskeletal: Positive for back pain. Negative for arthralgias.   Skin: Negative for color change and pallor.   Neurological: Negative for dizziness, seizures, speech difficulty and headaches.   Psychiatric/Behavioral: Negative for agitation, sleep disturbance and suicidal ideas. The patient is not nervous/anxious.      Objective:     Vital Signs (Most Recent):  Temp: 97.8 °F (36.6 °C) (03/11/19 0801)  Pulse: 104 (03/11/19 0801)  Resp: 16 (03/11/19 0801)  BP: 129/73 (03/11/19 0801)  SpO2: 97 % (03/11/19 0801)  O2 Device (Oxygen Therapy): nasal cannula (03/11/19 0400) Vital Signs (24h Range):  Temp:  [97 °F (36.1 °C)-98.5 °F (36.9 °C)] 97.8 °F (36.6 °C)  Pulse:  [] 104  Resp:  [16-20] 16  SpO2:  [94 %-97 %] 97 %  BP: (117-147)/(58-73) 129/73     Weight: 100.7 kg (222 lb) (03/08/19 1301)  Body mass index is 40.6 kg/m².  Body surface area is 2.1 meters squared.    I/O last 3 completed shifts:  In: 2924.3 [I.V.:2924.3]  Out: 375 [Drains:375]    Physical Exam   Constitutional: She is oriented to person, place, and time. She appears well-developed and well-nourished.   HENT:   Head: Normocephalic and atraumatic.   Eyes: EOM are normal. Pupils are equal, round, and reactive to light.   Neck: Normal range of motion. Neck supple.   Cardiovascular: Normal rate and regular rhythm.   Pulmonary/Chest: Breath sounds normal. She is in respiratory  distress. She has no wheezes. She has no rales.   Abdominal: Soft. Bowel sounds are normal. She exhibits no distension. There is no tenderness.   Musculoskeletal: Normal range of motion. She exhibits no edema or deformity.   Neurological: She is alert and oriented to person, place, and time.   Skin: Skin is warm and dry. Capillary refill takes less than 2 seconds. No erythema.     Significant Labs:  CBC:   Recent Labs   Lab 03/11/19  0554   WBC 15.33*   RBC 3.77*   HGB 11.1*   HCT 35.4*      MCV 94   MCH 29.4   MCHC 31.4*     CMP:   Recent Labs   Lab 03/11/19  0554   *   CALCIUM 9.5   ALBUMIN 3.3*   PROT 7.0   *   K 4.5   CO2 11*      BUN 12   CREATININE 1.0   ALKPHOS 106   ALT 18   AST 13   BILITOT 0.7     All labs within the past 24 hours have been reviewed.      Assessment/Plan:     Acidosis    Patient is a 52 yo female presenting with acute onset acidosis. Ddx include RTA vs ketosis vs medication induced vs hypovolemia vs ischemia. Patient with an acute drop in bicarb from 21 --14 --11. No tachypnea on exam. No medication hx of salicylate use. Patient did have 3+ ketones and 3+ glucose in her urine however glucose adequately controlled in the hospital. No new medications introduced inpatient. Lactate checked on 3/11 WNL. Patient may have a ketoacidosis 2/2 poor nutrition.     PLAN:   - Urine lytes ordered. Urine gap minimal with normal potassium making RTA extremely unlikely. Urine pH also normal.  - Will order Urine Urea and Osms.   - Patient with mildly elevated WBC today. May be start of new infection. Recommend checking Lactate in the AM.   - Recommend discontinuing NSAIDs as it may precipitate renal failure worsening acidosis.   - Recommend repeating BMP, if acidosis worsens, recommend starting Bicarb.        Thank you for your consult. I will follow-up with patient. Please contact us if you have any additional questions.    Tony Cardona MD  Nephrology  [facility]  [place]

## 2019-03-11 NOTE — PLAN OF CARE
Problem: Pain Acute  Goal: Optimal Pain Control    Intervention: Develop Pain Management Plan  Patient had an uneventful hs. Patient complaining of tube will cause nausea at times. Patient informed at 2100 of NG tube can come out, patient preferred to wait until am , offerred ice chips and water,tolerated well. Patient rested well, remains on fluids and PCA . 0600-Patient removed NG tube this am. Patient tolerated well.Comtinue Plan of Care.

## 2019-03-12 LAB
ALBUMIN SERPL BCP-MCNC: 3.1 G/DL
ALLENS TEST: ABNORMAL
ALLENS TEST: ABNORMAL
ALP SERPL-CCNC: 107 U/L
ALT SERPL W/O P-5'-P-CCNC: 15 U/L
ANION GAP SERPL CALC-SCNC: 13 MMOL/L
AST SERPL-CCNC: 12 U/L
BASOPHILS # BLD AUTO: 0.07 K/UL
BASOPHILS NFR BLD: 0.8 %
BILIRUB SERPL-MCNC: 0.9 MG/DL
BUN SERPL-MCNC: 14 MG/DL
CALCIUM SERPL-MCNC: 9.5 MG/DL
CHLORIDE SERPL-SCNC: 108 MMOL/L
CO2 SERPL-SCNC: 14 MMOL/L
CREAT SERPL-MCNC: 0.9 MG/DL
DELSYS: ABNORMAL
DELSYS: ABNORMAL
DIFFERENTIAL METHOD: ABNORMAL
EOSINOPHIL # BLD AUTO: 0.3 K/UL
EOSINOPHIL NFR BLD: 3.8 %
ERYTHROCYTE [DISTWIDTH] IN BLOOD BY AUTOMATED COUNT: 13.4 %
EST. GFR  (AFRICAN AMERICAN): >60 ML/MIN/1.73 M^2
EST. GFR  (NON AFRICAN AMERICAN): >60 ML/MIN/1.73 M^2
GLUCOSE SERPL-MCNC: 97 MG/DL
HCO3 UR-SCNC: 11.1 MMOL/L (ref 24–28)
HCO3 UR-SCNC: 15.7 MMOL/L (ref 24–28)
HCT VFR BLD AUTO: 34.4 %
HGB BLD-MCNC: 11.2 G/DL
IMM GRANULOCYTES # BLD AUTO: 0.08 K/UL
IMM GRANULOCYTES NFR BLD AUTO: 0.9 %
LACTATE SERPL-SCNC: 0.7 MMOL/L
LYMPHOCYTES # BLD AUTO: 0.7 K/UL
LYMPHOCYTES NFR BLD: 7.3 %
MAGNESIUM SERPL-MCNC: 2.1 MG/DL
MCH RBC QN AUTO: 29.3 PG
MCHC RBC AUTO-ENTMCNC: 32.6 G/DL
MCV RBC AUTO: 90 FL
MONOCYTES # BLD AUTO: 0.8 K/UL
MONOCYTES NFR BLD: 8.7 %
NEUTROPHILS # BLD AUTO: 7 K/UL
NEUTROPHILS NFR BLD: 78.5 %
NRBC BLD-RTO: 0 /100 WBC
OSMOLALITY UR: 631 MOSM/KG
PCO2 BLDA: 23.7 MMHG (ref 35–45)
PCO2 BLDA: 33.5 MMHG (ref 35–45)
PH SMN: 7.28 [PH] (ref 7.35–7.45)
PH SMN: 7.28 [PH] (ref 7.35–7.45)
PHOSPHATE SERPL-MCNC: 2.5 MG/DL
PLATELET # BLD AUTO: 307 K/UL
PMV BLD AUTO: 9.2 FL
PO2 BLDA: 102 MMHG (ref 80–100)
PO2 BLDA: 21 MMHG (ref 40–60)
POC BE: -11 MMOL/L
POC BE: -16 MMOL/L
POC SATURATED O2: 28 % (ref 95–100)
POC SATURATED O2: 97 % (ref 95–100)
POC TCO2: 12 MMOL/L (ref 23–27)
POC TCO2: 17 MMOL/L (ref 24–29)
POTASSIUM SERPL-SCNC: 3.9 MMOL/L
PROT SERPL-MCNC: 6.8 G/DL
RBC # BLD AUTO: 3.82 M/UL
SAMPLE: ABNORMAL
SAMPLE: ABNORMAL
SITE: ABNORMAL
SITE: ABNORMAL
SODIUM SERPL-SCNC: 135 MMOL/L
WBC # BLD AUTO: 8.86 K/UL

## 2019-03-12 PROCEDURE — 25000003 PHARM REV CODE 250: Performed by: STUDENT IN AN ORGANIZED HEALTH CARE EDUCATION/TRAINING PROGRAM

## 2019-03-12 PROCEDURE — 25000003 PHARM REV CODE 250: Performed by: SURGERY

## 2019-03-12 PROCEDURE — 80053 COMPREHEN METABOLIC PANEL: CPT

## 2019-03-12 PROCEDURE — 83605 ASSAY OF LACTIC ACID: CPT

## 2019-03-12 PROCEDURE — 99900035 HC TECH TIME PER 15 MIN (STAT)

## 2019-03-12 PROCEDURE — 36415 COLL VENOUS BLD VENIPUNCTURE: CPT

## 2019-03-12 PROCEDURE — 36600 WITHDRAWAL OF ARTERIAL BLOOD: CPT

## 2019-03-12 PROCEDURE — 85025 COMPLETE CBC W/AUTO DIFF WBC: CPT

## 2019-03-12 PROCEDURE — 84100 ASSAY OF PHOSPHORUS: CPT

## 2019-03-12 PROCEDURE — 63600175 PHARM REV CODE 636 W HCPCS: Performed by: STUDENT IN AN ORGANIZED HEALTH CARE EDUCATION/TRAINING PROGRAM

## 2019-03-12 PROCEDURE — 20600001 HC STEP DOWN PRIVATE ROOM

## 2019-03-12 PROCEDURE — 83735 ASSAY OF MAGNESIUM: CPT

## 2019-03-12 RX ORDER — BISACODYL 10 MG
10 SUPPOSITORY, RECTAL RECTAL DAILY PRN
Status: DISCONTINUED | OUTPATIENT
Start: 2019-03-12 | End: 2019-03-13 | Stop reason: HOSPADM

## 2019-03-12 RX ORDER — SODIUM,POTASSIUM PHOSPHATES 280-250MG
2 POWDER IN PACKET (EA) ORAL
Status: DISCONTINUED | OUTPATIENT
Start: 2019-03-12 | End: 2019-03-13 | Stop reason: HOSPADM

## 2019-03-12 RX ADMIN — LEVOTHYROXINE SODIUM 100 MCG: 100 TABLET ORAL at 06:03

## 2019-03-12 RX ADMIN — PANTOPRAZOLE SODIUM 40 MG: 40 TABLET, DELAYED RELEASE ORAL at 09:03

## 2019-03-12 RX ADMIN — POTASSIUM & SODIUM PHOSPHATES POWDER PACK 280-160-250 MG 2 PACKET: 280-160-250 PACK at 10:03

## 2019-03-12 RX ADMIN — ACETAMINOPHEN 650 MG: 325 TABLET ORAL at 04:03

## 2019-03-12 RX ADMIN — ACETAMINOPHEN 650 MG: 325 TABLET ORAL at 12:03

## 2019-03-12 RX ADMIN — POTASSIUM & SODIUM PHOSPHATES POWDER PACK 280-160-250 MG 2 PACKET: 280-160-250 PACK at 04:03

## 2019-03-12 RX ADMIN — POTASSIUM & SODIUM PHOSPHATES POWDER PACK 280-160-250 MG 2 PACKET: 280-160-250 PACK at 01:03

## 2019-03-12 RX ADMIN — ATORVASTATIN CALCIUM 20 MG: 20 TABLET, FILM COATED ORAL at 09:03

## 2019-03-12 RX ADMIN — ENOXAPARIN SODIUM 40 MG: 100 INJECTION SUBCUTANEOUS at 05:03

## 2019-03-12 RX ADMIN — POTASSIUM & SODIUM PHOSPHATES POWDER PACK 280-160-250 MG 2 PACKET: 280-160-250 PACK at 12:03

## 2019-03-12 NOTE — ASSESSMENT & PLAN NOTE
Patient is a 52 yo female presenting with acute onset acidosis. Ddx include RTA vs ketosis vs medication induced vs hypovolemia vs ischemia. Patient with an acute drop in bicarb from 21 --14 --11. No tachypnea on exam. No medication hx of salicylate use. Patient did have 3+ ketones and 3+ glucose in her urine however glucose adequately controlled in the hospital. No new medications introduced inpatient. Lactate checked on 3/11 WNL.     PLAN:   - Urine lytes ordered. Urine gap with normal potassium making RTA unlikely.   - Patient reports she has not eaten in over 10 days and has not tolerated her clear liquid diet.   - Urine Osm and Urea mildly elevated suggestive of hypovolemia which is consistent with her poor PO intake.   - 3+ ketones in urine, with hx of poor PO intake and urine studies consistent with hypovolemia. Patient may have a component of ketoacidosis. Encouraged patient to try to increase PO intake.   - elevated WBC yesterday which returned normal today may be 2/2 hemoconcentration.   - Agree with Na/K phos pack supplements as hypophosphatemia can cause ketosis.   - Continue to monitor bicarb levels.

## 2019-03-12 NOTE — PROGRESS NOTES
Progress Note  General Surgery    Admit Date: 3/6/2019  Attending: Sigifredo  S/P: Procedure(s) (LRB):  LAPAROSCOPY, DIAGNOSTIC (N/A)  EXCISION, SMALL INTESTINE, LAPAROSCOPIC    Post-operative Day: 4 Days Post-Op    Hospital Day: 7    SUBJECTIVE:     NAEON. AFVSS. Bloated, Burping consistently, + flatus. No n/v.    OBJECTIVE:     Vital Signs (Most Recent)  Temp: 98.5 °F (36.9 °C) (03/12/19 0833)  Pulse: 94 (03/12/19 0833)  Resp: 20 (03/12/19 0833)  BP: (!) 140/80 (03/12/19 0833)  SpO2: 98 % (03/12/19 0833)    Vital Signs Range (Last 24H):  Temp:  [97.8 °F (36.6 °C)-99 °F (37.2 °C)]   Pulse:  [88-98]   Resp:  [18-20]   BP: (102-140)/(50-82)   SpO2:  [97 %-98 %]     I & O (Last 24H):    Intake/Output Summary (Last 24 hours) at 3/12/2019 1003  Last data filed at 3/11/2019 1800  Gross per 24 hour   Intake 739 ml   Output 400 ml   Net 339 ml       Physical Exam:  Gen: NAD   HEENT: NCAT  CV: RRR, no m/r/g   Pulm: CAB, unlabored, symmetrical   Abd: Soft, attp. No rebound, guarding. Incisions c/d/i  Extremities: no cyanosis or edema, or clubbing  Skin: Skin color, texture, turgor normal. No rashes or lesions    Laboratory:  CBC:   Recent Labs   Lab 03/12/19  0442   WBC 8.86   RBC 3.82*   HGB 11.2*   HCT 34.4*      MCV 90   MCH 29.3   MCHC 32.6     CMP:   Recent Labs   Lab 03/12/19  0442   GLU 97   CALCIUM 9.5   ALBUMIN 3.1*   PROT 6.8   *   K 3.9   CO2 14*      BUN 14   CREATININE 0.9   ALKPHOS 107   ALT 15   AST 12   BILITOT 0.9     Coagulation:   Recent Labs   Lab 03/06/19  1527   INR 1.0   APTT 22.6     Labs within the past 24 hours have been reviewed.    ASSESSMENT/PLAN:     Assessment: Intussusception s/p diagnostic laparoscopy, Tawny, resection of involved segment on 3/8    Plan:  Reg diet  Suppository  F/u KUB  Norco PRN pain, no NSAIDS per nephro rec given acidosis  Nausea control  Lovenox  Leukocytosis resolved.  Nephro c/s appreciate recs, RTA not likely    Stone Saab MD  Surgery,  PGY1  [phone]

## 2019-03-12 NOTE — PLAN OF CARE
Problem: Pain Acute  Goal: Optimal Pain Control    Intervention: Develop Pain Management Plan  Patient in bed on rounds, Patient had an eventful day, Presently family at bedside. Patient requesting medication for pain at hs. Patient medicated for pain x 1, Patient slept most of the night. Lab here for labwork this am. Will continue to monitor as needed.

## 2019-03-12 NOTE — SUBJECTIVE & OBJECTIVE
Interval History:     NAEON. Patient asymptomatic from her acidemia which has improved from yesterday. No complaints other than abdominal discomfort which has been adequately controlled.     Review of patient's allergies indicates:   Allergen Reactions    Milk containing products      Congestion      Nuts [tree nut] Other (See Comments)     Nasal Congestion    Avelox [moxifloxacin] Rash     Current Facility-Administered Medications   Medication Frequency    acetaminophen tablet 650 mg Q8H PRN    atorvastatin tablet 20 mg Daily    diphenhydrAMINE injection 25 mg Q4H PRN    enoxaparin injection 40 mg Daily    hydrALAZINE injection 10 mg Q6H PRN    HYDROcodone-acetaminophen 5-325 mg per tablet 1 tablet Q4H PRN    hydrocortisone 1 % cream BID PRN    levothyroxine tablet 100 mcg Before breakfast    ondansetron injection 8 mg Q12H PRN    pantoprazole EC tablet 40 mg Daily    polyethylene glycol packet 17 g BID PRN    promethazine (PHENERGAN) 6.25 mg in dextrose 5 % 50 mL IVPB Q6H PRN    sodium chloride 0.9% flush 3 mL PRN       Objective:     Vital Signs (Most Recent):  Temp: 98.3 °F (36.8 °C) (03/12/19 0452)  Pulse: 88 (03/12/19 0452)  Resp: 18 (03/12/19 0452)  BP: 116/64 (03/12/19 0452)  SpO2: 97 % (03/12/19 0452)  O2 Device (Oxygen Therapy): room air (03/11/19 2000) Vital Signs (24h Range):  Temp:  [97.8 °F (36.6 °C)-99 °F (37.2 °C)] 98.3 °F (36.8 °C)  Pulse:  [88-98] 88  Resp:  [18-20] 18  SpO2:  [97 %-98 %] 97 %  BP: (102-122)/(50-82) 116/64     Weight: 100.7 kg (222 lb) (03/08/19 1301)  Body mass index is 40.6 kg/m².  Body surface area is 2.1 meters squared.    I/O last 3 completed shifts:  In: 979 [P.O.:600; I.V.:379]  Out: 400 [Urine:400]    Physical Exam    Significant Labs:  CBC:   Recent Labs   Lab 03/12/19  0442   WBC 8.86   RBC 3.82*   HGB 11.2*   HCT 34.4*      MCV 90   MCH 29.3   MCHC 32.6     CMP:   Recent Labs   Lab 03/12/19  0442   GLU 97   CALCIUM 9.5   ALBUMIN 3.1*   PROT 6.8    *   K 3.9   CO2 14*      BUN 14   CREATININE 0.9   ALKPHOS 107   ALT 15   AST 12   BILITOT 0.9     All labs within the past 24 hours have been reviewed.

## 2019-03-12 NOTE — PLAN OF CARE
Problem: Adult Inpatient Plan of Care  Goal: Plan of Care Review  Outcome: Ongoing (interventions implemented as appropriate)  POC reviewed with pt and spouse at bedside, all questions and concerns addressed. VSS on RA. Tolerating regular diet with no complaints of NVD, only abd tenderness. Up ad maddison to toilet, two bms this shift. Ambulated in hallways once. PRN tylenol given once for pain. No adverse events. Will continue to monitor.

## 2019-03-12 NOTE — PROGRESS NOTES
[facility]  Nephrology  Progress Note    Patient Name: Ailyn Ortiz  MRN: [id number]  Admission Date: 3/6/2019  Hospital Length of Stay: 6 days  Attending Provider: Stone Mei MD   Primary Care Physician: Lewis Lloyd MD  Principal Problem:Partial small bowel obstruction    Subjective:     HPI: Mrs. Ailyn Ortiz is a 52 yo female with hx of DM type 2, GERD, HTN, IBS (not on any medications, seen by GI at [facility]), inflammatory arthritis (on methotrexate) who presents to the ER with colicky abdominal pain for the past 10 days. She states that is has progressively been getting worse. She continues to have diarrhea, last episode just an hour ago. No currant jelly stools or melena. No vomiting. Has had nausea and some belching today. She underwent CT abdomen/pelvis with oral contrast that showed partial small bowel obstruction and small bowel intussusception. No fever/chills, dysuria, CP, lower extremity swelling.      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 Mei.    Nephrology was consulted for concern for RTA as patient's bicarb abruptly decreased from 21 --14 --11. Patient has not been tachypnic but an ABG was performed which showed a pH of 7.12        Interval History:     NAEON. Patient asymptomatic from her acidemia which has improved from yesterday. No complaints other than abdominal discomfort which has been adequately controlled.     Review of patient's allergies indicates:   Allergen Reactions    Milk containing products      Congestion      Nuts [tree nut] Other (See Comments)     Nasal Congestion    Avelox [moxifloxacin] Rash     Current Facility-Administered Medications   Medication Frequency    acetaminophen tablet 650 mg Q8H PRN    atorvastatin tablet 20 mg Daily    diphenhydrAMINE injection 25 mg Q4H PRN    enoxaparin injection 40  mg Daily    hydrALAZINE injection 10 mg Q6H PRN    HYDROcodone-acetaminophen 5-325 mg per tablet 1 tablet Q4H PRN    hydrocortisone 1 % cream BID PRN    levothyroxine tablet 100 mcg Before breakfast    ondansetron injection 8 mg Q12H PRN    pantoprazole EC tablet 40 mg Daily    polyethylene glycol packet 17 g BID PRN    promethazine (PHENERGAN) 6.25 mg in dextrose 5 % 50 mL IVPB Q6H PRN    sodium chloride 0.9% flush 3 mL PRN       Objective:     Vital Signs (Most Recent):  Temp: 98.3 °F (36.8 °C) (03/12/19 0452)  Pulse: 88 (03/12/19 0452)  Resp: 18 (03/12/19 0452)  BP: 116/64 (03/12/19 0452)  SpO2: 97 % (03/12/19 0452)  O2 Device (Oxygen Therapy): room air (03/11/19 2000) Vital Signs (24h Range):  Temp:  [97.8 °F (36.6 °C)-99 °F (37.2 °C)] 98.3 °F (36.8 °C)  Pulse:  [88-98] 88  Resp:  [18-20] 18  SpO2:  [97 %-98 %] 97 %  BP: (102-122)/(50-82) 116/64     Weight: 100.7 kg (222 lb) (03/08/19 1301)  Body mass index is 40.6 kg/m².  Body surface area is 2.1 meters squared.    I/O last 3 completed shifts:  In: 979 [P.O.:600; I.V.:379]  Out: 400 [Urine:400]    Physical Exam    Significant Labs:  CBC:   Recent Labs   Lab 03/12/19 0442   WBC 8.86   RBC 3.82*   HGB 11.2*   HCT 34.4*      MCV 90   MCH 29.3   MCHC 32.6     CMP:   Recent Labs   Lab 03/12/19 0442   GLU 97   CALCIUM 9.5   ALBUMIN 3.1*   PROT 6.8   *   K 3.9   CO2 14*      BUN 14   CREATININE 0.9   ALKPHOS 107   ALT 15   AST 12   BILITOT 0.9     All labs within the past 24 hours have been reviewed.       Assessment/Plan:     Acidosis    Patient is a 52 yo female presenting with acute onset acidosis. Ddx include RTA vs ketosis vs medication induced vs hypovolemia vs ischemia. Patient with an acute drop in bicarb from 21 --14 --11. No tachypnea on exam. No medication hx of salicylate use. Patient did have 3+ ketones and 3+ glucose in her urine however glucose adequately controlled in the hospital. No new medications introduced inpatient.  Lactate checked on 3/11 WNL.     PLAN:   - Urine lytes ordered. Urine gap with normal potassium making RTA unlikely.   - Patient reports she has not eaten in over 10 days and has not tolerated her clear liquid diet.   - Urine Osm and Urea mildly elevated suggestive of hypovolemia which is consistent with her poor PO intake.   - 3+ ketones in urine, with hx of poor PO intake and urine studies consistent with hypovolemia. Patient may have a component of ketoacidosis. Encouraged patient to try to increase PO intake.   - elevated WBC yesterday which returned normal today may be 2/2 hemoconcentration.   - Agree with Na/K phos pack supplements as hypophosphatemia can cause ketosis.   - Continue to monitor bicarb levels.        Thank you for your consult. I will follow-up with patient. Please contact us if you have any additional questions.    Tony Cardona MD  Nephrology  [facility]

## 2019-03-13 VITALS
SYSTOLIC BLOOD PRESSURE: 137 MMHG | RESPIRATION RATE: 14 BRPM | DIASTOLIC BLOOD PRESSURE: 83 MMHG | HEIGHT: 62 IN | WEIGHT: 222 LBS | BODY MASS INDEX: 40.85 KG/M2 | TEMPERATURE: 98 F | OXYGEN SATURATION: 96 % | HEART RATE: 89 BPM

## 2019-03-13 LAB
ALBUMIN SERPL BCP-MCNC: 3 G/DL
ALP SERPL-CCNC: 96 U/L
ALT SERPL W/O P-5'-P-CCNC: 14 U/L
ANION GAP SERPL CALC-SCNC: 13 MMOL/L
AST SERPL-CCNC: 11 U/L
BASOPHILS # BLD AUTO: 0.06 K/UL
BASOPHILS NFR BLD: 0.7 %
BILIRUB SERPL-MCNC: 0.8 MG/DL
BUN SERPL-MCNC: 14 MG/DL
CALCIUM SERPL-MCNC: 9.4 MG/DL
CHLORIDE SERPL-SCNC: 106 MMOL/L
CO2 SERPL-SCNC: 17 MMOL/L
CREAT SERPL-MCNC: 0.8 MG/DL
DIFFERENTIAL METHOD: ABNORMAL
EOSINOPHIL # BLD AUTO: 0.4 K/UL
EOSINOPHIL NFR BLD: 4.6 %
ERYTHROCYTE [DISTWIDTH] IN BLOOD BY AUTOMATED COUNT: 13.3 %
EST. GFR  (AFRICAN AMERICAN): >60 ML/MIN/1.73 M^2
EST. GFR  (NON AFRICAN AMERICAN): >60 ML/MIN/1.73 M^2
GLUCOSE SERPL-MCNC: 123 MG/DL
HCT VFR BLD AUTO: 36.4 %
HGB BLD-MCNC: 11.8 G/DL
IMM GRANULOCYTES # BLD AUTO: 0.09 K/UL
IMM GRANULOCYTES NFR BLD AUTO: 1.1 %
LYMPHOCYTES # BLD AUTO: 0.7 K/UL
LYMPHOCYTES NFR BLD: 8.3 %
MAGNESIUM SERPL-MCNC: 2.1 MG/DL
MCH RBC QN AUTO: 29 PG
MCHC RBC AUTO-ENTMCNC: 32.4 G/DL
MCV RBC AUTO: 89 FL
MONOCYTES # BLD AUTO: 1 K/UL
MONOCYTES NFR BLD: 11.7 %
NEUTROPHILS # BLD AUTO: 6 K/UL
NEUTROPHILS NFR BLD: 73.6 %
NRBC BLD-RTO: 0 /100 WBC
PHOSPHATE SERPL-MCNC: 2.8 MG/DL
PLATELET # BLD AUTO: 340 K/UL
PMV BLD AUTO: 8.9 FL
POTASSIUM SERPL-SCNC: 3.7 MMOL/L
PROT SERPL-MCNC: 6.7 G/DL
RBC # BLD AUTO: 4.07 M/UL
SODIUM SERPL-SCNC: 136 MMOL/L
WBC # BLD AUTO: 8.12 K/UL

## 2019-03-13 PROCEDURE — 25000003 PHARM REV CODE 250: Performed by: SURGERY

## 2019-03-13 PROCEDURE — 90686 IIV4 VACC NO PRSV 0.5 ML IM: CPT | Performed by: SURGERY

## 2019-03-13 PROCEDURE — 36415 COLL VENOUS BLD VENIPUNCTURE: CPT

## 2019-03-13 PROCEDURE — 80053 COMPREHEN METABOLIC PANEL: CPT

## 2019-03-13 PROCEDURE — 25000003 PHARM REV CODE 250: Performed by: STUDENT IN AN ORGANIZED HEALTH CARE EDUCATION/TRAINING PROGRAM

## 2019-03-13 PROCEDURE — 63600175 PHARM REV CODE 636 W HCPCS: Performed by: SURGERY

## 2019-03-13 PROCEDURE — 85025 COMPLETE CBC W/AUTO DIFF WBC: CPT

## 2019-03-13 PROCEDURE — 83735 ASSAY OF MAGNESIUM: CPT

## 2019-03-13 PROCEDURE — 84100 ASSAY OF PHOSPHORUS: CPT

## 2019-03-13 PROCEDURE — 90471 IMMUNIZATION ADMIN: CPT | Performed by: SURGERY

## 2019-03-13 RX ORDER — HYDROCODONE BITARTRATE AND ACETAMINOPHEN 5; 325 MG/1; MG/1
1 TABLET ORAL EVERY 6 HOURS PRN
Qty: 21 TABLET | Refills: 0 | Status: SHIPPED | OUTPATIENT
Start: 2019-03-13 | End: 2019-04-10

## 2019-03-13 RX ORDER — HYDROCODONE BITARTRATE AND ACETAMINOPHEN 5; 325 MG/1; MG/1
1 TABLET ORAL EVERY 6 HOURS PRN
Qty: 21 TABLET | Refills: 0 | Status: SHIPPED | OUTPATIENT
Start: 2019-03-13 | End: 2019-04-10 | Stop reason: SDUPTHER

## 2019-03-13 RX ORDER — POLYETHYLENE GLYCOL 3350 17 G/17G
17 POWDER, FOR SOLUTION ORAL DAILY
Qty: 238 G | Refills: 0 | Status: SHIPPED | OUTPATIENT
Start: 2019-03-13 | End: 2019-04-10 | Stop reason: CLARIF

## 2019-03-13 RX ADMIN — PANTOPRAZOLE SODIUM 40 MG: 40 TABLET, DELAYED RELEASE ORAL at 09:03

## 2019-03-13 RX ADMIN — INFLUENZA A VIRUS A/MICHIGAN/45/2015 X-275 (H1N1) ANTIGEN (FORMALDEHYDE INACTIVATED), INFLUENZA A VIRUS A/SINGAPORE/INFIMH-16-0019/2016 IVR-186 (H3N2) ANTIGEN (FORMALDEHYDE INACTIVATED), INFLUENZA B VIRUS B/PHUKET/3073/2013 ANTIGEN (FORMALDEHYDE INACTIVATED), AND INFLUENZA B VIRUS B/MARYLAND/15/2016 BX-69A ANTIGEN (FORMALDEHYDE INACTIVATED) 0.5 ML: 15; 15; 15; 15 INJECTION, SUSPENSION INTRAMUSCULAR at 10:03

## 2019-03-13 RX ADMIN — LEVOTHYROXINE SODIUM 100 MCG: 100 TABLET ORAL at 06:03

## 2019-03-13 RX ADMIN — ACETAMINOPHEN 650 MG: 325 TABLET ORAL at 12:03

## 2019-03-13 RX ADMIN — ATORVASTATIN CALCIUM 20 MG: 20 TABLET, FILM COATED ORAL at 09:03

## 2019-03-13 NOTE — NURSING
Discharge POC reviewed with pt and  at bedside, all questions and concerns addressed. Pt verbalized understanding of medication regimen and follow up appointments. Flu shot given. VSS at time of discharge. Pt waiting in room for transport.

## 2019-03-13 NOTE — DISCHARGE SUMMARY
[facility]  General Surgery  Discharge Summary      Patient Name: Ailyn Ortiz  MRN: [id number]  Admission Date: 3/6/2019  Hospital Length of Stay: 7 days  Discharge Date and Time: 19  Attending Physician: Stone Mei MD   Discharging Provider: Stone Saab MD  Primary Care Provider: Lewis Lloyd MD     HPI:     52 yo female with hx of DM type 2, GERD, HTN, IBS (not on any medications, seen by GI at [facility]), inflammatory arthritis (on methotrexate) who presents to the ER with colicky abdominal pain for the past 10 days. She states that is has progressively been getting worse. She continues to have diarrhea, last episode just an hour ago. No currant jelly stools or melena. No vomiting. Has had nausea and some belching today. She underwent CT abdomen/pelvis with oral contrast that showed partial small bowel obstruction and small bowel intussusception. No fever/chills, dysuria, CP, lower extremity swelling.      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 Mei.     Last colonoscopy was in 2017. No polyps. Biopsies were negative for IBD.      Her other abdominal surgeries include  - with lower midline scar.      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 She denies any previous MI, CVA, DVT/PE.        Procedure(s) (LRB):  LAPAROSCOPY, DIAGNOSTIC (N/A)  EXCISION, SMALL INTESTINE, LAPAROSCOPIC     Hospital Course: Patient was admitted and diagnosed with small bowel obstruction secondary to intussusception. She was taken to the OR where small bowel resection was performed. Post operatively nephrology consulted due to metabolic acidosis. Acidosis resolved and she was discharged after meeting all milestones.    Physical Exam:  Gen: NAD   HEENT: NCAT  CV: RRR, no m/r/g   Pulm: CAB, unlabored, symmetrical   Abd: Soft, attp.  No rebound, guarding. Incisions c/d/i  Extremities: no cyanosis or edema, or clubbing  Skin: Skin color, texture, turgor normal. No rashes or lesions        Consults:   Consults (From admission, onward)        Status Ordering Provider     Inpatient consult to General surgery  Once     Provider:  (Not yet assigned)    Completed TOMMY DUTTON     Inpatient consult to Nephrology  Once     Provider:  (Not yet assigned)    Completed REGINA GREER          Significant Diagnostic Studies:   Specimen (12h ago, onward)    None          Pending Diagnostic Studies:     None        Final Active Diagnoses:    Diagnosis Date Noted POA    PRINCIPAL PROBLEM:  Partial small bowel obstruction [K56.600] 03/06/2019 Yes    Acidosis [E87.2] 03/11/2019 Unknown    Small bowel mass [K63.89] 03/08/2019 Yes      Problems Resolved During this Admission:      Discharged Condition: good    Disposition: Home or Self Care    Follow Up:  Follow-up Information     Stone Mei MD In 2 weeks.    Specialties:  General Surgery, Bariatrics  Why:  post op  Contact information:  [address] [place] [id number]  [phone]                 Patient Instructions:      Diet Adult Regular     Lifting restrictions   Order Comments: May not lift more than 10 lbs for 4 weeks from day of surgery.  No pushing/pulling such as vacuuming or raking.  No straining, avoid constipation, and take stool softeners as described and laxatives as needed.  No driving while on narcotics and until you can react quickly without pain.     Notify your health care provider if you experience any of the following:  temperature >100.4     Notify your health care provider if you experience any of the following:  persistent nausea and vomiting or diarrhea     Notify your health care provider if you experience any of the following:  severe uncontrolled pain     Notify your health care provider if you experience any of the following:  redness,  tenderness, or signs of infection (pain, swelling, redness, odor or green/yellow discharge around incision site)     Notify your health care provider if you experience any of the following:  difficulty breathing or increased cough     Notify your health care provider if you experience any of the following:  severe persistent headache     Notify your health care provider if you experience any of the following:  worsening rash     Notify your health care provider if you experience any of the following:  persistent dizziness, light-headedness, or visual disturbances     Notify your health care provider if you experience any of the following:  increased confusion or weakness     No dressing needed   Order Comments: Apply sterile gauze and tape daily as needed for leakage. OK to shower. Keep area clean and dry otherwise. Use dial soap. Do not soak in a tub or swim in a pool until cleared in clinic.     Activity as tolerated     Medications:  Reconciled Home Medications:      Medication List      START taking these medications    polyethylene glycol 17 gram/dose powder  Commonly known as:  GLYCOLAX  Take 17 g by mouth once daily.        CHANGE how you take these medications    * HYDROcodone-acetaminophen 5-325 mg per tablet  Commonly known as:  NORCO  Take 1 tablet by mouth every 6 (six) hours as needed for Pain.  What changed:  Another medication with the same name was added. Make sure you understand how and when to take each.     * HYDROcodone-acetaminophen 5-325 mg per tablet  Commonly known as:  NORCO  Take 1 tablet by mouth every 6 (six) hours as needed for Pain (Do not drive while taking this medication. Take miralax while taking this medication.).  What changed:  You were already taking a medication with the same name, and this prescription was added. Make sure you understand how and when to take each.         * This list has 2 medication(s) that are the same as other medications prescribed for you. Read the  directions carefully, and ask your doctor or other care provider to review them with you.            CONTINUE taking these medications    atorvastatin 20 MG tablet  Commonly known as:  LIPITOR  Take 20 mg by mouth once daily.     cholecalciferol (vitamin D3) 50,000 unit capsule  Take 50,000 Units by mouth once a week.     fluticasone 50 mcg/actuation nasal spray  Commonly known as:  FLONASE  1 spray by Each Nare route once daily.     folic acid 1 MG tablet  Commonly known as:  FOLVITE  Take 1 tablet (1 mg total) by mouth 3 (three) times daily.     INVOKANA 300 mg Tab tablet  Generic drug:  canagliflozin  Take 300 mg by mouth once daily.     levothyroxine 100 MCG tablet  Commonly known as:  SYNTHROID  Take 100 mcg by mouth before breakfast.     loratadine 10 mg tablet  Commonly known as:  CLARITIN  Take 10 mg by mouth once daily.     losartan-hydrochlorothiazide 100-12.5 mg 100-12.5 mg Tab  Commonly known as:  HYZAAR  Take 1 tablet by mouth once daily.     methotrexate 2.5 MG Tab  TAKE 8 TABLETS BY MOUTH WEEKLY     omeprazole 40 MG capsule  Commonly known as:  PRILOSEC  Take 40 mg by mouth every morning.     promethazine 12.5 MG Tab  Commonly known as:  PHENERGAN  Take 1 tablet (12.5 mg total) by mouth every 4 (four) hours as needed.     TRULICITY 1.5 mg/0.5 mL Pnij  Generic drug:  dulaglutide  Inject into the skin once a week.     VITAMIN D2 50,000 unit Cap  Generic drug:  ergocalciferol  TAKE 1 CAPSULE (50,000 UNITS TOTAL) BY MOUTH EVERY 7 DAYS.        STOP taking these medications    AUGMENTIN ORAL            Stone Saab MD  General Surgery  [facility]

## 2019-03-13 NOTE — PLAN OF CARE
POC reviewed w/ pt, verbalized understanding. Pt AAOx4. VSS on RA. Pain managed with PRN tylenol. Pt ambulates to bathroom independently. Pt tolerating regular diet with no c/o nausea. Pt slept between care. Frequent rounds made for pt safety. Call light in reach and bed in lowest position. WCTM

## 2019-03-15 ENCOUNTER — PATIENT MESSAGE (OUTPATIENT)
Dept: SURGERY | Facility: CLINIC | Age: 54
End: 2019-03-15

## 2019-03-15 ENCOUNTER — OFFICE VISIT (OUTPATIENT)
Dept: RHEUMATOLOGY | Facility: CLINIC | Age: 54
End: 2019-03-15
Payer: COMMERCIAL

## 2019-03-15 VITALS
BODY MASS INDEX: 39.36 KG/M2 | HEART RATE: 107 BPM | SYSTOLIC BLOOD PRESSURE: 149 MMHG | DIASTOLIC BLOOD PRESSURE: 81 MMHG | WEIGHT: 213.88 LBS | HEIGHT: 62 IN

## 2019-03-15 DIAGNOSIS — D17.9 LIPOMA, UNSPECIFIED SITE: ICD-10-CM

## 2019-03-15 DIAGNOSIS — M19.90 INFLAMMATORY ARTHRITIS: Primary | ICD-10-CM

## 2019-03-15 PROCEDURE — 99214 PR OFFICE/OUTPT VISIT, EST, LEVL IV, 30-39 MIN: ICD-10-PCS | Mod: S$GLB,,, | Performed by: INTERNAL MEDICINE

## 2019-03-15 PROCEDURE — 99999 PR PBB SHADOW E&M-EST. PATIENT-LVL III: CPT | Mod: PBBFAC,,, | Performed by: INTERNAL MEDICINE

## 2019-03-15 PROCEDURE — 3079F PR MOST RECENT DIASTOLIC BLOOD PRESSURE 80-89 MM HG: ICD-10-PCS | Mod: CPTII,S$GLB,, | Performed by: INTERNAL MEDICINE

## 2019-03-15 PROCEDURE — 99214 OFFICE O/P EST MOD 30 MIN: CPT | Mod: S$GLB,,, | Performed by: INTERNAL MEDICINE

## 2019-03-15 PROCEDURE — 3077F SYST BP >= 140 MM HG: CPT | Mod: CPTII,S$GLB,, | Performed by: INTERNAL MEDICINE

## 2019-03-15 PROCEDURE — 3079F DIAST BP 80-89 MM HG: CPT | Mod: CPTII,S$GLB,, | Performed by: INTERNAL MEDICINE

## 2019-03-15 PROCEDURE — 3008F BODY MASS INDEX DOCD: CPT | Mod: CPTII,S$GLB,, | Performed by: INTERNAL MEDICINE

## 2019-03-15 PROCEDURE — 3008F PR BODY MASS INDEX (BMI) DOCUMENTED: ICD-10-PCS | Mod: CPTII,S$GLB,, | Performed by: INTERNAL MEDICINE

## 2019-03-15 PROCEDURE — 3077F PR MOST RECENT SYSTOLIC BLOOD PRESSURE >= 140 MM HG: ICD-10-PCS | Mod: CPTII,S$GLB,, | Performed by: INTERNAL MEDICINE

## 2019-03-15 PROCEDURE — 99999 PR PBB SHADOW E&M-EST. PATIENT-LVL III: ICD-10-PCS | Mod: PBBFAC,,, | Performed by: INTERNAL MEDICINE

## 2019-03-15 RX ORDER — METHOTREXATE 2.5 MG/1
TABLET ORAL
Qty: 120 TABLET | Refills: 3 | Status: SHIPPED | OUTPATIENT
Start: 2019-03-15 | End: 2019-12-10

## 2019-03-15 RX ORDER — CLINDAMYCIN HYDROCHLORIDE 300 MG/1
300 CAPSULE ORAL 3 TIMES DAILY
Qty: 21 CAPSULE | Refills: 0 | Status: SHIPPED | OUTPATIENT
Start: 2019-03-15 | End: 2019-03-22

## 2019-03-15 RX ORDER — FOLIC ACID 1 MG/1
1 TABLET ORAL 3 TIMES DAILY
Qty: 270 TABLET | Refills: 2 | Status: SHIPPED | OUTPATIENT
Start: 2019-03-15 | End: 2019-12-10

## 2019-03-15 ASSESSMENT — ROUTINE ASSESSMENT OF PATIENT INDEX DATA (RAPID3)
PATIENT GLOBAL ASSESSMENT SCORE: 10
TOTAL RAPID3 SCORE: 7.67
PSYCHOLOGICAL DISTRESS SCORE: 1.1
MDHAQ FUNCTION SCORE: .9
PAIN SCORE: 10
FATIGUE SCORE: 10
AM STIFFNESS SCORE: 1, YES

## 2019-03-15 NOTE — PROGRESS NOTES
Chief Complaint   Patient presents with    Follow-up     rheumatoid arthritis follow up       Patient is here for a follow up    History of presenting illness    53 year old female with seronegative rheumatoid arthritis for a follow up    Has been on mtx 8 tabs weekly/folic acid 1 mg daily   Off prednisone    Joints are doing really well  Thumb aches some times  She has triggering fingers and she has had injection    Recent small bowel obstruction due to lipomas  Some drainage at the site      She has a previous diagnosis of uveitis  Last eye exam looked fine,dec 2018    Recent labs     H/H 11.8/36.4  nml white and plt count  nml CMP    Right wrist MRI : mass like area of abnormal signal and enhancement within dorsal subcutaneous fat of the wrist : inflammatory process vs cellulitis vs neoplasm/desmoid tumor  Sent her to ortho : she wants to do EMG/NCS first for CTS and both surgeries will be done at the same time    Dealing with some GI issues will get EGD soon    TAQUERIA negative  RF,CCP negative  Hepatitis b and c negative  HLAB27 negative    CRP 96.9  ESR 92    SPEP no M spike    Uric acid 2.2    Vit d 8,went upto 26  TSH normal    Ck,aldolase normal    Xrays    Hands degenerative changes  Ankles degenerative changes and bone spur  Knees degenerative changes  Feet : degenerative changes     ID clearance done  Did hepatitis A and B vaccine  Waiting for shingrix vaccine    53 year old white female presented with the following symptoms :    December around Christmas she had the upper respiratory tract symptoms with myalgia and nasal congestion  Following this she started to experience severe joint pain  All her joints in the hands,elbows,knees,feet started to hurt and swell  EMS for 1 hour  Alleve helps minimally    Hands : thumbs and index fingers and the 3rd digits hurt the worse  She cant make a fist  Thinks that the left 2nd finger swells like a sausage  Swelling along the tendons as per her PCP,she says he  thought she has tenosynovitis     Hips and shoulders ache but no stiffness,good ROM in them    Cold weather makes her symptoms worse     Claims to have had labs : she had ESR,CRP elevation apparently     Past history: asthma,diabetes,JESSICA,thyroid disease,HTN  Occasional tendinitis feet b/l    Has a rash every winter,dermatology thinks it is eczema  This time started in November on the elbows,back,torso,legs,didnt get better,but derm suggested stronger steroids  Has not been told to have psoriasis     Recently had small bowel resection for lipoma    Family history : psoriasis and psoriatic arthritis runs in the family     Social history : quit smoking a while ago,drinks occasionally     Review of Systems   Constitutional: Negative for activity change, appetite change, chills, diaphoresis, fatigue, fever and unexpected weight change.   HENT: Negative for congestion, dental problem, drooling, ear discharge, ear pain, facial swelling, hearing loss, mouth sores, nosebleeds, postnasal drip, rhinorrhea, sinus pressure, sinus pain, sneezing, sore throat, tinnitus, trouble swallowing and voice change.    Eyes: Negative for photophobia, pain, discharge, redness, itching and visual disturbance.   Respiratory: Negative for apnea, cough, choking, chest tightness, shortness of breath, wheezing and stridor.    Cardiovascular: Negative for chest pain, palpitations and leg swelling.   Gastrointestinal: Negative for abdominal distention, abdominal pain, anal bleeding, blood in stool, constipation, diarrhea, nausea, rectal pain and vomiting.   Endocrine: Negative for cold intolerance, heat intolerance, polydipsia, polyphagia and polyuria.   Genitourinary: Negative for decreased urine volume, difficulty urinating, dysuria, enuresis, flank pain, frequency, genital sores, hematuria and urgency.   Musculoskeletal: Negative for back pain, gait problem, myalgias, neck pain and neck stiffness.   Skin: Negative for color change, pallor, rash  and wound.   Allergic/Immunologic: Negative for environmental allergies, food allergies and immunocompromised state.   Neurological: Negative for dizziness, tremors, seizures, syncope, facial asymmetry, speech difficulty, weakness, light-headedness, numbness and headaches.   Hematological: Negative for adenopathy. Does not bruise/bleed easily.   Psychiatric/Behavioral: Negative for agitation, behavioral problems, confusion, decreased concentration, dysphoric mood, hallucinations, self-injury, sleep disturbance and suicidal ideas. The patient is not nervous/anxious and is not hyperactive.        Physical Exam   Right wrist has a huge swelling which has clear borders,harder than soft     Left thumb triggering,tender flexor tendon noted     Physical Exam   Constitutional: She is oriented to person, place, and time and well-developed, well-nourished, and in no distress. No distress.   HENT:   Head: Normocephalic.   Mouth/Throat: Oropharynx is clear and moist.   Eyes: Conjunctivae are normal. Pupils are equal, round, and reactive to light. Right eye exhibits no discharge. Left eye exhibits no discharge. No scleral icterus.   Neck: Normal range of motion. No thyromegaly present.   Cardiovascular: Normal rate, regular rhythm, normal heart sounds and intact distal pulses.    Pulmonary/Chest: Effort normal and breath sounds normal. No stridor.   Abdominal: Soft. Bowel sounds are normal.   Lymphadenopathy:     She has no cervical adenopathy.   Neurological: She is alert and oriented to person, place, and time.   Skin: Skin is warm. No rash noted. She is not diaphoretic.     Psychiatric: Affect and judgment normal.   Musculoskeletal: She exhibits tenderness.         Assessment     53 year old white female presented to me with 1 month of polyarthralgias in the hands,elbows,knees and feet : s/p URT symptoms during [place] : is also s/p bowel resection for a lipoma : reported swelling in addition to pain and stiffness in her  joints : significant EMS : symmetric involvement : in addition reported to having dactylitis   On exam : synovitis noted in many joints,no evidence of dactylitis.    She has inflammatory arthritis,seronegative,unclear yet if she has psoriatic arthritis since the rash might be eczema vs psoriasis  Reactive arthritis possible too    Labs revealed very high inflammatory markers with negative RA and TAQUERIA and HLAB27 panel  Xrays with degenerative arthritis     She did well on prednisone  I then introduced mtx 5 tabs weekly and tapered her prednisone   Then titrated mtx    She has a diagnosis of uveitis   But last eye exam normal    On mtx 8 tabs weekly/folic acid 1 mg daily     New small bowel obstruction  Due to lipomas   She has recurrent lipomas this is the second SBO due to lipomas   Current biopsy pending but jan 2018 lipoma     Surgical site drainage+    1. Inflammatory arthritis    2. Lipoma, unspecified site          Plan    Stop mtx temporarily    Antibiotics    Then resume mtx    Derm eval : why does she get recurrent lipomas? whats the management plan?    ID follow ups for the hepatitis and shingrix vaccines    Vit d maintanence    Right wrist f/u as per ortho    Ailyn was seen today for follow-up.    Diagnoses and all orders for this visit:    Inflammatory arthritis  -     CBC auto differential; Future  -     Comprehensive metabolic panel; Future  -     Sedimentation rate; Future  -     C-reactive protein; Standing    Lipoma, unspecified site  -     Ambulatory consult to Dermatology    Other orders  -     clindamycin (CLEOCIN) 300 MG capsule; Take 1 capsule (300 mg total) by mouth 3 (three) times daily. for 7 days  -     folic acid (FOLVITE) 1 MG tablet; Take 1 tablet (1 mg total) by mouth 3 (three) times daily.  -     methotrexate 2.5 MG Tab; TAKE 8 TABLETS BY MOUTH WEEKLY        rtc in 3 months  Refill requests updated

## 2019-03-18 ENCOUNTER — PATIENT MESSAGE (OUTPATIENT)
Dept: SURGERY | Facility: CLINIC | Age: 54
End: 2019-03-18

## 2019-03-19 ENCOUNTER — CLINICAL SUPPORT (OUTPATIENT)
Dept: REHABILITATION | Facility: HOSPITAL | Age: 54
End: 2019-03-19
Attending: PHYSICIAN ASSISTANT
Payer: COMMERCIAL

## 2019-03-19 ENCOUNTER — OFFICE VISIT (OUTPATIENT)
Dept: SURGERY | Facility: CLINIC | Age: 54
End: 2019-03-19
Payer: COMMERCIAL

## 2019-03-19 VITALS
SYSTOLIC BLOOD PRESSURE: 126 MMHG | HEART RATE: 89 BPM | HEIGHT: 62 IN | DIASTOLIC BLOOD PRESSURE: 68 MMHG | BODY MASS INDEX: 37.91 KG/M2 | WEIGHT: 206 LBS

## 2019-03-19 DIAGNOSIS — Z09 POSTOP CHECK: Primary | ICD-10-CM

## 2019-03-19 DIAGNOSIS — M65.312 TRIGGER FINGER OF LEFT THUMB: Primary | ICD-10-CM

## 2019-03-19 PROCEDURE — 97035 APP MDLTY 1+ULTRASOUND EA 15: CPT

## 2019-03-19 PROCEDURE — 99999 PR PBB SHADOW E&M-EST. PATIENT-LVL III: ICD-10-PCS | Mod: PBBFAC,,, | Performed by: SURGERY

## 2019-03-19 PROCEDURE — 99024 PR POST-OP FOLLOW-UP VISIT: ICD-10-PCS | Mod: S$GLB,,, | Performed by: SURGERY

## 2019-03-19 PROCEDURE — 99024 POSTOP FOLLOW-UP VISIT: CPT | Mod: S$GLB,,, | Performed by: SURGERY

## 2019-03-19 PROCEDURE — 99999 PR PBB SHADOW E&M-EST. PATIENT-LVL III: CPT | Mod: PBBFAC,,, | Performed by: SURGERY

## 2019-03-19 PROCEDURE — 97110 THERAPEUTIC EXERCISES: CPT

## 2019-03-19 NOTE — PROGRESS NOTES
Occupational Therapy Daily Treatment Note   Date 3/19/2019    Name: Ailyn Ortiz  Clinic Number: [id number]  Name: Ailyn Ortiz  Clinic Number: [id number]     Therapy Diagnosis:        Encounter Diagnosis   Name Primary?    History of carpal tunnel release        Physician: Lianet Castillo PA     Physician Orders: eval and treat   Medical Diagnosis: M65.312 trigger finger of left thumb                                       G56.03 bilateral carpal tunnel release     Surgical Procedure/ Date :12/26/18 right CTR  Evaluation Date: 2/18/2019  Insurance:[employer]  Insurance Authorization period Expiration: 12/31/19  Plan of Care Certification Period: 5/30/19     Visit # / Visits Authortized: 1 / 60  Time In:2:00pm  Time Out: 3:00pm  Total Billable Time: 60 minutes     Precautions: Diabetes         Subjective    pt reports that she had to miss a week of therapy, she had GI issues which had to address and she lost her thumb splint and needs a new one.        Pt reports: she was compliant with home exercise program given last session.  She reports that her thumb felt good yesterday , today she comes to therapy and is in a little more pain .     Response to previous treatment:slightly omproved   Functional change: has slightly more motion in hand     Pain: 4/ 10  Location: bilateral fingers      Objective     Ailyn received the following supervised modalities after being cleared for contradictions for 15 minutes:   -fluido   direct contact modalities X 6 minutes 1.0w/cm2 to right CTR scar and 6 minutes to left volar thumb at Mp joint    fabricated new trigger finger splint for left thumb     Ailyn received the following manual therapy techniques for 10 minutes:   -PROM left thumb ROM       Ailyn received therapeutic exercises for 18 minutes including:  -AROM wrist right  motion   wrist flexion 1#,   wrist extension 1#  Hand gripper (R) 2 yellow rubberbands     Fabricated new left  trigger finger thumb splint , to be worn during the day     Post exercise 10 minutes MH , bilateral hands       Home Exercises and Education Provided     Education provided:     -  - Progress towards goals     Written Home Exercises Provided: Patient instructed to cont prior HEP.  Exercises were reviewed and Ailyn was able to demonstrate them prior to the end of the session.  Ailyn demonstrated fair  understanding of the education provided.   .   See EMR under Patient Instructions for exercises provided prior visit.       Assessment     Pt would continue to benefit from skilled OT. Yes     Ailyn is progressing well towards her goals and there are no updates to goals at this time. Pt prognosis is Good.     Pt will continue to benefit from skilled outpatient occupational therapy to address the deficits listed in the problem list on initial evaluation provide pt/family education and to maximize pt's level of independence in the home and community environment.     Anticipated barriers to occupational therapy: none     Pt's spiritual, cultural and educational needs considered and pt agreeable to plan of care and goals.          GOALS: 6 weeks. Pt agrees with goals set.        Short Term (4 weeks on 3/18/19 ):  1)   Patient to be IND with HEP and modalities for pain management, progressing   2)   Increase ROM 10 degrees  For  Left thumb Mp flexion and Ip flexion   motion to increase functional hand use for left right hand  , progressing   3)   Increase  strength by  5 lbs. to grasp bilateral hand , progressing   4. Decrease sensnsitivty of right wrist scar      Long Term (by discharge):  1)   Pt will report 0 out of 10 pain with right and left hands  ., progressing   2)   Patient to score of CJ on FOTO to demonstrate improved perception of functionalbilateral hand/ arm  Use. Progressing   3)   Pt will return to prior level of function for ADLs and household management, progressing                  Plan    Certification Period/Plan of care expiration: 2/18/2019 to 4/30/19 .     Outpatient Occupational Therapy 2 times weekly for 5 weeks may include the following interventions: Paraffin, Modalities for pain management, US 3 mhz, Therapeutic exercises/activities. and Strengthening.        Discussed Plan of Care with patient: Yes  Updates/Grading for next session: continue       Sujata Villanueva, OT

## 2019-03-19 NOTE — LETTER
[facility] General [facility]  [address]  [place] [address]  Phone: [phone] March 19, 2019      Lewis Lloyd MD  [address]  [place]  [place] [address]    Patient: Ailyn Ortiz   MR Number: [id number]   YOB: 1965   Date of Visit: 3/19/2019     Dear Dr. Lloyd:    Thank you for referring Ailyn Ortiz to me for evaluation. Attached you will find relevant portions of my assessment and plan of care.    Ms. Ortiz is doing well after surgery, except for a wound infection at the navel.  The wound is to be packed once a day.      The patient is to return to the clinic in one week and complete course of antibiotics.    If you have questions, please do not hesitate to call me. I look forward to following Ailyn Ortiz along with you.    Sincerely,      Stone Mei MD  Professor, [employer]  Section Head, General Surgery  [facility]    WSR/arlethw

## 2019-03-19 NOTE — PROGRESS NOTES
"Ailyn Ortiz is a 53 y.o. female patient.   1. Postop check      Past Medical History:   Diagnosis Date    Asthma     Diabetes mellitus, type 2 since the age of 42    Hypertension since the age of 42    JESSICA (obstructive sleep apnea)     S/P UPP    Thyroid disease      No past surgical history pertinent negatives on file.  Scheduled Meds:   lidocaine HCL 10 mg/ml (1%)  0.1 mL Other 1 time in Clinic/HOD    triamcinolone acetonide  12 mg Intramuscular 1 time in Clinic/HOD     Continuous Infusions:  PRN Meds:    Review of patient's allergies indicates:   Allergen Reactions    Nuts [tree nut] Other (See Comments)     Nasal Congestion    Avelox [moxifloxacin] Rash     There are no hospital problems to display for this patient.    Blood pressure 126/68, pulse 89, height 5' 2" (1.575 m), weight 93.4 kg (206 lb).    Subjective S/p small bowel resection for intussusception 3/8/19.  She is complaining of drainage from one of the wounds improved since she was started on antibiotics.  She has no trouble eating soft foods, is off pain medications and bowel habits are ok.  Objective Umbilical wound about 4 cm deep and 0.5 cm wide with pus.  Other wounds clear and abdomen benign.   Assessment & Plan Doing well after surgery except for a wound infection at the navel.  Pack once a day.  Rtc one week and complete course of antibiotics.       Stone Mei MD  3/19/2019  "

## 2019-03-22 ENCOUNTER — PATIENT MESSAGE (OUTPATIENT)
Dept: SURGERY | Facility: CLINIC | Age: 54
End: 2019-03-22

## 2019-03-26 ENCOUNTER — CLINICAL SUPPORT (OUTPATIENT)
Dept: REHABILITATION | Facility: HOSPITAL | Age: 54
End: 2019-03-26
Attending: PHYSICIAN ASSISTANT
Payer: COMMERCIAL

## 2019-03-26 ENCOUNTER — INITIAL CONSULT (OUTPATIENT)
Dept: DERMATOLOGY | Facility: CLINIC | Age: 54
End: 2019-03-26
Payer: COMMERCIAL

## 2019-03-26 ENCOUNTER — OFFICE VISIT (OUTPATIENT)
Dept: SURGERY | Facility: CLINIC | Age: 54
End: 2019-03-26
Payer: COMMERCIAL

## 2019-03-26 ENCOUNTER — TELEPHONE (OUTPATIENT)
Dept: DERMATOLOGY | Facility: CLINIC | Age: 54
End: 2019-03-26

## 2019-03-26 VITALS
BODY MASS INDEX: 37.91 KG/M2 | WEIGHT: 206 LBS | HEIGHT: 62 IN | SYSTOLIC BLOOD PRESSURE: 155 MMHG | HEART RATE: 84 BPM | DIASTOLIC BLOOD PRESSURE: 73 MMHG

## 2019-03-26 VITALS — BODY MASS INDEX: 37.68 KG/M2 | WEIGHT: 206 LBS

## 2019-03-26 DIAGNOSIS — Z09 POSTOP CHECK: Primary | ICD-10-CM

## 2019-03-26 DIAGNOSIS — L30.9 ECZEMA, UNSPECIFIED TYPE: Primary | ICD-10-CM

## 2019-03-26 DIAGNOSIS — M65.312 TRIGGER FINGER OF LEFT THUMB: Primary | ICD-10-CM

## 2019-03-26 PROCEDURE — 99024 POSTOP FOLLOW-UP VISIT: CPT | Mod: S$GLB,,, | Performed by: SURGERY

## 2019-03-26 PROCEDURE — 97035 APP MDLTY 1+ULTRASOUND EA 15: CPT

## 2019-03-26 PROCEDURE — 99999 PR PBB SHADOW E&M-EST. PATIENT-LVL II: CPT | Mod: PBBFAC,,, | Performed by: PHYSICIAN ASSISTANT

## 2019-03-26 PROCEDURE — 97110 THERAPEUTIC EXERCISES: CPT

## 2019-03-26 PROCEDURE — 3078F DIAST BP <80 MM HG: CPT | Mod: CPTII,S$GLB,, | Performed by: PHYSICIAN ASSISTANT

## 2019-03-26 PROCEDURE — 99214 OFFICE O/P EST MOD 30 MIN: CPT | Mod: S$GLB,,, | Performed by: PHYSICIAN ASSISTANT

## 2019-03-26 PROCEDURE — 99214 PR OFFICE/OUTPT VISIT, EST, LEVL IV, 30-39 MIN: ICD-10-PCS | Mod: S$GLB,,, | Performed by: PHYSICIAN ASSISTANT

## 2019-03-26 PROCEDURE — 3008F BODY MASS INDEX DOCD: CPT | Mod: CPTII,S$GLB,, | Performed by: PHYSICIAN ASSISTANT

## 2019-03-26 PROCEDURE — 99999 PR PBB SHADOW E&M-EST. PATIENT-LVL II: ICD-10-PCS | Mod: PBBFAC,,, | Performed by: PHYSICIAN ASSISTANT

## 2019-03-26 PROCEDURE — 99999 PR PBB SHADOW E&M-EST. PATIENT-LVL III: CPT | Mod: PBBFAC,,, | Performed by: SURGERY

## 2019-03-26 PROCEDURE — 3077F PR MOST RECENT SYSTOLIC BLOOD PRESSURE >= 140 MM HG: ICD-10-PCS | Mod: CPTII,S$GLB,, | Performed by: PHYSICIAN ASSISTANT

## 2019-03-26 PROCEDURE — 99999 PR PBB SHADOW E&M-EST. PATIENT-LVL III: ICD-10-PCS | Mod: PBBFAC,,, | Performed by: SURGERY

## 2019-03-26 PROCEDURE — 99024 PR POST-OP FOLLOW-UP VISIT: ICD-10-PCS | Mod: S$GLB,,, | Performed by: SURGERY

## 2019-03-26 PROCEDURE — 3078F PR MOST RECENT DIASTOLIC BLOOD PRESSURE < 80 MM HG: ICD-10-PCS | Mod: CPTII,S$GLB,, | Performed by: PHYSICIAN ASSISTANT

## 2019-03-26 PROCEDURE — 3077F SYST BP >= 140 MM HG: CPT | Mod: CPTII,S$GLB,, | Performed by: PHYSICIAN ASSISTANT

## 2019-03-26 PROCEDURE — 3008F PR BODY MASS INDEX (BMI) DOCUMENTED: ICD-10-PCS | Mod: CPTII,S$GLB,, | Performed by: PHYSICIAN ASSISTANT

## 2019-03-26 RX ORDER — FLUTICASONE PROPIONATE 50 MCG
SPRAY, SUSPENSION (ML) NASAL
COMMUNITY
End: 2019-04-10 | Stop reason: SDUPTHER

## 2019-03-26 RX ORDER — TRIAMCINOLONE ACETONIDE 1 MG/G
CREAM TOPICAL
Qty: 454 G | Refills: 0 | Status: SHIPPED | OUTPATIENT
Start: 2019-03-26 | End: 2021-06-03

## 2019-03-26 RX ORDER — FLUCONAZOLE 200 MG/1
TABLET ORAL
COMMUNITY
End: 2019-12-10

## 2019-03-26 NOTE — PATIENT INSTRUCTIONS
XEROSIS (DRY SKIN)        1. Definition    Xerosis is the term for dry skin.  We all have a natural oil coating over our skin produced by the skin oil glands.  If this oil is removed, the skin becomes dry which can lead to cracking, which can lead to inflammation.  Xerosis is usually a long-term problem that recurs often, especially in the winter.    2. Cause     Long hot baths or showers can remove our natural oil and lead to xerosis.  One should never take more than one bath or shower a day and for no longer than ten minutes.   Use of harsh soaps such as Zest, Dial, and Ivory can worsen and cause xerosis.   Cold winter weather worsens xerosis because the amount of moisture contained in cold air is much less than the amount of moisture in warm air.    3. Treatment     Treatment is intended to restore the natural oil to your skin.  Keep the skin lubricated.     Do not take more than one bath or shower a day.  Use lukewarm water, not hot.  Hot water dries out the skin.     Use a gentle moisturizing soap such as Cetaphil soap, Oil of Olay, Dove, Basis, Ivory moisture care, Restoraderm cleanser.     When toweling dry, dont rub.  Blot the skin so there is still some water left on the skin.  You should apply a moisturizing cream to all of the skin such as Cerave cream, Cetaphil cream, Restoraderm or Eucerin Original Formula cream.   Alpha hydroxyacid lotions, i.e., AmLactin, also work very well for preventing dry skin, but may burn when used on inflamed or reddened skin.     If you like to swim during the winter months, you should not use soap when getting out of the pool.  When you have finished swimming, rinse off the chlorine with cool to warm water.  If this will be the only shower of the day, then you may use Cetaphil or another mild soap to cleanse your skin.  After the shower, apply a moisturizing cream to all of the skin as above.        [address] [facility] [place] [address]/ [phone]  [phone] FAX/ [email or web address]

## 2019-03-26 NOTE — TELEPHONE ENCOUNTER
Spoke with pt regarding appointment. Informed pt that she would have to follow up with general surgery and we would have to cancel her appointment. Pt wasn't happy. Apologized to pt and informed her that my supervisor sent a message to her rheumatologist and they will assist her.       HARPER

## 2019-03-26 NOTE — PROGRESS NOTES
"Ailyn Ortiz is a 53 y.o. female patient.   1. Postop check      Past Medical History:   Diagnosis Date    Asthma     Diabetes mellitus, type 2 since the age of 42    Hypertension since the age of 42    JESSICA (obstructive sleep apnea)     S/P UPP    Thyroid disease      No past surgical history pertinent negatives on file.  Scheduled Meds:   lidocaine HCL 10 mg/ml (1%)  0.1 mL Other 1 time in Clinic/HOD    triamcinolone acetonide  12 mg Intramuscular 1 time in Clinic/HOD     Continuous Infusions:  PRN Meds:    Review of patient's allergies indicates:   Allergen Reactions    Nuts [tree nut] Other (See Comments)     Nasal Congestion    Avelox [moxifloxacin] Rash     There are no hospital problems to display for this patient.    Blood pressure (!) 155/73, pulse 84, height 5' 2" (1.575 m), weight 93.4 kg (206 lb).    Subjective S/p small bowel resection for intussusception 3/8/19.  She has an open wound and is packing it well.  Objective Wound clear with 3 cm tunnel by 0.75 cm wide, well granulated as far I can see in the hole.   Assessment & Plan Continue daily packing.  Consult wound care.       Stone Mei MD  3/26/2019  "

## 2019-03-26 NOTE — PROGRESS NOTES
Subjective:       Patient ID:  Ailyn Ortiz is a 53 y.o. female who presents for   Chief Complaint   Patient presents with    Rash     bilateral legs, itching, several years     Rash  - Initial  Affected locations: right lower leg  Duration: years.  Signs / symptoms: itching, redness and dryness  Timing: intermittent  Aggravated by: cold weather and scratching  Treatments tried: TAC has improved symptoms in the past, using OTC HC crm now.    Rash occasionally comes all over body but only on legs currently.  FHx of psoriasis - daughter, aunt, grandmother.  Seeing rheum. - inflammatory arthritis. Usually on MTX but off right now 2/2 recent surgery. Was on prednisone previously.    Showers once daily, sometimes twice with Dove or Ivory. Admits to hot water, does not take long showers. Only moisturizes occasionally.    Review of Systems   Constitutional: Negative for fever.   Skin: Positive for itching, rash, dry skin and activity-related sunscreen use. Negative for daily sunscreen use and recent sunburn.   Hematologic/Lymphatic: Does not bruise/bleed easily.        Objective:    Physical Exam   Constitutional: She appears well-developed and well-nourished. She is obese.  No distress.   Neurological: She is alert and oriented to person, place, and time. She is not disoriented.   Psychiatric: She has a normal mood and affect.   Skin:   Areas Examined (abnormalities noted in diagram):   RLE Inspected  LLE Inspection Performed                  Diagram Legend     Erythematous scaling macule/papule c/w actinic keratosis       Vascular papule c/w angioma      Pigmented verrucoid papule/plaque c/w seborrheic keratosis      Yellow umbilicated papule c/w sebaceous hyperplasia      Irregularly shaped tan macule c/w lentigo     1-2 mm smooth white papules consistent with Milia      Movable subcutaneous cyst with punctum c/w epidermal inclusion cyst      Subcutaneous movable cyst c/w pilar cyst      Firm pink to brown  papule c/w dermatofibroma      Pedunculated fleshy papule(s) c/w skin tag(s)      Evenly pigmented macule c/w junctional nevus     Mildly variegated pigmented, slightly irregular-bordered macule c/w mildly atypical nevus      Flesh colored to evenly pigmented papule c/w intradermal nevus       Pink pearly papule/plaque c/w basal cell carcinoma      Erythematous hyperkeratotic cursted plaque c/w SCC      Surgical scar with no sign of skin cancer recurrence      Open and closed comedones      Inflammatory papules and pustules      Verrucoid papule consistent consistent with wart     Erythematous eczematous patches and plaques     Dystrophic onycholytic nail with subungual debris c/w onychomycosis     Umbilicated papule    Erythematous-base heme-crusted tan verrucoid plaque consistent with inflamed seborrheic keratosis     Erythematous Silvery Scaling Plaque c/w Psoriasis     See annotation      Assessment / Plan:      Eczema, unspecified type  -     triamcinolone acetonide 0.1% (KENALOG) 0.1 % cream; AAA BL lower legs bid/ prn for flares  Dispense: 454 g; Refill: 0    Good skin care regimen discussed including limiting to one bath or shower/day, using lukewarm water with mild soap and moisturizing cream to skin 1 - 2x/day. Brochure was provided and reviewed with patient.    Pt advised to RTC if rash spreads or worsens for psoriasis evaluation - not suspected at this time.         Follow up if symptoms worsen or fail to improve.

## 2019-03-26 NOTE — PROGRESS NOTES
Occupational Therapy Daily Treatment Note   Date 3/26/2019    Name: Ailyn Ortiz  Clinic Number: [id number]  Name: Ailyn Ortiz  Clinic Number: [id number]     Therapy Diagnosis:        Encounter Diagnosis   Name Primary?    History of carpal tunnel release        Physician: Lianet Castillo PA     Physician Orders: eval and treat   Medical Diagnosis: M65.312 trigger finger of left thumb                                       G56.03 bilateral carpal tunnel release     Surgical Procedure/ Date :12/26/18 right CTR  Evaluation Date: 2/18/2019  Insurance:[employer]  Insurance Authorization period Expiration: 12/31/19  Plan of Care Certification Period: 5/30/19     Visit # / Visits Authortized: 4/ 60  Time In:2:00pm  Time Out: 3:00pm  Total Billable Time: 60 minutes     Precautions: Diabetes         Subjective    pt reports that she has more thumb motion          Pt reports: she was compliant with home exercise program given last session.  She reports that her thumb felt good yesterday , today she comes to therapy and is in a little more pain .     Response to previous treatment:slightly omproved   Functional change: has slightly more motion in hand     Pain: 4/ 10  Location: bilateral fingers      Objective     Ailyn received the following supervised modalities after being cleared for contradictions for 10 minutes:   Paraffin wax X 10  Left thumb    direct contact modalities X 6 minutes 1.0w/cm2 to right CTR scar and 6 minutes to left volar thumb at Mp joint    fabricated new trigger finger splint for left thumb     Ailyn received the following manual therapy techniques for 10 minutes:   -PROM left thumb ROM       Ailyn received therapeutic exercises for 18 minutes including:  -AROM wrist right  motion   wrist flexion 1#,   wrist extension 1#  Hand gripper (R) 2 yellow rubberbands     Fabricated new left trigger finger thumb splint , to be worn during the day     Post exercise 10  minutes MH , bilateral hands       Home Exercises and Education Provided     Education provided:     -  - Progress towards goals     Written Home Exercises Provided: Patient instructed to cont prior HEP.  Exercises were reviewed and Ailyn was able to demonstrate them prior to the end of the session.  Ailyn demonstrated fair  understanding of the education provided.   .   See EMR under Patient Instructions for exercises provided prior visit.       Assessment     Pt would continue to benefit from skilled OT. Yes     Ailyn is progressing well towards her goals and there are no updates to goals at this time. Pt prognosis is Good.     Pt will continue to benefit from skilled outpatient occupational therapy to address the deficits listed in the problem list on initial evaluation provide pt/family education and to maximize pt's level of independence in the home and community environment.     Anticipated barriers to occupational therapy: none     Pt's spiritual, cultural and educational needs considered and pt agreeable to plan of care and goals.          GOALS: 6 weeks. Pt agrees with goals set.        Short Term (4 weeks on 3/18/19 ):  1)   Patient to be IND with HEP and modalities for pain management, progressing   2)   Increase ROM 10 degrees  For  Left thumb Mp flexion and Ip flexion   motion to increase functional hand use for left right hand  , progressing   3)   Increase  strength by  5 lbs. to grasp bilateral hand , progressing   4. Decrease sensnsitivty of right wrist scar      Long Term (by discharge):  1)   Pt will report 0 out of 10 pain with right and left hands  ., progressing   2)   Patient to score of CJ on FOTO to demonstrate improved perception of functionalbilateral hand/ arm  Use. Progressing   3)   Pt will return to prior level of function for ADLs and household management, progressing                  Plan   Certification Period/Plan of care expiration: 2/18/2019 to 4/30/19 .     Outpatient  Occupational Therapy 2 times weekly for 5 weeks may include the following interventions: Paraffin, Modalities for pain management, US 3 mhz, Therapeutic exercises/activities. and Strengthening.        Discussed Plan of Care with patient: Yes  Updates/Grading for next session: continue       Sujata Villanueva, OT

## 2019-03-26 NOTE — TELEPHONE ENCOUNTER
"Called to [facility] pt appointment. She asked if she can be seen for a rash that she gets periodically throughout the year. I told her yes but only for the rash not lipomas        ----- Message from Annmarie Juárez MA sent at 3/26/2019 11:35 AM CDT -----  Regarding: FW: appt today  Just in case jenny didn't know about this    :)    ----- Message -----  From: Noa Rasheed RN  Sent: 3/26/2019   9:26 AM  To: Annmarie Juárez MA, Koki Green PA-C  Subject: RE: appt today                                   I messaged the supervisor to Rheumatology to address. I suggested that they should see General Surgery. I am at [facility] This morning so please make sure that Fletcher or Tory contact the patient to cancel.     Thanks,   K  ----- Message -----  From: Koik Green PA-C  Sent: 3/26/2019   8:40 AM  To: Nao Rasheed RN, #  Subject: appt today                                       Please confirm with pt reason for appt. Per rheumatology note, "Derm eval: why does she get recurrent lipomas? whats the management plan?" If this is why she is being seen, I cannot really give her an answer to that question. Maybe she should schedule with a physician, but I don't think they would be able to tell her that either.        "

## 2019-03-29 NOTE — PHYSICIAN QUERY
PT Name: Ailyn Ortiz  MR #: [id number]    Physician Query Form - Pathology Findings Clarification     CDS/: Brandy E Capley               Contact information: [email or web address]  This form is a permanent document in the medical record.     Query Date: March 29, 2019      By submitting this query, we are merely seeking further clarification of documentation.  Please utilize your independent clinical judgment when addressing the question(s) below.      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               DIAGNOSIS:    Small bowel intussusception.     PROCEDURE PERFORMED:    Diagnostic laparoscopy and small bowel resection.      Surgical pathology 3/8, filed 3/9            Op note 3/8, filed 3/9     Please document the clinical significance of the Pathologists findings of ___inflammatory fibroid polyp_______.    [  xx ] I agree with the Pathology Findings   [   ] I do not agree with the Pathology Findings   [   ] Other/Clarification of Findings:   [  ] Clinically Undetermined       Please document in your progress notes daily for the duration of treatment until resolved and include in your discharge summary.

## 2019-04-01 ENCOUNTER — PATIENT MESSAGE (OUTPATIENT)
Dept: ORTHOPEDICS | Facility: CLINIC | Age: 54
End: 2019-04-01

## 2019-04-02 ENCOUNTER — PATIENT MESSAGE (OUTPATIENT)
Dept: ORTHOPEDICS | Facility: CLINIC | Age: 54
End: 2019-04-02

## 2019-04-02 ENCOUNTER — CLINICAL SUPPORT (OUTPATIENT)
Dept: REHABILITATION | Facility: HOSPITAL | Age: 54
End: 2019-04-02
Attending: PHYSICIAN ASSISTANT
Payer: COMMERCIAL

## 2019-04-02 DIAGNOSIS — M65.312 TRIGGER FINGER OF LEFT THUMB: Primary | ICD-10-CM

## 2019-04-02 DIAGNOSIS — Z98.890 HISTORY OF CARPAL TUNNEL RELEASE: ICD-10-CM

## 2019-04-02 PROCEDURE — 97110 THERAPEUTIC EXERCISES: CPT

## 2019-04-02 PROCEDURE — 97035 APP MDLTY 1+ULTRASOUND EA 15: CPT

## 2019-04-02 NOTE — PROGRESS NOTES
Occupational Therapy Daily Treatment Note discharge    Date 4/2/2019    Name: Ailyn Ortiz  Clinic Number: [id number]  Name: Ailyn Ortiz  Clinic Number: [id number]     Therapy Diagnosis:        Encounter Diagnosis   Name Primary?    History of carpal tunnel release        Physician: Lianet Castillo PA     Physician Orders: eval and treat   Medical Diagnosis: M65.312 trigger finger of left thumb                                       G56.03 bilateral carpal tunnel release     Surgical Procedure/ Date :12/26/18 right CTR  Evaluation Date: 2/18/2019  Insurance:[email or web address]  Insurance Authorization period Expiration: 12/31/19  Plan of Care Certification Period: 5/30/19     Visit # / Visits Authortized: 5/ 60  Time In:2:00pm  Time Out: 3:00pm  Total Billable Time: 60 minutes     Precautions: Diabetes         Subjective    pt reports that she has more thumb motion, she still has clicking. She is currently out on short disability from GI procedures, she reports that she has decided to move forward and have trigger finger surgery , while she still has 4 weeks left on Short term disability that she could combine and minimize her time out             Pt reports: she was compliant with home exercise program given last session.  She reports that her thumb felt good yesterday , today she comes to therapy and is in a little more pain .     Response to previous treatment:slightly omproved   Functional change: has slightly more motion in hand     Pain: 4/ 10  Location: bilateral fingers      Objective     Ailyn received the following supervised modalities after being cleared for contradictions for 10 minutes:   Paraffin wax X 10  Left thumb    direct contact modalities X 6 minutes 1.0w/cm2 to right CTR scar and 6 minutes to left volar thumb at Mp joint    fabricated new trigger finger splint for left thumb     Ailyn received the following manual therapy techniques for 10 minutes:   -PROM  left thumb ROM       Ailyn received therapeutic exercises for 18 minutes including:  -AROM wrist right  motion   wrist flexion 1#,   wrist extension 1#  Hand gripper (R) 2 yellow rubberbands     Pt states that she would like to stop therapy after today and schedule trigger finger surgery.       Home Exercises and Education Provided     Education provided:     -  - Progress towards goals     Written Home Exercises Provided: Patient instructed to cont prior HEP.  Exercises were reviewed and Ailyn was able to demonstrate them prior to the end of the session.  Ailyn demonstrated fair  understanding of the education provided.   .   See EMR under Patient Instructions for exercises provided prior visit.       Assessment     Minimal progress noted, so patient decided to move forward and plan surgery . Anticipated barriers to occupational therapy: none     Pt's spiritual, cultural and educational needs considered and pt agreeable to plan of care and goals.          GOALS: 6 weeks. Pt agrees with goals set.        Short Term (4 weeks on 3/18/19 ):  1)   Patient to be IND with HEP and modalities for pain management,  Met   2)   Increase ROM 10 degrees  For  Left thumb Mp flexion and Ip flexion   motion to increase functional hand use for left right hand  ,  Not met    3)   Increase  strength by  5 lbs. to grasp bilateral hand ,  Not met   4. Decrease sensnsitivty of right wrist scar      Long Term (by discharge):  1)   Pt will report 0 out of 10 pain with right and left hands  ., not met    2)   Patient to score of CJ on FOTO to demonstrate improved perception of functionalbilateral hand/ arm  Use.  Not met   3)   Pt will return to prior level of function for ADLs and household management,  Not met                 Plan   Pt to be d/c from [facility] and will move forward to schedule surgery .   Sujata Villanueva, OT

## 2019-04-03 DIAGNOSIS — R52 PAIN: Primary | ICD-10-CM

## 2019-04-04 ENCOUNTER — TELEPHONE (OUTPATIENT)
Dept: ORTHOPEDICS | Facility: CLINIC | Age: 54
End: 2019-04-04

## 2019-04-05 ENCOUNTER — TELEPHONE (OUTPATIENT)
Dept: DERMATOLOGY | Facility: CLINIC | Age: 54
End: 2019-04-05

## 2019-04-05 NOTE — TELEPHONE ENCOUNTER
Spoke with pt's pharmacy regarding Rx. Informed pharmacy to change pt's triamcinolone acetonide 0.1% (KENALOG) 0.1 % cream to 80 grams per Miriam's verbal order, because the pharmacy stated that's all they have in stock.    RD

## 2019-04-10 ENCOUNTER — OFFICE VISIT (OUTPATIENT)
Dept: ORTHOPEDICS | Facility: CLINIC | Age: 54
End: 2019-04-10
Payer: COMMERCIAL

## 2019-04-10 ENCOUNTER — INITIAL CONSULT (OUTPATIENT)
Dept: WOUND CARE | Facility: CLINIC | Age: 54
End: 2019-04-10
Payer: COMMERCIAL

## 2019-04-10 ENCOUNTER — HOSPITAL ENCOUNTER (OUTPATIENT)
Dept: PREADMISSION TESTING | Facility: OTHER | Age: 54
Discharge: HOME | End: 2019-04-10
Attending: PHYSICIAN ASSISTANT
Payer: COMMERCIAL

## 2019-04-10 ENCOUNTER — ANESTHESIA EVENT (OUTPATIENT)
Dept: SURGERY | Facility: OTHER | Age: 54
End: 2019-04-10
Payer: COMMERCIAL

## 2019-04-10 ENCOUNTER — HOSPITAL ENCOUNTER (OUTPATIENT)
Dept: RADIOLOGY | Facility: OTHER | Age: 54
Discharge: HOME | End: 2019-04-10
Attending: PHYSICIAN ASSISTANT
Payer: COMMERCIAL

## 2019-04-10 VITALS
TEMPERATURE: 99 F | HEART RATE: 84 BPM | DIASTOLIC BLOOD PRESSURE: 92 MMHG | SYSTOLIC BLOOD PRESSURE: 128 MMHG | HEIGHT: 62 IN | WEIGHT: 209 LBS | BODY MASS INDEX: 38.46 KG/M2 | OXYGEN SATURATION: 95 %

## 2019-04-10 VITALS
DIASTOLIC BLOOD PRESSURE: 82 MMHG | HEIGHT: 62 IN | WEIGHT: 205.94 LBS | BODY MASS INDEX: 37.9 KG/M2 | HEART RATE: 87 BPM | SYSTOLIC BLOOD PRESSURE: 120 MMHG

## 2019-04-10 VITALS
HEIGHT: 62 IN | SYSTOLIC BLOOD PRESSURE: 114 MMHG | TEMPERATURE: 98 F | WEIGHT: 209.88 LBS | HEART RATE: 81 BPM | BODY MASS INDEX: 38.62 KG/M2 | DIASTOLIC BLOOD PRESSURE: 81 MMHG

## 2019-04-10 DIAGNOSIS — E11.9 TYPE 2 DIABETES MELLITUS WITHOUT COMPLICATION, WITHOUT LONG-TERM CURRENT USE OF INSULIN: Primary | ICD-10-CM

## 2019-04-10 DIAGNOSIS — M65.312 TRIGGER FINGER OF LEFT THUMB: Primary | ICD-10-CM

## 2019-04-10 DIAGNOSIS — T81.89XA DELAYED SURGICAL WOUND HEALING, INITIAL ENCOUNTER: ICD-10-CM

## 2019-04-10 DIAGNOSIS — R52 PAIN: ICD-10-CM

## 2019-04-10 PROBLEM — E87.20 ACIDOSIS: Status: RESOLVED | Noted: 2019-03-11 | Resolved: 2019-04-10

## 2019-04-10 PROCEDURE — 3008F BODY MASS INDEX DOCD: CPT | Mod: CPTII,S$GLB,, | Performed by: NURSE PRACTITIONER

## 2019-04-10 PROCEDURE — 3074F SYST BP LT 130 MM HG: CPT | Mod: CPTII,S$GLB,, | Performed by: NURSE PRACTITIONER

## 2019-04-10 PROCEDURE — 99999 PR PBB SHADOW E&M-EST. PATIENT-LVL V: CPT | Mod: PBBFAC,,, | Performed by: NURSE PRACTITIONER

## 2019-04-10 PROCEDURE — 99999 PR PBB SHADOW E&M-EST. PATIENT-LVL V: ICD-10-PCS | Mod: PBBFAC,,, | Performed by: NURSE PRACTITIONER

## 2019-04-10 PROCEDURE — 3079F DIAST BP 80-89 MM HG: CPT | Mod: CPTII,S$GLB,, | Performed by: PHYSICIAN ASSISTANT

## 2019-04-10 PROCEDURE — 3044F HG A1C LEVEL LT 7.0%: CPT | Mod: CPTII,S$GLB,, | Performed by: NURSE PRACTITIONER

## 2019-04-10 PROCEDURE — 73130 X-RAY EXAM OF HAND: CPT | Mod: 26,LT,, | Performed by: RADIOLOGY

## 2019-04-10 PROCEDURE — 3008F PR BODY MASS INDEX (BMI) DOCUMENTED: ICD-10-PCS | Mod: CPTII,S$GLB,, | Performed by: PHYSICIAN ASSISTANT

## 2019-04-10 PROCEDURE — 3079F PR MOST RECENT DIASTOLIC BLOOD PRESSURE 80-89 MM HG: ICD-10-PCS | Mod: CPTII,S$GLB,, | Performed by: NURSE PRACTITIONER

## 2019-04-10 PROCEDURE — 73130 X-RAY EXAM OF HAND: CPT | Mod: TC,FY,LT

## 2019-04-10 PROCEDURE — 99203 OFFICE O/P NEW LOW 30 MIN: CPT | Mod: S$GLB,,, | Performed by: NURSE PRACTITIONER

## 2019-04-10 PROCEDURE — 3044F PR MOST RECENT HEMOGLOBIN A1C LEVEL <7.0%: ICD-10-PCS | Mod: CPTII,S$GLB,, | Performed by: NURSE PRACTITIONER

## 2019-04-10 PROCEDURE — 3074F PR MOST RECENT SYSTOLIC BLOOD PRESSURE < 130 MM HG: ICD-10-PCS | Mod: CPTII,S$GLB,, | Performed by: PHYSICIAN ASSISTANT

## 2019-04-10 PROCEDURE — 99999 PR PBB SHADOW E&M-EST. PATIENT-LVL IV: ICD-10-PCS | Mod: PBBFAC,,, | Performed by: PHYSICIAN ASSISTANT

## 2019-04-10 PROCEDURE — 99203 PR OFFICE/OUTPT VISIT, NEW, LEVL III, 30-44 MIN: ICD-10-PCS | Mod: S$GLB,,, | Performed by: NURSE PRACTITIONER

## 2019-04-10 PROCEDURE — 3079F DIAST BP 80-89 MM HG: CPT | Mod: CPTII,S$GLB,, | Performed by: NURSE PRACTITIONER

## 2019-04-10 PROCEDURE — 3008F PR BODY MASS INDEX (BMI) DOCUMENTED: ICD-10-PCS | Mod: CPTII,S$GLB,, | Performed by: NURSE PRACTITIONER

## 2019-04-10 PROCEDURE — 99214 PR OFFICE/OUTPT VISIT, EST, LEVL IV, 30-39 MIN: ICD-10-PCS | Mod: S$GLB,,, | Performed by: PHYSICIAN ASSISTANT

## 2019-04-10 PROCEDURE — 99999 PR PBB SHADOW E&M-EST. PATIENT-LVL IV: CPT | Mod: PBBFAC,,, | Performed by: PHYSICIAN ASSISTANT

## 2019-04-10 PROCEDURE — 3079F PR MOST RECENT DIASTOLIC BLOOD PRESSURE 80-89 MM HG: ICD-10-PCS | Mod: CPTII,S$GLB,, | Performed by: PHYSICIAN ASSISTANT

## 2019-04-10 PROCEDURE — 99214 OFFICE O/P EST MOD 30 MIN: CPT | Mod: S$GLB,,, | Performed by: PHYSICIAN ASSISTANT

## 2019-04-10 PROCEDURE — 3074F SYST BP LT 130 MM HG: CPT | Mod: CPTII,S$GLB,, | Performed by: PHYSICIAN ASSISTANT

## 2019-04-10 PROCEDURE — 3008F BODY MASS INDEX DOCD: CPT | Mod: CPTII,S$GLB,, | Performed by: PHYSICIAN ASSISTANT

## 2019-04-10 PROCEDURE — 3074F PR MOST RECENT SYSTOLIC BLOOD PRESSURE < 130 MM HG: ICD-10-PCS | Mod: CPTII,S$GLB,, | Performed by: NURSE PRACTITIONER

## 2019-04-10 PROCEDURE — 73130 XR HAND COMPLETE 3 VIEW LEFT: ICD-10-PCS | Mod: 26,LT,, | Performed by: RADIOLOGY

## 2019-04-10 RX ORDER — ACETAMINOPHEN 500 MG
1000 TABLET ORAL
Status: CANCELLED | OUTPATIENT
Start: 2019-04-10 | End: 2019-04-10

## 2019-04-10 RX ORDER — FAMOTIDINE 20 MG/1
20 TABLET, FILM COATED ORAL
Status: CANCELLED | OUTPATIENT
Start: 2019-04-10 | End: 2019-04-10

## 2019-04-10 RX ORDER — SODIUM CHLORIDE, SODIUM LACTATE, POTASSIUM CHLORIDE, CALCIUM CHLORIDE 600; 310; 30; 20 MG/100ML; MG/100ML; MG/100ML; MG/100ML
INJECTION, SOLUTION INTRAVENOUS CONTINUOUS
Status: CANCELLED | OUTPATIENT
Start: 2019-04-10

## 2019-04-10 RX ORDER — ACETAMINOPHEN AND CODEINE PHOSPHATE 300; 30 MG/1; MG/1
1 TABLET ORAL EVERY 6 HOURS PRN
Qty: 25 TABLET | Refills: 0 | Status: SHIPPED | OUTPATIENT
Start: 2019-04-10 | End: 2019-12-10

## 2019-04-10 NOTE — PATIENT INSTRUCTIONS
"You may shower daily with a mild soap such as dove.  Irrigate the wound with lukewarm water for 5 minutes.  Dry thoroughly.  Apply medihoney gel to the wound, pack with 1/4" plain nugauze, cover with cotton gauze and use a mepore island dressing to secure bandages.  Change daily.  Report any signs of infection.  "

## 2019-04-10 NOTE — ANESTHESIA PREPROCEDURE EVALUATION
04/10/2019  Ailyn Ortiz is a 53 y.o., female.    Anesthesia Evaluation    I have reviewed the Patient Summary Reports.    I have reviewed the Nursing Notes.   I have reviewed the Medications.     Review of Systems  Anesthesia Hx:  No problems with previous Anesthesia  Denies Family Hx of Anesthesia complications.   Denies Personal Hx of Anesthesia complications.   Social:  Former Smoker    Hematology/Oncology:  Hematology Normal   Oncology Normal     Cardiovascular:   Hypertension, well controlled    Pulmonary:   Asthma Sleep Apnea No further asthma post quit smoking   Renal/:  Renal/ Normal     Hepatic/GI:   GERD Open healing abdominal wound   Musculoskeletal:  Musculoskeletal Normal    Neurological:  Neurology Normal    Endocrine:   Diabetes, type 2 Hypothyroidism        Physical Exam  General:  Well nourished    Airway/Jaw/Neck:  Airway Findings: Mouth Opening: Normal Tongue: Normal  General Airway Assessment: Adult  Mallampati: I  TM Distance: Normal, at least 6 cm         Dental:  Dental Findings: Molar caps             Anesthesia Plan  Type of Anesthesia, risks & benefits discussed:  Anesthesia Type:  general, MAC  Patient's Preference:   Intra-op Monitoring Plan: standard ASA monitors  Intra-op Monitoring Plan Comments:   Post Op Pain Control Plan: per primary service following discharge from PACU  Post Op Pain Control Plan Comments:   Induction:    Beta Blocker:         Informed Consent: Patient understands risks and agrees with Anesthesia plan.  Questions answered. Anesthesia consent signed with patient.  ASA Score: 2     Day of Surgery Review of History & Physical:    H&P update referred to the surgeon.     Anesthesia Plan Notes: IV propofol for hand surgery        Ready For Surgery From Anesthesia Perspective.

## 2019-04-10 NOTE — LETTER
April 10, 2019      Stone Mei MD  [address]  [facility] [address]           [facility]  [address]  [facility] [address]  Phone: [phone]          Patient: Ailyn Ortiz   MR Number: [id number]   YOB: 1965   Date of Visit: 4/10/2019       Dear Dr. Stone Mei:    Thank you for referring Ailyn Ortiz to me for evaluation. Attached you will find relevant portions of my assessment and plan of care.    If you have questions, please do not hesitate to call me. I look forward to following Ailyn Ortiz along with you.    Sincerely,    Orly Fragoso NP    Enclosure  CC:  No Recipients    If you would like to receive this communication electronically, please contact [email or web address] or [phone] to request more information on [facility] [employer] access.    For providers and/or their staff who would like to refer a patient to [facility], please contact us through our one-stop-shop provider referral line, [facility] , at [phone].    If you feel you have received this communication in error or would no longer like to receive these types of communications, please e-mail [email or web address]          Head trauma

## 2019-04-10 NOTE — H&P
Subjective:       Patient ID: Ailyn Ortiz is a 53 y.o. female.     Chief Complaint: Pain of the Left Hand        HPI  Ailyn Ortiz is a 53 y.o. female presenting today for follow up left trigger thumb. Symptoms present 2-3 mos. She has received two prior injections. She noted relief after the first injection but not the second. She is s/p right carpal tunnel release by Dr. Choudhary on 12/26/18, this is doing well. She has been in therapy which has also included working on the thumb. She continues to have pain and locking of the thumb, she has difficulty fully flexing the finger. She is interested in scheduling surgery. She denies numbness or tingling of the left hand. Her prior EMG did show bl carpal tunnel, worse on the right. Again, the left side is asymptomatic currently.               Review of patient's allergies indicates:   Allergen Reactions    Nuts [tree nut] Other (See Comments)       Nasal Congestion    Avelox [moxifloxacin] Rash          Current Medications          Current Outpatient Medications   Medication Sig Dispense Refill    atorvastatin (LIPITOR) 20 MG tablet Take 20 mg by mouth once daily.         cholecalciferol, vitamin D3, 50,000 unit capsule Take 50,000 Units by mouth once a week.        fluconazole (DIFLUCAN) 200 MG Tab fluconazole 200 mg tablet        fluticasone (FLONASE) 50 mcg/actuation nasal spray 1 spray by Each Nare route once daily.        folic acid (FOLVITE) 1 MG tablet Take 1 tablet (1 mg total) by mouth 3 (three) times daily. 270 tablet 2    INVOKANA 300 mg Tab tablet Take 300 mg by mouth once daily.         levothyroxine (SYNTHROID) 100 MCG tablet Take 100 mcg by mouth before breakfast.         loratadine (CLARITIN) 10 mg tablet Take 10 mg by mouth once daily.        losartan-hydrochlorothiazide 100-12.5 mg (HYZAAR) 100-12.5 mg Tab Take 1 tablet by mouth once daily.         omeprazole (PRILOSEC) 40 MG capsule Take 40 mg by mouth every morning.          polyethylene glycol (GLYCOLAX) 17 gram/dose powder Take 17 g by mouth once daily. 238 g 0    promethazine (PHENERGAN) 12.5 MG Tab Take 1 tablet (12.5 mg total) by mouth every 4 (four) hours as needed. 60 tablet 0    triamcinolone acetonide 0.1% (KENALOG) 0.1 % cream AAA BL lower legs bid/ prn for flares 454 g 0    TRULICITY 1.5 mg/0.5 mL PnIj Inject into the skin once a week.         VITAMIN D2 50,000 unit capsule TAKE 1 CAPSULE (50,000 UNITS TOTAL) BY MOUTH EVERY 7 DAYS. 12 capsule 0    methotrexate 2.5 MG Tab TAKE 8 TABLETS BY MOUTH WEEKLY 120 tablet 3    varicella-zoster gE-AS01B, PF, (SHINGRIX) 50 mcg/0.5 mL injection take as directed. 0.5 mL 0                Current Facility-Administered Medications   Medication Dose Route Frequency Provider Last Rate Last Dose    lidocaine HCL 10 mg/ml (1%) injection 0.1 mL  0.1 mL Other 1 time in Clinic/HOD Brodie Fulton MD        triamcinolone acetonide injection 12 mg  12 mg Intramuscular 1 time in Clinic/HOD Brodie Fulton MD                     Past Medical History:   Diagnosis Date    Asthma      Diabetes mellitus, type 2 since the age of 42    Hypertension since the age of 42    JESSICA (obstructive sleep apnea)       S/P UPP    Thyroid disease                 Past Surgical History:   Procedure Laterality Date    ADENOIDECTOMY         SECTION         times 2    CHOLECYSTECTOMY        EXCISION, SMALL INTESTINE, LAPAROSCOPIC   3/8/2019     [facility] 2ND FLR    GANGLION CYST EXCISION        LAPAROSCOPY, DIAGNOSTIC N/A 3/8/2019     [facility] at [facility] OR 2ND FLR    LAPAROSCOPY-DIAGNOSTIC w/ small bowel resection N/A 2018     [facility] at [facility] OR 2ND FLR    RELEASE, CARPAL TUNNEL right Right 2018     Performed by Anai [facility]hary MD at [facility] OR 1ST FLR    RESECTION-SMALL BOWEL-LAPAROSCOPIC   2018      [facility] at [facility] [address] [place] [address]    small bowel resection (lipoma)        TONSILLECTOMY        TUBAL LIGATION        UVULOPALATOPHARYNGOPLASTY             Review of Systems:  Constitutional: Negative for chills and fever.   Respiratory: Negative for cough and shortness of breath.    Gastrointestinal: Negative for nausea and vomiting.   Skin: Negative for rash.   Neurological: Negative for dizziness and headaches.   Psychiatric/Behavioral: Negative for depression.   MSK as in HPI         OBJECTIVE:      PHYSICAL EXAM:     GEN:  NAD, well-developed, well-groomed.  NEURO: Awake, alert, and oriented. Normal attention and concentration.    PSYCH: Normal mood and affect. Behavior is normal.  HEENT: No cervical lymphadenopathy noted.  CARDIOVASCULAR: Radial pulses 2+ bilaterally. No LE edema noted.  PULMONARY: Breath sounds normal. No respiratory distress.  SKIN: Intact, no rashes.       MSK:   LUE:  Good active ROM of the wrist and fingers. She is ttp over the A1 pulley of the thumb. There is triggering noted. Negative tinels and durkans over the carpal tunnel. AIN/PIN/Radial/Median/Ulnar Nerves assessed in isolation without deficit. Radial & Ulnar arteries palpated 2+. Capillary Refill <3s.        RADIOGRAPHS:  Xray left hand 4/10/19  FINDINGS:  No evidence of acute fracture.  Joint spaces are relatively well maintained.  No radiopaque foreign bodies.  Comments: I have personally reviewed the imaging and I agree with the above radiologist's report.     EMG 8/16/18  Impression   This study is abnormal. There is electrophysiologic evidence for bilateral median neuropathies at the wrist (carpal tunnel syndrome), right worse than left.         ASSESSMENT/PLAN:          ICD-10-CM ICD-9-CM   1. Trigger finger of left thumb M65.312 727.03       Plan:   -Treatment options discussed. She wishes to proceed with a left thumb trigger finger release. Consents reviewed and signed in clinic previously. All  questions answered. She does not wish to address left carpal tunnel, shown on prior EMG, at this time as she is asymptomatic.   -RTC post op

## 2019-04-10 NOTE — PROGRESS NOTES
Subjective:      Patient ID: Ailyn Ortiz is a 53 y.o. female.    Chief Complaint: Pain of the Left Hand      HPI  Ailyn Ortiz is a 53 y.o. female presenting today for follow up left trigger thumb. Symptoms present 2-3 mos. She has received two prior injections. She noted relief after the first injection but not the second. She is s/p right carpal tunnel release by Dr. Choudhary on 12/26/18, this is doing well. She has been in therapy which has also included working on the thumb. She continues to have pain and locking of the thumb, she has difficulty fully flexing the finger. She is interested in scheduling surgery. She denies numbness or tingling of the left hand. Her prior EMG did show bl carpal tunnel, worse on the right. Again, the left side is asymptomatic currently.       Review of patient's allergies indicates:   Allergen Reactions    Nuts [tree nut] Other (See Comments)     Nasal Congestion    Avelox [moxifloxacin] Rash         Current Outpatient Medications   Medication Sig Dispense Refill    atorvastatin (LIPITOR) 20 MG tablet Take 20 mg by mouth once daily.       cholecalciferol, vitamin D3, 50,000 unit capsule Take 50,000 Units by mouth once a week.      fluconazole (DIFLUCAN) 200 MG Tab fluconazole 200 mg tablet      fluticasone (FLONASE) 50 mcg/actuation nasal spray 1 spray by Each Nare route once daily.      folic acid (FOLVITE) 1 MG tablet Take 1 tablet (1 mg total) by mouth 3 (three) times daily. 270 tablet 2    INVOKANA 300 mg Tab tablet Take 300 mg by mouth once daily.       levothyroxine (SYNTHROID) 100 MCG tablet Take 100 mcg by mouth before breakfast.       loratadine (CLARITIN) 10 mg tablet Take 10 mg by mouth once daily.      losartan-hydrochlorothiazide 100-12.5 mg (HYZAAR) 100-12.5 mg Tab Take 1 tablet by mouth once daily.       omeprazole (PRILOSEC) 40 MG capsule Take 40 mg by mouth every morning.       polyethylene glycol (GLYCOLAX) 17 gram/dose powder Take 17 g  by mouth once daily. 238 g 0    promethazine (PHENERGAN) 12.5 MG Tab Take 1 tablet (12.5 mg total) by mouth every 4 (four) hours as needed. 60 tablet 0    triamcinolone acetonide 0.1% (KENALOG) 0.1 % cream AAA BL lower legs bid/ prn for flares 454 g 0    TRULICITY 1.5 mg/0.5 mL PnIj Inject into the skin once a week.       VITAMIN D2 50,000 unit capsule TAKE 1 CAPSULE (50,000 UNITS TOTAL) BY MOUTH EVERY 7 DAYS. 12 capsule 0    methotrexate 2.5 MG Tab TAKE 8 TABLETS BY MOUTH WEEKLY 120 tablet 3    varicella-zoster gE-AS01B, PF, (SHINGRIX) 50 mcg/0.5 mL injection take as directed. 0.5 mL 0     Current Facility-Administered Medications   Medication Dose Route Frequency Provider Last Rate Last Dose    lidocaine HCL 10 mg/ml (1%) injection 0.1 mL  0.1 mL Other 1 time in Clinic/HOD Brodie Fulton MD        triamcinolone acetonide injection 12 mg  12 mg Intramuscular 1 time in Clinic/HOD Brodie Fulton MD           Past Medical History:   Diagnosis Date    Asthma     Diabetes mellitus, type 2 since the age of 42    Hypertension since the age of 42    JESSICA (obstructive sleep apnea)     S/P UPP    Thyroid disease        Past Surgical History:   Procedure Laterality Date    ADENOIDECTOMY       SECTION      times 2    CHOLECYSTECTOMY      EXCISION, SMALL INTESTINE, LAPAROSCOPIC  3/8/2019    [facility] 2ND FLR    GANGLION CYST EXCISION      LAPAROSCOPY, DIAGNOSTIC N/A 3/8/2019    Performed by [facility] at [facility] OR 2ND FLR    LAPAROSCOPY-DIAGNOSTIC w/ small bowel resection N/A 2018    Performed by [facility]i [facility] at [facility] OR 2ND FLR    RELEASE, CARPAL TUNNEL right Right 2018    Performed by Anai [facility]ary MD at [facility] OR 1ST FLR    RESECTION-SMALL BOWEL-LAPAROSCOPIC  2018    Performed by Stone [facility] MD at [facility] OR 2ND FLR    small bowel resection (lipoma)      TONSILLECTOMY      TUBAL  "LIGATION      UVULOPALATOPHARYNGOPLASTY         Review of Systems:  Constitutional: Negative for chills and fever.   Respiratory: Negative for cough and shortness of breath.    Gastrointestinal: Negative for nausea and vomiting.   Skin: Negative for rash.   Neurological: Negative for dizziness and headaches.   Psychiatric/Behavioral: Negative for depression.   MSK as in HPI       OBJECTIVE:     PHYSICAL EXAM:  /82   Pulse 87   Ht 5' 2" (1.575 m)   Wt 93.4 kg (205 lb 14.6 oz)   BMI 37.66 kg/m²     GEN:  NAD, well-developed, well-groomed.  NEURO: Awake, alert, and oriented. Normal attention and concentration.    PSYCH: Normal mood and affect. Behavior is normal.  HEENT: No cervical lymphadenopathy noted.  CARDIOVASCULAR: Radial pulses 2+ bilaterally. No LE edema noted.  PULMONARY: Breath sounds normal. No respiratory distress.  SKIN: Intact, no rashes.      MSK:   LUE:  Good active ROM of the wrist and fingers. She is ttp over the A1 pulley of the thumb. There is triggering noted. Negative tinels and durkans over the carpal tunnel. AIN/PIN/Radial/Median/Ulnar Nerves assessed in isolation without deficit. Radial & Ulnar arteries palpated 2+. Capillary Refill <3s.      RADIOGRAPHS:  Xray left hand 4/10/19  FINDINGS:  No evidence of acute fracture.  Joint spaces are relatively well maintained.  No radiopaque foreign bodies.  Comments: I have personally reviewed the imaging and I agree with the above radiologist's report.    EMG 8/16/18  Impression   This study is abnormal. There is electrophysiologic evidence for bilateral median neuropathies at the wrist (carpal tunnel syndrome), right worse than left.       ASSESSMENT/PLAN:       ICD-10-CM ICD-9-CM   1. Trigger finger of left thumb M65.312 727.03      Plan:   -Treatment options discussed. She wishes to proceed with a left thumb trigger finger release. Consents reviewed and signed in clinic. All questions answered. She does not wish to address left carpal " tunnel, shown on prior EMG, at this time as she is asymptomatic.   -RTC post op         The patient indicates understanding of these issues and agrees to the plan.    Ira Leonardo PA-C  [facility]   [facility]  [place]

## 2019-04-10 NOTE — DISCHARGE INSTRUCTIONS
PRE-ADMIT TESTING -  [phone]    [address]  [place]          Your surgery has been scheduled at [facility]. We are pleased to have the opportunity to serve you. For Further Information please call [phone].    On the day of surgery please report to the Information Desk on the 1st floor.    · CONTACT YOUR PHYSICIAN'S OFFICE THE DAY PRIOR TO YOUR SURGERY TO OBTAIN YOUR ARRIVAL TIME.     · The evening before surgery do not eat anything after 9 p.m. ( this includes hard candy, chewing gum and mints).  You may only have GATORADE, POWERADE AND WATER  from 9 p.m. until you leave your home.   DO NOT DRINK ANY LIQUIDS ON THE WAY TO THE HOSPITAL.      SPECIAL MEDICATION INSTRUCTIONS: TAKE medications checked off by the Anesthesiologist on your Medication List.    Angiogram Patients: Take medications as instructed by your physician, including aspirin.     Surgery Patients:    If you take ASPIRIN - Your PHYSICIAN/SURGEON will need to inform you IF/OR when you need to stop taking aspirin prior to your surgery.     Do Not take any medications containing IBUPROFEN.  Do Not Wear any make-up or dark nail polish   (especially eye make-up) to surgery. If you come to surgery with makeup on you will be required to remove the makeup or nail polish.    Do not shave your surgical area at least 5 days prior to your surgery. The surgical prep will be performed at the hospital according to Infection Control regulations.    Leave all valuables at home.   Do Not wear any jewelry or watches, including any metal in body piercings. Jewelry must be removed prior to coming to the hospital.  There is a possibility that rings that are unable to be removed may be cut off if they are on the surgical extremity.    Contact Lens must be removed before surgery. Either do not wear the contact lens or bring a case and solution for storage.  Please bring a container for eyeglasses or dentures as required.  Bring  any paperwork your physician has provided, such as consent forms,  history and physicals, doctor's orders, etc.   Bring comfortable clothes that are loose fitting to wear upon discharge. Take into consideration the type of surgery being performed.  Maintain your diet as advised per your physician the day prior to surgery.      Adequate rest the night before surgery is advised.   Park in the Parking lot behind the hospital or in the [facility] across the street from the parking lot. Parking is complimentary.  If you will be discharged the same day as your procedure, please arrange for a responsible adult to drive you home or to accompany you if traveling by taxi.   YOU WILL NOT BE PERMITTED TO DRIVE OR TO LEAVE THE HOSPITAL ALONE AFTER SURGERY.   It is strongly recommended that you arrange for someone to remain with you for the first 24 hrs following your surgery.       Thank you for your cooperation.  The Staff of [facility].                Bathing Instructions with Hibiclens     Shower the evening before and morning of your procedure with Hibiclens:   Wash your face with water and your regular face wash/soap   Apply Hibiclens directly on your skin or on a wet washcloth and wash gently. When showering: Move away from the shower stream when applying Hibiclens to avoid rinsing off too soon.   Rinse thoroughly with warm water   Do not dilute Hibiclens         Dry off as usual, do not use any deodorant, powder, body lotions, perfume, after shave or cologne.

## 2019-04-10 NOTE — PROGRESS NOTES
"Subjective:       Patient ID: Ailyn Ortiz is a 53 y.o. female.    Chief Complaint: Wound Check    HPI   This is a 53 year old female referred by Dr. Mei for evaluation and management of a surgical wound to the abdominal wall.  She is s/p laparoscopy with excision small intestine laparascopic for bowel obstruction with Dr. Narayan on 3/8/19.  She has been packing the wound with 1/2" plain nugauze and reports it is healing.  Her medical history is significant for type II diabetes.  She is afebrile. She denies increased redness, swelling or purulent drainage.  Her pain level is 2/10.  Review of Systems   Constitutional: Positive for chills. Negative for diaphoresis and fever.   HENT: Negative for hearing loss, postnasal drip, rhinorrhea, sinus pressure, sneezing, sore throat, tinnitus and trouble swallowing.    Eyes: Negative for visual disturbance.   Respiratory: Positive for apnea (not on CPAP). Negative for cough, shortness of breath and wheezing.    Cardiovascular: Negative for chest pain, palpitations and leg swelling.   Gastrointestinal: Positive for constipation. Negative for diarrhea, nausea and vomiting.   Genitourinary: Negative for difficulty urinating, dysuria, frequency and hematuria.   Musculoskeletal: Positive for arthralgias. Negative for back pain and joint swelling.   Skin: Positive for wound.   Neurological: Negative for dizziness, weakness, light-headedness and headaches.   Hematological: Does not bruise/bleed easily.   Psychiatric/Behavioral: Negative for confusion, decreased concentration, dysphoric mood and sleep disturbance. The patient is not nervous/anxious.        Objective:      Physical Exam   Constitutional: She is oriented to person, place, and time. She appears well-developed. No distress.   HENT:   Head: Normocephalic and atraumatic.   Mouth/Throat: Oropharynx is clear and moist.   Eyes: Pupils are equal, round, and reactive to light. Conjunctivae and EOM are normal. " Right eye exhibits no discharge. Left eye exhibits no discharge. No scleral icterus.   Neck: Normal range of motion. Neck supple. No JVD present. No tracheal deviation present. No thyromegaly present.   Cardiovascular: Normal rate, regular rhythm and normal heart sounds. Exam reveals no gallop and no friction rub.   No murmur heard.  Pulmonary/Chest: Effort normal and breath sounds normal. No respiratory distress. She has no wheezes. She has no rales.   Abdominal: Soft. Bowel sounds are normal. She exhibits no distension. There is no tenderness.       Musculoskeletal: Normal range of motion. She exhibits no edema or tenderness.   Neurological: She is alert and oriented to person, place, and time.   Skin: Skin is warm and dry. No rash noted. She is not diaphoretic. No erythema.   Psychiatric: She has a normal mood and affect. Her behavior is normal. Judgment and thought content normal.   Nursing note and vitals reviewed.      ..  Hemoglobin A1C   Date Value Ref Range Status   12/13/2018 6.5 (H) 4.0 - 5.6 % Final     Comment:     ADA Screening Guidelines:  5.7-6.4%  Consistent with prediabetes  >or=6.5%  Consistent with diabetes  High levels of fetal hemoglobin interfere with the HbA1C  assay. Heterozygous hemoglobin variants (HbS, HgC, etc)do  not significantly interfere with this assay.   However, presence of multiple variants may affect accuracy.     09/25/2018 6.3 (H) 4.0 - 5.6 % Final     Comment:     ADA Screening Guidelines:  5.7-6.4%  Consistent with prediabetes  >or=6.5%  Consistent with diabetes  High levels of fetal hemoglobin interfere with the HbA1C  assay. Heterozygous hemoglobin variants (HbS, HgC, etc)do  not significantly interfere with this assay.   However, presence of multiple variants may affect accuracy.       Assessment:       1. Type 2 diabetes mellitus without complication, without long-term current use of insulin    2. Delayed surgical wound healing, initial encounter      ..  Lab Results  "  Component Value Date    ALBUMIN 3.0 (L) 03/13/2019              Plan:            Apply medihoney gel to wound, pack with 1/4" plain nugauze, cover with cotton gauze and secure with a mepore island dressing. Change dressing daily.  Return to clinic in one month.                      "

## 2019-04-11 ENCOUNTER — PATIENT MESSAGE (OUTPATIENT)
Dept: SURGERY | Facility: CLINIC | Age: 54
End: 2019-04-11

## 2019-04-12 ENCOUNTER — TELEPHONE (OUTPATIENT)
Dept: ORTHOPEDICS | Facility: CLINIC | Age: 54
End: 2019-04-12

## 2019-04-15 ENCOUNTER — HOSPITAL ENCOUNTER (OUTPATIENT)
Facility: OTHER | Age: 54
Discharge: HOME | End: 2019-04-15
Attending: ORTHOPAEDIC SURGERY | Admitting: ORTHOPAEDIC SURGERY
Payer: COMMERCIAL

## 2019-04-15 ENCOUNTER — ANESTHESIA (OUTPATIENT)
Dept: SURGERY | Facility: OTHER | Age: 54
End: 2019-04-15
Payer: COMMERCIAL

## 2019-04-15 VITALS
BODY MASS INDEX: 38.46 KG/M2 | RESPIRATION RATE: 16 BRPM | HEART RATE: 72 BPM | DIASTOLIC BLOOD PRESSURE: 69 MMHG | OXYGEN SATURATION: 96 % | HEIGHT: 62 IN | SYSTOLIC BLOOD PRESSURE: 117 MMHG | WEIGHT: 209 LBS | TEMPERATURE: 98 F

## 2019-04-15 DIAGNOSIS — M65.312 TRIGGER THUMB OF LEFT HAND: ICD-10-CM

## 2019-04-15 DIAGNOSIS — M65.312 TRIGGER FINGER OF LEFT THUMB: Primary | ICD-10-CM

## 2019-04-15 LAB — POCT GLUCOSE: 121 MG/DL (ref 70–110)

## 2019-04-15 PROCEDURE — 63600175 PHARM REV CODE 636 W HCPCS: Performed by: NURSE ANESTHETIST, CERTIFIED REGISTERED

## 2019-04-15 PROCEDURE — 36000706: Performed by: ORTHOPAEDIC SURGERY

## 2019-04-15 PROCEDURE — 37000008 HC ANESTHESIA 1ST 15 MINUTES: Performed by: ORTHOPAEDIC SURGERY

## 2019-04-15 PROCEDURE — 25000003 PHARM REV CODE 250: Performed by: ANESTHESIOLOGY

## 2019-04-15 PROCEDURE — 71000015 HC POSTOP RECOV 1ST HR: Performed by: ORTHOPAEDIC SURGERY

## 2019-04-15 PROCEDURE — 26055 PR INCISE FINGER TENDON SHEATH: ICD-10-PCS | Mod: FA,,, | Performed by: ORTHOPAEDIC SURGERY

## 2019-04-15 PROCEDURE — 25000003 PHARM REV CODE 250: Performed by: STUDENT IN AN ORGANIZED HEALTH CARE EDUCATION/TRAINING PROGRAM

## 2019-04-15 PROCEDURE — 26055 INCISE FINGER TENDON SHEATH: CPT | Mod: FA,,, | Performed by: ORTHOPAEDIC SURGERY

## 2019-04-15 PROCEDURE — 37000009 HC ANESTHESIA EA ADD 15 MINS: Performed by: ORTHOPAEDIC SURGERY

## 2019-04-15 PROCEDURE — 63600175 PHARM REV CODE 636 W HCPCS: Performed by: STUDENT IN AN ORGANIZED HEALTH CARE EDUCATION/TRAINING PROGRAM

## 2019-04-15 PROCEDURE — 25000003 PHARM REV CODE 250: Performed by: ORTHOPAEDIC SURGERY

## 2019-04-15 PROCEDURE — 36000707: Performed by: ORTHOPAEDIC SURGERY

## 2019-04-15 PROCEDURE — 82962 GLUCOSE BLOOD TEST: CPT | Performed by: ORTHOPAEDIC SURGERY

## 2019-04-15 RX ORDER — CEFAZOLIN SODIUM 1 G/3ML
2 INJECTION, POWDER, FOR SOLUTION INTRAMUSCULAR; INTRAVENOUS
Status: COMPLETED | OUTPATIENT
Start: 2019-04-15 | End: 2019-04-15

## 2019-04-15 RX ORDER — HYDROCODONE BITARTRATE AND ACETAMINOPHEN 5; 325 MG/1; MG/1
1 TABLET ORAL EVERY 4 HOURS PRN
Status: DISCONTINUED | OUTPATIENT
Start: 2019-04-15 | End: 2019-04-15 | Stop reason: HOSPADM

## 2019-04-15 RX ORDER — HYDROCODONE BITARTRATE AND ACETAMINOPHEN 10; 325 MG/1; MG/1
1 TABLET ORAL EVERY 4 HOURS PRN
Status: DISCONTINUED | OUTPATIENT
Start: 2019-04-15 | End: 2019-04-15 | Stop reason: HOSPADM

## 2019-04-15 RX ORDER — ACETAMINOPHEN 325 MG/1
650 TABLET ORAL EVERY 4 HOURS PRN
Status: DISCONTINUED | OUTPATIENT
Start: 2019-04-15 | End: 2019-04-15 | Stop reason: HOSPADM

## 2019-04-15 RX ORDER — SODIUM CHLORIDE 0.9 % (FLUSH) 0.9 %
10 SYRINGE (ML) INJECTION
Status: DISCONTINUED | OUTPATIENT
Start: 2019-04-15 | End: 2019-04-15 | Stop reason: HOSPADM

## 2019-04-15 RX ORDER — PHENYLEPHRINE HYDROCHLORIDE 10 MG/ML
INJECTION INTRAVENOUS
Status: DISCONTINUED | OUTPATIENT
Start: 2019-04-15 | End: 2019-04-15

## 2019-04-15 RX ORDER — ACETAMINOPHEN 500 MG
1000 TABLET ORAL
Status: COMPLETED | OUTPATIENT
Start: 2019-04-15 | End: 2019-04-15

## 2019-04-15 RX ORDER — LIDOCAINE HCL/PF 100 MG/5ML
SYRINGE (ML) INTRAVENOUS
Status: DISCONTINUED | OUTPATIENT
Start: 2019-04-15 | End: 2019-04-15

## 2019-04-15 RX ORDER — SODIUM CHLORIDE 9 MG/ML
INJECTION, SOLUTION INTRAVENOUS CONTINUOUS
Status: ACTIVE | OUTPATIENT
Start: 2019-04-15

## 2019-04-15 RX ORDER — PROPOFOL 10 MG/ML
VIAL (ML) INTRAVENOUS CONTINUOUS PRN
Status: DISCONTINUED | OUTPATIENT
Start: 2019-04-15 | End: 2019-04-15

## 2019-04-15 RX ORDER — LIDOCAINE HYDROCHLORIDE 10 MG/ML
INJECTION, SOLUTION EPIDURAL; INFILTRATION; INTRACAUDAL; PERINEURAL
Status: DISCONTINUED | OUTPATIENT
Start: 2019-04-15 | End: 2019-04-15 | Stop reason: HOSPADM

## 2019-04-15 RX ORDER — ONDANSETRON 8 MG/1
8 TABLET, ORALLY DISINTEGRATING ORAL EVERY 8 HOURS PRN
Status: DISCONTINUED | OUTPATIENT
Start: 2019-04-15 | End: 2019-04-15 | Stop reason: HOSPADM

## 2019-04-15 RX ORDER — MIDAZOLAM HYDROCHLORIDE 1 MG/ML
INJECTION INTRAMUSCULAR; INTRAVENOUS
Status: DISCONTINUED | OUTPATIENT
Start: 2019-04-15 | End: 2019-04-15

## 2019-04-15 RX ORDER — PROPOFOL 10 MG/ML
VIAL (ML) INTRAVENOUS
Status: DISCONTINUED | OUTPATIENT
Start: 2019-04-15 | End: 2019-04-15

## 2019-04-15 RX ORDER — SODIUM CHLORIDE, SODIUM LACTATE, POTASSIUM CHLORIDE, CALCIUM CHLORIDE 600; 310; 30; 20 MG/100ML; MG/100ML; MG/100ML; MG/100ML
INJECTION, SOLUTION INTRAVENOUS CONTINUOUS
Status: DISCONTINUED | OUTPATIENT
Start: 2019-04-15 | End: 2019-04-15 | Stop reason: HOSPADM

## 2019-04-15 RX ORDER — FENTANYL CITRATE 50 UG/ML
INJECTION, SOLUTION INTRAMUSCULAR; INTRAVENOUS
Status: DISCONTINUED | OUTPATIENT
Start: 2019-04-15 | End: 2019-04-15

## 2019-04-15 RX ORDER — MUPIROCIN 20 MG/G
OINTMENT TOPICAL
Status: DISPENSED | OUTPATIENT
Start: 2019-04-15

## 2019-04-15 RX ORDER — FAMOTIDINE 20 MG/1
20 TABLET, FILM COATED ORAL
Status: COMPLETED | OUTPATIENT
Start: 2019-04-15 | End: 2019-04-15

## 2019-04-15 RX ORDER — BUPIVACAINE HYDROCHLORIDE 2.5 MG/ML
INJECTION, SOLUTION EPIDURAL; INFILTRATION; INTRACAUDAL
Status: DISCONTINUED | OUTPATIENT
Start: 2019-04-15 | End: 2019-04-15 | Stop reason: HOSPADM

## 2019-04-15 RX ADMIN — FENTANYL CITRATE 50 MCG: 50 INJECTION, SOLUTION INTRAMUSCULAR; INTRAVENOUS at 08:04

## 2019-04-15 RX ADMIN — PROPOFOL 30 MG: 10 INJECTION, EMULSION INTRAVENOUS at 08:04

## 2019-04-15 RX ADMIN — MIDAZOLAM HYDROCHLORIDE 2 MG: 1 INJECTION, SOLUTION INTRAMUSCULAR; INTRAVENOUS at 08:04

## 2019-04-15 RX ADMIN — CEFAZOLIN 2 G: 330 INJECTION, POWDER, FOR SOLUTION INTRAMUSCULAR; INTRAVENOUS at 08:04

## 2019-04-15 RX ADMIN — LIDOCAINE HYDROCHLORIDE 75 MG: 20 INJECTION, SOLUTION INTRAVENOUS at 08:04

## 2019-04-15 RX ADMIN — PHENYLEPHRINE HYDROCHLORIDE 100 MCG: 10 INJECTION INTRAVENOUS at 08:04

## 2019-04-15 RX ADMIN — PHENYLEPHRINE HYDROCHLORIDE 200 MCG: 10 INJECTION INTRAVENOUS at 09:04

## 2019-04-15 RX ADMIN — PROPOFOL 50 MCG/KG/MIN: 10 INJECTION, EMULSION INTRAVENOUS at 08:04

## 2019-04-15 RX ADMIN — ACETAMINOPHEN 1000 MG: 500 TABLET ORAL at 07:04

## 2019-04-15 RX ADMIN — FAMOTIDINE 20 MG: 20 TABLET, FILM COATED ORAL at 07:04

## 2019-04-15 RX ADMIN — SODIUM CHLORIDE, SODIUM LACTATE, POTASSIUM CHLORIDE, AND CALCIUM CHLORIDE: 600; 310; 30; 20 INJECTION, SOLUTION INTRAVENOUS at 08:04

## 2019-04-15 RX ADMIN — MUPIROCIN: 20 OINTMENT TOPICAL at 07:04

## 2019-04-15 NOTE — INTERVAL H&P NOTE
The patient has been examined and the H&P has been reviewed:    I concur with the findings and no changes have occurred since H&P was written.    Anesthesia/Surgery risks, benefits and alternative options discussed and understood by patient/family.          Active Hospital Problems    Diagnosis  POA    Trigger thumb of left hand [M65.312]  Yes      Resolved Hospital Problems   No resolved problems to display.

## 2019-04-15 NOTE — DISCHARGE INSTRUCTIONS
Anesthesia: Monitored Anesthesia Care (MAC)    Anesthesia Safety  · Have an adult family member or friend drive you home after the procedure.  · For the first 24 hours after your surgery:  ¨ Do not drive or use heavy equipment.  ¨ Do not make important decisions or sign documents.  ¨ Avoid alcohol.  ¨ Have someone stay with you, if possible. They can watch for problems and help keep you safe.    PLEASE FOLLOW ANY ADDITIONAL INSTRUCTIONS GIVEN TO YOU BY DR CARMEN GONZALES!

## 2019-04-15 NOTE — PLAN OF CARE
Ailyn BENNETT Ortiz has met all discharge criteria from Phase II. Vital Signs are stable, ambulating  without difficulty. Discharge instructions given, patient verbalized understanding. Discharged from facility via wheelchair in stable condition.

## 2019-04-15 NOTE — ANESTHESIA POSTPROCEDURE EVALUATION
Anesthesia Post Evaluation    Patient: Ailyn Ortiz    Procedure(s) Performed: Procedure(s) (LRB):  RELEASE, TRIGGER FINGER left thumb (Left)    Final Anesthesia Type: general  Patient location during evaluation: [facility]  Patient participation: Yes- Able to Participate  Level of consciousness: awake and alert  Post-procedure vital signs: reviewed and stable  Pain management: adequate  Airway patency: patent  PONV status at discharge: No PONV  Anesthetic complications: no      Cardiovascular status: blood pressure returned to baseline  Respiratory status: unassisted, spontaneous ventilation and room air  Hydration status: euvolemic  Follow-up not needed.          Vitals Value Taken Time   /82 4/15/2019  7:52 AM   Temp 37.1 °C (98.7 °F) 4/15/2019  7:52 AM   Pulse 82 4/15/2019  7:52 AM   Resp 18 4/15/2019  7:52 AM   SpO2 95 % 4/15/2019  7:52 AM         No case tracking events are documented in the log.      Pain/Buddy Score: Pain Rating Prior to Med Admin: 2 (4/15/2019  7:59 AM)

## 2019-04-15 NOTE — BRIEF OP NOTE
[facility]  Brief Operative Note     SUMMARY     Surgery Date: 4/15/2019     Surgeon(s) and Role:     * Anai Choudhary MD - Primary    Assisting Surgeon: None    Pre-op Diagnosis:  Trigger finger of left thumb [M65.312]    Post-op Diagnosis:  Post-Op Diagnosis Codes:     * Trigger finger of left thumb [M65.312]    Procedure(s) (LRB):  RELEASE, TRIGGER FINGER left thumb (Left)    Anesthesia: Local MAC    Description of the findings of the procedure: see op note    Findings/Key Components: see op note    Estimated Blood Loss: * No values recorded between 4/15/2019  8:49 AM and 4/15/2019  9:10 AM *         Specimens:   Specimen (12h ago, onward)    None          Discharge Note    SUMMARY     Admit Date: 4/15/2019    Discharge Date and Time: No discharge date for patient encounter.    Hospital Course (synopsis of major diagnoses, care, treatment, and services provided during the course of the hospital stay):     On the day of surgery, Ailyn Ortiz arrived to the day of surgery center. The patient was identified in the pre-operative area and the operative site was marked. All consents were verified. The patient was taken to the operative suite and underwent a left trigger thumb release without complication. The patient tolerated the procedure well and was then taken to the PACU and monitored post-operatively. The patient's pain was controlled with oral medications and the decision was made to discharge the patient home with close follow up.     Follow up appointment scheduled for 2 weeks with [facility]  Weight Bearing Status: As tolerated   Prescriptions:   Diet: Regular          Final Diagnosis: Post-Op Diagnosis Codes:     * Trigger finger of left thumb [M65.312]    Disposition: Home or Self Care    Follow Up/Patient Instructions:     Medications:  Reconciled Home Medications:      Medication List      CONTINUE taking these medications    acetaminophen-codeine 300-30mg 300-30 mg  Tab  Commonly known as:  TYLENOL #3  Take 1 tablet by mouth every 6 (six) hours as needed.     atorvastatin 20 MG tablet  Commonly known as:  LIPITOR  Take 20 mg by mouth once daily.     cholecalciferol (vitamin D3) 50,000 unit capsule  Take 50,000 Units by mouth once a week.     fluconazole 200 MG Tab  Commonly known as:  DIFLUCAN  fluconazole 200 mg tablet     fluticasone 50 mcg/actuation nasal spray  Commonly known as:  FLONASE  1 spray by Each Nare route once daily.     folic acid 1 MG tablet  Commonly known as:  FOLVITE  Take 1 tablet (1 mg total) by mouth 3 (three) times daily.     INVOKANA 300 mg Tab tablet  Generic drug:  canagliflozin  Take 300 mg by mouth once daily.     levothyroxine 100 MCG tablet  Commonly known as:  SYNTHROID  Take 100 mcg by mouth before breakfast.     loratadine 10 mg tablet  Commonly known as:  CLARITIN  Take 10 mg by mouth once daily.     losartan-hydrochlorothiazide 100-12.5 mg 100-12.5 mg Tab  Commonly known as:  HYZAAR  Take 1 tablet by mouth once daily.     methotrexate 2.5 MG Tab  TAKE 8 TABLETS BY MOUTH WEEKLY     omeprazole 40 MG capsule  Commonly known as:  PRILOSEC  Take 40 mg by mouth every morning.     triamcinolone acetonide 0.1% 0.1 % cream  Commonly known as:  KENALOG  AAA BL lower legs bid/ prn for flares     TRULICITY 1.5 mg/0.5 mL Pnij  Generic drug:  dulaglutide  Inject into the skin once a week.     varicella-zoster gE-AS01B (PF) 50 mcg/0.5 mL injection  Commonly known as:  SHINGRIX  take as directed.     VITAMIN D2 50,000 unit Cap  Generic drug:  ergocalciferol  TAKE 1 CAPSULE (50,000 UNITS TOTAL) BY MOUTH EVERY 7 DAYS.          Discharge Procedure Orders   Diet general     Call MD for:  temperature >100.4     Call MD for:  persistent nausea and vomiting     Call MD for:  severe uncontrolled pain     Call MD for:  difficulty breathing, headache or visual disturbances     Call MD for:  redness, tenderness, or signs of infection (pain, swelling, redness, odor or  green/yellow discharge around incision site)     Call MD for:  hives     Call MD for:  persistent dizziness or light-headedness     Call MD for:  extreme fatigue     Keep surgical extremity elevated     No driving, operating heavy equipment or signing legal documents while taking pain medication.     Leave dressing on - Keep it clean, dry, and intact until clinic visit     Follow-up Information     [facility] 2 weeks.    Specialty:  Outpatient Rehab  Why:  For suture removal, For wound re-check  Contact information:  [address]  [facility] [id number] [phone]  Additional information:  [facility] - [facility], 9th Floor [address]   Please park in [place]

## 2019-04-15 NOTE — OP NOTE
DATE OF PROCEDURE: 04/15/2019  SERVICE: Orthopedics.     ATTENDING SURGEON: Anai Choudhary M.D.   ASSISTANT SURGEON: Monika   PREOPERATIVE DIAGNOSIS:left thumb trigger finger.   POSTOPERATIVE DIAGNOSIS: left thumb finger trigger finger.   PROCEDURE: left thumb A1 pulley release.   ANESTHESIA: Local placed by surgeon and MAC.   FLUID: Lactated Ringer's.   BLOOD LOSS: None, no blood was given.   TOURNIQUET TIME: 10 minutes.   PACKS AND DRAINS: None.   IMPLANTS: None.   SPECIMENS: None.   COMPLICATIONS: None.     INDICATIONS FOR PROCEDURE: Ailyn Ortiz is a 53 y.o.year-old who has failed   conservative treatment for  left thumb trigger finger; therefore,   operative intervention was deemed necessary. Risks and benefits were explained   to the patient in clinic. Consents were performed in clinic.     PROCEDURE IN DETAIL: After the correct site was marked with the patient's   participation in the Holding Area, the patient was brought to the Operating   Room, placed in supine position, underwent MAC anesthesia. A well-padded   nonsterile tourniquet was placed on the right forearm. Time-out was called for   correct site, procedure and patient to be indicated. Under sterile conditions,   an injection of lidocaine 1% without epinephrine was injected at the A1 pulley   space. The arm was prepped and draped in normal sterile fashion. Incision was   marked out in a longitudinal fashion along the pulley. The arm was   exsanguinated using Esmarch. Tourniquet was insufflated 250 mmHg and that is   where it remained for 8 minutes. The incision was made. Careful dissection   down was maintained to the A1 pulley. The neurovascular structures were   retracted out of the way. The A1 pulley was identified. It was sharply   incised. It was completely incised proximally and distally. Portion of    pulley was also incised. The tendon was then retracted out of the tendon sheath   using a right angle. The finger was placed through  range of motion, showed it   did not trigger. The area was irrigated with copious amounts of normal saline.   Nylon closed the skin. Sterile dressing was applied. Tourniquet was deflated.   Brisk cap refill ensued. The patient was transferred the Recovery Room in   stable condition.     POSTOPERATIVE PLAN FOR THIS PATIENT: The patient is to keep the dressing clean, dry and   intact. We will take the sutures out at two weeks.  I was present and scrubbed for the key portions of the case. Anai Rankin MD

## 2019-04-16 ENCOUNTER — OFFICE VISIT (OUTPATIENT)
Dept: SURGERY | Facility: CLINIC | Age: 54
End: 2019-04-16
Payer: COMMERCIAL

## 2019-04-16 VITALS
BODY MASS INDEX: 38.46 KG/M2 | DIASTOLIC BLOOD PRESSURE: 93 MMHG | WEIGHT: 209 LBS | HEART RATE: 82 BPM | HEIGHT: 62 IN | SYSTOLIC BLOOD PRESSURE: 175 MMHG

## 2019-04-16 DIAGNOSIS — Z09 POSTOP CHECK: Primary | ICD-10-CM

## 2019-04-16 PROCEDURE — 99999 PR PBB SHADOW E&M-EST. PATIENT-LVL III: ICD-10-PCS | Mod: PBBFAC,,, | Performed by: SURGERY

## 2019-04-16 PROCEDURE — 99024 PR POST-OP FOLLOW-UP VISIT: ICD-10-PCS | Mod: S$GLB,,, | Performed by: SURGERY

## 2019-04-16 PROCEDURE — 99024 POSTOP FOLLOW-UP VISIT: CPT | Mod: S$GLB,,, | Performed by: SURGERY

## 2019-04-16 PROCEDURE — 99999 PR PBB SHADOW E&M-EST. PATIENT-LVL III: CPT | Mod: PBBFAC,,, | Performed by: SURGERY

## 2019-04-16 NOTE — PROGRESS NOTES
"Ailyn Ortiz is a 53 y.o. female patient.   1. Postop check      Past Medical History:   Diagnosis Date    Arthritis     zero negative imflammatory arthritis    Asthma     Diabetes mellitus, type 2 since the age of 42    Hypertension since the age of 42    JESSICA (obstructive sleep apnea)     S/P UPP    Thyroid disease      No past surgical history pertinent negatives on file.  Scheduled Meds:  Continuous Infusions:  PRN Meds:    Review of patient's allergies indicates:   Allergen Reactions    Nuts [tree nut] Other (See Comments)     Nasal Congestion    Avelox [moxifloxacin] Rash     There are no hospital problems to display for this patient.    Blood pressure (!) 175/93, pulse 82, height 5' 2" (1.575 m), weight 94.8 kg (209 lb).    Subjective S/p small bowel resection for intussusception 3/8/19.  She is concerned about changes in her wound.  Objective Wound is 2 cm deep and 100% granulated as far as I can see.   Assessment & Plan Continue daily packing.  Follow up with wound care.       Stone Mie MD  4/16/2019  "

## 2019-04-19 ENCOUNTER — PATIENT MESSAGE (OUTPATIENT)
Dept: WOUND CARE | Facility: CLINIC | Age: 54
End: 2019-04-19

## 2019-04-23 ENCOUNTER — TELEPHONE (OUTPATIENT)
Dept: WOUND CARE | Facility: CLINIC | Age: 54
End: 2019-04-23

## 2019-04-23 NOTE — TELEPHONE ENCOUNTER
Spoke with patient regarding her change in appointment form 5/10/19 to 5/17/19 - patient advised she will be out of town on 5/17/19.  5/17/19 appointment rescheduled for Saturday 5/4/19 at 840am - patient accepted new appointment date/time.  Appointment reminder mailed out

## 2019-04-29 ENCOUNTER — OFFICE VISIT (OUTPATIENT)
Dept: ORTHOPEDICS | Facility: CLINIC | Age: 54
End: 2019-04-29
Payer: COMMERCIAL

## 2019-04-29 VITALS
SYSTOLIC BLOOD PRESSURE: 130 MMHG | HEIGHT: 62 IN | WEIGHT: 209 LBS | DIASTOLIC BLOOD PRESSURE: 89 MMHG | HEART RATE: 79 BPM | BODY MASS INDEX: 38.46 KG/M2

## 2019-04-29 DIAGNOSIS — M65.312 TRIGGER FINGER OF LEFT THUMB: Primary | ICD-10-CM

## 2019-04-29 PROBLEM — M79.642 PAIN IN BOTH HANDS: Status: RESOLVED | Noted: 2018-07-20 | Resolved: 2019-04-29

## 2019-04-29 PROBLEM — G56.03 BILATERAL CARPAL TUNNEL SYNDROME: Status: RESOLVED | Noted: 2018-12-26 | Resolved: 2019-04-29

## 2019-04-29 PROBLEM — M79.641 PAIN IN BOTH HANDS: Status: RESOLVED | Noted: 2018-07-20 | Resolved: 2019-04-29

## 2019-04-29 PROCEDURE — 99024 PR POST-OP FOLLOW-UP VISIT: ICD-10-PCS | Mod: S$GLB,,, | Performed by: PHYSICIAN ASSISTANT

## 2019-04-29 PROCEDURE — 99999 PR PBB SHADOW E&M-EST. PATIENT-LVL IV: ICD-10-PCS | Mod: PBBFAC,,, | Performed by: PHYSICIAN ASSISTANT

## 2019-04-29 PROCEDURE — 99024 POSTOP FOLLOW-UP VISIT: CPT | Mod: S$GLB,,, | Performed by: PHYSICIAN ASSISTANT

## 2019-04-29 PROCEDURE — 99999 PR PBB SHADOW E&M-EST. PATIENT-LVL IV: CPT | Mod: PBBFAC,,, | Performed by: PHYSICIAN ASSISTANT

## 2019-04-29 NOTE — PROGRESS NOTES
"Ms. Ortiz is here today for a post-operative visit.  She is 14 days status post left thumb A1 pulley release by Dr. Choudhary on 4/15/19. She reports that she is doing well.  Pain is mild.  She denies fever, chills, and sweats since the time of the surgery.     Physical exam:    Vitals:    04/29/19 1124   BP: 130/89   Pulse: 79   Weight: 94.8 kg (209 lb)   Height: 5' 2" (1.575 m)   PainSc:   4     Vital signs are stable, patient is afebrile.  Patient is well dressed and well groomed, no acute distress.  Alert and oriented to person, place, and time.  Post op dressing taken down.  Incision is clean, dry and intact.  There is no erythema or exudate.  There is no sign of any infection. She is NVI. Sutures removed without difficulty.      Assessment: 14 days status post left thumb A1 pulley release    Plan:  Ailyn was seen today for post-op evaluation.    Diagnoses and all orders for this visit:    Trigger finger of left thumb        - PO instruction reviewed and provided to patient  - Discussed scar massage  - Follow up in 4 weeks if needed  - Call with questions or concerns      "

## 2019-05-04 ENCOUNTER — OFFICE VISIT (OUTPATIENT)
Dept: WOUND CARE | Facility: CLINIC | Age: 54
End: 2019-05-04
Payer: COMMERCIAL

## 2019-05-04 VITALS
HEART RATE: 95 BPM | TEMPERATURE: 99 F | HEIGHT: 62 IN | WEIGHT: 208.75 LBS | SYSTOLIC BLOOD PRESSURE: 130 MMHG | DIASTOLIC BLOOD PRESSURE: 90 MMHG | BODY MASS INDEX: 38.42 KG/M2

## 2019-05-04 DIAGNOSIS — T81.89XA DELAYED SURGICAL WOUND HEALING, INITIAL ENCOUNTER: Primary | ICD-10-CM

## 2019-05-04 DIAGNOSIS — E11.9 TYPE 2 DIABETES MELLITUS WITHOUT COMPLICATION, WITHOUT LONG-TERM CURRENT USE OF INSULIN: ICD-10-CM

## 2019-05-04 PROCEDURE — 3008F PR BODY MASS INDEX (BMI) DOCUMENTED: ICD-10-PCS | Mod: CPTII,S$GLB,, | Performed by: NURSE PRACTITIONER

## 2019-05-04 PROCEDURE — 3075F PR MOST RECENT SYSTOLIC BLOOD PRESS GE 130-139MM HG: ICD-10-PCS | Mod: CPTII,S$GLB,, | Performed by: NURSE PRACTITIONER

## 2019-05-04 PROCEDURE — 99999 PR PBB SHADOW E&M-EST. PATIENT-LVL V: CPT | Mod: PBBFAC,,, | Performed by: NURSE PRACTITIONER

## 2019-05-04 PROCEDURE — 99212 OFFICE O/P EST SF 10 MIN: CPT | Mod: S$GLB,,, | Performed by: NURSE PRACTITIONER

## 2019-05-04 PROCEDURE — 3044F PR MOST RECENT HEMOGLOBIN A1C LEVEL <7.0%: ICD-10-PCS | Mod: CPTII,S$GLB,, | Performed by: NURSE PRACTITIONER

## 2019-05-04 PROCEDURE — 99999 PR PBB SHADOW E&M-EST. PATIENT-LVL V: ICD-10-PCS | Mod: PBBFAC,,, | Performed by: NURSE PRACTITIONER

## 2019-05-04 PROCEDURE — 3044F HG A1C LEVEL LT 7.0%: CPT | Mod: CPTII,S$GLB,, | Performed by: NURSE PRACTITIONER

## 2019-05-04 PROCEDURE — 3080F PR MOST RECENT DIASTOLIC BLOOD PRESSURE >= 90 MM HG: ICD-10-PCS | Mod: CPTII,S$GLB,, | Performed by: NURSE PRACTITIONER

## 2019-05-04 PROCEDURE — 3008F BODY MASS INDEX DOCD: CPT | Mod: CPTII,S$GLB,, | Performed by: NURSE PRACTITIONER

## 2019-05-04 PROCEDURE — 99212 PR OFFICE/OUTPT VISIT, EST, LEVL II, 10-19 MIN: ICD-10-PCS | Mod: S$GLB,,, | Performed by: NURSE PRACTITIONER

## 2019-05-04 PROCEDURE — 3080F DIAST BP >= 90 MM HG: CPT | Mod: CPTII,S$GLB,, | Performed by: NURSE PRACTITIONER

## 2019-05-04 PROCEDURE — 3075F SYST BP GE 130 - 139MM HG: CPT | Mod: CPTII,S$GLB,, | Performed by: NURSE PRACTITIONER

## 2019-05-04 NOTE — PATIENT INSTRUCTIONS
You may shower with a mild soap such as dove.  Irrigate the wound with lukewarm water for 5 minutes.  Dry thoroughly.  Apply skin prep to periwound area. Pack wound with silver hydrofiber, cover with cotton gauze and use an island dressing to secure bandages.  Change every 5-7 days and as needed if soilded.  Report any signs of infection.

## 2019-05-04 NOTE — PROGRESS NOTES
"Subjective:       Patient ID: Ailyn Ortiz is a 53 y.o. female.    Chief Complaint: Wound Check    HPI     This patient is seen today for reevaluation of a surgical wound to the abdominal wall.  She is s/p laparoscopy with excision small intestine laparascopic for bowel obstruction with Dr. Narayan on 3/8/19.  She has been packing the wound with 1/2" plain nugauze and reports it is healing but very slowly.  She reports that while it is shallower the wound itself got somewhat larger.  Her medical history is significant for type II diabetes.  She is afebrile. She denies increased redness, swelling or purulent drainage.  Her pain level is 3/10.  Review of Systems   Constitutional: Negative for chills, diaphoresis and fever.   HENT: Negative for hearing loss, postnasal drip, rhinorrhea, sinus pressure, sneezing, sore throat, tinnitus and trouble swallowing.    Eyes: Negative for visual disturbance.   Respiratory: Positive for apnea (not on CPAP). Negative for cough, shortness of breath and wheezing.    Cardiovascular: Negative for chest pain, palpitations and leg swelling.   Gastrointestinal: Positive for constipation. Negative for diarrhea, nausea and vomiting.   Genitourinary: Negative for difficulty urinating, dysuria, frequency and hematuria.   Musculoskeletal: Positive for arthralgias. Negative for back pain and joint swelling.   Skin: Positive for wound.   Neurological: Negative for dizziness, weakness, light-headedness and headaches.   Hematological: Does not bruise/bleed easily.   Psychiatric/Behavioral: Negative for confusion, decreased concentration, dysphoric mood and sleep disturbance. The patient is not nervous/anxious.        Objective:      Physical Exam   Constitutional: She is oriented to person, place, and time. She appears well-developed. No distress.   HENT:   Head: Normocephalic and atraumatic.   Neck: Normal range of motion.   Abdominal:       Musculoskeletal: Normal range of motion. She " Patient Seen in: [facility] AND [facility] Emergency Department    History   Patient presents with:  Eval-G (gynecologic)    Stated Complaint: Vaginal pain    HPI    69-year-old female presents for evaluation of genital pain.   Patient was seen in the emergency d 98%  LMP 12/20/2016        Physical Exam   Constitutional: She is oriented to person, place, and time. She appears well-developed and well-nourished. No distress. HENT:   Head: Normocephalic and atraumatic.    Eyes: Conjunctivae are normal.   Neck: Normal exhibits no edema or tenderness.   Neurological: She is alert and oriented to person, place, and time.   Skin: Skin is warm and dry. No rash noted. She is not diaphoretic. No erythema.   Psychiatric: She has a normal mood and affect. Her behavior is normal. Judgment and thought content normal.   Nursing note and vitals reviewed.      ..  Hemoglobin A1C   Date Value Ref Range Status   12/13/2018 6.5 (H) 4.0 - 5.6 % Final     Comment:     ADA Screening Guidelines:  5.7-6.4%  Consistent with prediabetes  >or=6.5%  Consistent with diabetes  High levels of fetal hemoglobin interfere with the HbA1C  assay. Heterozygous hemoglobin variants (HbS, HgC, etc)do  not significantly interfere with this assay.   However, presence of multiple variants may affect accuracy.     09/25/2018 6.3 (H) 4.0 - 5.6 % Final     Comment:     ADA Screening Guidelines:  5.7-6.4%  Consistent with prediabetes  >or=6.5%  Consistent with diabetes  High levels of fetal hemoglobin interfere with the HbA1C  assay. Heterozygous hemoglobin variants (HbS, HgC, etc)do  not significantly interfere with this assay.   However, presence of multiple variants may affect accuracy.       Assessment:       1. Delayed surgical wound healing, initial encounter    2. Type 2 diabetes mellitus without complication, without long-term current use of insulin      ..  Lab Results   Component Value Date    ALBUMIN 3.0 (L) 03/13/2019              Plan:            Pack wound with silver hydrofiber, cover with cotton gauze and secure with a mepore island dressing. Change dressing every 5-7 days and as needed if soiled.  Return to clinic in one month.

## 2019-05-20 RX ORDER — ERGOCALCIFEROL 1.25 MG/1
CAPSULE ORAL
Qty: 12 CAPSULE | Refills: 0 | Status: SHIPPED | OUTPATIENT
Start: 2019-05-20 | End: 2019-08-10 | Stop reason: SDUPTHER

## 2019-05-28 ENCOUNTER — PATIENT MESSAGE (OUTPATIENT)
Dept: ORTHOPEDICS | Facility: CLINIC | Age: 54
End: 2019-05-28

## 2019-05-29 DIAGNOSIS — R52 PAIN: Primary | ICD-10-CM

## 2019-05-31 ENCOUNTER — OFFICE VISIT (OUTPATIENT)
Dept: WOUND CARE | Facility: CLINIC | Age: 54
End: 2019-05-31
Payer: COMMERCIAL

## 2019-05-31 ENCOUNTER — PATIENT MESSAGE (OUTPATIENT)
Dept: RHEUMATOLOGY | Facility: CLINIC | Age: 54
End: 2019-05-31

## 2019-05-31 VITALS
HEART RATE: 83 BPM | WEIGHT: 213.19 LBS | TEMPERATURE: 99 F | BODY MASS INDEX: 39.23 KG/M2 | HEIGHT: 62 IN | DIASTOLIC BLOOD PRESSURE: 73 MMHG | SYSTOLIC BLOOD PRESSURE: 124 MMHG

## 2019-05-31 DIAGNOSIS — T81.89XA DELAYED SURGICAL WOUND HEALING, INITIAL ENCOUNTER: Primary | ICD-10-CM

## 2019-05-31 DIAGNOSIS — E11.9 TYPE 2 DIABETES MELLITUS WITHOUT COMPLICATION, WITHOUT LONG-TERM CURRENT USE OF INSULIN: ICD-10-CM

## 2019-05-31 PROCEDURE — 3044F PR MOST RECENT HEMOGLOBIN A1C LEVEL <7.0%: ICD-10-PCS | Mod: CPTII,S$GLB,, | Performed by: NURSE PRACTITIONER

## 2019-05-31 PROCEDURE — 99211 OFF/OP EST MAY X REQ PHY/QHP: CPT | Mod: S$GLB,,, | Performed by: NURSE PRACTITIONER

## 2019-05-31 PROCEDURE — 99999 PR PBB SHADOW E&M-EST. PATIENT-LVL IV: ICD-10-PCS | Mod: PBBFAC,,, | Performed by: NURSE PRACTITIONER

## 2019-05-31 PROCEDURE — 3074F SYST BP LT 130 MM HG: CPT | Mod: CPTII,S$GLB,, | Performed by: NURSE PRACTITIONER

## 2019-05-31 PROCEDURE — 3044F HG A1C LEVEL LT 7.0%: CPT | Mod: CPTII,S$GLB,, | Performed by: NURSE PRACTITIONER

## 2019-05-31 PROCEDURE — 3078F DIAST BP <80 MM HG: CPT | Mod: CPTII,S$GLB,, | Performed by: NURSE PRACTITIONER

## 2019-05-31 PROCEDURE — 99211 PR OFFICE/OUTPT VISIT, EST, LEVL I: ICD-10-PCS | Mod: S$GLB,,, | Performed by: NURSE PRACTITIONER

## 2019-05-31 PROCEDURE — 3078F PR MOST RECENT DIASTOLIC BLOOD PRESSURE < 80 MM HG: ICD-10-PCS | Mod: CPTII,S$GLB,, | Performed by: NURSE PRACTITIONER

## 2019-05-31 PROCEDURE — 3074F PR MOST RECENT SYSTOLIC BLOOD PRESSURE < 130 MM HG: ICD-10-PCS | Mod: CPTII,S$GLB,, | Performed by: NURSE PRACTITIONER

## 2019-05-31 PROCEDURE — 99999 PR PBB SHADOW E&M-EST. PATIENT-LVL IV: CPT | Mod: PBBFAC,,, | Performed by: NURSE PRACTITIONER

## 2019-05-31 NOTE — PROGRESS NOTES
Subjective:       Patient ID: Ailyn Ortiz is a 53 y.o. female.    Chief Complaint: Wound Check    HPI     This patient is seen today for reevaluation of a surgical wound to the abdominal wall.  She is s/p laparoscopy with excision small intestine laparascopic for bowel obstruction with Dr. Narayan on 3/8/19.  She has been packing the wound with with silver hydrofiber rope dressing and it is now healed.  Her medical history is significant for type II diabetes.  She is afebrile. She denies increased redness, swelling or purulent drainage.  Her pain level is 2/10.  Review of Systems   Constitutional: Negative for chills, diaphoresis and fever.   HENT: Negative for hearing loss, postnasal drip, rhinorrhea, sinus pressure, sneezing, sore throat, tinnitus and trouble swallowing.    Eyes: Negative for visual disturbance.   Respiratory: Positive for apnea (not on CPAP). Negative for cough, shortness of breath and wheezing.    Cardiovascular: Negative for chest pain, palpitations and leg swelling.   Gastrointestinal: Positive for constipation. Negative for diarrhea, nausea and vomiting.   Genitourinary: Negative for difficulty urinating, dysuria, frequency and hematuria.   Musculoskeletal: Positive for arthralgias. Negative for back pain and joint swelling.   Skin: Positive for wound.   Neurological: Negative for dizziness, weakness, light-headedness and headaches.   Hematological: Does not bruise/bleed easily.   Psychiatric/Behavioral: Negative for confusion, decreased concentration, dysphoric mood and sleep disturbance. The patient is not nervous/anxious.        Objective:      Physical Exam   Constitutional: She is oriented to person, place, and time. She appears well-developed. No distress.   HENT:   Head: Normocephalic and atraumatic.   Neck: Normal range of motion.   Abdominal:       Musculoskeletal: Normal range of motion. She exhibits no edema or tenderness.   Neurological: She is alert and oriented to  person, place, and time.   Skin: Skin is warm and dry. No rash noted. She is not diaphoretic. No erythema.   Psychiatric: She has a normal mood and affect. Her behavior is normal. Judgment and thought content normal.   Nursing note and vitals reviewed.      ..  Hemoglobin A1C   Date Value Ref Range Status   12/13/2018 6.5 (H) 4.0 - 5.6 % Final     Comment:     ADA Screening Guidelines:  5.7-6.4%  Consistent with prediabetes  >or=6.5%  Consistent with diabetes  High levels of fetal hemoglobin interfere with the HbA1C  assay. Heterozygous hemoglobin variants (HbS, HgC, etc)do  not significantly interfere with this assay.   However, presence of multiple variants may affect accuracy.     09/25/2018 6.3 (H) 4.0 - 5.6 % Final     Comment:     ADA Screening Guidelines:  5.7-6.4%  Consistent with prediabetes  >or=6.5%  Consistent with diabetes  High levels of fetal hemoglobin interfere with the HbA1C  assay. Heterozygous hemoglobin variants (HbS, HgC, etc)do  not significantly interfere with this assay.   However, presence of multiple variants may affect accuracy.       Assessment:       1. Delayed surgical wound healing, initial encounter    2. Type 2 diabetes mellitus without complication, without long-term current use of insulin      ..  Lab Results   Component Value Date    ALBUMIN 3.0 (L) 03/13/2019              Plan:            Return to this clinic as needed.

## 2019-06-11 ENCOUNTER — TELEPHONE (OUTPATIENT)
Dept: ORTHOPEDICS | Facility: CLINIC | Age: 54
End: 2019-06-11

## 2019-06-12 ENCOUNTER — HOSPITAL ENCOUNTER (OUTPATIENT)
Dept: RADIOLOGY | Facility: OTHER | Age: 54
Discharge: HOME | End: 2019-06-12
Attending: PHYSICIAN ASSISTANT
Payer: COMMERCIAL

## 2019-06-12 ENCOUNTER — OFFICE VISIT (OUTPATIENT)
Dept: RHEUMATOLOGY | Facility: CLINIC | Age: 54
End: 2019-06-12
Payer: COMMERCIAL

## 2019-06-12 ENCOUNTER — TELEPHONE (OUTPATIENT)
Dept: PHARMACY | Facility: CLINIC | Age: 54
End: 2019-06-12

## 2019-06-12 ENCOUNTER — OFFICE VISIT (OUTPATIENT)
Dept: ORTHOPEDICS | Facility: CLINIC | Age: 54
End: 2019-06-12
Payer: COMMERCIAL

## 2019-06-12 VITALS
BODY MASS INDEX: 40.12 KG/M2 | HEART RATE: 88 BPM | SYSTOLIC BLOOD PRESSURE: 115 MMHG | WEIGHT: 218 LBS | HEIGHT: 62 IN | DIASTOLIC BLOOD PRESSURE: 77 MMHG

## 2019-06-12 VITALS
WEIGHT: 218.69 LBS | SYSTOLIC BLOOD PRESSURE: 125 MMHG | HEART RATE: 75 BPM | DIASTOLIC BLOOD PRESSURE: 85 MMHG | HEIGHT: 62 IN | BODY MASS INDEX: 40.25 KG/M2

## 2019-06-12 DIAGNOSIS — M65.311 TRIGGER FINGER OF RIGHT THUMB: Primary | ICD-10-CM

## 2019-06-12 DIAGNOSIS — M13.80 SERONEGATIVE ARTHRITIS: Primary | ICD-10-CM

## 2019-06-12 DIAGNOSIS — R52 PAIN: ICD-10-CM

## 2019-06-12 PROCEDURE — 3008F PR BODY MASS INDEX (BMI) DOCUMENTED: ICD-10-PCS | Mod: CPTII,S$GLB,, | Performed by: INTERNAL MEDICINE

## 2019-06-12 PROCEDURE — 3008F BODY MASS INDEX DOCD: CPT | Mod: CPTII,S$GLB,, | Performed by: PHYSICIAN ASSISTANT

## 2019-06-12 PROCEDURE — 99214 OFFICE O/P EST MOD 30 MIN: CPT | Mod: S$GLB,,, | Performed by: INTERNAL MEDICINE

## 2019-06-12 PROCEDURE — 3074F SYST BP LT 130 MM HG: CPT | Mod: CPTII,S$GLB,, | Performed by: INTERNAL MEDICINE

## 2019-06-12 PROCEDURE — 99999 PR PBB SHADOW E&M-EST. PATIENT-LVL III: ICD-10-PCS | Mod: PBBFAC,,, | Performed by: INTERNAL MEDICINE

## 2019-06-12 PROCEDURE — 73130 XR HAND COMPLETE 3 VIEW RIGHT: ICD-10-PCS | Mod: 26,RT,, | Performed by: RADIOLOGY

## 2019-06-12 PROCEDURE — 3078F PR MOST RECENT DIASTOLIC BLOOD PRESSURE < 80 MM HG: ICD-10-PCS | Mod: CPTII,S$GLB,, | Performed by: PHYSICIAN ASSISTANT

## 2019-06-12 PROCEDURE — 3008F BODY MASS INDEX DOCD: CPT | Mod: CPTII,S$GLB,, | Performed by: INTERNAL MEDICINE

## 2019-06-12 PROCEDURE — 99999 PR PBB SHADOW E&M-EST. PATIENT-LVL III: CPT | Mod: PBBFAC,,, | Performed by: INTERNAL MEDICINE

## 2019-06-12 PROCEDURE — 99999 PR PBB SHADOW E&M-EST. PATIENT-LVL IV: ICD-10-PCS | Mod: PBBFAC,,, | Performed by: PHYSICIAN ASSISTANT

## 2019-06-12 PROCEDURE — 73130 X-RAY EXAM OF HAND: CPT | Mod: TC,FY,RT

## 2019-06-12 PROCEDURE — 99999 PR PBB SHADOW E&M-EST. PATIENT-LVL IV: CPT | Mod: PBBFAC,,, | Performed by: PHYSICIAN ASSISTANT

## 2019-06-12 PROCEDURE — 99213 OFFICE O/P EST LOW 20 MIN: CPT | Mod: 24,25,S$GLB, | Performed by: PHYSICIAN ASSISTANT

## 2019-06-12 PROCEDURE — 20550 NJX 1 TENDON SHEATH/LIGAMENT: CPT | Mod: 79,F5,S$GLB, | Performed by: PHYSICIAN ASSISTANT

## 2019-06-12 PROCEDURE — 3008F PR BODY MASS INDEX (BMI) DOCUMENTED: ICD-10-PCS | Mod: CPTII,S$GLB,, | Performed by: PHYSICIAN ASSISTANT

## 2019-06-12 PROCEDURE — 3078F DIAST BP <80 MM HG: CPT | Mod: CPTII,S$GLB,, | Performed by: PHYSICIAN ASSISTANT

## 2019-06-12 PROCEDURE — 3074F SYST BP LT 130 MM HG: CPT | Mod: CPTII,S$GLB,, | Performed by: PHYSICIAN ASSISTANT

## 2019-06-12 PROCEDURE — 3074F PR MOST RECENT SYSTOLIC BLOOD PRESSURE < 130 MM HG: ICD-10-PCS | Mod: CPTII,S$GLB,, | Performed by: INTERNAL MEDICINE

## 2019-06-12 PROCEDURE — 3074F PR MOST RECENT SYSTOLIC BLOOD PRESSURE < 130 MM HG: ICD-10-PCS | Mod: CPTII,S$GLB,, | Performed by: PHYSICIAN ASSISTANT

## 2019-06-12 PROCEDURE — 73130 X-RAY EXAM OF HAND: CPT | Mod: 26,RT,, | Performed by: RADIOLOGY

## 2019-06-12 PROCEDURE — 20550 PR INJECT TENDON SHEATH/LIGAMENT: ICD-10-PCS | Mod: 79,F5,S$GLB, | Performed by: PHYSICIAN ASSISTANT

## 2019-06-12 PROCEDURE — 3079F PR MOST RECENT DIASTOLIC BLOOD PRESSURE 80-89 MM HG: ICD-10-PCS | Mod: CPTII,S$GLB,, | Performed by: INTERNAL MEDICINE

## 2019-06-12 PROCEDURE — 99213 PR OFFICE/OUTPT VISIT, EST, LEVL III, 20-29 MIN: ICD-10-PCS | Mod: 24,25,S$GLB, | Performed by: PHYSICIAN ASSISTANT

## 2019-06-12 PROCEDURE — 3079F DIAST BP 80-89 MM HG: CPT | Mod: CPTII,S$GLB,, | Performed by: INTERNAL MEDICINE

## 2019-06-12 PROCEDURE — 99214 PR OFFICE/OUTPT VISIT, EST, LEVL IV, 30-39 MIN: ICD-10-PCS | Mod: S$GLB,,, | Performed by: INTERNAL MEDICINE

## 2019-06-12 RX ORDER — DEXAMETHASONE SODIUM PHOSPHATE 4 MG/ML
4 INJECTION, SOLUTION INTRA-ARTICULAR; INTRALESIONAL; INTRAMUSCULAR; INTRAVENOUS; SOFT TISSUE
Status: COMPLETED | OUTPATIENT
Start: 2019-06-12 | End: 2019-06-12

## 2019-06-12 RX ORDER — LIDOCAINE HYDROCHLORIDE 10 MG/ML
0.5 INJECTION, SOLUTION EPIDURAL; INFILTRATION; INTRACAUDAL; PERINEURAL
Status: COMPLETED | OUTPATIENT
Start: 2019-06-12 | End: 2019-06-12

## 2019-06-12 RX ORDER — ADALIMUMAB 40MG/0.8ML
40 KIT SUBCUTANEOUS
Qty: 2 PEN | Refills: 11 | Status: SHIPPED | OUTPATIENT
Start: 2019-06-12 | End: 2019-12-18

## 2019-06-12 RX ADMIN — DEXAMETHASONE SODIUM PHOSPHATE 4 MG: 4 INJECTION, SOLUTION INTRA-ARTICULAR; INTRALESIONAL; INTRAMUSCULAR; INTRAVENOUS; SOFT TISSUE at 02:06

## 2019-06-12 RX ADMIN — LIDOCAINE HYDROCHLORIDE 5 MG: 10 INJECTION, SOLUTION EPIDURAL; INFILTRATION; INTRACAUDAL; PERINEURAL at 02:06

## 2019-06-12 NOTE — PROGRESS NOTES
Chief Complaint   Patient presents with    Disease Management     seronegative RA       Patient is here for a follow up    History of presenting illness    53 year old female with seronegative rheumatoid arthritis for a follow up    Has been on mtx 8 tabs weekly/folic acid 1 mg daily   Off prednisone    Cannot tolerate mtx due to severe hair fall    So went off the mtx    Doesn't want to go back on it    Recent small bowel obstruction due to lipomas     She has a previous diagnosis of uveitis  Last eye exam looked fine,dec 2018    Recent labs     CBC nml  Pending other labs   ESR nml    Right wrist MRI : mass like area of abnormal signal and enhancement within dorsal subcutaneous fat of the wrist : inflammatory process vs cellulitis vs neoplasm/desmoid tumor    Sent her to ortho : she wants to do EMG/NCS first for CTS and both surgeries will be done at the same time    Dealing with some GI issues will get EGD soon    TAQUERIA negative  RF,CCP negative  Hepatitis b and c negative  HLAB27 negative    CRP 96.9  ESR 92    SPEP no M spike    Uric acid 2.2    Vit d 8,went upto 26  TSH normal    Ck,aldolase normal    Xrays    Hands degenerative changes  Ankles degenerative changes and bone spur  Knees degenerative changes  Feet : degenerative changes     ID clearance done  Did hepatitis A and B vaccine  Doing the shingrix vaccine    53 year old white female presented with the following symptoms :    December around Christmas she had the upper respiratory tract symptoms with myalgia and nasal congestion  Following this she started to experience severe joint pain  All her joints in the hands,elbows,knees,feet started to hurt and swell  EMS for 1 hour  Alleve helps minimally    Hands : thumbs and index fingers and the 3rd digits hurt the worse  She cant make a fist  Thinks that the left 2nd finger swells like a sausage  Swelling along the tendons as per her PCP,she says he thought she has tenosynovitis     Hips and shoulders ache  but no stiffness,good ROM in them    Cold weather makes her symptoms worse     Claims to have had labs : she had ESR,CRP elevation apparently     Past history: asthma,diabetes,JESSICA,thyroid disease,HTN  Occasional tendinitis feet b/l    Has a rash every winter,dermatology thinks it is eczema  This time started in November on the elbows,back,torso,legs,didnt get better,but derm suggested stronger steroids  Has not been told to have psoriasis     Recently had small bowel resection for lipoma    Family history : psoriasis and psoriatic arthritis runs in the family     Social history : quit smoking a while ago,drinks occasionally     Review of Systems   Constitutional: Negative for activity change, appetite change, chills, diaphoresis, fatigue, fever and unexpected weight change.   HENT: Negative for congestion, dental problem, drooling, ear discharge, ear pain, facial swelling, hearing loss, mouth sores, nosebleeds, postnasal drip, rhinorrhea, sinus pressure, sinus pain, sneezing, sore throat, tinnitus, trouble swallowing and voice change.    Eyes: Negative for photophobia, pain, discharge, redness, itching and visual disturbance.   Respiratory: Negative for apnea, cough, choking, chest tightness, shortness of breath, wheezing and stridor.    Cardiovascular: Negative for chest pain, palpitations and leg swelling.   Gastrointestinal: Negative for abdominal distention, abdominal pain, anal bleeding, blood in stool, constipation, diarrhea, nausea, rectal pain and vomiting.   Endocrine: Negative for cold intolerance, heat intolerance, polydipsia, polyphagia and polyuria.   Genitourinary: Negative for decreased urine volume, difficulty urinating, dysuria, enuresis, flank pain, frequency, genital sores, hematuria and urgency.   Musculoskeletal: Negative for back pain, gait problem, myalgias, neck pain and neck stiffness.   Skin: Negative for color change, pallor, rash and wound.   Allergic/Immunologic: Negative for  environmental allergies, food allergies and immunocompromised state.   Neurological: Negative for dizziness, tremors, seizures, syncope, facial asymmetry, speech difficulty, weakness, light-headedness, numbness and headaches.   Hematological: Negative for adenopathy. Does not bruise/bleed easily.   Psychiatric/Behavioral: Negative for agitation, behavioral problems, confusion, decreased concentration, dysphoric mood, hallucinations, self-injury, sleep disturbance and suicidal ideas. The patient is not nervous/anxious and is not hyperactive.        Physical Exam     MARTE-28 tender joint count: 0  MARTE-28 swollen joint count: 0     Right wrist has a huge swelling which has clear borders,harder than soft     Left thumb triggering,tender flexor tendon noted     Physical Exam   Constitutional: She is oriented to person, place, and time and well-developed, well-nourished, and in no distress. No distress.   HENT:   Head: Normocephalic.   Mouth/Throat: Oropharynx is clear and moist.   Eyes: Conjunctivae are normal. Pupils are equal, round, and reactive to light. Right eye exhibits no discharge. Left eye exhibits no discharge. No scleral icterus.   Neck: Normal range of motion. No thyromegaly present.   Cardiovascular: Normal rate, regular rhythm, normal heart sounds and intact distal pulses.    Pulmonary/Chest: Effort normal and breath sounds normal. No stridor.   Abdominal: Soft. Bowel sounds are normal.   Lymphadenopathy:     She has no cervical adenopathy.   Neurological: She is alert and oriented to person, place, and time.   Skin: Skin is warm. No rash noted. She is not diaphoretic.     Psychiatric: Affect and judgment normal.   Musculoskeletal: She exhibits tenderness.         Assessment     53 year old white female presented to me with 1 month of polyarthralgias in the hands,elbows,knees and feet : s/p URT symptoms during [place] : is also s/p bowel resection for a lipoma : reported swelling in addition to pain and  stiffness in her joints : significant EMS : symmetric involvement : in addition reported to having dactylitis   On exam : synovitis noted in many joints,no evidence of dactylitis.    She has inflammatory arthritis,seronegative,unclear yet if she has psoriatic arthritis since the rash might be eczema vs psoriasis  Reactive arthritis possible too    Labs revealed very high inflammatory markers with negative RA and TAQUERIA and HLAB27 panel  Xrays with degenerative arthritis     She did well on prednisone  I then introduced mtx 5 tabs weekly and tapered her prednisone   Then titrated mtx to 8 tabs weekly  She cannot tolerate the mtx     She has ongoing joint pains     She has a diagnosis of uveitis   But last eye exam normal    New small bowel obstruction  Due to lipomas   She has recurrent lipomas this is the second SBO due to lipomas      F/u problem    1. Seronegative arthritis          Plan    D/c mtx  She cant take the low dose mtx either  It will cause severe hair fall    Initiate humira     ID follow ups for the hepatitis and shingrix vaccines    Vit d maintanence    Right wrist f/u as per ortho    Ailyn was seen today for disease management.    Diagnoses and all orders for this visit:    Seronegative arthritis  -     adalimumab (HUMIRA PEN) 40 mg/0.8 mL PnKt; Inject 1 pen (40 mg total) into the skin every 14 (fourteen) days.        rtc in 3 months

## 2019-06-12 NOTE — PROGRESS NOTES
"Subjective:      Patient ID: Ailyn Ortiz is a 53 y.o. female.    Chief Complaint: Pain of the Right Hand      HPI  Ailyn Ortiz is a right hand dominant 53 y.o. female presenting today for right thumb pain and triggering.  There was not a history of trauma.  Onset of symptoms began 4-6 weeks ago.  She reports that it triggers "every time that the thumb bends."  She says that it is painful with the thumb triggers.  She is requesting a steroid injection into the right trigger thumb today.  She also reports stiffness in the hands, intermittent.    She is status post right carpal tunnel release 6 months ago, also status post left thumb A1 pulley release 8 weeks ago.  She does have a history of diabetes as well as rheumatoid arthritis. She has left carpal tunnel syndrome, intermittently bothersome.      Review of patient's allergies indicates:   Allergen Reactions    Nuts [tree nut] Other (See Comments)     Nasal Congestion    Avelox [moxifloxacin] Rash         Current Outpatient Medications   Medication Sig Dispense Refill    acetaminophen-codeine 300-30mg (TYLENOL #3) 300-30 mg Tab Take 1 tablet by mouth every 6 (six) hours as needed. 25 tablet 0    adalimumab (HUMIRA PEN) 40 mg/0.8 mL PnKt Inject 1 pen (40 mg total) into the skin every 14 (fourteen) days. 2 pen 11    atorvastatin (LIPITOR) 20 MG tablet Take 20 mg by mouth once daily.       cholecalciferol, vitamin D3, 50,000 unit capsule Take 50,000 Units by mouth once a week.      ergocalciferol (ERGOCALCIFEROL) 50,000 unit Cap TAKE 1 CAPSULE (50,000 UNITS TOTAL) BY MOUTH EVERY 7 DAYS. 12 capsule 0    fluconazole (DIFLUCAN) 200 MG Tab fluconazole 200 mg tablet      fluticasone (FLONASE) 50 mcg/actuation nasal spray 1 spray by Each Nare route once daily.      folic acid (FOLVITE) 1 MG tablet Take 1 tablet (1 mg total) by mouth 3 (three) times daily. 270 tablet 2    INVOKANA 300 mg Tab tablet Take 300 mg by mouth once daily.       levothyroxine " (SYNTHROID) 100 MCG tablet Take 100 mcg by mouth before breakfast.       loratadine (CLARITIN) 10 mg tablet Take 10 mg by mouth once daily.      losartan-hydrochlorothiazide 100-12.5 mg (HYZAAR) 100-12.5 mg Tab Take 1 tablet by mouth once daily.       omeprazole (PRILOSEC) 40 MG capsule Take 40 mg by mouth every morning.       triamcinolone acetonide 0.1% (KENALOG) 0.1 % cream AAA BL lower legs bid/ prn for flares 454 g 0    TRULICITY 1.5 mg/0.5 mL PnIj Inject into the skin once a week.       varicella-zoster gE-AS01B, PF, (SHINGRIX) 50 mcg/0.5 mL injection take as directed. 0.5 mL 0    methotrexate 2.5 MG Tab TAKE 8 TABLETS BY MOUTH WEEKLY 120 tablet 3     Current Facility-Administered Medications   Medication Dose Route Frequency Provider Last Rate Last Dose    dexamethasone injection 4 mg  4 mg Other 1 time in Clinic/[facility] DEANDRE Miller        lidocaine (PF) 10 mg/ml (1%) injection 5 mg  0.5 mL Other 1 time in Clinic/[facility] DEANDRE Miller         Facility-Administered Medications Ordered in Other Visits   Medication Dose Route Frequency Provider Last Rate Last Dose    0.9%  NaCl infusion   Intravenous Continuous David Chisholm MD        mupirocin 2 % ointment   Nasal On Call Procedure David Chisholm MD           Past Medical History:   Diagnosis Date    Arthritis     zero negative imflammatory arthritis    Asthma     Diabetes mellitus, type 2 since the age of 42    Hypertension since the age of 42    JESSICA (obstructive sleep apnea)     S/P UPP    Thyroid disease        Past Surgical History:   Procedure Laterality Date    ADENOIDECTOMY       SECTION      times 2    CHOLECYSTECTOMY      EXCISION, SMALL INTESTINE, LAPAROSCOPIC  3/8/2019    [facility] 2ND [facility]    GANGLION CYST EXCISION      LAPAROSCOPY, DIAGNOSTIC N/A 3/8/2019    Per[facility] at [facility] OR 2ND [facility]LR    LAPAROSCOPY-DIAGNOSTIC w/ small  "bowel resection N/A 1/8/2018    [facility] 2ND FLR    RELEASE, CARPAL TUNNEL right Right 12/26/2018    Per[facility] at [facility] OR 1ST FLR    RELEASE, TRIGGER FINGER left thumb Left 4/15/2019    [facility] at [facility] OR    RESECTION-SMALL BOWEL-LAPAROSCOPIC  1/8/2018    Performed [facility] at [facility] OR 2ND FLR    small bowel resection (lipoma)      TONSILLECTOMY      TUBAL LIGATION      UVULOPALATOPHARYNGOPLASTY           Review of Systems:  Review of Systems   Constitution: Negative for chills and fever.   Skin: Negative for rash and suspicious lesions.   Musculoskeletal:        See HPI   Neurological: Negative for dizziness, headaches and light-headedness.   Psychiatric/Behavioral: Negative for depression. The patient is not nervous/anxious.          OBJECTIVE:     PHYSICAL EXAM:  Height: 5' 2" (157.5 cm) Weight: 98.9 kg (218 lb)  Vitals:    06/12/19 1335   BP: 115/77   Pulse: 88   Weight: 98.9 kg (218 lb)   Height: 5' 2" (1.575 m)   PainSc:   4     General    Vitals reviewed.  Constitutional: She is oriented to person, place, and time. She appears well-developed and well-nourished.   HENT:   Head: Normocephalic and atraumatic.   Neck: Normal range of motion.   Cardiovascular: Normal rate.    Pulmonary/Chest: Effort normal. No respiratory distress.   Neurological: She is alert and oriented to person, place, and time.   Psychiatric: She has a normal mood and affect. Her behavior is normal. Judgment and thought content normal.             Musculoskeletal:  Well-healed surgical scar right palm and base of left thumb.  No edema appreciated.  She is tender palpation over the A1 pulley of the right thumb, otherwise nontender to palpation.  Palpable thickening at the base of the right thumb near the A1 pulley, no triggering on exam today. Good finger and wrist range of motion. Neurovascularly intact-good sensation " motor function, good capillary refill, 2+ radial pulses.    RADIOGRAPHS:  Right Hand XRay, 6/12/19  FINDINGS:  No acute fracture or dislocation.  Joint spaces are relatively well maintained.  No radiopaque foreign bodies.      Impression     No acute findings.     Comments: I have personally reviewed the imaging and I agree with the above radiologist's report.    ASSESSMENT/PLAN:   Ailyn was seen today for pain.    Diagnoses and all orders for this visit:    Trigger finger of right thumb    Other orders  -     dexamethasone injection 4 mg  -     lidocaine (PF) 10 mg/ml (1%) injection 5 mg           - We talked at length about the anatomy and pathophysiology of   Encounter Diagnosis   Name Primary?    Trigger finger of right thumb Yes       - discussed conservative and surgical treatment options for trigger thumb, patient previously underwent left trigger thumb A1 pulley release due to the left trigger thumb not resolving with steroid injections. Patient is interested in a right thumb trigger thumb injection today.  - follow-up in 6-8 weeks  - discussed nighttime splinting for trigger thumb, discussed massage.  - call with questions or concerns    PROCEDURE NOTE:  I have explained the risks, benefits, and alternatives of the procedure in detail.  The patient voices understanding and all questions have been answered, consents to injection. Pause for timeout.  After a steril prep of the skin in the normal fashion, the level of the A-1 pulley of the right thumb is injected using a 25 gauge needle from the volar approach with a  combination of 0.5 cc 1% plain Lidocaine and 4 mg of dexamethasone.  The patient is cautioned and immediate relief of pain is secondary to the local anesthetic and will be temporary.  After the anesthetic wears off there may be a increase in pain that may last for a few hours or a few days and they should use ice to help alleviate this flair up of pain.       Disclaimer: This note has been  generated using voice-recognition software. There may be typographical errors that have been missed during proof-reading.

## 2019-06-12 NOTE — TELEPHONE ENCOUNTER
Informed Patient  that [facility] received prescription for Humira and prior authorization is required.  [facility] will be back in touch once insurance determination is received.

## 2019-06-17 NOTE — TELEPHONE ENCOUNTER
DOCUMENTATION ONLY:     Prior Authorization for Humira approved from 6/17/19 to 6/17/21.   Case ID# [id number]    Co-Pay: $0.00    Patient Assistance IS NOT required.     Forward to clinical pharmacist for consult & shipment.      KRISTEL

## 2019-06-21 ENCOUNTER — TELEPHONE (OUTPATIENT)
Dept: SURGERY | Facility: CLINIC | Age: 54
End: 2019-06-21

## 2019-06-21 NOTE — TELEPHONE ENCOUNTER
----- Message from tSone Mei MD sent at 6/21/2019  3:05 PM CDT -----  Our expert doesn't suggest further work up for your small bowel problems.

## 2019-06-21 NOTE — TELEPHONE ENCOUNTER
"Spoke with patient regarding Patho results no further work is required.     "patient stated, okay, I guess we will see what happens from here if another pops up, I sure hope not"    No further questions or concerns noted during call.   "

## 2019-06-27 ENCOUNTER — TELEPHONE (OUTPATIENT)
Dept: PHARMACY | Facility: CLINIC | Age: 54
End: 2019-06-27

## 2019-06-27 ENCOUNTER — PATIENT MESSAGE (OUTPATIENT)
Dept: PHARMACY | Facility: CLINIC | Age: 54
End: 2019-06-27

## 2019-06-27 NOTE — TELEPHONE ENCOUNTER
Humira initial consult / shipment complete on 19.  Pt confirmed shipping of Humira on  for delivery on .  Pt address and  verified.  $0.00 copay in 004.  Pt plans to inject on .  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  Furthermore, she states that she injects herself with Trulicity and her daughter with Humira.  We discussed possible side effects such as injection site reactions and the increased risk of infections.  Holding doses when sick and handling missed doses was reviewed.  Pt voiced understanding.  Pt is currently reporting pain that is 4/10.  She states that her thumbs are very sore.  She also indicated that she has pain in her knees and feet.  Pt use to take MTX, but stopped in 2019 due to an infection.  She had an abdominal incision that became infected in March.  The infection is now completely cleared.  Pt reports morning stiffness that lasts 1 hour.  [facility] refill procedure and welcome packet reviewed.  I will follow up with the pt 1 week after 1st injection.  Pt had no further questions or concerns.

## 2019-07-11 ENCOUNTER — HOSPITAL ENCOUNTER (EMERGENCY)
Facility: HOSPITAL | Age: 54
Discharge: HOME | End: 2019-07-11
Attending: EMERGENCY MEDICINE
Payer: COMMERCIAL

## 2019-07-11 VITALS
WEIGHT: 215 LBS | TEMPERATURE: 99 F | OXYGEN SATURATION: 96 % | RESPIRATION RATE: 20 BRPM | SYSTOLIC BLOOD PRESSURE: 130 MMHG | HEART RATE: 83 BPM | BODY MASS INDEX: 39.56 KG/M2 | HEIGHT: 62 IN | DIASTOLIC BLOOD PRESSURE: 80 MMHG

## 2019-07-11 DIAGNOSIS — R10.9 INTERMITTENT ABDOMINAL PAIN: Primary | ICD-10-CM

## 2019-07-11 DIAGNOSIS — K76.0 HEPATIC STEATOSIS: ICD-10-CM

## 2019-07-11 LAB
ALBUMIN SERPL BCP-MCNC: 4.6 G/DL (ref 3.5–5.2)
ALP SERPL-CCNC: 120 U/L (ref 55–135)
ALT SERPL W/O P-5'-P-CCNC: 22 U/L (ref 10–44)
ANION GAP SERPL CALC-SCNC: 12 MMOL/L (ref 8–16)
AST SERPL-CCNC: 17 U/L (ref 10–40)
B-HCG UR QL: NEGATIVE
BACTERIA #/AREA URNS AUTO: NORMAL /HPF
BASOPHILS # BLD AUTO: 0.06 K/UL (ref 0–0.2)
BASOPHILS NFR BLD: 0.8 % (ref 0–1.9)
BILIRUB SERPL-MCNC: 1 MG/DL (ref 0.1–1)
BILIRUB UR QL STRIP: NEGATIVE
BUN SERPL-MCNC: 16 MG/DL (ref 6–20)
CALCIUM SERPL-MCNC: 10 MG/DL (ref 8.7–10.5)
CHLORIDE SERPL-SCNC: 104 MMOL/L (ref 95–110)
CLARITY UR REFRACT.AUTO: CLEAR
CO2 SERPL-SCNC: 24 MMOL/L (ref 23–29)
COLOR UR AUTO: YELLOW
CREAT SERPL-MCNC: 0.8 MG/DL (ref 0.5–1.4)
CTP QC/QA: YES
DIFFERENTIAL METHOD: ABNORMAL
EOSINOPHIL # BLD AUTO: 0.3 K/UL (ref 0–0.5)
EOSINOPHIL NFR BLD: 3.6 % (ref 0–8)
ERYTHROCYTE [DISTWIDTH] IN BLOOD BY AUTOMATED COUNT: 13.2 % (ref 11.5–14.5)
EST. GFR  (AFRICAN AMERICAN): >60 ML/MIN/1.73 M^2
EST. GFR  (NON AFRICAN AMERICAN): >60 ML/MIN/1.73 M^2
GLUCOSE SERPL-MCNC: 108 MG/DL (ref 70–110)
GLUCOSE UR QL STRIP: ABNORMAL
HCT VFR BLD AUTO: 43.9 % (ref 37–48.5)
HGB BLD-MCNC: 14.5 G/DL (ref 12–16)
HGB UR QL STRIP: NEGATIVE
IMM GRANULOCYTES # BLD AUTO: 0.02 K/UL (ref 0–0.04)
IMM GRANULOCYTES NFR BLD AUTO: 0.3 % (ref 0–0.5)
KETONES UR QL STRIP: NEGATIVE
LACTATE SERPL-SCNC: 1.1 MMOL/L (ref 0.5–2.2)
LEUKOCYTE ESTERASE UR QL STRIP: NEGATIVE
LIPASE SERPL-CCNC: 17 U/L (ref 4–60)
LYMPHOCYTES # BLD AUTO: 1.9 K/UL (ref 1–4.8)
LYMPHOCYTES NFR BLD: 26.2 % (ref 18–48)
MCH RBC QN AUTO: 28.2 PG (ref 27–31)
MCHC RBC AUTO-ENTMCNC: 33 G/DL (ref 32–36)
MCV RBC AUTO: 85 FL (ref 82–98)
MICROSCOPIC COMMENT: NORMAL
MONOCYTES # BLD AUTO: 0.7 K/UL (ref 0.3–1)
MONOCYTES NFR BLD: 10 % (ref 4–15)
NEUTROPHILS # BLD AUTO: 4.3 K/UL (ref 1.8–7.7)
NEUTROPHILS NFR BLD: 59.1 % (ref 38–73)
NITRITE UR QL STRIP: NEGATIVE
NRBC BLD-RTO: 0 /100 WBC
PH UR STRIP: 5 [PH] (ref 5–8)
PLATELET # BLD AUTO: 298 K/UL (ref 150–350)
PMV BLD AUTO: 8.9 FL (ref 9.2–12.9)
POTASSIUM SERPL-SCNC: 3.4 MMOL/L (ref 3.5–5.1)
PROT SERPL-MCNC: 7.8 G/DL (ref 6–8.4)
PROT UR QL STRIP: NEGATIVE
RBC # BLD AUTO: 5.15 M/UL (ref 4–5.4)
RBC #/AREA URNS AUTO: 2 /HPF (ref 0–4)
SODIUM SERPL-SCNC: 140 MMOL/L (ref 136–145)
SP GR UR STRIP: 1.03 (ref 1–1.03)
SQUAMOUS #/AREA URNS AUTO: 1 /HPF
URN SPEC COLLECT METH UR: ABNORMAL
WBC # BLD AUTO: 7.22 K/UL (ref 3.9–12.7)
WBC #/AREA URNS AUTO: 1 /HPF (ref 0–5)
YEAST UR QL AUTO: NORMAL

## 2019-07-11 PROCEDURE — 99284 PR EMERGENCY DEPT VISIT,LEVEL IV: ICD-10-PCS | Mod: ,,, | Performed by: EMERGENCY MEDICINE

## 2019-07-11 PROCEDURE — 83690 ASSAY OF LIPASE: CPT

## 2019-07-11 PROCEDURE — 25500020 PHARM REV CODE 255: Performed by: EMERGENCY MEDICINE

## 2019-07-11 PROCEDURE — 80053 COMPREHEN METABOLIC PANEL: CPT

## 2019-07-11 PROCEDURE — 99284 EMERGENCY DEPT VISIT MOD MDM: CPT | Mod: ,,, | Performed by: EMERGENCY MEDICINE

## 2019-07-11 PROCEDURE — 99285 EMERGENCY DEPT VISIT HI MDM: CPT | Mod: 25

## 2019-07-11 PROCEDURE — 96361 HYDRATE IV INFUSION ADD-ON: CPT

## 2019-07-11 PROCEDURE — 83605 ASSAY OF LACTIC ACID: CPT

## 2019-07-11 PROCEDURE — 81025 URINE PREGNANCY TEST: CPT | Performed by: EMERGENCY MEDICINE

## 2019-07-11 PROCEDURE — 81001 URINALYSIS AUTO W/SCOPE: CPT

## 2019-07-11 PROCEDURE — 25000003 PHARM REV CODE 250: Performed by: EMERGENCY MEDICINE

## 2019-07-11 PROCEDURE — 85025 COMPLETE CBC W/AUTO DIFF WBC: CPT

## 2019-07-11 PROCEDURE — 96360 HYDRATION IV INFUSION INIT: CPT

## 2019-07-11 RX ADMIN — IOHEXOL 100 ML: 350 INJECTION, SOLUTION INTRAVENOUS at 05:07

## 2019-07-11 RX ADMIN — SODIUM CHLORIDE 1000 ML: 0.9 INJECTION, SOLUTION INTRAVENOUS at 04:07

## 2019-07-11 NOTE — DISCHARGE INSTRUCTIONS
CT Abdomen Pelvis With Contrast (Final result)   Result time 07/11/19 17:38:41   Final result by Tavon Bailon MD (07/11/19 17:38:41)                Impression:      1. Fluid-filled small bowel loops, some of which upper limit of normal in caliber.  Postsurgical change noted of mid to distal small bowel loops, noting there is some decompression of distal small bowel loops.  Previous intussusception no longer identified.  Adhesion may be present, particularly in the region of the anterior abdominal wall, no findings to suggest high-grade obstruction, or pneumatosis at this time although correlation is advised.  There is liquid stool within the large bowel, diarrheal illness is a consideration.  2. Hepatic steatosis, correlation with LFTs recommended.  3. Please see above for several additional findings.      Electronically signed by: Tavon Bailon MD  Date: 07/11/2019  Time: 17:38            Narrative:    EXAMINATION:  CT ABDOMEN PELVIS WITH CONTRAST    CLINICAL HISTORY:  Bowel obstruction, high-grade;    TECHNIQUE:  Low dose axial images, sagittal and coronal reformations were obtained from the lung bases to the pubic symphysis following the IV administration of 100 mL of Omnipaque 350 .  Oral contrast was not given.    COMPARISON:  03/06/2019    FINDINGS:  Images of the lower thorax are unremarkable.    The liver is hypoattenuating, suggesting steatosis, correlation with LFTs recommended.  The spleen, pancreas and adrenal glands are unremarkable.  The gallbladder is surgically absent.  There is no biliary dilation or ascites.  The pancreatic duct is not dilated.  The portal vein, splenic vein, SMV, celiac axis and SMA all are patent.  No significant abdominal lymphadenopathy.    The kidneys enhance symmetrically and excrete contrast appropriately without hydronephrosis or nephrolithiasis.  There is a punctate focus of hypoattenuation within the interpolar region of the right kidney, too small for  characterization.  The bilateral ureters are unremarkable without calculi seen.  The urinary bladder is decompressed, limiting evaluation.  The uterus and adnexa is unremarkable.    There are a few scattered colonic diverticula, without surrounding inflammation.  There is liquid stool within the transverse and right colon.  The terminal ileum and appendix are unremarkable.  There are several fluid-filled small bowel loops throughout the abdomen and pelvis, several of which are upper limit of normal in caliber.  Surgical change noted of the small bowel within the right anterior abdomen, these loops protrude into a region of rectus abdominus diastasis.  Distal to this, the bowel loops are partially decompressed suggesting stenosis in the region, possibly on the basis of adhesions.  No findings to suggest high-grade obstruction.  No free air or pneumatosis.  No focal organized pelvic fluid collection.  There is surgical change of additional proximal small bowel loops, similar to the previous exam.    Degenerative changes are noted of the spine.  There is atherosclerotic calcification of the aorta and its branches.  No significant inguinal lymphadenopathy.  Surgical changes are noted of the anterior abdominal wall.

## 2019-07-11 NOTE — ED PROVIDER NOTES
"Encounter Date: 2019    SCRIBE #1 NOTE: I, Johana Petergabriela, am scribing for, and in the presence of,  Dr. Finney. I have scribed the following portions of the note - Other sections scribed: HPI, ROS, PE.       History     Chief Complaint   Patient presents with    Abdominal Pain     Pt c/o abdominal pain, nausea & diarrhea.  States "I think I may have another bowel obstruction".  pt sent by her MD to have CT scan     53 year old female with a past medical history of small bowel resection (x2), type two diabetes, and hypertension with complaints of abdominal pain. The patient reports that her RUE abdominal pain began a few days ago and is similar to when she had two bowel obstructions in the past (2018 and 2019). The patient endorses diarrhea, nausea, and bloating. Patient denies hematochezia and states that her stool is extremely watery. Patient denies vomiting, fever, cough, shortness of breath, chest pain, or dysuria. The patients available PMH, PSH, Social History, medications, allergies, and triage vital signs were reviewed just prior to their medical evaluation.        The history is provided by the patient and medical records.     Review of patient's allergies indicates:   Allergen Reactions    Nuts [tree nut] Other (See Comments)     Nasal Congestion    Avelox [moxifloxacin] Rash     Past Medical History:   Diagnosis Date    Arthritis     zero negative imflammatory arthritis    Asthma     Diabetes mellitus, type 2 since the age of 42    Hypertension since the age of 42    JESSICA (obstructive sleep apnea)     S/P UPP    Thyroid disease      Past Surgical History:   Procedure Laterality Date    ADENOIDECTOMY       SECTION      times 2    CHOLECYSTECTOMY      EXCISION, SMALL INTESTINE, LAPAROSCOPIC  3/8/2019    [facility] 2ND [facility]    GANGLION CYST EXCISION      LAPAROSCOPY, DIAGNOSTIC N/A 3/8/2019    Performed by Stone [facility]i MD at " [facility] OR 2ND FLR    LAPAROSCOPY-DIAGNOSTIC w/ small bowel resection N/A 2018    [facility]formed [facility] at [facility] OR 2ND [facility]LR    RELEASE, CARPAL TUNNEL right Right 2018    [facility]ary [facility] at [facility] OR 1ST FLR    RELEASE, TRIGGER FINGER left thumb Left 4/15/2019    [facility]    RESECTION-SMALL BOWEL-LAPAROSCOPIC  2018    Performed [facility] at [facility] OR 2ND [facility]LR    small bowel resection (lipoma)      TONSILLECTOMY      TUBAL LIGATION      UVULOPALATOPHARYNGOPLASTY       Family History   Problem Relation Age of Onset    Hypertension Father     Diabetes Father     Heart disease Maternal Grandfather         CAD    Heart disease Paternal Grandmother         CAD    Diabetes Paternal Grandmother     Heart disease Paternal Grandfather         CAD     Social History     Tobacco Use    Smoking status: Former Smoker     Packs/day: 0.00     Years: 20.00     Pack years: 0.00     Types: Cigarettes     Last attempt to quit: 12/15/2013     Years since quittin.5    Smokeless tobacco: Never Used   Substance Use Topics    Alcohol use: Yes     Comment: occasionally    Drug use: No     Review of Systems   Constitutional: Negative for fever.   HENT: Negative for sore throat.    Eyes: Negative for visual disturbance.   Respiratory: Negative for cough and shortness of breath.    Cardiovascular: Negative for chest pain.   Gastrointestinal: Positive for abdominal pain, diarrhea and nausea. Negative for vomiting.   Genitourinary: Negative for dysuria.   Musculoskeletal: Negative for neck pain.   Skin: Negative for rash and wound.   Allergic/Immunologic: Negative for immunocompromised state.   Neurological: Negative for syncope.   Psychiatric/Behavioral: Negative for confusion.   All other systems reviewed and are negative.      Physical Exam     Initial Vitals [19 1416]   BP Pulse Resp Temp SpO2   137/83 86 16 98.4  °F (36.9 °C) 98 %      MAP       --         Physical Exam    Nursing note and vitals reviewed.  Constitutional: She appears well-developed and well-nourished. She is not diaphoretic. No distress.   HENT:   Head: Normocephalic and atraumatic.   Nose: Nose normal.   Eyes: Conjunctivae are normal. Right eye exhibits no discharge. Left eye exhibits no discharge.   Neck: Normal range of motion. Neck supple.   Cardiovascular: Normal rate, regular rhythm and normal heart sounds. Exam reveals no gallop and no friction rub.    No murmur heard.  Pulmonary/Chest: Breath sounds normal. No respiratory distress. She has no wheezes. She has no rhonchi. She has no rales.   Abdominal: Soft. She exhibits no distension. There is tenderness (RUE to palpation.). There is no rebound and no guarding.   Musculoskeletal: Normal range of motion. She exhibits no edema or tenderness.   Neurological: She is alert and oriented to person, place, and time. GCS score is 15. GCS eye subscore is 4. GCS verbal subscore is 5. GCS motor subscore is 6.   Skin: Skin is warm and dry. No rash noted. No erythema.   Psychiatric: She has a normal mood and affect. Her behavior is normal. Judgment and thought content normal.         ED Course   Procedures  Labs Reviewed   CBC W/ AUTO DIFFERENTIAL - Abnormal; Notable for the following components:       Result Value    MPV 8.9 (*)     All other components within normal limits   COMPREHENSIVE METABOLIC PANEL - Abnormal; Notable for the following components:    Potassium 3.4 (*)     All other components within normal limits   URINALYSIS, REFLEX TO URINE CULTURE - Abnormal; Notable for the following components:    Glucose, UA 3+ (*)     All other components within normal limits    Narrative:     Preferred Collection Type->Urine, Clean Catch   LIPASE   LACTIC ACID, PLASMA   URINALYSIS MICROSCOPIC    Narrative:     Preferred Collection Type->Urine, Clean Catch   POCT URINE PREGNANCY          Imaging:  CT pending at  turnover     Medical Decision Making:   History:   Old Medical Records: I decided to obtain old medical records.  Clinical Tests:   Lab Tests: Ordered and Reviewed  Radiological Study: Ordered  ED Management:  53-year-old female with a history of lipoma on her bowel and intussusception presents with nausea, vomiting, and diarrhea.  She says the symptoms are similar to her prior obstruction.  Vitals normal. Physical exam with mild right-sided abdominal pain. Labs unremarkable. CT ordered.  Results pending at shift change.  Discussed with Dr. Santamaria who will f/up and dispo.  Did bedside teaching.  All questions answered.  Patient acknowledges understanding.  Other:   I have discussed this case with another health care provider.            Scribe Attestation:   Scribe #1: I performed the above scribed service and the documentation accurately describes the services I performed. I attest to the accuracy of the note.               Clinical Impression:       ICD-10-CM ICD-9-CM   1. Intermittent abdominal pain R10.9 789.00   2. Hepatic steatosis K76.0 571.8         Disposition:   Disposition: Other  Condition: Stable                        Simeon Finney MD  07/12/19 1640

## 2019-07-11 NOTE — ED TRIAGE NOTES
Patient in from home for eval of abdominal pain, nausea, vomiting, and diarrhea x 2 weeks. Patient has a history of SBO and called her GI doctor today requesting a CT scan - was told to come to the ED for evaluation. Patient reports symptoms are similar to her previous SBO with nausea, vomiting, and liquid stool. Patient denies any chest pain, SOB, fever, swelling, or chills.

## 2019-07-19 ENCOUNTER — HOSPITAL ENCOUNTER (OUTPATIENT)
Dept: RADIOLOGY | Facility: OTHER | Age: 54
Discharge: HOME | End: 2019-07-19
Attending: INTERNAL MEDICINE
Payer: COMMERCIAL

## 2019-07-19 DIAGNOSIS — R93.89 ABNORMAL CT SCAN: ICD-10-CM

## 2019-07-19 PROCEDURE — 74250 FL SMALL BOWEL FOLLOW THROUGH: ICD-10-PCS | Mod: 26,,, | Performed by: RADIOLOGY

## 2019-07-19 PROCEDURE — 74250 X-RAY XM SM INT 1CNTRST STD: CPT | Mod: 26,,, | Performed by: RADIOLOGY

## 2019-07-19 PROCEDURE — 74250 X-RAY XM SM INT 1CNTRST STD: CPT | Mod: TC,FY

## 2019-08-07 ENCOUNTER — PATIENT MESSAGE (OUTPATIENT)
Dept: RHEUMATOLOGY | Facility: CLINIC | Age: 54
End: 2019-08-07

## 2019-08-07 ENCOUNTER — OFFICE VISIT (OUTPATIENT)
Dept: ORTHOPEDICS | Facility: CLINIC | Age: 54
End: 2019-08-07
Payer: COMMERCIAL

## 2019-08-07 VITALS
HEIGHT: 62 IN | DIASTOLIC BLOOD PRESSURE: 81 MMHG | SYSTOLIC BLOOD PRESSURE: 116 MMHG | HEART RATE: 80 BPM | BODY MASS INDEX: 39.56 KG/M2 | WEIGHT: 215 LBS

## 2019-08-07 DIAGNOSIS — M65.311 TRIGGER FINGER OF RIGHT THUMB: Primary | ICD-10-CM

## 2019-08-07 PROCEDURE — 76942 PR U/S GUIDANCE FOR NEEDLE GUIDANCE: ICD-10-PCS | Mod: 26,S$GLB,, | Performed by: PHYSICIAN ASSISTANT

## 2019-08-07 PROCEDURE — 99213 OFFICE O/P EST LOW 20 MIN: CPT | Mod: 25,S$GLB,, | Performed by: PHYSICIAN ASSISTANT

## 2019-08-07 PROCEDURE — 3008F BODY MASS INDEX DOCD: CPT | Mod: CPTII,S$GLB,, | Performed by: PHYSICIAN ASSISTANT

## 2019-08-07 PROCEDURE — 20550 PR INJECT TENDON SHEATH/LIGAMENT: ICD-10-PCS | Mod: F5,S$GLB,, | Performed by: PHYSICIAN ASSISTANT

## 2019-08-07 PROCEDURE — 76942 ECHO GUIDE FOR BIOPSY: CPT | Mod: 26,S$GLB,, | Performed by: PHYSICIAN ASSISTANT

## 2019-08-07 PROCEDURE — 3008F PR BODY MASS INDEX (BMI) DOCUMENTED: ICD-10-PCS | Mod: CPTII,S$GLB,, | Performed by: PHYSICIAN ASSISTANT

## 2019-08-07 PROCEDURE — 3074F PR MOST RECENT SYSTOLIC BLOOD PRESSURE < 130 MM HG: ICD-10-PCS | Mod: CPTII,S$GLB,, | Performed by: PHYSICIAN ASSISTANT

## 2019-08-07 PROCEDURE — 3079F DIAST BP 80-89 MM HG: CPT | Mod: CPTII,S$GLB,, | Performed by: PHYSICIAN ASSISTANT

## 2019-08-07 PROCEDURE — 99213 PR OFFICE/OUTPT VISIT, EST, LEVL III, 20-29 MIN: ICD-10-PCS | Mod: 25,S$GLB,, | Performed by: PHYSICIAN ASSISTANT

## 2019-08-07 PROCEDURE — 99999 PR PBB SHADOW E&M-EST. PATIENT-LVL IV: CPT | Mod: PBBFAC,,, | Performed by: PHYSICIAN ASSISTANT

## 2019-08-07 PROCEDURE — 3079F PR MOST RECENT DIASTOLIC BLOOD PRESSURE 80-89 MM HG: ICD-10-PCS | Mod: CPTII,S$GLB,, | Performed by: PHYSICIAN ASSISTANT

## 2019-08-07 PROCEDURE — 20550 NJX 1 TENDON SHEATH/LIGAMENT: CPT | Mod: F5,S$GLB,, | Performed by: PHYSICIAN ASSISTANT

## 2019-08-07 PROCEDURE — 3074F SYST BP LT 130 MM HG: CPT | Mod: CPTII,S$GLB,, | Performed by: PHYSICIAN ASSISTANT

## 2019-08-07 PROCEDURE — 99999 PR PBB SHADOW E&M-EST. PATIENT-LVL IV: ICD-10-PCS | Mod: PBBFAC,,, | Performed by: PHYSICIAN ASSISTANT

## 2019-08-07 RX ORDER — LIDOCAINE HYDROCHLORIDE 10 MG/ML
0.5 INJECTION, SOLUTION EPIDURAL; INFILTRATION; INTRACAUDAL; PERINEURAL
Status: COMPLETED | OUTPATIENT
Start: 2019-08-07 | End: 2019-08-07

## 2019-08-07 RX ORDER — DEXAMETHASONE SODIUM PHOSPHATE 4 MG/ML
4 INJECTION, SOLUTION INTRA-ARTICULAR; INTRALESIONAL; INTRAMUSCULAR; INTRAVENOUS; SOFT TISSUE
Status: COMPLETED | OUTPATIENT
Start: 2019-08-07 | End: 2019-08-07

## 2019-08-07 RX ADMIN — LIDOCAINE HYDROCHLORIDE 5 MG: 10 INJECTION, SOLUTION EPIDURAL; INFILTRATION; INTRACAUDAL; PERINEURAL at 11:08

## 2019-08-07 RX ADMIN — DEXAMETHASONE SODIUM PHOSPHATE 4 MG: 4 INJECTION, SOLUTION INTRA-ARTICULAR; INTRALESIONAL; INTRAMUSCULAR; INTRAVENOUS; SOFT TISSUE at 11:08

## 2019-08-07 NOTE — PROGRESS NOTES
"Subjective:      Patient ID: Ailyn Ortiz is a 53 y.o. female.    Chief Complaint: Pain of the Right Hand      HPI  Ailyn Ortiz is a right hand dominant 53 y.o. female presenting today for follow up of right thumb pain and triggering.  She underwent right trigger thumb injection at her last visit, she reports that the triggering has not improved. There was not a history of trauma.  Onset of symptoms began 3 months ago.  She reports that it triggers "every time that the thumb bends."  She says that it is painful with the thumb triggers.  She is requesting a repeat steroid injection into the right trigger thumb today.  She also reports stiffness in the hands, intermittent.  She started Humira 1 month ago, she does not believe that it has started working yet.    She is status post right carpal tunnel release 8 months ago, also status post left thumb A1 pulley release 4 months ago.  She does have a history of diabetes as well as rheumatoid arthritis. She has left carpal tunnel syndrome, intermittently bothersome.      Review of patient's allergies indicates:   Allergen Reactions    Nuts [tree nut] Other (See Comments)     Nasal Congestion    Avelox [moxifloxacin] Rash         Current Outpatient Medications   Medication Sig Dispense Refill    acetaminophen-codeine 300-30mg (TYLENOL #3) 300-30 mg Tab Take 1 tablet by mouth every 6 (six) hours as needed. 25 tablet 0    adalimumab (HUMIRA PEN) 40 mg/0.8 mL PnKt Inject 1 pen (40 mg total) into the skin every 14 (fourteen) days. 2 pen 11    atorvastatin (LIPITOR) 20 MG tablet Take 20 mg by mouth once daily.       cholecalciferol, vitamin D3, 50,000 unit capsule Take 50,000 Units by mouth once a week.      ergocalciferol (ERGOCALCIFEROL) 50,000 unit Cap TAKE 1 CAPSULE (50,000 UNITS TOTAL) BY MOUTH EVERY 7 DAYS. 12 capsule 0    fluconazole (DIFLUCAN) 200 MG Tab fluconazole 200 mg tablet      fluticasone (FLONASE) 50 mcg/actuation nasal spray 1 spray by " Each Nare route once daily.      INVOKANA 300 mg Tab tablet Take 300 mg by mouth once daily.       levothyroxine (SYNTHROID) 100 MCG tablet Take 100 mcg by mouth before breakfast.       loratadine (CLARITIN) 10 mg tablet Take 10 mg by mouth once daily.      losartan-hydrochlorothiazide 100-12.5 mg (HYZAAR) 100-12.5 mg Tab Take 1 tablet by mouth once daily.       methotrexate 2.5 MG Tab TAKE 8 TABLETS BY MOUTH WEEKLY 120 tablet 3    omeprazole (PRILOSEC) 40 MG capsule Take 40 mg by mouth every morning.       triamcinolone acetonide 0.1% (KENALOG) 0.1 % cream AAA BL lower legs bid/ prn for flares 454 g 0    TRULICITY 1.5 mg/0.5 mL PnIj Inject into the skin once a week.       varicella-zoster gE-AS01B, PF, (SHINGRIX) 50 mcg/0.5 mL injection take as directed. 0.5 mL 0    folic acid (FOLVITE) 1 MG tablet Take 1 tablet (1 mg total) by mouth 3 (three) times daily. 270 tablet 2     No current facility-administered medications for this visit.      Facility-Administered Medications Ordered in Other Visits   Medication Dose Route Frequency Provider Last Rate Last Dose    0.9%  NaCl infusion   Intravenous Continuous David Chisholm MD        mupirocin 2 % ointment   Nasal On Call Procedure David Chisholm MD           Past Medical History:   Diagnosis Date    Arthritis     zero negative imflammatory arthritis    Asthma     Diabetes mellitus, type 2 since the age of 42    Hypertension since the age of 42    JESSICA (obstructive sleep apnea)     S/P UPP    Thyroid disease        Past Surgical History:   Procedure Laterality Date    ADENOIDECTOMY       SECTION      times 2    CHOLECYSTECTOMY      EXCISION, SMALL INTESTINE, LAPAROSCOPIC  3/8/2019    [facility] 2ND FLR    GANGLION CYST EXCISION      LAPAROSCOPY, DIAGNOSTIC N/A 3/8/2019    [facility] at [facility] OR 2ND FLR    LAPAROSCOPY-DIAGNOSTIC w/ small bowel resection  "N/A 1/8/2018    [facility] 2ND FLR    RELEASE, CARPAL TUNNEL right Right 12/26/2018    Performed by [facility] at [facility] OR 1ST FLR    RELEASE, TRIGGER FINGER left thumb Left 4/15/2019    [facility] OR    RESECTION-SMALL BOWEL-LAPAROSCOPIC  1/8/2018    Performed by [facility] at [facility] OR 2ND FLR    small bowel resection (lipoma)      TONSILLECTOMY      TUBAL LIGATION      UVULOPALATOPHARYNGOPLASTY           Review of Systems:  Review of Systems   Constitution: Negative for chills and fever.   Skin: Negative for rash and suspicious lesions.   Musculoskeletal:        See HPI   Neurological: Negative for dizziness, headaches and light-headedness.   Psychiatric/Behavioral: Negative for depression. The patient is not nervous/anxious.          OBJECTIVE:     PHYSICAL EXAM:  Height: 5' 2" (157.5 cm) Weight: 97.5 kg (215 lb)  Vitals:    08/07/19 1100   BP: 116/81   Pulse: 80   Weight: 97.5 kg (215 lb)   Height: 5' 2" (1.575 m)   PainSc:   4     General    Vitals reviewed.  Constitutional: She is oriented to person, place, and time. She appears well-developed and well-nourished.   HENT:   Head: Normocephalic and atraumatic.   Neck: Normal range of motion.   Cardiovascular: Normal rate.    Pulmonary/Chest: Effort normal. No respiratory distress.   Neurological: She is alert and oriented to person, place, and time.   Psychiatric: She has a normal mood and affect. Her behavior is normal. Judgment and thought content normal.             Musculoskeletal:  Well-healed surgical scar right palm and base of left thumb.  No edema appreciated.  She is tender palpation over the A1 pulley of the right thumb, otherwise nontender to palpation.  Palpable thickening at the base of the right thumb near the A1 pulley, no triggering on exam today. Good finger and wrist range of motion. Neurovascularly intact-good sensation motor function, " good capillary refill, 2+ radial pulses.    RADIOGRAPHS:  Right Hand XRay, 6/12/19  FINDINGS:  No acute fracture or dislocation.  Joint spaces are relatively well maintained.  No radiopaque foreign bodies.      Impression     No acute findings.     Comments: I have personally reviewed the imaging and I agree with the above radiologist's report.    ASSESSMENT/PLAN:   There are no diagnoses linked to this encounter.       - We talked at length about the anatomy and pathophysiology of   No diagnosis found.    - discussed conservative and surgical treatment options for trigger thumb, patient previously underwent left trigger thumb A1 pulley release due to the left trigger thumb not resolving with steroid injections. Patient is interested in a right thumb trigger thumb injection today.  - Discussed comorbidities that may be contributing to her tendinopathy.  - follow-up in 6-8 weeks  - discussed splinting for trigger thumb, discussed massage. Discussed use of NSAIDs. Discussed use of anti-inflammatory diet or Tumeric.  - call with questions or concerns    PROCEDURE NOTE:  I have explained the risks, benefits, and alternatives of the procedure in detail.  The patient voices understanding and all questions have been answered, consents to injection. Pause for timeout.  After a steril prep of the skin in the normal fashion, the level of the A-1 pulley of the right thumb is injected using a 25 gauge needle from the volar approach with a  combination of 0.5 cc 1% plain Lidocaine and 4 mg of dexamethasone.  The patient is cautioned and immediate relief of pain is secondary to the local anesthetic and will be temporary.  After the anesthetic wears off there may be a increase in pain that may last for a few hours or a few days and they should use ice to help alleviate this flair up of pain.   Ultrasound was utilized for this injection for improved visualization.        Disclaimer: This note has been generated using  voice-recognition software. There may be typographical errors that have been missed during proof-reading.

## 2019-08-12 RX ORDER — ERGOCALCIFEROL 1.25 MG/1
CAPSULE ORAL
Qty: 12 CAPSULE | Refills: 0 | Status: SHIPPED | OUTPATIENT
Start: 2019-08-12 | End: 2019-11-01 | Stop reason: SDUPTHER

## 2019-08-19 ENCOUNTER — TELEPHONE (OUTPATIENT)
Dept: PHARMACY | Facility: CLINIC | Age: 54
End: 2019-08-19

## 2019-09-17 ENCOUNTER — TELEPHONE (OUTPATIENT)
Dept: PHARMACY | Facility: CLINIC | Age: 54
End: 2019-09-17

## 2019-09-17 NOTE — TELEPHONE ENCOUNTER
Refill call attempt 1 regarding Humira at [facility]. Will prepare for shipment on   to arrive  with pt consent. Copay 0.00. Patient has not started any new medications, no missed doses/ side effects. Patient did not have any concerns or questions for the pharmacist at this time.  & address verified.  ~ next injection with 0 doses on hand.

## 2019-09-24 ENCOUNTER — HOSPITAL ENCOUNTER (OUTPATIENT)
Dept: RADIOLOGY | Facility: OTHER | Age: 54
Discharge: HOME | End: 2019-09-24
Attending: OBSTETRICS & GYNECOLOGY
Payer: COMMERCIAL

## 2019-09-24 ENCOUNTER — OFFICE VISIT (OUTPATIENT)
Dept: ORTHOPEDICS | Facility: CLINIC | Age: 54
End: 2019-09-24
Payer: COMMERCIAL

## 2019-09-24 ENCOUNTER — OFFICE VISIT (OUTPATIENT)
Dept: OBSTETRICS AND GYNECOLOGY | Facility: CLINIC | Age: 54
End: 2019-09-24
Payer: COMMERCIAL

## 2019-09-24 VITALS — BODY MASS INDEX: 39.76 KG/M2 | WEIGHT: 216.06 LBS | HEIGHT: 62 IN

## 2019-09-24 VITALS
HEART RATE: 76 BPM | HEIGHT: 62 IN | DIASTOLIC BLOOD PRESSURE: 80 MMHG | SYSTOLIC BLOOD PRESSURE: 123 MMHG | WEIGHT: 215 LBS | BODY MASS INDEX: 39.56 KG/M2

## 2019-09-24 DIAGNOSIS — Z12.39 BREAST CANCER SCREENING: ICD-10-CM

## 2019-09-24 DIAGNOSIS — Z12.39 BREAST CANCER SCREENING: Primary | ICD-10-CM

## 2019-09-24 DIAGNOSIS — M65.311 TRIGGER FINGER OF RIGHT THUMB: Primary | ICD-10-CM

## 2019-09-24 PROCEDURE — 77063 BREAST TOMOSYNTHESIS BI: CPT | Mod: 26,,, | Performed by: RADIOLOGY

## 2019-09-24 PROCEDURE — 3078F PR MOST RECENT DIASTOLIC BLOOD PRESSURE < 80 MM HG: ICD-10-PCS | Mod: CPTII,S$GLB,, | Performed by: OBSTETRICS & GYNECOLOGY

## 2019-09-24 PROCEDURE — 99386 PREV VISIT NEW AGE 40-64: CPT | Mod: S$GLB,,, | Performed by: OBSTETRICS & GYNECOLOGY

## 2019-09-24 PROCEDURE — 3008F BODY MASS INDEX DOCD: CPT | Mod: CPTII,S$GLB,, | Performed by: ORTHOPAEDIC SURGERY

## 2019-09-24 PROCEDURE — 99999 PR PBB SHADOW E&M-EST. PATIENT-LVL III: ICD-10-PCS | Mod: PBBFAC,,, | Performed by: OBSTETRICS & GYNECOLOGY

## 2019-09-24 PROCEDURE — 99214 PR OFFICE/OUTPT VISIT, EST, LEVL IV, 30-39 MIN: ICD-10-PCS | Mod: S$GLB,,, | Performed by: ORTHOPAEDIC SURGERY

## 2019-09-24 PROCEDURE — 3079F PR MOST RECENT DIASTOLIC BLOOD PRESSURE 80-89 MM HG: ICD-10-PCS | Mod: CPTII,S$GLB,, | Performed by: ORTHOPAEDIC SURGERY

## 2019-09-24 PROCEDURE — 77063 MAMMO DIGITAL SCREENING BILAT WITH TOMOSYNTHESIS_CAD: ICD-10-PCS | Mod: 26,,, | Performed by: RADIOLOGY

## 2019-09-24 PROCEDURE — 3074F PR MOST RECENT SYSTOLIC BLOOD PRESSURE < 130 MM HG: ICD-10-PCS | Mod: CPTII,S$GLB,, | Performed by: ORTHOPAEDIC SURGERY

## 2019-09-24 PROCEDURE — 77067 MAMMO DIGITAL SCREENING BILAT WITH TOMOSYNTHESIS_CAD: ICD-10-PCS | Mod: 26,,, | Performed by: RADIOLOGY

## 2019-09-24 PROCEDURE — 77067 SCR MAMMO BI INCL CAD: CPT | Mod: TC

## 2019-09-24 PROCEDURE — 99214 OFFICE O/P EST MOD 30 MIN: CPT | Mod: S$GLB,,, | Performed by: ORTHOPAEDIC SURGERY

## 2019-09-24 PROCEDURE — 3074F PR MOST RECENT SYSTOLIC BLOOD PRESSURE < 130 MM HG: ICD-10-PCS | Mod: CPTII,S$GLB,, | Performed by: OBSTETRICS & GYNECOLOGY

## 2019-09-24 PROCEDURE — 3008F PR BODY MASS INDEX (BMI) DOCUMENTED: ICD-10-PCS | Mod: CPTII,S$GLB,, | Performed by: ORTHOPAEDIC SURGERY

## 2019-09-24 PROCEDURE — 3074F SYST BP LT 130 MM HG: CPT | Mod: CPTII,S$GLB,, | Performed by: ORTHOPAEDIC SURGERY

## 2019-09-24 PROCEDURE — 99999 PR PBB SHADOW E&M-EST. PATIENT-LVL III: CPT | Mod: PBBFAC,,, | Performed by: OBSTETRICS & GYNECOLOGY

## 2019-09-24 PROCEDURE — 3078F DIAST BP <80 MM HG: CPT | Mod: CPTII,S$GLB,, | Performed by: OBSTETRICS & GYNECOLOGY

## 2019-09-24 PROCEDURE — 77067 SCR MAMMO BI INCL CAD: CPT | Mod: 26,,, | Performed by: RADIOLOGY

## 2019-09-24 PROCEDURE — 3079F DIAST BP 80-89 MM HG: CPT | Mod: CPTII,S$GLB,, | Performed by: ORTHOPAEDIC SURGERY

## 2019-09-24 PROCEDURE — 99999 PR PBB SHADOW E&M-EST. PATIENT-LVL IV: ICD-10-PCS | Mod: PBBFAC,,, | Performed by: ORTHOPAEDIC SURGERY

## 2019-09-24 PROCEDURE — 99999 PR PBB SHADOW E&M-EST. PATIENT-LVL IV: CPT | Mod: PBBFAC,,, | Performed by: ORTHOPAEDIC SURGERY

## 2019-09-24 PROCEDURE — 3074F SYST BP LT 130 MM HG: CPT | Mod: CPTII,S$GLB,, | Performed by: OBSTETRICS & GYNECOLOGY

## 2019-09-24 PROCEDURE — 99386 PR PREVENTIVE VISIT,NEW,40-64: ICD-10-PCS | Mod: S$GLB,,, | Performed by: OBSTETRICS & GYNECOLOGY

## 2019-09-24 NOTE — PROGRESS NOTES
"Subjective:      Patient ID: Ailyn Ortiz is a 54 y.o. female.    Chief Complaint: Follow-up of the Right Hand      HPI at last visit:  Ailyn Ortiz is a right hand dominant 54 y.o. female presenting today for follow up of right thumb pain and triggering.  She underwent right trigger thumb injection at her last visit, she reports that the triggering has not improved. There was not a history of trauma.  Onset of symptoms began 3 months ago.  She reports that it triggers "every time that the thumb bends."  She says that it is painful with the thumb triggers.  She is requesting a repeat steroid injection into the right trigger thumb today.  She also reports stiffness in the hands, intermittent.  She started Humira 1 month ago, she does not believe that it has started working yet.    She is status post right carpal tunnel release 8 months ago, also status post left thumb A1 pulley release 4 months ago.  She does have a history of diabetes as well as rheumatoid arthritis. She has left carpal tunnel syndrome, intermittently bothersome.  TOday: pt is still having triggering right thum,b. SHe is going to have abd surgery and wants to see what the next step is for her abd before trigger thumb surgery    Review of patient's allergies indicates:   Allergen Reactions    Nuts [tree nut] Other (See Comments)     Nasal Congestion    Avelox [moxifloxacin] Rash         Current Outpatient Medications   Medication Sig Dispense Refill    acetaminophen-codeine 300-30mg (TYLENOL #3) 300-30 mg Tab Take 1 tablet by mouth every 6 (six) hours as needed. 25 tablet 0    adalimumab (HUMIRA PEN) 40 mg/0.8 mL PnKt Inject 1 pen (40 mg total) into the skin every 14 (fourteen) days. 2 pen 11    atorvastatin (LIPITOR) 20 MG tablet Take 20 mg by mouth once daily.       cholecalciferol, vitamin D3, 50,000 unit capsule Take 50,000 Units by mouth once a week.      ergocalciferol (ERGOCALCIFEROL) 50,000 unit Cap TAKE 1 CAPSULE (50,000 " UNITS TOTAL) BY MOUTH EVERY 7 DAYS. 12 capsule 0    fluconazole (DIFLUCAN) 200 MG Tab fluconazole 200 mg tablet      fluticasone (FLONASE) 50 mcg/actuation nasal spray 1 spray by Each Nare route once daily.      INVOKANA 300 mg Tab tablet Take 300 mg by mouth once daily.       levothyroxine (SYNTHROID) 100 MCG tablet Take 100 mcg by mouth before breakfast.       loratadine (CLARITIN) 10 mg tablet Take 10 mg by mouth once daily.      losartan-hydrochlorothiazide 100-12.5 mg (HYZAAR) 100-12.5 mg Tab Take 1 tablet by mouth once daily.       methotrexate 2.5 MG Tab TAKE 8 TABLETS BY MOUTH WEEKLY 120 tablet 3    omeprazole (PRILOSEC) 40 MG capsule Take 40 mg by mouth every morning.       triamcinolone acetonide 0.1% (KENALOG) 0.1 % cream AAA BL lower legs bid/ prn for flares 454 g 0    TRULICITY 1.5 mg/0.5 mL PnIj Inject into the skin once a week.       varicella-zoster gE-AS01B, PF, (SHINGRIX) 50 mcg/0.5 mL injection take as directed. 0.5 mL 0    folic acid (FOLVITE) 1 MG tablet Take 1 tablet (1 mg total) by mouth 3 (three) times daily. 270 tablet 2     No current facility-administered medications for this visit.      Facility-Administered Medications Ordered in Other Visits   Medication Dose Route Frequency Provider Last Rate Last Dose    0.9%  NaCl infusion   Intravenous Continuous David Chisholm MD        mupirocin 2 % ointment   Nasal On Call Procedure David Chisholm MD           Past Medical History:   Diagnosis Date    Arthritis     zero negative imflammatory arthritis    Asthma     Diabetes mellitus, type 2 since the age of 42    Hypertension since the age of 42    JESSICA (obstructive sleep apnea)     S/P UPP    Thyroid disease        Past Surgical History:   Procedure Laterality Date    ADENOIDECTOMY      CARPAL TUNNEL RELEASE Right 12/26/2018    Procedure: RELEASE, CARPAL TUNNEL right;  Surgeon: Anai Choudhary MD;  Location: [facility] OR [address] [place] [address];  Service: Orthopedics;   "Laterality: Right;  stretcher, supine, hand pan 1 and pan 2     SECTION      times 2    CHOLECYSTECTOMY      DIAGNOSTIC LAPAROSCOPY N/A 3/8/2019    Procedure: LAPAROSCOPY, DIAGNOSTIC;  Surgeon: Stone Mei MD;  Location: [facility] OR [address] [place] [address];  Service: General;  Laterality: N/A;    GANGLION CYST EXCISION      LAPAROSCOPIC RESECTION OF SMALL INTESTINE  3/8/2019    Procedure: EXCISION, SMALL INTESTINE, LAPAROSCOPIC;  Surgeon: Stone Mei MD;  [facility];  Service: General;;    small bowel resection (lipoma)      TONSILLECTOMY      TRIGGER FINGER RELEASE Left 4/15/2019    Procedure: RELEASE, TRIGGER FINGER left thumb;  Surgeon: Anai Choudhary MD;  Location: [facility] OR;  Service: Orthopedics;  Laterality: Left;  stretcher, supine, hand pan 1 and 2    TUBAL LIGATION      UVULOPALATOPHARYNGOPLASTY           Review of Systems:  Review of Systems   Constitution: Negative for chills and fever.   Skin: Negative for rash and suspicious lesions.   Musculoskeletal:        See HPI   Neurological: Negative for dizziness, headaches and light-headedness.   Psychiatric/Behavioral: Negative for depression. The patient is not nervous/anxious.          OBJECTIVE:     PHYSICAL EXAM:  Height: 5' 2" (157.5 cm) Weight: 97.5 kg (215 lb)  Vitals:    19 1016   BP: 123/80   Pulse: 76   Weight: 97.5 kg (215 lb)   Height: 5' 2" (1.575 m)   PainSc:   2     General    Vitals reviewed.  Constitutional: She is oriented to person, place, and time. She appears well-developed and well-nourished.   HENT:   Head: Normocephalic and atraumatic.   Neck: Normal range of motion.   Cardiovascular: Normal rate.    Pulmonary/Chest: Effort normal. No respiratory distress.   Neurological: She is alert and oriented to person, place, and time.   Psychiatric: She has a normal mood and affect. Her behavior is normal. Judgment and thought content normal.             Musculoskeletal:  Well-healed surgical scar " right palm and base of left thumb.  No edema appreciated.  She is tender palpation over the A1 pulley of the right thumb, otherwise nontender to palpation.  Palpable thickening at the base of the right thumb near the A1 pulley, no triggering on exam today. Good finger and wrist range of motion. Neurovascularly intact-good sensation motor function, good capillary refill, 2+ radial pulses.    RADIOGRAPHS:  Right Hand XRay, 6/12/19  FINDINGS:  No acute fracture or dislocation.  Joint spaces are relatively well maintained.  No radiopaque foreign bodies.      Impression     No acute findings.     Comments: I have personally reviewed the imaging and I agree with the above radiologist's report.    ASSESSMENT/PLAN:   There are no diagnoses linked to this encounter.       - We talked at length about the anatomy and pathophysiology of   No diagnosis found.    Plan for trigger thumb release. Consents signed today. We will wait to hear about abd plan before scheduling surgery.   I have explained the risks, benefits, and alternatives of the procedure to the patient in great detail. The patient voices understanding and all questions have been answered. The patient agrees with to proceed as planned. Consents were performed in clinic. Pt meets with Dr. Mei on Tuesday.

## 2019-09-24 NOTE — PROGRESS NOTES
CC: 53 yo  female, here for AE    HPI: Here for WWE. No GYN concerns today. She is an RN. SA with , but doesn't have as much desire as she used to. Went through menopause in her early 40s. No bleeding since then. Lives with her daughter (nursing school) and 8 yo grand daughter. She has PCP. Due for mammogram. Last pap 2017, NILM. No PMB. Was doing cross fit for exercise, but then had a hernia so has stopped. Scheduled to see someone about the hernia. Plans to return to walking.     She is a home health nurse.     Past Medical History:   Diagnosis Date    Arthritis     zero negative imflammatory arthritis    Asthma     Diabetes mellitus, type 2 since the age of 42    Hypertension since the age of 42    JESSICA (obstructive sleep apnea)     S/P UPP    Thyroid disease        Past Surgical History:   Procedure Laterality Date    ADENOIDECTOMY      CARPAL TUNNEL RELEASE Right 2018    Procedure: RELEASE, CARPAL TUNNEL right;  Surgeon: Anai Choudhary MD;  Location: [facility] OR [address] [place] [address];  Service: Orthopedics;  Laterality: Right;  stretcher, supine, hand pan 1 and pan 2     SECTION      times 2    CHOLECYSTECTOMY      DIAGNOSTIC LAPAROSCOPY N/A 3/8/2019    Procedure: LAPAROSCOPY, DIAGNOSTIC;  Surgeon: Stone Mei MD;  [facility];  Service: General;  Laterality: N/A;    GANGLION CYST EXCISION      LAPAROSCOPIC RESECTION OF SMALL INTESTINE  3/8/2019    Procedure: EXCISION, SMALL INTESTINE, LAPAROSCOPIC;  Surgeon: Stone Mei MD;  Location: [facility] OR 2ND FLR;  Service: General;;    small bowel resection (lipoma)      TONSILLECTOMY      TRIGGER FINGER RELEASE Left 4/15/2019    Procedure: RELEASE, TRIGGER FINGER left thumb;  Surgeon: Anai Choudhary MD;  Location: [facility];  Service: Orthopedics;  Laterality: Left;  stretcher, supine, hand pan 1 and 2    TUBAL LIGATION      UVULOPALATOPHARYNGOPLASTY         OB History        3    Para   3     Term   3            AB        Living   3       SAB        TAB        Ectopic        Multiple        Live Births                     Current Outpatient Medications on File Prior to Visit   Medication Sig Dispense Refill    acetaminophen-codeine 300-30mg (TYLENOL #3) 300-30 mg Tab Take 1 tablet by mouth every 6 (six) hours as needed. 25 tablet 0    adalimumab (HUMIRA PEN) 40 mg/0.8 mL PnKt Inject 1 pen (40 mg total) into the skin every 14 (fourteen) days. 2 pen 11    atorvastatin (LIPITOR) 20 MG tablet Take 20 mg by mouth once daily.       cholecalciferol, vitamin D3, 50,000 unit capsule Take 50,000 Units by mouth once a week.      ergocalciferol (ERGOCALCIFEROL) 50,000 unit Cap TAKE 1 CAPSULE (50,000 UNITS TOTAL) BY MOUTH EVERY 7 DAYS. 12 capsule 0    fluconazole (DIFLUCAN) 200 MG Tab fluconazole 200 mg tablet      fluticasone (FLONASE) 50 mcg/actuation nasal spray 1 spray by Each Nare route once daily.      INVOKANA 300 mg Tab tablet Take 300 mg by mouth once daily.       levothyroxine (SYNTHROID) 100 MCG tablet Take 100 mcg by mouth before breakfast.       loratadine (CLARITIN) 10 mg tablet Take 10 mg by mouth once daily.      losartan-hydrochlorothiazide 100-12.5 mg (HYZAAR) 100-12.5 mg Tab Take 1 tablet by mouth once daily.       omeprazole (PRILOSEC) 40 MG capsule Take 40 mg by mouth every morning.       triamcinolone acetonide 0.1% (KENALOG) 0.1 % cream AAA BL lower legs bid/ prn for flares 454 g 0    TRULICITY 1.5 mg/0.5 mL PnIj Inject into the skin once a week.       varicella-zoster gE-AS01B, PF, (SHINGRIX) 50 mcg/0.5 mL injection take as directed. 0.5 mL 0    folic acid (FOLVITE) 1 MG tablet Take 1 tablet (1 mg total) by mouth 3 (three) times daily. 270 tablet 2    methotrexate 2.5 MG Tab TAKE 8 TABLETS BY MOUTH WEEKLY (Patient not taking: Reported on 2019) 120 tablet 3     Current Facility-Administered Medications on File Prior to Visit   Medication Dose Route Frequency  "Provider Last Rate Last Dose    0.9%  NaCl infusion   Intravenous Continuous David Chisholm MD        mupirocin 2 % ointment   Nasal On Call Procedure David Chisholm MD             ROS:  GENERAL: Feeling well overall.   SKIN: Denies rash or lesions.   HEAD: Denies head injury or headache.   CHEST: Denies chest pain or shortness of breath.   URINARY: No frequency, dysuria, hematuria or burning on urination.  REPRODUCTIVE: See HPI.   HEMATOLOGIC: No easy bruisability or excessive bleeding.   MUSCULOSKELETAL: Denies joint pain or swelling.     Physical Exam:   Ht 5' 2" (1.575 m)   Wt 98 kg (216 lb 0.8 oz)   BMI 39.52 kg/m²   General: No distress, well appearing, obese  HEENT: normocephalic, atraumatic   Heart: Regular rate  Lungs: No increased work of breathing  Breasts: Symmetrical, no masses, discharge or skin retractions.  Abdomen: soft, nontender, no masses  MS: lower extremeties symmetrical, no edema  Pelvic Exam:  Deferred  PSYCH: Normal affect, mood appropriate     ASSESSMENT/PLAN: 53 yo  here for AE.     1. Pap up to date, next due in 2020.   2. Mammogram due and ordered.   3. Flu shot recommended  4. Continue routine health maintenance with PCP  5. We reviewed importance of exercise. Previously doing cross fit, but has symptomatic hernia. Will now resume walking. Also discussed option for stationary bike.     RTO in 1 year for WWE or sooner if needed.     Celeste Simons MD  Obstetrics and Gynecology  [facility]      "

## 2019-10-01 ENCOUNTER — OFFICE VISIT (OUTPATIENT)
Dept: SURGERY | Facility: CLINIC | Age: 54
End: 2019-10-01
Payer: COMMERCIAL

## 2019-10-01 VITALS
TEMPERATURE: 98 F | WEIGHT: 214.81 LBS | DIASTOLIC BLOOD PRESSURE: 74 MMHG | BODY MASS INDEX: 39.53 KG/M2 | SYSTOLIC BLOOD PRESSURE: 164 MMHG | HEIGHT: 62 IN | HEART RATE: 77 BPM

## 2019-10-01 DIAGNOSIS — K43.2 INCISIONAL HERNIA, WITHOUT OBSTRUCTION OR GANGRENE: ICD-10-CM

## 2019-10-01 DIAGNOSIS — E66.01 MORBID OBESITY: ICD-10-CM

## 2019-10-01 PROCEDURE — 99214 PR OFFICE/OUTPT VISIT, EST, LEVL IV, 30-39 MIN: ICD-10-PCS | Mod: S$GLB,,, | Performed by: SURGERY

## 2019-10-01 PROCEDURE — 99214 OFFICE O/P EST MOD 30 MIN: CPT | Mod: S$GLB,,, | Performed by: SURGERY

## 2019-10-01 PROCEDURE — 3077F SYST BP >= 140 MM HG: CPT | Mod: CPTII,S$GLB,, | Performed by: SURGERY

## 2019-10-01 PROCEDURE — 99999 PR PBB SHADOW E&M-EST. PATIENT-LVL III: CPT | Mod: PBBFAC,,, | Performed by: SURGERY

## 2019-10-01 PROCEDURE — 3078F DIAST BP <80 MM HG: CPT | Mod: CPTII,S$GLB,, | Performed by: SURGERY

## 2019-10-01 PROCEDURE — 99999 PR PBB SHADOW E&M-EST. PATIENT-LVL III: ICD-10-PCS | Mod: PBBFAC,,, | Performed by: SURGERY

## 2019-10-01 PROCEDURE — 3078F PR MOST RECENT DIASTOLIC BLOOD PRESSURE < 80 MM HG: ICD-10-PCS | Mod: CPTII,S$GLB,, | Performed by: SURGERY

## 2019-10-01 PROCEDURE — 3077F PR MOST RECENT SYSTOLIC BLOOD PRESSURE >= 140 MM HG: ICD-10-PCS | Mod: CPTII,S$GLB,, | Performed by: SURGERY

## 2019-10-01 RX ORDER — PROMETHAZINE HYDROCHLORIDE 12.5 MG/1
TABLET ORAL
COMMUNITY
End: 2019-12-10

## 2019-10-01 NOTE — PROGRESS NOTES
I have seen the patient, reviewed the Resident's history and physical, assessment and plan. I have personally interviewed and examined the patient at bedside and: agree with the findings.     S/p small bowel resection twice for intussusception and now with some abdominal pain and a new bulge in her abdominal wall.  Bmi 39.29 with DM2 not on insulin and no complications, arthritis, essential htn, allie not tolerating cpap and gerd (resolved on ppi daily and doesn't eat late at night).  PE: small to moderate incisional hernia for 2 fb above the navel and for 2 fb to the right.  Will work up for possible sleeve with hernia repair.

## 2019-10-01 NOTE — LETTER
[facility] - General Surgery  [address]  [place] [address]  Phone: [phone] October 1, 2019      Lewis Lloyd MD  [address]  [place]  [place] [address]    Patient: Ailyn Ortiz   MR Number: [id number]   YOB: 1965   Date of Visit: 10/1/2019     Dear Dr. Lloyd:    Thank you for referring Ailyn Ortiz to me for evaluation. Attached you will find relevant portions of my assessment and plan of care.    Ms. Ortiz is status post small bowel resection twice for intussusception and now with some abdominal pain and a new bulge in her abdominal wall.  BMI 39.29 with DM2 not on insulin and no complications, arthritis, essential HTN, JESSICA not tolerating CPAP and GERD (resolved on PPI daily and does not eat late at night).      On physical examination, small to moderate incisional hernia for 2 fb above the navel and for 2 fb to the right.  We will work up for possible sleeve with hernia repair.    If you have questions, please do not hesitate to call me. I look forward to following Ailyn Ortiz along with you.    Sincerely,    Stone Mei MD  Professor, [employer]  Section Head, General Surgery  [facility]    WSR/afw

## 2019-10-01 NOTE — PROGRESS NOTES
History & Physical  gen Surgery Clinic    SUBJECTIVE:     Chief complaint: ventral hernia    History of Present Illness:  Patient is a 54 y.o. female s/p SBR for intussusception in march who presents with a new bulge that she noticed 4 weeks ago.   She notices that it enlarged by coughing, lifting and towards the end of the day. She doesn't have any pain but says it burns a little bit from time to time.   Otherwise she is in her regular state of health. No nausea/vomiting, baseline constipated.     Review of Systems   Constitutional: Negative for fever.   HENT: Negative for tinnitus.    Eyes: Negative for photophobia.   Respiratory: Negative for shortness of breath.    Cardiovascular: Negative for chest pain.   Gastrointestinal: Positive for heartburn. Negative for abdominal pain.   Genitourinary: Negative for dysuria and frequency.   Musculoskeletal: Negative for myalgias.   Skin: Negative for rash.   Neurological: Negative for dizziness.   Endo/Heme/Allergies: Does not bruise/bleed easily.   Psychiatric/Behavioral: Negative for depression.        Past Medical History:  Past Medical History:   Diagnosis Date    Arthritis     zero negative imflammatory arthritis    Asthma     Diabetes mellitus, type 2 since the age of 42    Hypertension since the age of 42    JESSICA (obstructive sleep apnea)     S/P UPP    Thyroid disease        Past Surgical History:  Past Surgical History:   Procedure Laterality Date    ADENOIDECTOMY      CARPAL TUNNEL RELEASE Right 2018    Procedure: RELEASE, CARPAL TUNNEL right;  Surgeon: Anai Choudhary MD;  Location: [facility] OR [address] [place] [address];  Service: Orthopedics;  Laterality: Right;  stretcher, supine, hand pan 1 and pan 2     SECTION      times 2    CHOLECYSTECTOMY      DIAGNOSTIC LAPAROSCOPY N/A 3/8/2019    Procedure: LAPAROSCOPY, DIAGNOSTIC;  Surgeon: Stone Mei MD;  Location: [facility] OR [address] [place] [address];  Service: General;  Laterality: N/A;    GANGLION CYST EXCISION       LAPAROSCOPIC RESECTION OF SMALL INTESTINE  3/8/2019    Procedure: EXCISION, SMALL INTESTINE, LAPAROSCOPIC;  Surgeon: Stone Mei MD;  Location: [facility] OR [address] [place] [address];  Service: General;;    small bowel resection (lipoma)      TONSILLECTOMY      TRIGGER FINGER RELEASE Left 4/15/2019    Procedure: RELEASE, TRIGGER FINGER left thumb;  Surgeon: Anai Choudhary MD;  Location: [facility];  Service: Orthopedics;  Laterality: Left;  stretcher, supine, hand pan 1 and 2    TUBAL LIGATION      UVULOPALATOPHARYNGOPLASTY         Family History:  Family History   Problem Relation Age of Onset    Hypertension Father     Diabetes Father     Heart disease Maternal Grandfather         CAD    Heart disease Paternal Grandmother         CAD    Diabetes Paternal Grandmother     Heart disease Paternal Grandfather         CAD       Social History:  Social History     Socioeconomic History    Marital status:      Spouse name: Not on file    Number of children: Not on file    Years of education: Not on file    Highest education level: Not on file   Occupational History    Not on file   Social Needs    Financial resource strain: Not on file    Food insecurity:     Worry: Not on file     Inability: Not on file    Transportation needs:     Medical: Not on file     Non-medical: Not on file   Tobacco Use    Smoking status: Former Smoker     Packs/day: 0.00     Years: 20.00     Pack years: 0.00     Types: Cigarettes     Last attempt to quit: 12/15/2013     Years since quittin.7    Smokeless tobacco: Never Used   Substance and Sexual Activity    Alcohol use: Yes     Comment: occasionally    Drug use: No    Sexual activity: Yes     Partners: Male   Lifestyle    Physical activity:     Days per week: Not on file     Minutes per session: Not on file    Stress: Not on file   Relationships    Social connections:     Talks on phone: Not on file     Gets together: Not on file     Attends Denominational service:  Not on file     Active member of club or organization: Not on file     Attends meetings of clubs or organizations: Not on file     Relationship status: Not on file   Other Topics Concern    Not on file   Social History Narrative    Not on file       Medications:  Current Outpatient Medications   Medication Sig Dispense Refill    acetaminophen-codeine 300-30mg (TYLENOL #3) 300-30 mg Tab Take 1 tablet by mouth every 6 (six) hours as needed. 25 tablet 0    adalimumab (HUMIRA PEN) 40 mg/0.8 mL PnKt Inject 1 pen (40 mg total) into the skin every 14 (fourteen) days. 2 pen 11    atorvastatin (LIPITOR) 20 MG tablet Take 20 mg by mouth once daily.       cholecalciferol, vitamin D3, 50,000 unit capsule Take 50,000 Units by mouth once a week.      ergocalciferol (ERGOCALCIFEROL) 50,000 unit Cap TAKE 1 CAPSULE (50,000 UNITS TOTAL) BY MOUTH EVERY 7 DAYS. 12 capsule 0    fluconazole (DIFLUCAN) 200 MG Tab fluconazole 200 mg tablet      fluticasone (FLONASE) 50 mcg/actuation nasal spray 1 spray by Each Nare route once daily.      INVOKANA 300 mg Tab tablet Take 300 mg by mouth once daily.       levothyroxine (SYNTHROID) 100 MCG tablet Take 100 mcg by mouth before breakfast.       loratadine (CLARITIN) 10 mg tablet Take 10 mg by mouth once daily.      losartan-hydrochlorothiazide 100-12.5 mg (HYZAAR) 100-12.5 mg Tab Take 1 tablet by mouth once daily.       methotrexate 2.5 MG Tab TAKE 8 TABLETS BY MOUTH WEEKLY 120 tablet 3    omeprazole (PRILOSEC) 40 MG capsule Take 40 mg by mouth every morning.       promethazine (PHENERGAN) 12.5 MG Tab promethazine 12.5 mg tablet      triamcinolone acetonide 0.1% (KENALOG) 0.1 % cream AAA BL lower legs bid/ prn for flares 454 g 0    TRULICITY 1.5 mg/0.5 mL PnIj Inject into the skin once a week.       varicella-zoster gE-AS01B, PF, (SHINGRIX) 50 mcg/0.5 mL injection take as directed. 0.5 mL 0    folic acid (FOLVITE) 1 MG tablet Take 1 tablet (1 mg total) by mouth 3 (three) times  "daily. 270 tablet 2     No current facility-administered medications for this visit.      Facility-Administered Medications Ordered in Other Visits   Medication Dose Route Frequency Provider Last Rate Last Dose    0.9%  NaCl infusion   Intravenous Continuous David Chisholm MD        mupirocin 2 % ointment   Nasal On Call Procedure David Chisholm MD           Allergies:  Review of patient's allergies indicates:   Allergen Reactions    Avelox [moxifloxacin] Rash       OBJECTIVE:     BP (!) 164/74   Pulse 77   Temp 98 °F (36.7 °C)   Ht 5' 2" (1.575 m)   Wt 97.4 kg (214 lb 13.4 oz)   BMI 39.29 kg/m²     Physical Exam   Constitutional: She is oriented to person, place, and time and well-developed, well-nourished, and in no distress.   HENT:   Head: Atraumatic.   Eyes: EOM are normal.   Neck: Normal range of motion.   Cardiovascular: Regular rhythm.   Pulmonary/Chest: Effort normal. No respiratory distress.   Abdominal: Soft.   Supraumbilical incisional hernia just right of previous midline incision. Worse with cough.   Musculoskeletal: Normal range of motion. She exhibits no edema.   Neurological: She is alert and oriented to person, place, and time.   Skin: Skin is warm and dry.         ASSESSMENT/PLAN:     54 y.o. female with small ventral hernia.    - Will work up for possible sleeve with hernia repair.    "

## 2019-10-16 ENCOUNTER — TELEPHONE (OUTPATIENT)
Dept: PHARMACY | Facility: CLINIC | Age: 54
End: 2019-10-16

## 2019-10-16 NOTE — TELEPHONE ENCOUNTER
Humira Follow Up and Refill. Pt confirmed shipping of Humira on 10/17 to arrive on 10/18. Address and  verified. $0 copay in 004. Pt is not in need of a sharps container at this time.  Pt has 0 doses on hand, next dose due 10/20. Pt reported no missed doses. Pt denies any injection site reactions or side effects. Pt did not start any new medications. Pt had no further questions or concerns

## 2019-10-21 ENCOUNTER — TELEPHONE (OUTPATIENT)
Dept: BARIATRICS | Facility: CLINIC | Age: 54
End: 2019-10-21

## 2019-10-21 NOTE — TELEPHONE ENCOUNTER
----- Message from Demarco Ruiz sent at 10/21/2019  8:29 AM CDT -----  Contact: Pt  Type:  Needs Medical Advice    Who Called: The Pt states that she is waiting to hear back about scheduling her Psyche evaluation.  The Pt has questions for you and would like a call back please.    Best Call Back Number:[phone]

## 2019-10-21 NOTE — TELEPHONE ENCOUNTER
Took call directly from patient and discussed the necessary testing and appointments with psych and dietician for clearances.  Orders for labs routed to Dr Fountain's office for him to facilitate getting them drawn for her insurance to cover them.  Scheduled dietician appointment and linked the questionnaires to the appointment for her to complete online for the psych evaluation.  Canceled the CXR that she had scheduled for Friday as she had one done 3/2018 and if we schedule surgery before 3/2020, it should not need to be repeated. Her EKG should also be good until  3/2020 if we schedule surgery early in the year.  She verbalized understanding.

## 2019-10-21 NOTE — TELEPHONE ENCOUNTER
----- Message from Demarco Ruiz sent at 10/21/2019  8:36 AM CDT -----  Contact: Pt  Type:  Needs Medical Advice    Who Called: The Pt would like for you to call her back about scheduling her Psyche eval.    Best Call Back Number:[phone]

## 2019-10-21 NOTE — TELEPHONE ENCOUNTER
Returned call and LVM informing that the order is now in for the psych evaluation and to call back to schedule a dietician appointment to start working on the dietician clearances.

## 2019-10-25 ENCOUNTER — OFFICE VISIT (OUTPATIENT)
Dept: RHEUMATOLOGY | Facility: CLINIC | Age: 54
End: 2019-10-25
Payer: COMMERCIAL

## 2019-10-25 ENCOUNTER — CLINICAL SUPPORT (OUTPATIENT)
Dept: BARIATRICS | Facility: CLINIC | Age: 54
End: 2019-10-25
Payer: COMMERCIAL

## 2019-10-25 ENCOUNTER — LAB VISIT (OUTPATIENT)
Dept: LAB | Facility: HOSPITAL | Age: 54
End: 2019-10-25
Attending: INTERNAL MEDICINE
Payer: COMMERCIAL

## 2019-10-25 VITALS — BODY MASS INDEX: 39.4 KG/M2 | WEIGHT: 215.38 LBS

## 2019-10-25 VITALS
HEART RATE: 76 BPM | HEIGHT: 62 IN | WEIGHT: 215 LBS | BODY MASS INDEX: 39.56 KG/M2 | SYSTOLIC BLOOD PRESSURE: 130 MMHG | DIASTOLIC BLOOD PRESSURE: 77 MMHG

## 2019-10-25 DIAGNOSIS — G47.33 OBSTRUCTIVE SLEEP APNEA SYNDROME: ICD-10-CM

## 2019-10-25 DIAGNOSIS — M06.00 SERONEGATIVE RHEUMATOID ARTHRITIS: Primary | ICD-10-CM

## 2019-10-25 DIAGNOSIS — I10 ESSENTIAL HYPERTENSION: ICD-10-CM

## 2019-10-25 DIAGNOSIS — M13.80 SERONEGATIVE ARTHRITIS: ICD-10-CM

## 2019-10-25 DIAGNOSIS — E11.9 TYPE 2 DIABETES MELLITUS WITHOUT COMPLICATION, WITHOUT LONG-TERM CURRENT USE OF INSULIN: ICD-10-CM

## 2019-10-25 DIAGNOSIS — Z71.3 NUTRITIONAL COUNSELING: ICD-10-CM

## 2019-10-25 DIAGNOSIS — E66.01 SEVERE OBESITY WITH BODY MASS INDEX (BMI) OF 35.0 TO 39.9 WITH SERIOUS COMORBIDITY: ICD-10-CM

## 2019-10-25 LAB
ALBUMIN SERPL BCP-MCNC: 4.1 G/DL (ref 3.5–5.2)
ALP SERPL-CCNC: 105 U/L (ref 55–135)
ALT SERPL W/O P-5'-P-CCNC: 20 U/L (ref 10–44)
ANION GAP SERPL CALC-SCNC: 7 MMOL/L (ref 8–16)
AST SERPL-CCNC: 14 U/L (ref 10–40)
BASOPHILS # BLD AUTO: 0.06 K/UL (ref 0–0.2)
BASOPHILS NFR BLD: 1.2 % (ref 0–1.9)
BILIRUB SERPL-MCNC: 0.8 MG/DL (ref 0.1–1)
BUN SERPL-MCNC: 16 MG/DL (ref 6–20)
CALCIUM SERPL-MCNC: 9.6 MG/DL (ref 8.7–10.5)
CHLORIDE SERPL-SCNC: 105 MMOL/L (ref 95–110)
CO2 SERPL-SCNC: 32 MMOL/L (ref 23–29)
CREAT SERPL-MCNC: 0.8 MG/DL (ref 0.5–1.4)
CRP SERPL-MCNC: 2.3 MG/L (ref 0–8.2)
DIFFERENTIAL METHOD: NORMAL
EOSINOPHIL # BLD AUTO: 0.3 K/UL (ref 0–0.5)
EOSINOPHIL NFR BLD: 5.5 % (ref 0–8)
ERYTHROCYTE [DISTWIDTH] IN BLOOD BY AUTOMATED COUNT: 12.1 % (ref 11.5–14.5)
ERYTHROCYTE [SEDIMENTATION RATE] IN BLOOD BY WESTERGREN METHOD: 7 MM/HR (ref 0–36)
EST. GFR  (AFRICAN AMERICAN): >60 ML/MIN/1.73 M^2
EST. GFR  (NON AFRICAN AMERICAN): >60 ML/MIN/1.73 M^2
GLUCOSE SERPL-MCNC: 118 MG/DL (ref 70–110)
HCT VFR BLD AUTO: 44.3 % (ref 37–48.5)
HGB BLD-MCNC: 14.2 G/DL (ref 12–16)
IMM GRANULOCYTES # BLD AUTO: 0.01 K/UL (ref 0–0.04)
IMM GRANULOCYTES NFR BLD AUTO: 0.2 % (ref 0–0.5)
LYMPHOCYTES # BLD AUTO: 1.5 K/UL (ref 1–4.8)
LYMPHOCYTES NFR BLD: 29.6 % (ref 18–48)
MCH RBC QN AUTO: 28.8 PG (ref 27–31)
MCHC RBC AUTO-ENTMCNC: 32.1 G/DL (ref 32–36)
MCV RBC AUTO: 90 FL (ref 82–98)
MONOCYTES # BLD AUTO: 0.5 K/UL (ref 0.3–1)
MONOCYTES NFR BLD: 9.6 % (ref 4–15)
NEUTROPHILS # BLD AUTO: 2.8 K/UL (ref 1.8–7.7)
NEUTROPHILS NFR BLD: 53.9 % (ref 38–73)
NRBC BLD-RTO: 0 /100 WBC
PLATELET # BLD AUTO: 286 K/UL (ref 150–350)
PMV BLD AUTO: 9.3 FL (ref 9.2–12.9)
POTASSIUM SERPL-SCNC: 3.7 MMOL/L (ref 3.5–5.1)
PROT SERPL-MCNC: 7.3 G/DL (ref 6–8.4)
RBC # BLD AUTO: 4.93 M/UL (ref 4–5.4)
SODIUM SERPL-SCNC: 144 MMOL/L (ref 136–145)
WBC # BLD AUTO: 5.13 K/UL (ref 3.9–12.7)

## 2019-10-25 PROCEDURE — 86140 C-REACTIVE PROTEIN: CPT

## 2019-10-25 PROCEDURE — 97802 PR MED NUTR THER, 1ST, INDIV, EA 15 MIN: ICD-10-PCS | Mod: ,,, | Performed by: DIETITIAN, REGISTERED

## 2019-10-25 PROCEDURE — 99999 PR PBB SHADOW E&M-EST. PATIENT-LVL III: CPT | Mod: PBBFAC,,, | Performed by: INTERNAL MEDICINE

## 2019-10-25 PROCEDURE — 85652 RBC SED RATE AUTOMATED: CPT

## 2019-10-25 PROCEDURE — 99999 PR PBB SHADOW E&M-EST. PATIENT-LVL III: ICD-10-PCS | Mod: PBBFAC,,, | Performed by: INTERNAL MEDICINE

## 2019-10-25 PROCEDURE — 99499 UNLISTED E&M SERVICE: CPT | Mod: ,,, | Performed by: DIETITIAN, REGISTERED

## 2019-10-25 PROCEDURE — 99999 PR PBB SHADOW E&M-EST. PATIENT-LVL II: CPT | Mod: PBBFAC,,, | Performed by: DIETITIAN, REGISTERED

## 2019-10-25 PROCEDURE — 97802 MEDICAL NUTRITION INDIV IN: CPT | Mod: ,,, | Performed by: DIETITIAN, REGISTERED

## 2019-10-25 PROCEDURE — 99214 OFFICE O/P EST MOD 30 MIN: CPT | Mod: S$GLB,,, | Performed by: INTERNAL MEDICINE

## 2019-10-25 PROCEDURE — 80053 COMPREHEN METABOLIC PANEL: CPT

## 2019-10-25 PROCEDURE — 3008F BODY MASS INDEX DOCD: CPT | Mod: CPTII,S$GLB,, | Performed by: INTERNAL MEDICINE

## 2019-10-25 PROCEDURE — 3008F PR BODY MASS INDEX (BMI) DOCUMENTED: ICD-10-PCS | Mod: CPTII,S$GLB,, | Performed by: INTERNAL MEDICINE

## 2019-10-25 PROCEDURE — 3078F PR MOST RECENT DIASTOLIC BLOOD PRESSURE < 80 MM HG: ICD-10-PCS | Mod: CPTII,S$GLB,, | Performed by: INTERNAL MEDICINE

## 2019-10-25 PROCEDURE — 3075F PR MOST RECENT SYSTOLIC BLOOD PRESS GE 130-139MM HG: ICD-10-PCS | Mod: CPTII,S$GLB,, | Performed by: INTERNAL MEDICINE

## 2019-10-25 PROCEDURE — 99999 PR PBB SHADOW E&M-EST. PATIENT-LVL II: ICD-10-PCS | Mod: PBBFAC,,, | Performed by: DIETITIAN, REGISTERED

## 2019-10-25 PROCEDURE — 99214 PR OFFICE/OUTPT VISIT, EST, LEVL IV, 30-39 MIN: ICD-10-PCS | Mod: S$GLB,,, | Performed by: INTERNAL MEDICINE

## 2019-10-25 PROCEDURE — 3078F DIAST BP <80 MM HG: CPT | Mod: CPTII,S$GLB,, | Performed by: INTERNAL MEDICINE

## 2019-10-25 PROCEDURE — 85025 COMPLETE CBC W/AUTO DIFF WBC: CPT

## 2019-10-25 PROCEDURE — 99499 NO LOS: ICD-10-PCS | Mod: ,,, | Performed by: DIETITIAN, REGISTERED

## 2019-10-25 PROCEDURE — 3075F SYST BP GE 130 - 139MM HG: CPT | Mod: CPTII,S$GLB,, | Performed by: INTERNAL MEDICINE

## 2019-10-25 PROCEDURE — 36415 COLL VENOUS BLD VENIPUNCTURE: CPT

## 2019-10-25 NOTE — PROGRESS NOTES
Chief Complaint   Patient presents with    Disease Management     seronegative arthritis       Patient is here for a follow up    History of presenting illness    53 year old female with seronegative rheumatoid arthritis for a follow up    She is now on humira  Joints feel great    Right knee has bothered her  Pain with activity  Going up and down the stairs  Getting in and out of the car hurts   Both arms ache   Muscles of the thighs ache    In the past took mtx,had severe hair fall     Recent small bowel obstruction due to lipomas     She has a previous diagnosis of uveitis  Eyes are doing good now    Recent labs     CBC nml  CMP in process  ESR nml  CRP pending     Right wrist MRI : mass like area of abnormal signal and enhancement within dorsal subcutaneous fat of the wrist : inflammatory process vs cellulitis vs neoplasm/desmoid tumor    Sent her to ortho : she wants to do EMG/NCS first for CTS and both surgeries will be done at the same time    Dealing with some GI issues will get EGD soon    TAQUERIA negative  RF,CCP negative  Hepatitis b and c negative  HLAB27 negative    CRP 96.9  ESR 92    SPEP no M spike    Uric acid 2.2    Vit d 8,went upto 26  TSH normal    Ck,aldolase normal    Xrays    Hands degenerative changes  Ankles degenerative changes and bone spur  Knees degenerative changes  Feet : degenerative changes     ID clearance done  Did hepatitis A and B vaccine  Doing the shingrix vaccine    54 year old white female presented with the following symptoms :    December around Christmas she had the upper respiratory tract symptoms with myalgia and nasal congestion  Following this she started to experience severe joint pain  All her joints in the hands,elbows,knees,feet started to hurt and swell  EMS for 1 hour  Alleve helps minimally    Hands : thumbs and index fingers and the 3rd digits hurt the worse  She cant make a fist  Thinks that the left 2nd finger swells like a sausage  Swelling along the tendons  as per her PCP,she says he thought she has tenosynovitis     Hips and shoulders ache but no stiffness,good ROM in them    Cold weather makes her symptoms worse     Claims to have had labs : she had ESR,CRP elevation apparently     Past history: asthma,diabetes,JESSICA,thyroid disease,HTN  Occasional tendinitis feet b/l    Has a rash every winter,dermatology thinks it is eczema  This time started in November on the elbows,back,torso,legs,didnt get better,but derm suggested stronger steroids  Has not been told to have psoriasis     Recently had small bowel resection for lipoma    Family history : psoriasis and psoriatic arthritis runs in the family     Social history : quit smoking a while ago,drinks occasionally     Review of Systems   Constitutional: Negative for activity change, appetite change, chills, diaphoresis, fatigue, fever and unexpected weight change.   HENT: Negative for congestion, dental problem, drooling, ear discharge, ear pain, facial swelling, hearing loss, mouth sores, nosebleeds, postnasal drip, rhinorrhea, sinus pressure, sinus pain, sneezing, sore throat, tinnitus, trouble swallowing and voice change.    Eyes: Negative for photophobia, pain, discharge, redness, itching and visual disturbance.   Respiratory: Negative for apnea, cough, choking, chest tightness, shortness of breath, wheezing and stridor.    Cardiovascular: Negative for chest pain, palpitations and leg swelling.   Gastrointestinal: Negative for abdominal distention, abdominal pain, anal bleeding, blood in stool, constipation, diarrhea, nausea, rectal pain and vomiting.   Endocrine: Negative for cold intolerance, heat intolerance, polydipsia, polyphagia and polyuria.   Genitourinary: Negative for decreased urine volume, difficulty urinating, dysuria, enuresis, flank pain, frequency, genital sores, hematuria and urgency.   Musculoskeletal: Negative for back pain, gait problem, myalgias, neck pain and neck stiffness.   Skin: Negative for  color change, pallor, rash and wound.   Allergic/Immunologic: Negative for environmental allergies, food allergies and immunocompromised state.   Neurological: Negative for dizziness, tremors, seizures, syncope, facial asymmetry, speech difficulty, weakness, light-headedness, numbness and headaches.   Hematological: Negative for adenopathy. Does not bruise/bleed easily.   Psychiatric/Behavioral: Negative for agitation, behavioral problems, confusion, decreased concentration, dysphoric mood, hallucinations, self-injury, sleep disturbance and suicidal ideas. The patient is not nervous/anxious and is not hyperactive.        Physical Exam     MARTE-28 tender joint count: 0  MARTE-28 swollen joint count: 0     Right wrist has a huge swelling which has clear borders,harder than soft     Left thumb triggering,tender flexor tendon noted     Physical Exam   Constitutional: She is oriented to person, place, and time and well-developed, well-nourished, and in no distress. No distress.   HENT:   Head: Normocephalic.   Mouth/Throat: Oropharynx is clear and moist.   Eyes: Conjunctivae are normal. Pupils are equal, round, and reactive to light. Right eye exhibits no discharge. Left eye exhibits no discharge. No scleral icterus.   Neck: Normal range of motion. No thyromegaly present.   Cardiovascular: Normal rate, regular rhythm, normal heart sounds and intact distal pulses.    Pulmonary/Chest: Effort normal and breath sounds normal. No stridor.   Abdominal: Soft. Bowel sounds are normal.   Lymphadenopathy:     She has no cervical adenopathy.   Neurological: She is alert and oriented to person, place, and time.   Skin: Skin is warm. No rash noted. She is not diaphoretic.     Psychiatric: Affect and judgment normal.   Musculoskeletal: She exhibits tenderness.         Assessment     54 year old white female presented to me with 1 month of polyarthralgias in the hands,elbows,knees and feet : s/p URT symptoms during Antony : is also s/p  bowel resection for a lipoma : reported swelling in addition to pain and stiffness in her joints : significant EMS : symmetric involvement : in addition reported to having dactylitis   On exam : synovitis noted in many joints,no evidence of dactylitis.    She has inflammatory arthritis,seronegative,unclear yet if she has psoriatic arthritis since the rash might be eczema vs psoriasis  Reactive arthritis possible too    Labs revealed very high inflammatory markers with negative RA and TAQUERIA and HLAB27 panel  Xrays with degenerative arthritis     She did well on prednisone  I then introduced mtx 5 tabs weekly and tapered her prednisone   Then titrated mtx to 8 tabs weekly  She didn't  tolerate the mtx     We switched to humira and she has done well    She has a diagnosis of uveitis   But last eye exam normal    New small bowel obstruction  Due to lipomas   She has recurrent lipomas this is the second SBO due to lipomas      Some knee pain due to OA  Hand arthralgias due to OA    Muscle aches reported,worry if due to statins     F/u problem    1. Seronegative rheumatoid arthritis          Plan    Continue humira    ID follow ups for the hepatitis and shingrix vaccines    Vit d maintanence    Right wrist f/u as per ortho    Ask PCP if statins can be changed    Follow rest of today's labs     Ailyn was seen today for disease management.    Diagnoses and all orders for this visit:    Seronegative rheumatoid arthritis  -     CBC auto differential; Future  -     Comprehensive metabolic panel; Future  -     Sedimentation rate; Future  -     C-reactive protein; Future        rtc in 3 months

## 2019-10-25 NOTE — PROGRESS NOTES
"NUTRITIONAL CONSULT    Referring Physician: Stone Mei M.D.  Reason for MNT Referral: Initial assessment for sleeve gastrectomy work-up    PAST MEDICAL HISTORY:   54 y.o. female  Body mass index is 39.4 kg/m²..  Weight history includes highest wt 250 lbs 7 years ago, lowest wt 98 lbs 22-23 years old  Dieting attempts include Whole 30 and did well, Keto and did well, low carb    Past Medical History:   Diagnosis Date    Arthritis     zero negative imflammatory arthritis    Asthma     Diabetes mellitus, type 2 since the age of 42    Hypertension since the age of 42    JESSICA (obstructive sleep apnea)     S/P UPP    Thyroid disease     Ventral hernia without obstruction or gangrene        CLINICAL DATA:  54 y.o.-year-old White female.  Height: 5'2"  Weight: 215 lbs  IBW: 132 lbs  BMI: 39.40  The patient's goal weight (50% EBW): 173.5 lbs  Personal goal weight: 125-150 lbs    Goal for Bariatric Surgery: to improve health, to improve quality of life and to prevent future medical conditions    NUTRITIONAL NEEDS:  1330 Calories (using [place] 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  She has tried  Protein2.0 and did not experience any adverse effects.  Pt also tried Isopure with water and  tolerated shake well  Snackin / day  Vegetables: Likes a " variety. Eats daily.  Fruits: Likes a variety. Eats 2-3 times per week.  Beverages: water, diet soda, sugar-free beverages, coffee without sugar and no milk  Dining out: Weekly. Mostly fast food, restaurants and take-out.  Cooking at home: Weekly.2-3 times per week Mostly smothered and fried meat, fish, starchy CHO and vegetables.   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  Grocery shopping and food prep daughter or cook from parents restaurant  Patient believes the household will be supportive after surgery.  Alcohol: None.  Rarely on social   Smoking: Quit 7-8 years ago    ASSESSMENT:  · Patient reports attempts at weight loss, only to regain lost weight.  · Patient demonstrated knowledge of healthy eating behaviors and exercise patterns; admits to not eating healthy and not exercising at this point.  · Patient states willingness to change lifestyle and make behavior modifications as evidenced by increased vegetables.    Insurance requires medically supervised diet prior to consideration for bariatric surgery.    Barriers to Education: none    Stage of change: determination    NUTRITION DIAGNOSIS:    Obesity related to Excessive carbohydrate intake and Inadequate protein intake as evidence by BMI.    BARIATRIC DIET DISCUSSION/PLAN:  Discussed diet after surgery and related to patient's food record.  Reviewed nutrition guidelines for before and after surgery.  Answered all questions.  Work on  Bariatric Nutrition Checklist.  Work on gradually cutting back on starchy CHO in the diet.  5-6 meals per day.  Start including protein supplements in the diet plan daily.  Reduce frequency of dining out.  More grocery shopping and meal preparation at home.    RECOMMENDATIONS:  Patient is a potential candidate for bariatric surgery.    Needs additional visit(s) with RD.  Will conduct telephone follow phone call in November 2019    Patient verbalized understanding.    Expect good  compliance after surgery at this time.    Communicated nutrition plan with bariatric team.    SESSION TIME:  60 minutes

## 2019-10-25 NOTE — PATIENT INSTRUCTIONS
Bariatric Diet Grocery List      High in Protein:   ? Canned tuna or chicken (packed in water)  ? Lean ground turkey breast or ground round  ? Turkey or chicken (no skin)  ? Lean pork or beef   ? Scrambled, poached, or boiled eggs  ? Baked or broiled fish and seafood (not fried!)  ? Beans, edamame and lentils  ? Low fat deli meats: turkey, chicken, ham, roast beef  ? 1% or Skim Milk, Lactaid, or Soymilk  ? Low-fat cheese, cottage cheese, mozzarella string cheese, ricotta  ? Light yogurt, FF/SF frozen yogurt, custard, SF pudding  ? Protein drinks and protein bars with 0-4 grams sugar     Fruits, Vegetables and Snacks   - Green beans, broccoli, cauliflower, spinach, asparagus, carrots, lima beans, yellow squash, zucchini, etc.  - Apple, pineapple, peach, grapes, banana, watermelon, oranges, etc.  - Fruit canned in its own juice or in water (not in syrup)  - Raw veggies  - Lettuce: dark greens like spinach and Darryn  - Unsalted Nuts  - Teodoro Links beef jerky     Fluids:   Skim/1% milk, Lactaid, Soymilk  Sugar-free beverages  (decaf and non-carbonated)  Decaf coffee & decaf tea   Water     1200- 1500 calorie Sample meal plan   80-120g protein per day.   Protein drinks and bars: 0-4 grams sugar   Drink lots of water throughout the day and exercise!   MENU # 1   Breakfast: 2 eggs, 1 turkey sausage jakob, 1 apple   Snack: P3 protein pack  Lunch: 2 roll-ups (sliced turkey, low-fat sliced cheese, mustard), 12 baby carrots dipped in 1 Tbsp natural peanut butter   Mid-Day Snack: ¼ cup unsalted almonds, ½ cup fruit   Dinner: 1 chicken thigh simmered in tomato sauce + 2 Tbsp mozzarella cheese, ½ cup black beans, 1/2 cup steamed carrots   Evening Snack: 1/2 cup grapes with 4 cubes low-fat cheddar cheese   ___________________________________________________   MENU # 2   Breakfast: 200 calorie protein drink   Mid-morning snack : ¼ cup unsalted nuts, medium banana   Lunch: 3oz tuna or chicken salad made with 2 tbsp light garcia, over  salad with tomatoes and cucumbers.   Snack: low-fat cheese stick   Dinner: 3oz hamburger jakob, slice of low-fat cheese, 1 cup yellow squash and zucchini sauteed in nonstick cookspray  Snack: 6oz light yogurt   _______________________________________________________   Menu #3   Breakfast: 6oz plain Greek yogurt + fruit (½ banana OR ½ cup fruit - pineapple, blueberries, strawberries, peach), may add Splenda to bjorn.   Lunch: Grilled chicken breast w/ slice low-fat pepper sandeep cheese, 1/2 cup grilled/baked asparagus and small salad with Salad Spritzer.   Mid-Day snack: 200 calorie protein drink   Dinner: 4oz Grilled fish, ½ cup white beans, ½ cup cooked spinach   Evening Snack: fudgsicle no-sugar-added   Menu # 4   Breakfast: 1 scoop vanilla protein powder + 4oz skim milk + 4oz coffee   Snack: Pure protein bar   Lunch: 2 Lettuce tacos: 3oz seasoned ground turkey wrapped in a Darryn lettuce leaves with 1 Tbsp shredded cheese and dollop low-fat sour cream   Snack: ½ cup cottage cheese, ½ cup pineapple chunks   Dinner: Shrimp omelet: 2 eggs, ½ cup shrimp, green onions, and shredded cheese   ______________________________________________________   Menu #5   Breakfast: 1 cup low-fat cottage cheese, ½ cup peaches (no sugar added)   Snack: 1 apple, 4 cubes cheese   Lunch: 3oz baked pork chop, 1 cup okra and tomato stew   Snack: 1/2 cup black beans + salsa + dollop light sour cream   Dinner: Caprese chicken salad: 3 oz chicken breast, 1oz fresh mozzarella, sliced tomato, 1 Tbsp olive oil, basil   Snack: sugar-free popsicle   _______________________________________________________  Menu #6   Breakfast: ½ cup part-skim ricotta cheese, 2 Tbsp sugar-free strawberry preserves, sprinkle of slivered almonds   Snack: 1 orange + string cheese  Lunch: 3 oz canned chicken, 1oz shredded cheddar cheese, ½ cup black beans, 2 Tbsp salsa   Snack: 200 calorie Protein drink   Dinner: 4 oz grilled salmon steak, over mixed green salad with 2  tbsp light dressing   _______________________________________________________  Menu #7  Breakfast: crust-less quiche (bake 1 egg mixed w/ low fat cheese, broccoli and low sodium ham in a muffin cup until cooked inside)  Snack: 1 light yogurt  Lunch: protein shake (1 scoop powder + 8 oz skim milk, blended w/ ice)  Snack: small apple w/ 2 tbsp PB2 (powdered peanut butter)  Dinner: 2 Turkey meatballs w/ low sugar marinara sauce, 1/2 cup spiralized zucchini (sauteed on stove top w/ nonstick cookspray)    Healthy Eating on the go ~ Pre and Post-Surgery     (*) Means this meal is appropriate for pre-surgery consumption because it is >30g of protein per meal. Post-surgery, may want to split meal in half or save a small portion for a snack.     Daphnes Kitchen  Item  Calories  Protein (g)   Mediterranean Lamb Kafta  350 22   *Chicken Kabobs 290 41   *Gardiner Kabobs 330 40   Shrimp Kabobs 170 23   *Steak Kabobs 490 42   *Cauliflower Rice Bowl- chicken (salmon, lamb)  490 41   Mediterranean Chicken 260 34   *Protein Power Plate 520 41   Side items: Greek side salad, fruit salad, roasted vegetables, baked falafel       Laguerres   Item  Calories Protein (g)   Artisan Grilled Chicken Colon, no bun  <380 36   *[place] Grilled Chicken Salad, no dressing <320 42   Double Hamburger, no bun <440 25   Side items: apple slices, side salad       Maceys   Item Calories Protein (g)   Grilled Chicken Colon, no bun  <370 34   Tranquillity Chicken Salad, half size, no dressing 320  22   Apple Pecan Chicken Salad, half size, no dressing  340 20   Large Chili 250 21   Side items: garden or caesar side salad (no croutons), apple bites       Burger Lenin   Item Calories Protein (g)   Grilled Chicken Colon, no bun <470 37   Whopper Jr., no bun <310 13   Double Hamburger, no bun  <390 23   *Chicken Garden Salad, no croutons, use ½ packet of dressing  <520 40   Side items: garden side salad,         Chick-maribell-A  Item Calories Protein (g)    Grilled Chicken Roscoe, no bun  <310 29   Grilled Nuggets, 8 count 140 25   Grilled Chicken Market Salad with light balsamic dressing 330 28   Small Chicken Tortilla Soup, no tortilla strips 340 23   Side items: side salad, superfood side salad, carrot raisin salad, fruit cup,          Sonic  Item Calories Protein (g)   Jr. Buck, no bun  <330 15   Classic Grilled Chicken [place], no bun <490 31   Hearty [place], no fritos 140 10       Kyles   Item Calories Protein (g)   Blackened Chicken Tenders, 3 piece 170 26   Side items: green beans             [facility]   Item Calories Protein (g)   Unwich: any sandwich made wrapped in lettuce instead of bread. Ask for no garcia.      Original Roast Beef Unwich 260 16   [place] Breast Unwich 230 14   Perfect Liberian Unwich 340 22   Ham and Provolone Unwich 350 19   Tuna Salad Unwich 280 11   The Veggie Unwich 410 17   Side items: dill pickle          Chipotle  Item Calories Protein (g)   *Salad with your choice of meat, beans, fresh tomato salsa, fajita veggies, and guacamole (NO rice) ~375 ~40        Applebees  Item Calories Protein (g)   Grilled Chicken Breast 290 38   Turkmen Shrimp Salad, no wonton strips, use ½ dressing <390 26   Blackened Shrimp Caesar Salad, no croutons, use ½ dressing  <660 25   *Grilled Chicken Caesar Salad, no croutons, use ½ dressing <770 47   [place] Reva with Maple Mustard Glaze 350 37   Side Grilled Shrimp Skewer 110 27   Side items: steamed broccoli, garlicky green beans,               [employer]  Item Calories Protein (g)   *Spinach & Mushroom Omelette, no hollandaise sauce  <890 46   *Garden Omelette 840 46   Egg White Vegetable Omelette 380 29   *Grilled Chicken & Veggie Salad, use ½ dressing  <680 38     Olive Garden   Item Calories Protein (g)   *Herb-Grilled Reva 460 45   House salad with, no croutons. (Add grilled chicken or shrimp) <400 25   Side items: steamed broccoli       Walk Ons   Tuna Tini 410 30   Venison [place]- Bowl 330 14    Chicken Ferrell Pecan Salad      Side items: seasonal fruit, broccoli, green beans side salad

## 2019-10-29 ENCOUNTER — DOCUMENTATION ONLY (OUTPATIENT)
Dept: BARIATRICS | Facility: CLINIC | Age: 54
End: 2019-10-29

## 2019-10-29 NOTE — PROGRESS NOTES
Bariatric Surgery Online Course Form Submission  Someone has submitted a Bariatric Surgery Online Course Form Submission on this page.  Date: 10- 15:45:03  Patient's Name: Ailyn Ortiz  YOB: 1965 Email: [email or web address]  Phone: [phone]   Patient Address: [address] [place] [address]  Preferred Surgical Location: [facility] [place]   I certify that I am 18 years of age or older: Yes   Confirmation Code: [id number]  Verification of Bariatric Seminar: yes  Insurance Information  Insurance or Self Pay? Self Pay/No Insurance - Skip fields below  Insurance Company Name:   Type of Coverage/Coverage Plan (i.e. PPO, HMO):   Group Name: Ailyn Ortiz   Subscriber Name: Ailyn Ortiz   Member Name (patient's name): Ailyn Ortiz   Member ID/Policy #:   Plan Effective Date:   Card Issuance date:

## 2019-10-30 LAB
25(OH)D3 SERPL-MCNC: 41 NG/ML
ALBUMIN SERPL BCP-MCNC: 4.9 G/DL (ref 3.5–5)
ALP SERPL-CCNC: 102 U/L (ref 25–125)
ALT SERPL W P-5'-P-CCNC: 22 U/L (ref 7–35)
AST SERPL-CCNC: 18 U/L (ref 13–35)
BILIRUB SERPL-MCNC: 0.6 MG/DL (ref 0.1–1.4)
BILIRUBIN DIRECT+TOT PNL SERPL-MCNC: 0.13 MG/DL (ref 0.01–0.4)
CHOLEST SERPL-MSCNC: 176 MG/DL (ref 0–200)
FOLATE: 8.9
HBA1C MFR BLD: 5.9 % (ref 4–6)
HDLC SERPL-MCNC: 53 MG/DL (ref 35–70)
IRON: 68
LDLC SERPL CALC-MCNC: 96 MG/DL
Lab: 0.46
MAGNESIUM SERPL-MCNC: 2.3 MG/DL (ref 1.6–2.4)
PHOSPHATE FLD-MCNC: 3.5 MG/DL (ref 2.5–4.9)
SATURATED IRON: 21
T3 SERPL-MCNC: 126 NG/DL
T4 FREE SP9 P CHAL SERPL-SCNC: 1.32 PMOL/L
T4,THYROXINE: 9.7
TOTAL IRON BINDING CAPACITY: 325
TRIGL SERPL-MCNC: 134 MG/DL (ref 40–160)
TSH SERPL DL<=0.005 MIU/L-ACNC: 1.69 UIU/ML (ref 0.41–5.9)
VIT B1 BLD-MCNC: 172.9 UG/DL
VIT B12 SERPL-MCNC: 456 PG/ML

## 2019-11-01 RX ORDER — ERGOCALCIFEROL 1.25 MG/1
CAPSULE ORAL
Qty: 12 CAPSULE | Refills: 0 | Status: SHIPPED | OUTPATIENT
Start: 2019-11-01 | End: 2020-02-17

## 2019-11-12 ENCOUNTER — TELEPHONE (OUTPATIENT)
Dept: PHARMACY | Facility: CLINIC | Age: 54
End: 2019-11-12

## 2019-11-12 ENCOUNTER — OFFICE VISIT (OUTPATIENT)
Dept: PSYCHIATRY | Facility: CLINIC | Age: 54
End: 2019-11-12
Payer: COMMERCIAL

## 2019-11-12 DIAGNOSIS — I10 ESSENTIAL HYPERTENSION: ICD-10-CM

## 2019-11-12 DIAGNOSIS — M19.90 INFLAMMATORY ARTHRITIS: ICD-10-CM

## 2019-11-12 DIAGNOSIS — G47.33 OBSTRUCTIVE SLEEP APNEA SYNDROME: ICD-10-CM

## 2019-11-12 DIAGNOSIS — Z01.818 PREOP EXAMINATION: Primary | ICD-10-CM

## 2019-11-12 DIAGNOSIS — E11.9 TYPE 2 DIABETES MELLITUS WITHOUT COMPLICATION, WITHOUT LONG-TERM CURRENT USE OF INSULIN: ICD-10-CM

## 2019-11-12 DIAGNOSIS — E66.01 MORBID OBESITY DUE TO EXCESS CALORIES: ICD-10-CM

## 2019-11-12 PROCEDURE — 96138 PR PSYCH/NEUROPSYCH TEST ADMIN/SCORING, BY TECH, 2+ TESTS, 1ST 30 MIN: ICD-10-PCS | Mod: S$GLB,,, | Performed by: PSYCHOLOGIST

## 2019-11-12 PROCEDURE — 96139 PR PSYCH/NEUROPSYCH TEST ADMIN/SCORING, BY TECH, 2+ TESTS, EA ADDTL 30 MIN: ICD-10-PCS | Mod: S$GLB,,, | Performed by: PSYCHOLOGIST

## 2019-11-12 PROCEDURE — 90791 PSYCH DIAGNOSTIC EVALUATION: CPT | Mod: S$GLB,,, | Performed by: PSYCHOLOGIST

## 2019-11-12 PROCEDURE — 96131 PR PSYCHOLOGIC TEST EVAL SVCS, EA ADDTL HR: ICD-10-PCS | Mod: S$GLB,,, | Performed by: PSYCHOLOGIST

## 2019-11-12 PROCEDURE — 96139 PSYCL/NRPSYC TST TECH EA: CPT | Mod: S$GLB,,, | Performed by: PSYCHOLOGIST

## 2019-11-12 PROCEDURE — 96131 PSYCL TST EVAL PHYS/QHP EA: CPT | Mod: S$GLB,,, | Performed by: PSYCHOLOGIST

## 2019-11-12 PROCEDURE — 96130 PSYCL TST EVAL PHYS/QHP 1ST: CPT | Mod: S$GLB,,, | Performed by: PSYCHOLOGIST

## 2019-11-12 PROCEDURE — 96138 PSYCL/NRPSYC TECH 1ST: CPT | Mod: S$GLB,,, | Performed by: PSYCHOLOGIST

## 2019-11-12 PROCEDURE — 96130 PR PSYCHOLOGIC TEST EVAL SVCS, 1ST HR: ICD-10-PCS | Mod: S$GLB,,, | Performed by: PSYCHOLOGIST

## 2019-11-12 PROCEDURE — 90791 PR PSYCHIATRIC DIAGNOSTIC EVALUATION: ICD-10-PCS | Mod: S$GLB,,, | Performed by: PSYCHOLOGIST

## 2019-11-12 NOTE — PSYCH TESTING
[facility] [address] [place]  [address]  [phone]    REPORT OF PSYCHOLOGICAL TESTING    NAME: Ailyn Ortiz  MRN #: [id number]  : 1965    REFERRED BY: Dr. Mei    EVALUATED BY:  Tessy Mcnair, Ph.D., Clinical Psychologist  ELVIS Swartz Psychometrician    DATES OF EVALUATION: 2019, 2019    EVALUATION PROCEDURES AND TIMES:  Conducted by Psychologist (2 hours):  Integration of patient data, interpretation of standardized test results and clinical data, clinical decision-making, treatment planning and report, and interactive feedback to the patient  CPT Codes:  46381 - 1 hour; 33705 - 1 hour  Conducted by Technician (1 hour, 27 minutes):  Psychological test administration and scoring by technician, two or more tests, any method:  [place] Multiphasic Personality Inventory - 2 - Restructured Form (MMPI-2-RF); [facility] Diagnostic (MBMD)  CPT Codes:  49567 - 30 minutes; 24948 - 30 minutes, 20264 - 30 minutes, 68433 - 30 minutes, (3 additional units)    EVALUATION FINDINGS:  Before this evaluation was initiated, the purposes and process of the assessment and the limits of confidentiality were discussed with the patient who expressed understanding of these issues and orally consented to proceed with the evaluation.    Ms. Ortiz is a 54 y.o.  female who is pursuing bariatric surgery to improve her health and quality of life. She denied any current psychiatric distress, she appears psychologically stable, and has a good support system. She reported a history of brief family therapy during her college years due to her parents disagreeing with her dating partner, but no further therapy or psychiatric treatment.  She denied any history of suicidality or non-suicidal self-injury.      Ms. Ortiz began struggling with her weight during adolescence when her father would criticize her for gaining a few pounds. She began using  compensatory strategies (i.e., purging, laxative, excessive exercise) in late teens and early 20s. She reported discontinuing these behaviors over 30 years ago, and she denied any binge eating as well. She has tried low carb diets with success, but she is unable to maintain the weight loss. She reported her goals for after surgery are to improve her physical health and get off of medications for hypertension and diabetes.    An initial consultation with the bariatric team was not located in the patients chart, and this is likely due to the patient meeting with Dr. Mei for a consultation for hernia surgery and discussing having the gastric sleeve procedure simultaneously. Her medical comorbidities include: hypertension, diabetes, JESSICA, and inflammatory arthritis.  She completed a nutritional consultation with Ms. Jessy Starks RD, bariatric nutritionist, and reports that she has made many changes to her diet since beginning the program.  Her BMI at that appointment was 39.4 kg/m².  She has a good understanding regarding the risks and benefits of the procedure and appears motivated for change.  She was fully cooperative and engaged in the assessment process.     Ms. Ortiz produced an interpretable MMPI-2-RF profile. Of note, validity scale results suggest she tended to portray herself in an overly positive and well-adjusted presentation, which may indicate an underestimation of problems assessed by this test.  This profile should be interpreted with these issues in mind.  Although there are no indications of emotional problems, disordered thinking, or behavioral issues, these problems cannot be ruled-out due to indications of under-reporting described earlier.     The MBMD indicated that Ms. Ortiz responded in an open and honest manner and similar to other medical patients. The results indicate she is not reporting any significant psychiatric distress at this time. She typically demonstrates conformity to  the expectations of others, particularly those of medical professionals. She is generally afraid of expressing strong emotions, and this tendency may result in tension-related somatic ailments. According to test data, psychological factors are unlikely to contribute to excessive medical complications for Ms. Ortiz. Her responses suggest that, compared to other patients seeking bariatric surgery, she is likely to experience an improved quality of life and functioning after surgery, and she is likely to follow through on making behavioral changes that will lead to optimal surgery results. A nutritional instruction plan may benefit Ms. Ortiz post-surgery, but psychological intervention is not necessary at this time.    DIAGNOSTIC IMPRESSIONS:          ICD-10-CM ICD-9-CM             1. Preop examination Z01.818 V72.84     2. Morbid obesity due to excess calories E66.01 278.01     3. Essential hypertension I10 401.9     4. Type 2 diabetes mellitus without complication, without long-term current use of insulin E11.9 250.00     5. Obstructive sleep apnea syndrome G47.33 327.23     6. Inflammatory arthritis M19.90 714.9     7. BMI 39.0-39.9,adult Z68.39 V85.39            SUMMARY AND RECOMMENDATIONS:  Ms. Ortiz has a long history of weight problems and is pursuing bariatric surgery at this time in an effort to improve her health and quality of life.  Test results should be considered valid, and indicate that she reports no current psychiatric symptoms or adjustment problems.  In the clinical interview, Ms. Ortiz reports no significant psychiatric history.  She reports good social support, demonstrates several characteristics that make her a good candidate for surgery, and testing suggests that she will benefit in multiple ways from bariatric surgery.  Test results show NO overt psychological contraindications for surgery.

## 2019-11-12 NOTE — PROGRESS NOTES
Psychiatry Initial Visit (PhD/LCSW)   Diagnostic Interview - CPT 81329     Date: 11/12/2019    Site: [facility]     Referral source: Stone Mei M.D.     Clinical status of patient: Outpatient     Ms. Ortiz, a y.o. 54 female, presented for initial evaluation visit. Before this evaluation was initiated, the purposes and process of the assessment and the limits of confidentiality were discussed with the patient who expressed understanding of these issues and orally consented to proceed with the evaluation.     Chief complaint/reason for encounter: Routine psychological evaluation prior to bariatric surgery.     Type of surgery sought:  sleeve gastrectomy     History of present illness:  Ms. Ortiz is a 54 y.o.  female who is pursuing bariatric surgery to improve her health and quality of life. She denied any current psychiatric distress, she appears psychologically stable, and has a good support system. She reported a history of brief family therapy during her college years due to her parents disagreeing with her dating partner, but no further therapy or psychiatric treatment.  She denied any history of suicidality or non-suicidal self-injury.  She has attempted making positive lifestyle changes in anticipation for surgery, with reported benefit.  The patient has a Body Mass Index of 39.4 kg/m² as documented by the referring provider.     Ms. Ortiz has struggled with weight since 11 years old (father called her heavy if she gained a little bit of weight; in high school he told her she was heavy when she went from a size 7 to size 9). She stated she began purging, taking laxatives, and running excessively to lose weight. She reported her lowest weight was 96 pounds. She reported she stopped these compensatory behaviors after college, around 1985, before getting  and having children.  Factors that have contributed to her weight gain over the years include:  Unhealthy family  "recipes, pregnancy weight gain could not lose, and liking to eat food.  She also reported eating when bored, but denied being an emotional eater.  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  Her postsurgical goals include to be able to get off medications for diabetes and hypertension, and get ahold on her inflammatory arthritis.    Ms. Ortiz has met with bariatric nutritionist Jessy Starks RD, and reports that she has made the following lifestyle changes since beginning the bariatric program:  Cut out potatoes, pasta, and rice.  She chose the gastric sleeve because Dr. Mei said this would be better option due to her history of bowel surgeries.  Of note, the patient is undergoing hernia surgery by Dr. Mei and having the gastric sleeve surgery simultaneously.  She understood that she needs to focus on protein intake after surgery, which includes low fat proteins and protein supplements.  After surgery, she noted that she will need to stay away from nuts, fiberous meats, candy, and any foods harder to digest, which she noted she already avoids due to two bowel resections.  She hopes to lose 60 pounds and expects it will take a year to 18 months.  She plans to take two to six weeks to recover after surgery.  Her  will be available to help her during recovery.    Medical History:  Diabetes, hypertension, inflammatory arthritis, JESSICA (cannot tolerate sleep mask)     Current medications and drug reactions:  see medication list.    Pain:  3/10, pain in fingers due to arthritis (on Humira)    Psychiatric Symptoms:  Depression:  She reported she can get frustrated when dealing with her " daughter and granddaughter at times and will go lay down and cry, but this will pass quickly.  She denied episodes of depressed mood or depression-related anhedonia, lack of motivation, lethargy, difficulty concentrating, feelings of worthlessness or guilt, hopelessness, appetite changes, or psychomotor changes.  Based on her reports, her symptoms do not meet full criteria for Major Depressive Disorder.   Carol Ann/Hypomania:  Denied.  She denied periods of elevated mood or abnormally increased energy or goal-directed activity.  Anxiety:  Denied.  She only reported worries about bad weather and does not sleep well with bad weather. She denied experiencing excessive, exaggerated anxiety that was unmanageable.  Based on her reports, her symptoms do not meet full criteria for Generalized Anxiety Disorder.   Thoughts:  Denied delusions, hallucinations.  Suicidal thoughts/behaviors:  Denied.  Self-injury:  Denied.  Sleep:  Obtaining eight hours of sleep night.  Cognitive functioning:  Denied problems.    Current psychosocial stressors:  Having to deal with daughter and granddaughter (ADHD), having differing opinions of parenting her granddaughter.  Report of coping skills:  Isolate and step away from the problem  Support system:    Strengths and liabilities:  Strength: Patient accepts guidance/feedback, Strength: Patient is expressive/articulate., Strength: Patient is motivated for change., Strength: Patient has positive support network., Strength: Patient has reasonable judgment., Strength: Patient is stable., Liability: Patient has poor health.    Current and past substance use:  Alcohol: Denied current use; denied history of abuse or dependency. Drink occasionally for holidays or special events.  Drugs: Denied current use; denied history of abuse or dependency.   Tobacco: Quit smoking cigarettes seven years ago.   Caffeine: Half cup of coffee per day, diet Dr. Pepper - 2 or 3 cans per day, cutting back in  "anticipation of surgery.    Current Psychiatric Treatment:  Medications:  Denied.  Psychotherapy:  In college, she had a disagreement with her parents regarding who she was dating, so they went to counseling as a family briefly regarding the matter. It all worked out well as she  the man she was dating, and her family loves him.    Psychiatric History:  Previous diagnosis: Denied.  Previous hospitalizations: Denied.  History of outpatient treatment:  Only brief family therapy.  Previous suicide attempt:  Denied.  Family history of psychiatric illness:  None reported.    Trauma history:  Denied.    History of eating disorders:  History of bulimia:  Patient reported a history of purging, taking laxatives, and exercising excessively in late teens and early 20's. She reported she quit those behaviors over 30 years ago. She denied current episodes of binge eating and inappropriate compensatory behaviors.    History of binge-eating episodes:  She denied eating excessive amounts of food within a discrete time period with a lack of control during eating.  She denied eating more rapidly than normal, eating until uncomfortably full, eating large amounts of food when not physically hungry, eating alone due to embarrassment, or negative emotions (i.e., disgusted, guilty, depressed) afterwards.    Social history (marriage, employment, etc.):  Ms. Ortiz was born in [place], and raised in [place], [place] by her biological parents along with her older sister and two younger brothers.  She described her childhood as "normal."  She denied childhood trauma, abuse, and neglect.  She enjoyed school ([employer], track-held school record in [employer]) and earned associates degree in nursing from [employer] after high school.  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  She is not on disability and finances are stable.  She has been  to her  for 33 years.  She has " three children (ages 31, 29, and 27).  She currently lives with ALIE Ledesma with her , daughter and 7-year-old granddaughter.  She talks to her mother several times per week, her father at the business, and talks to her sister jeremiah. 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 Her Church is [employer].     Legal history: Denied.    Mental Status Exam:   General appearance:  appears stated age, neatly dressed, well groomed  Speech:  normal rate and tone  Level of cooperation:  cooperative  Thought processes:  logical, goal-directed  Mood:  euthymic  Thought content:  no illusions, no visual hallucinations, no auditory hallucinations, no delusions, no active or passive homicidal thoughts, no active or passive suicidal ideation, no obsessions, no compulsions, no violence  Affect:  appropriate  Orientation:  oriented to person, place, and date  Memory:  Recent memory:  3 of 3 objects after brief delay.    Remote memory - intact  Attention span and concentration:  spelled HOUSE forward and backwards  Fund of general knowledge: 3 of 3 recent presidents  Abstract reasoning:    Similarities: abstract.    Proverbs: abstract.  Judgment and insight: fair  Language:  intact    Diagnostic Impression:        ICD-10-CM ICD-9-CM             1. Preop examination Z01.818 V72.84     2. Morbid obesity due to excess calories E66.01 278.01     3. Essential hypertension I10 401.9     4. Type 2 diabetes mellitus without complication, without long-term current use of insulin E11.9 250.00     5. Obstructive sleep apnea syndrome G47.33 327.23     6. Inflammatory arthritis M19.90 714.9     7. BMI 39.0-39.9,adult Z68.39 V85.39            Summary/Conclusion:   There are no overt psychological contraindications for proceeding with bariatric surgery.  The patient has no significant psychiatric history, and reports no current psychiatric problems or major adjustment issues.  The  patient has reasonable expectations for surgery, good social support, and has already begun making appropriate lifestyle changes in anticipation for surgery. The patient has verbalized appropriate awareness and commitment to the necessary behavioral changes associated with bariatric surgery and appears willing to comply with long-term lifestyle changes. There are no recommendations for psychological treatment at this time. The patient is aware of resources available should her needs change in the future.    Recommendations:    Please see Psychological Testing report available in Notes tab of Chart Review in Epic for results of psychological testing.      Length of Session:  40 minutes

## 2019-11-13 ENCOUNTER — TELEPHONE (OUTPATIENT)
Dept: BARIATRICS | Facility: CLINIC | Age: 54
End: 2019-11-13

## 2019-11-13 NOTE — TELEPHONE ENCOUNTER
----- Message from Laurie Ying sent at 11/13/2019 12:05 PM CST -----  Contact: PT @[phone]  Pt called in to speak with you regarding scheduling for surgery. Please call back.

## 2019-11-13 NOTE — TELEPHONE ENCOUNTER
Returned call to patient and LVM to have labs drawn with Dr Lewis Lloyd for insurance purposes and get PCP clearance and we will be closer to setting up her surgery. Surgery.

## 2019-11-14 ENCOUNTER — TELEPHONE (OUTPATIENT)
Dept: BARIATRICS | Facility: CLINIC | Age: 54
End: 2019-11-14

## 2019-11-14 DIAGNOSIS — I10 ESSENTIAL HYPERTENSION: ICD-10-CM

## 2019-11-14 DIAGNOSIS — Z71.3 NUTRITIONAL COUNSELING: ICD-10-CM

## 2019-11-14 DIAGNOSIS — Z98.84 S/P LAPAROSCOPIC SLEEVE GASTRECTOMY: Primary | ICD-10-CM

## 2019-11-14 DIAGNOSIS — G47.33 OBSTRUCTIVE SLEEP APNEA SYNDROME: ICD-10-CM

## 2019-11-14 DIAGNOSIS — E11.9 TYPE 2 DIABETES MELLITUS WITHOUT COMPLICATION, WITHOUT LONG-TERM CURRENT USE OF INSULIN: ICD-10-CM

## 2019-11-14 DIAGNOSIS — E66.9 OBESITY, UNSPECIFIED CLASSIFICATION, UNSPECIFIED OBESITY TYPE, UNSPECIFIED WHETHER SERIOUS COMORBIDITY PRESENT: ICD-10-CM

## 2019-11-14 NOTE — TELEPHONE ENCOUNTER
Spoke to patient and she has decided that she wants to have her surgery before the end of the year.  Surgery for hernia and sleeve scheduled for 12/30/19 per her request.  Reviewed the necessary testing still needed, a phone call with the RD and a repeat stress test for clearance.  She is also sending an EGD report and path from last year with Dr Wharton for review.   She verbalized understanding.

## 2019-11-14 NOTE — TELEPHONE ENCOUNTER
----- Message from Tessy Mcnair, PhD sent at 11/12/2019  4:37 PM CST -----  Patient is psychologically cleared for bariatric surgery.

## 2019-11-14 NOTE — TELEPHONE ENCOUNTER
----- Message from Teri Lozoya sent at 11/14/2019  3:28 PM CST -----  Contact: Pt   Reason: Pt is calling with a few concerns about her surgery she asked that you call her asap. Thanks     Communication: [phone]

## 2019-11-14 NOTE — TELEPHONE ENCOUNTER
----- Message from Teri Lozoya sent at 11/14/2019  4:04 PM CST -----  Contact: Pt   HI, I see guys keep missing each other but pt is returning your call     Communication: [phone]

## 2019-11-14 NOTE — TELEPHONE ENCOUNTER
Phoned message and LVM for patient to RC to discuss what else is needed for surgery to be scheduled.

## 2019-11-15 ENCOUNTER — PATIENT MESSAGE (OUTPATIENT)
Dept: BARIATRICS | Facility: CLINIC | Age: 54
End: 2019-11-15

## 2019-11-15 ENCOUNTER — TELEPHONE (OUTPATIENT)
Dept: BARIATRICS | Facility: CLINIC | Age: 54
End: 2019-11-15

## 2019-11-15 NOTE — TELEPHONE ENCOUNTER
Called and left vm for pt with contact information.  Will send msg via portal for pt to answer questions concerning diet and exercise.

## 2019-11-15 NOTE — TELEPHONE ENCOUNTER
Called pt . Pt unavailable to talk, pt to call back.  ----- Message from Yoshi Parra MA sent at 11/15/2019 12:11 PM CST -----  Contact: pt       ----- Message -----  From: Tyra Gold  Sent: 11/15/2019  11:55 AM CST  To: Sigifredo Lurebecca Staff    Please call pt at [phone]    Patient is returning staff call    Thank you

## 2019-11-18 ENCOUNTER — DOCUMENTATION ONLY (OUTPATIENT)
Dept: BARIATRICS | Facility: CLINIC | Age: 54
End: 2019-11-18

## 2019-11-26 ENCOUNTER — HOSPITAL ENCOUNTER (OUTPATIENT)
Dept: CARDIOLOGY | Facility: CLINIC | Age: 54
Discharge: HOME | End: 2019-11-26
Attending: SURGERY
Payer: COMMERCIAL

## 2019-11-26 VITALS — HEIGHT: 62 IN | WEIGHT: 214 LBS | BODY MASS INDEX: 39.38 KG/M2

## 2019-11-26 DIAGNOSIS — G47.33 OBSTRUCTIVE SLEEP APNEA SYNDROME: ICD-10-CM

## 2019-11-26 DIAGNOSIS — E66.9 OBESITY, UNSPECIFIED CLASSIFICATION, UNSPECIFIED OBESITY TYPE, UNSPECIFIED WHETHER SERIOUS COMORBIDITY PRESENT: ICD-10-CM

## 2019-11-26 DIAGNOSIS — I10 ESSENTIAL HYPERTENSION: ICD-10-CM

## 2019-11-26 DIAGNOSIS — Z71.3 NUTRITIONAL COUNSELING: ICD-10-CM

## 2019-11-26 DIAGNOSIS — E11.9 TYPE 2 DIABETES MELLITUS WITHOUT COMPLICATION, WITHOUT LONG-TERM CURRENT USE OF INSULIN: ICD-10-CM

## 2019-11-26 LAB
ASCENDING AORTA: 2.93 CM
BSA FOR ECHO PROCEDURE: 2.06 M2
CV ECHO LV RWT: 0.41 CM
CV STRESS BASE HR: 76 BPM
DIASTOLIC BLOOD PRESSURE: 74 MMHG
DOP CALC LVOT AREA: 4.3 CM2
DOP CALC LVOT DIAMETER: 2.33 CM
DOP CALC LVOT PEAK VEL: 1.16 M/S
DOP CALC LVOT STROKE VOLUME: 96.1 CM3
DOP CALCLVOT PEAK VEL VTI: 22.55 CM
E/A RATIO: 1.16
E/E' RATIO: 12.4 M/S
ECHO LV POSTERIOR WALL: 0.92 CM (ref 0.6–1.1)
FRACTIONAL SHORTENING: 25 % (ref 28–44)
INTERVENTRICULAR SEPTUM: 1.05 CM (ref 0.6–1.1)
LA MAJOR: 4.73 CM
LA MINOR: 4.2 CM
LA WIDTH: 2.93 CM
LEFT ATRIUM SIZE: 3.2 CM
LEFT ATRIUM VOLUME INDEX: 18 ML/M2
LEFT ATRIUM VOLUME: 35.46 CM3
LEFT INTERNAL DIMENSION IN SYSTOLE: 3.35 CM (ref 2.1–4)
LEFT VENTRICLE DIASTOLIC VOLUME INDEX: 45.99 ML/M2
LEFT VENTRICLE DIASTOLIC VOLUME: 90.48 ML
LEFT VENTRICLE MASS INDEX: 75 G/M2
LEFT VENTRICLE SYSTOLIC VOLUME INDEX: 23.3 ML/M2
LEFT VENTRICLE SYSTOLIC VOLUME: 45.81 ML
LEFT VENTRICULAR INTERNAL DIMENSION IN DIASTOLE: 4.46 CM (ref 3.5–6)
LEFT VENTRICULAR MASS: 147.97 G
LV LATERAL E/E' RATIO: 13.29 M/S
LV SEPTAL E/E' RATIO: 11.63 M/S
MV PEAK A VEL: 0.8 M/S
MV PEAK E VEL: 0.93 M/S
OHS CV CPX 1 MINUTE RECOVERY HEART RATE: 105 BPM
OHS CV CPX 85 PERCENT MAX PREDICTED HEART RATE MALE: 134
OHS CV CPX ESTIMATED METS: 10
OHS CV CPX MAX PREDICTED HEART RATE: 158
OHS CV CPX PATIENT IS FEMALE: 1
OHS CV CPX PATIENT IS MALE: 0
OHS CV CPX PEAK DIASTOLIC BLOOD PRESSURE: 69 MMHG
OHS CV CPX PEAK HEAR RATE: 138 BPM
OHS CV CPX PEAK RATE PRESSURE PRODUCT: NORMAL
OHS CV CPX PEAK SYSTOLIC BLOOD PRESSURE: 156 MMHG
OHS CV CPX PERCENT MAX PREDICTED HEART RATE ACHIEVED: 88
OHS CV CPX RATE PRESSURE PRODUCT PRESENTING: 8968
RA MAJOR: 3.85 CM
RA WIDTH: 2.42 CM
RIGHT VENTRICULAR END-DIASTOLIC DIMENSION: 3.09 CM
RV TISSUE DOPPLER FREE WALL SYSTOLIC VELOCITY 1 (APICAL 4 CHAMBER VIEW): 15.19 CM/S
SINUS: 3.14 CM
STJ: 2.75 CM
STRESS ECHO POST EXERCISE DUR MIN: 6 MINUTES
STRESS ECHO POST EXERCISE DUR SEC: 15 SECONDS
SYSTOLIC BLOOD PRESSURE: 118 MMHG
TDI LATERAL: 0.07 M/S
TDI SEPTAL: 0.08 M/S
TDI: 0.08 M/S
TRICUSPID ANNULAR PLANE SYSTOLIC EXCURSION: 2.21 CM

## 2019-11-26 PROCEDURE — 93351 STRESS ECHO (CUPID ONLY): ICD-10-PCS | Mod: S$GLB,,, | Performed by: INTERNAL MEDICINE

## 2019-11-26 PROCEDURE — 93351 STRESS TTE COMPLETE: CPT | Mod: S$GLB,,, | Performed by: INTERNAL MEDICINE

## 2019-12-02 ENCOUNTER — TELEPHONE (OUTPATIENT)
Dept: SURGERY | Facility: CLINIC | Age: 54
End: 2019-12-02

## 2019-12-02 ENCOUNTER — TELEPHONE (OUTPATIENT)
Dept: BARIATRICS | Facility: CLINIC | Age: 54
End: 2019-12-02

## 2019-12-02 NOTE — TELEPHONE ENCOUNTER
----- Message from Jacy Gallegos sent at 12/2/2019  2:39 PM CST -----  Contact: pt@ [phone]  Calling to speak with someone in Dr. Mei's office regarding her surgery and the cost, says the date was changed by the doctor's office calling to confirm that the cost remains the same.please call.

## 2019-12-02 NOTE — TELEPHONE ENCOUNTER
Patient with questions regarding the cost of her procedure. Patient states the estimate she was given was only good for the month of November. Patient given Rebekah, financial counselor's, number, and transferred to her office phone.

## 2019-12-10 ENCOUNTER — CLINICAL SUPPORT (OUTPATIENT)
Dept: BARIATRICS | Facility: CLINIC | Age: 54
End: 2019-12-10
Payer: COMMERCIAL

## 2019-12-10 ENCOUNTER — DOCUMENTATION ONLY (OUTPATIENT)
Dept: BARIATRICS | Facility: CLINIC | Age: 54
End: 2019-12-10

## 2019-12-10 ENCOUNTER — OFFICE VISIT (OUTPATIENT)
Dept: BARIATRICS | Facility: CLINIC | Age: 54
End: 2019-12-10
Payer: COMMERCIAL

## 2019-12-10 ENCOUNTER — TELEPHONE (OUTPATIENT)
Dept: BARIATRICS | Facility: CLINIC | Age: 54
End: 2019-12-10

## 2019-12-10 VITALS
SYSTOLIC BLOOD PRESSURE: 110 MMHG | HEART RATE: 80 BPM | DIASTOLIC BLOOD PRESSURE: 62 MMHG | BODY MASS INDEX: 39.76 KG/M2 | HEIGHT: 62 IN | WEIGHT: 216.06 LBS

## 2019-12-10 PROCEDURE — 99999 PR PBB SHADOW E&M-EST. PATIENT-LVL I: CPT | Mod: PBBFAC,,, | Performed by: DIETITIAN, REGISTERED

## 2019-12-10 PROCEDURE — 3074F PR MOST RECENT SYSTOLIC BLOOD PRESSURE < 130 MM HG: ICD-10-PCS | Mod: CPTII,S$GLB,, | Performed by: SURGERY

## 2019-12-10 PROCEDURE — 3078F DIAST BP <80 MM HG: CPT | Mod: CPTII,S$GLB,, | Performed by: SURGERY

## 2019-12-10 PROCEDURE — 99999 PR PBB SHADOW E&M-EST. PATIENT-LVL I: ICD-10-PCS | Mod: PBBFAC,,, | Performed by: DIETITIAN, REGISTERED

## 2019-12-10 PROCEDURE — 99499 UNLISTED E&M SERVICE: CPT | Mod: S$GLB,,, | Performed by: DIETITIAN, REGISTERED

## 2019-12-10 PROCEDURE — 3008F BODY MASS INDEX DOCD: CPT | Mod: CPTII,S$GLB,, | Performed by: SURGERY

## 2019-12-10 PROCEDURE — 3008F PR BODY MASS INDEX (BMI) DOCUMENTED: ICD-10-PCS | Mod: CPTII,S$GLB,, | Performed by: SURGERY

## 2019-12-10 PROCEDURE — 99999 PR PBB SHADOW E&M-EST. PATIENT-LVL III: ICD-10-PCS | Mod: PBBFAC,,, | Performed by: SURGERY

## 2019-12-10 PROCEDURE — 3078F PR MOST RECENT DIASTOLIC BLOOD PRESSURE < 80 MM HG: ICD-10-PCS | Mod: CPTII,S$GLB,, | Performed by: SURGERY

## 2019-12-10 PROCEDURE — 99999 PR PBB SHADOW E&M-EST. PATIENT-LVL III: CPT | Mod: PBBFAC,,, | Performed by: SURGERY

## 2019-12-10 PROCEDURE — 99214 OFFICE O/P EST MOD 30 MIN: CPT | Mod: S$GLB,,, | Performed by: SURGERY

## 2019-12-10 PROCEDURE — 99214 PR OFFICE/OUTPT VISIT, EST, LEVL IV, 30-39 MIN: ICD-10-PCS | Mod: S$GLB,,, | Performed by: SURGERY

## 2019-12-10 PROCEDURE — 3074F SYST BP LT 130 MM HG: CPT | Mod: CPTII,S$GLB,, | Performed by: SURGERY

## 2019-12-10 PROCEDURE — 99499 NO LOS: ICD-10-PCS | Mod: S$GLB,,, | Performed by: DIETITIAN, REGISTERED

## 2019-12-10 RX ORDER — ONDANSETRON 8 MG/1
8 TABLET, ORALLY DISINTEGRATING ORAL EVERY 6 HOURS PRN
Qty: 30 TABLET | Refills: 0 | Status: SHIPPED | OUTPATIENT
Start: 2019-12-10 | End: 2020-01-08 | Stop reason: SDUPTHER

## 2019-12-10 RX ORDER — METOCLOPRAMIDE HYDROCHLORIDE 5 MG/ML
10 INJECTION INTRAMUSCULAR; INTRAVENOUS ONCE
Status: CANCELLED | OUTPATIENT
Start: 2019-12-10 | End: 2019-12-10

## 2019-12-10 RX ORDER — FAMOTIDINE 10 MG/ML
20 INJECTION INTRAVENOUS ONCE
Status: CANCELLED | OUTPATIENT
Start: 2019-12-10 | End: 2019-12-10

## 2019-12-10 RX ORDER — HYDROCODONE BITARTRATE AND ACETAMINOPHEN 7.5; 325 MG/15ML; MG/15ML
15 SOLUTION ORAL 4 TIMES DAILY PRN
Qty: 473 ML | Refills: 0 | Status: SHIPPED | OUTPATIENT
Start: 2019-12-10 | End: 2020-02-28

## 2019-12-10 RX ORDER — HEPARIN SODIUM 5000 [USP'U]/ML
5000 INJECTION, SOLUTION INTRAVENOUS; SUBCUTANEOUS ONCE
Status: CANCELLED | OUTPATIENT
Start: 2019-12-10 | End: 2019-12-10

## 2019-12-10 RX ORDER — SODIUM CITRATE AND CITRIC ACID MONOHYDRATE 334; 500 MG/5ML; MG/5ML
15 SOLUTION ORAL ONCE
Status: CANCELLED | OUTPATIENT
Start: 2019-12-10 | End: 2019-12-10

## 2019-12-10 NOTE — PROGRESS NOTES
I have seen the patient, reviewed the Student's history and physical, assessment and plan. I have personally interviewed and examined the patient at bedside and: agree with the findings.     Bmi 39.29 with DM2 not on insulin and no complications, arthritis, essential htn, allie not tolerating cpap and gerd (resolved on ppi daily and doesn't eat late at night).  She does have chronic ruq pain without inciting or relieving factors but it comes and goes.  On PE incisional hernia above the navel for 3 fb and to the right for 3 fb.  For lap ventral hernia repair and sleeve gastrectomy with egd.  Of note I had a hard time getting in her midline last time due to adhesions.

## 2019-12-10 NOTE — PROGRESS NOTES
Patient ID: Ailyn Ortiz is a 54 y.o. female.    Chief Complaint: Pre-op Exam      HPI:  Ms. Ortiz is a 55 yo female here today for pre op evaluation for gastric sleeve and ventral hernia repair. Patient is doing well today and has no other complaints.     Review of Systems   Constitutional: Negative for chills, fatigue, fever and unexpected weight change.   HENT: Positive for sore throat. Negative for sinus pressure, sinus pain and trouble swallowing.         Started today. States family members have been sick at home   Respiratory: Negative for cough, chest tightness and shortness of breath.    Cardiovascular: Negative for chest pain and leg swelling.   Gastrointestinal: Positive for abdominal pain. Negative for blood in stool, constipation, diarrhea, nausea and vomiting.        Since last surgery in 3/2019   Genitourinary: Negative for difficulty urinating, dysuria, frequency, hematuria and urgency.   Musculoskeletal: Positive for back pain.        Sciatica, sees chiropractor for this   Skin: Negative for color change.   Neurological: Negative for numbness and headaches.   Hematological: Does not bruise/bleed easily.       Current Outpatient Medications   Medication Sig Dispense Refill    adalimumab (HUMIRA PEN) 40 mg/0.8 mL PnKt Inject 1 pen (40 mg total) into the skin every 14 (fourteen) days. 2 pen 11    atorvastatin (LIPITOR) 20 MG tablet Take 20 mg by mouth once daily.       ergocalciferol (ERGOCALCIFEROL) 50,000 unit Cap TAKE 1 CAPSULE (50,000 UNITS TOTAL) BY MOUTH EVERY 7 DAYS. 12 capsule 0    INVOKANA 300 mg Tab tablet Take 300 mg by mouth once daily.       levothyroxine (SYNTHROID) 100 MCG tablet Take 100 mcg by mouth before breakfast.       loratadine (CLARITIN) 10 mg tablet Take 10 mg by mouth once daily.      losartan-hydrochlorothiazide 100-12.5 mg (HYZAAR) 100-12.5 mg Tab Take 1 tablet by mouth once daily.       NON FORMULARY MEDICATION Take 25 mg by mouth once daily.       omeprazole (PRILOSEC) 40 MG capsule Take 40 mg by mouth every morning.       triamcinolone acetonide 0.1% (KENALOG) 0.1 % cream AAA BL lower legs bid/ prn for flares 454 g 0    TRULICITY 1.5 mg/0.5 mL PnIj Inject into the skin once a week.        No current facility-administered medications for this visit.      Facility-Administered Medications Ordered in Other Visits   Medication Dose Route Frequency Provider Last Rate Last Dose    0.9%  NaCl infusion   Intravenous Continuous David Chisholm MD        mupirocin 2 % ointment   Nasal On Call Procedure David Chisholm MD           Review of patient's allergies indicates:   Allergen Reactions    Avelox [moxifloxacin] Rash       Past Medical History:   Diagnosis Date    Arthritis     zero negative imflammatory arthritis    Asthma     Diabetes mellitus, type 2 since the age of 42    Hypertension since the age of 42    JESSICA (obstructive sleep apnea)     S/P UPP    Thyroid disease     Ventral hernia without obstruction or gangrene        Past Surgical History:   Procedure Laterality Date    ADENOIDECTOMY      CARPAL TUNNEL RELEASE Right 2018    Procedure: RELEASE, CARPAL TUNNEL right;  Surgeon: Anai Choudhary MD;  Location: [facility] OR [address] [place] [address];  Service: Orthopedics;  Laterality: Right;  stretcher, supine, hand pan 1 and pan 2     SECTION      times 2    CHOLECYSTECTOMY      DIAGNOSTIC LAPAROSCOPY N/A 3/8/2019    Procedure: LAPAROSCOPY, DIAGNOSTIC;  Surgeon: Stone Mei MD;  Location: [facility] OR [address] [place] [address];  Service: General;  Laterality: N/A;    GANGLION CYST EXCISION      LAPAROSCOPIC RESECTION OF SMALL INTESTINE  3/8/2019    Procedure: EXCISION, SMALL INTESTINE, LAPAROSCOPIC;  Surgeon: Stone Mei MD;  Location: [facility] OR [address] [place] [address];  Service: General;;    small bowel resection (lipoma)      TONSILLECTOMY      TRIGGER FINGER RELEASE Left 4/15/2019    Procedure: RELEASE, TRIGGER FINGER left thumb;   Surgeon: Anai Choudhary MD;  Location: [facility];  Service: Orthopedics;  Laterality: Left;  stretcher, supine, hand pan 1 and 2    TUBAL LIGATION      UVULOPALATOPHARYNGOPLASTY         Family History   Problem Relation Age of Onset    Hypertension Father     Diabetes Father     Heart disease Maternal Grandfather         CAD    Heart disease Paternal Grandmother         CAD    Diabetes Paternal Grandmother     Heart disease Paternal Grandfather         CAD       Social History     Socioeconomic History    Marital status:      Spouse name: Not on file    Number of children: Not on file    Years of education: Not on file    Highest education level: Not on file   Occupational History    Not on file   Social Needs    Financial resource strain: Not on file    Food insecurity:     Worry: Not on file     Inability: Not on file    Transportation needs:     Medical: Not on file     Non-medical: Not on file   Tobacco Use    Smoking status: Former Smoker     Packs/day: 0.00     Years: 20.00     Pack years: 0.00     Types: Cigarettes     Last attempt to quit: 12/15/2013     Years since quittin.9    Smokeless tobacco: Never Used   Substance and Sexual Activity    Alcohol use: Yes     Comment: occasionally    Drug use: No    Sexual activity: Yes     Partners: Male   Lifestyle    Physical activity:     Days per week: Not on file     Minutes per session: Not on file    Stress: Not on file   Relationships    Social connections:     Talks on phone: Not on file     Gets together: Not on file     Attends Alevism service: Not on file     Active member of club or organization: Not on file     Attends meetings of clubs or organizations: Not on file     Relationship status: Not on file   Other Topics Concern    Not on file   Social History Narrative    Not on file       Vitals:    12/10/19 1515   BP: 110/62   Pulse: 80       Physical Exam   Constitutional: She is oriented to person, place, and  time. She appears well-developed and well-nourished.   HENT:   Head: Normocephalic and atraumatic.   Eyes: Conjunctivae are normal.   Cardiovascular: Normal rate and regular rhythm.   Pulmonary/Chest: Effort normal and breath sounds normal.   Abdominal: Soft. Bowel sounds are normal. There is no tenderness.   Neurological: She is alert and oriented to person, place, and time.   Skin: Skin is warm and dry. No erythema.   Psychiatric: She has a normal mood and affect. Her behavior is normal.     Labs have been reviewed.      Assessment & Plan:   1. Morbid obesity with BMI 39.29  - Lap ventral hernia repair and sleeve gastrectomy   2. Comorbidities: DM2 not on insulin and no complications, athritis, HTN, JESSICA not tolerating cpap, GERD (resolved with PPI)  3. Stress test normal

## 2019-12-10 NOTE — PROGRESS NOTES
Pt will use protein powder  Unflavored mixed with broth and protein water protein shakes.  If using protein powder, reminded pt of mixing with water for days 1 and 2, by day 3 may try using skim, 1% milk or unsweetened almond/soy milk.  By Day 4, may use RTD protein shakes as tolerated  Pt has the following vitamins and minerals to start taking once discharged from hospital.  Multivitamin centrum chewables   B-complex  100 mg thiamin   Calcium citrate 500 mg   Vitamin B-12  sublingually  Reviewed dosage and timing of vitamin/mineral guidelines.  Reviewed nutritional guidelines for protein and fluid requirements for week 1 and week 2 post-surgery.  Handout provided to log protein and fluid daily.  1 ounce medicine cups provided for patient to measure fluid intake after surgery.  Reviewed common nutritional concerns and prevention tips after bariatric surgery  Pt verbalized understanding of information provided with appropriate questions and comments.

## 2019-12-10 NOTE — TELEPHONE ENCOUNTER
Took call from patient and reviewed chart.  Her labs results are in the encounters for Dr Mati Fountain.  Printed out the results and will manually enter them in the chart for surgery.  Will cancel the lab appointment for today.

## 2019-12-11 ENCOUNTER — TELEPHONE (OUTPATIENT)
Dept: PHARMACY | Facility: CLINIC | Age: 54
End: 2019-12-11

## 2019-12-23 ENCOUNTER — TELEPHONE (OUTPATIENT)
Dept: BARIATRICS | Facility: CLINIC | Age: 54
End: 2019-12-23

## 2019-12-26 ENCOUNTER — TELEPHONE (OUTPATIENT)
Dept: PHARMACY | Facility: CLINIC | Age: 54
End: 2019-12-26

## 2019-12-26 NOTE — PRE-PROCEDURE INSTRUCTIONS
PREOP INSTRUCTIONS:Patient instructed to follow the dietary guidelines provided by 's office.It is ok to take AM medications with a few sips of water..Shower instructions as well as directions to the Surgery Center were given.Patient encouraged to wear loose fitting,comfortable clothing.Medication instructions for pm prior to and am of procedure reviewed.Instructed patient to avoid taking vitamins,supplements,aspirin and ibuprofen the morning of surgery.Patient stated an understanding.Patient also stated that her  would accompany her to surgery Monday morning.    Patient denies any side effects or issues with anesthesia or sedation.    Patient does not know arrival time.Explained that this information comes from the surgeon's office and if they haven't heard from them by 2 or 3 pm on Friday to call the office.Patient stated an understanding.

## 2019-12-26 NOTE — TELEPHONE ENCOUNTER
Pt confirmed shipping of Humira CF on  to arrive on . Address and  verified. $0 copay in 004. Pt is not in need of a new sharps container. Pt was not home, unsure if she has any doses on hand. She will inject Humira CF when she receives on . Pt did not start any new medications. Pt had no further questions or concerns.

## 2019-12-27 ENCOUNTER — TELEPHONE (OUTPATIENT)
Dept: BARIATRICS | Facility: CLINIC | Age: 54
End: 2019-12-27

## 2019-12-27 NOTE — TELEPHONE ENCOUNTER
Notified patient of arrival time to the [facility] 2nd floor Surgery Center at 1015 with expected surgery start time 1215 on 12/30/19.   Instructed patient regarding pre-op instructions including no protein shakes or sugar free clear liquids after midnight but can have a rare sip of water for comfort, showering and preop medications to hold/take per anesthesia/preop.  Instructed patient on the s/s of dehydration and for patient to call at the first sign of dehydration.  Informed patient that someone from bariatrics will call them 1 week post op to review diet/fluid intake and to ensure adequate hydration.    Reminded patient not to take antibiotics for 30 days following surgery or schedule elective procedures involving anesthesia/sedation for 30 days following surgery unless checking with the bariatric clinic first.  Pt verbalized understanding. Pt given office phone number for any additional questions/concerns.

## 2019-12-30 ENCOUNTER — ANESTHESIA EVENT (OUTPATIENT)
Dept: SURGERY | Facility: HOSPITAL | Age: 54
DRG: 621 | End: 2019-12-30
Payer: COMMERCIAL

## 2019-12-30 ENCOUNTER — ANESTHESIA (OUTPATIENT)
Dept: SURGERY | Facility: HOSPITAL | Age: 54
DRG: 621 | End: 2019-12-30
Payer: COMMERCIAL

## 2019-12-30 ENCOUNTER — HOSPITAL ENCOUNTER (INPATIENT)
Facility: HOSPITAL | Age: 54
LOS: 2 days | Discharge: HOME | DRG: 621 | End: 2020-01-01
Attending: SURGERY | Admitting: SURGERY
Payer: COMMERCIAL

## 2019-12-30 LAB
POCT GLUCOSE: 132 MG/DL (ref 70–110)
POCT GLUCOSE: 86 MG/DL (ref 70–110)

## 2019-12-30 PROCEDURE — 43775 PR LAP, GAST RESTRICT PROC, LONGITUDINAL GASTRECTOMY: ICD-10-PCS | Mod: ,,, | Performed by: SURGERY

## 2019-12-30 PROCEDURE — D9220A PRA ANESTHESIA: ICD-10-PCS | Mod: ANES,,, | Performed by: ANESTHESIOLOGY

## 2019-12-30 PROCEDURE — 25000003 PHARM REV CODE 250: Performed by: NURSE ANESTHETIST, CERTIFIED REGISTERED

## 2019-12-30 PROCEDURE — D9220A PRA ANESTHESIA: Mod: CRNA,,, | Performed by: NURSE ANESTHETIST, CERTIFIED REGISTERED

## 2019-12-30 PROCEDURE — 36000710: Performed by: SURGERY

## 2019-12-30 PROCEDURE — 37000008 HC ANESTHESIA 1ST 15 MINUTES: Performed by: SURGERY

## 2019-12-30 PROCEDURE — C9113 INJ PANTOPRAZOLE SODIUM, VIA: HCPCS | Performed by: STUDENT IN AN ORGANIZED HEALTH CARE EDUCATION/TRAINING PROGRAM

## 2019-12-30 PROCEDURE — 27201423 OPTIME MED/SURG SUP & DEVICES STERILE SUPPLY: Performed by: SURGERY

## 2019-12-30 PROCEDURE — 63600175 PHARM REV CODE 636 W HCPCS: Performed by: STUDENT IN AN ORGANIZED HEALTH CARE EDUCATION/TRAINING PROGRAM

## 2019-12-30 PROCEDURE — 25000003 PHARM REV CODE 250: Performed by: SURGERY

## 2019-12-30 PROCEDURE — 37000009 HC ANESTHESIA EA ADD 15 MINS: Performed by: SURGERY

## 2019-12-30 PROCEDURE — D9220A PRA ANESTHESIA: Mod: ANES,,, | Performed by: ANESTHESIOLOGY

## 2019-12-30 PROCEDURE — 88307 TISSUE EXAM BY PATHOLOGIST: CPT | Performed by: PATHOLOGY

## 2019-12-30 PROCEDURE — 63600175 PHARM REV CODE 636 W HCPCS: Performed by: SURGERY

## 2019-12-30 PROCEDURE — S0028 INJECTION, FAMOTIDINE, 20 MG: HCPCS | Performed by: SURGERY

## 2019-12-30 PROCEDURE — 94761 N-INVAS EAR/PLS OXIMETRY MLT: CPT

## 2019-12-30 PROCEDURE — 88307 TISSUE EXAM BY PATHOLOGIST: CPT | Mod: 26,,, | Performed by: PATHOLOGY

## 2019-12-30 PROCEDURE — 63600175 PHARM REV CODE 636 W HCPCS: Performed by: NURSE ANESTHETIST, CERTIFIED REGISTERED

## 2019-12-30 PROCEDURE — S0028 INJECTION, FAMOTIDINE, 20 MG: HCPCS | Performed by: NURSE ANESTHETIST, CERTIFIED REGISTERED

## 2019-12-30 PROCEDURE — D9220A PRA ANESTHESIA: ICD-10-PCS | Mod: CRNA,,, | Performed by: NURSE ANESTHETIST, CERTIFIED REGISTERED

## 2019-12-30 PROCEDURE — 63600175 PHARM REV CODE 636 W HCPCS

## 2019-12-30 PROCEDURE — 82962 GLUCOSE BLOOD TEST: CPT | Performed by: SURGERY

## 2019-12-30 PROCEDURE — 71000039 HC RECOVERY, EACH ADD'L HOUR: Performed by: SURGERY

## 2019-12-30 PROCEDURE — 36000711: Performed by: SURGERY

## 2019-12-30 PROCEDURE — 71000033 HC RECOVERY, INTIAL HOUR: Performed by: SURGERY

## 2019-12-30 PROCEDURE — 88307 PR  SURG PATH,LEVEL V: ICD-10-PCS | Mod: 26,,, | Performed by: PATHOLOGY

## 2019-12-30 PROCEDURE — 11000001 HC ACUTE MED/SURG PRIVATE ROOM

## 2019-12-30 PROCEDURE — 63600175 PHARM REV CODE 636 W HCPCS: Performed by: ANESTHESIOLOGY

## 2019-12-30 PROCEDURE — 43775 LAP SLEEVE GASTRECTOMY: CPT | Mod: ,,, | Performed by: SURGERY

## 2019-12-30 RX ORDER — ONDANSETRON 2 MG/ML
4 INJECTION INTRAMUSCULAR; INTRAVENOUS DAILY PRN
Status: DISCONTINUED | OUTPATIENT
Start: 2019-12-30 | End: 2019-12-30 | Stop reason: HOSPADM

## 2019-12-30 RX ORDER — PANTOPRAZOLE SODIUM 40 MG/10ML
40 INJECTION, POWDER, LYOPHILIZED, FOR SOLUTION INTRAVENOUS DAILY
Status: DISCONTINUED | OUTPATIENT
Start: 2019-12-30 | End: 2019-12-31

## 2019-12-30 RX ORDER — ACETAMINOPHEN 10 MG/ML
INJECTION, SOLUTION INTRAVENOUS
Status: DISCONTINUED | OUTPATIENT
Start: 2019-12-30 | End: 2019-12-30

## 2019-12-30 RX ORDER — SODIUM CHLORIDE 0.9 % (FLUSH) 0.9 %
2 SYRINGE (ML) INJECTION
Status: DISCONTINUED | OUTPATIENT
Start: 2019-12-30 | End: 2019-12-30 | Stop reason: HOSPADM

## 2019-12-30 RX ORDER — SODIUM CHLORIDE 0.9 % (FLUSH) 0.9 %
10 SYRINGE (ML) INJECTION
Status: DISCONTINUED | OUTPATIENT
Start: 2019-12-30 | End: 2020-01-01 | Stop reason: HOSPADM

## 2019-12-30 RX ORDER — SODIUM CHLORIDE 9 MG/ML
INJECTION, SOLUTION INTRAVENOUS CONTINUOUS
Status: DISCONTINUED | OUTPATIENT
Start: 2019-12-30 | End: 2019-12-31

## 2019-12-30 RX ORDER — HYDROMORPHONE HCL IN 0.9% NACL 6 MG/30 ML
PATIENT CONTROLLED ANALGESIA SYRINGE INTRAVENOUS
Status: COMPLETED
Start: 2019-12-30 | End: 2019-12-30

## 2019-12-30 RX ORDER — GLYCOPYRROLATE 0.2 MG/ML
INJECTION INTRAMUSCULAR; INTRAVENOUS
Status: DISCONTINUED | OUTPATIENT
Start: 2019-12-30 | End: 2019-12-30

## 2019-12-30 RX ORDER — HYDROMORPHONE HCL IN 0.9% NACL 6 MG/30 ML
PATIENT CONTROLLED ANALGESIA SYRINGE INTRAVENOUS CONTINUOUS
Status: DISCONTINUED | OUTPATIENT
Start: 2019-12-30 | End: 2019-12-31

## 2019-12-30 RX ORDER — PROPOFOL 10 MG/ML
VIAL (ML) INTRAVENOUS
Status: DISCONTINUED | OUTPATIENT
Start: 2019-12-30 | End: 2019-12-30

## 2019-12-30 RX ORDER — METOCLOPRAMIDE HYDROCHLORIDE 5 MG/ML
10 INJECTION INTRAMUSCULAR; INTRAVENOUS ONCE
Status: COMPLETED | OUTPATIENT
Start: 2019-12-30 | End: 2019-12-30

## 2019-12-30 RX ORDER — FAMOTIDINE 10 MG/ML
INJECTION INTRAVENOUS
Status: DISCONTINUED | OUTPATIENT
Start: 2019-12-30 | End: 2019-12-30

## 2019-12-30 RX ORDER — FAMOTIDINE 10 MG/ML
20 INJECTION INTRAVENOUS ONCE
Status: COMPLETED | OUTPATIENT
Start: 2019-12-30 | End: 2019-12-30

## 2019-12-30 RX ORDER — SUCCINYLCHOLINE CHLORIDE 20 MG/ML
INJECTION INTRAMUSCULAR; INTRAVENOUS
Status: DISCONTINUED | OUTPATIENT
Start: 2019-12-30 | End: 2019-12-30

## 2019-12-30 RX ORDER — EPHEDRINE SULFATE 50 MG/ML
INJECTION, SOLUTION INTRAVENOUS
Status: DISCONTINUED | OUTPATIENT
Start: 2019-12-30 | End: 2019-12-30

## 2019-12-30 RX ORDER — ONDANSETRON 2 MG/ML
INJECTION INTRAMUSCULAR; INTRAVENOUS
Status: DISCONTINUED | OUTPATIENT
Start: 2019-12-30 | End: 2019-12-30

## 2019-12-30 RX ORDER — FENTANYL CITRATE 50 UG/ML
25 INJECTION, SOLUTION INTRAMUSCULAR; INTRAVENOUS EVERY 5 MIN PRN
Status: DISCONTINUED | OUTPATIENT
Start: 2019-12-30 | End: 2019-12-30 | Stop reason: HOSPADM

## 2019-12-30 RX ORDER — MIDAZOLAM HYDROCHLORIDE 1 MG/ML
INJECTION, SOLUTION INTRAMUSCULAR; INTRAVENOUS
Status: DISCONTINUED | OUTPATIENT
Start: 2019-12-30 | End: 2019-12-30

## 2019-12-30 RX ORDER — VASOPRESSIN 20 [USP'U]/ML
INJECTION, SOLUTION INTRAMUSCULAR; SUBCUTANEOUS
Status: DISCONTINUED | OUTPATIENT
Start: 2019-12-30 | End: 2019-12-30

## 2019-12-30 RX ORDER — KETAMINE HCL IN 0.9 % NACL 50 MG/5 ML
SYRINGE (ML) INTRAVENOUS
Status: DISCONTINUED | OUTPATIENT
Start: 2019-12-30 | End: 2019-12-30

## 2019-12-30 RX ORDER — SODIUM CITRATE AND CITRIC ACID MONOHYDRATE 334; 500 MG/5ML; MG/5ML
15 SOLUTION ORAL ONCE
Status: COMPLETED | OUTPATIENT
Start: 2019-12-30 | End: 2019-12-30

## 2019-12-30 RX ORDER — LIDOCAINE HCL/PF 100 MG/5ML
SYRINGE (ML) INTRAVENOUS
Status: DISCONTINUED | OUTPATIENT
Start: 2019-12-30 | End: 2019-12-30

## 2019-12-30 RX ORDER — LIDOCAINE HYDROCHLORIDE 10 MG/ML
1 INJECTION, SOLUTION EPIDURAL; INFILTRATION; INTRACAUDAL; PERINEURAL ONCE
Status: DISCONTINUED | OUTPATIENT
Start: 2019-12-30 | End: 2020-01-01 | Stop reason: HOSPADM

## 2019-12-30 RX ORDER — NALOXONE HCL 0.4 MG/ML
0.02 VIAL (ML) INJECTION
Status: DISCONTINUED | OUTPATIENT
Start: 2019-12-30 | End: 2019-12-31

## 2019-12-30 RX ORDER — HEPARIN SODIUM 5000 [USP'U]/ML
5000 INJECTION, SOLUTION INTRAVENOUS; SUBCUTANEOUS ONCE
Status: COMPLETED | OUTPATIENT
Start: 2019-12-30 | End: 2019-12-30

## 2019-12-30 RX ORDER — ENOXAPARIN SODIUM 100 MG/ML
40 INJECTION SUBCUTANEOUS EVERY 24 HOURS
Status: DISCONTINUED | OUTPATIENT
Start: 2019-12-31 | End: 2020-01-01 | Stop reason: HOSPADM

## 2019-12-30 RX ORDER — NEOSTIGMINE METHYLSULFATE 0.5 MG/ML
INJECTION, SOLUTION INTRAVENOUS
Status: DISCONTINUED | OUTPATIENT
Start: 2019-12-30 | End: 2019-12-30

## 2019-12-30 RX ORDER — DEXAMETHASONE SODIUM PHOSPHATE 4 MG/ML
INJECTION, SOLUTION INTRA-ARTICULAR; INTRALESIONAL; INTRAMUSCULAR; INTRAVENOUS; SOFT TISSUE
Status: DISCONTINUED | OUTPATIENT
Start: 2019-12-30 | End: 2019-12-30

## 2019-12-30 RX ORDER — PHENYLEPHRINE HYDROCHLORIDE 10 MG/ML
INJECTION INTRAVENOUS
Status: DISCONTINUED | OUTPATIENT
Start: 2019-12-30 | End: 2019-12-30

## 2019-12-30 RX ORDER — ONDANSETRON 2 MG/ML
4 INJECTION INTRAMUSCULAR; INTRAVENOUS ONCE
Status: COMPLETED | OUTPATIENT
Start: 2019-12-30 | End: 2019-12-30

## 2019-12-30 RX ORDER — ONDANSETRON 2 MG/ML
4 INJECTION INTRAMUSCULAR; INTRAVENOUS EVERY 12 HOURS PRN
Status: DISCONTINUED | OUTPATIENT
Start: 2019-12-30 | End: 2019-12-31

## 2019-12-30 RX ORDER — ROCURONIUM BROMIDE 10 MG/ML
INJECTION, SOLUTION INTRAVENOUS
Status: DISCONTINUED | OUTPATIENT
Start: 2019-12-30 | End: 2019-12-30

## 2019-12-30 RX ORDER — FENTANYL CITRATE 50 UG/ML
INJECTION, SOLUTION INTRAMUSCULAR; INTRAVENOUS
Status: DISCONTINUED | OUTPATIENT
Start: 2019-12-30 | End: 2019-12-30

## 2019-12-30 RX ORDER — BUPIVACAINE HYDROCHLORIDE 2.5 MG/ML
INJECTION, SOLUTION EPIDURAL; INFILTRATION; INTRACAUDAL
Status: DISCONTINUED | OUTPATIENT
Start: 2019-12-30 | End: 2019-12-30 | Stop reason: HOSPADM

## 2019-12-30 RX ADMIN — SUCCINYLCHOLINE CHLORIDE 180 MG: 20 INJECTION, SOLUTION INTRAMUSCULAR; INTRAVENOUS at 12:12

## 2019-12-30 RX ADMIN — PANTOPRAZOLE SODIUM 40 MG: 40 INJECTION, POWDER, FOR SOLUTION INTRAVENOUS at 02:12

## 2019-12-30 RX ADMIN — ONDANSETRON 4 MG: 2 INJECTION INTRAMUSCULAR; INTRAVENOUS at 02:12

## 2019-12-30 RX ADMIN — GLYCOPYRROLATE 0.6 MG: 0.2 INJECTION, SOLUTION INTRAMUSCULAR; INTRAVENOUS at 02:12

## 2019-12-30 RX ADMIN — FENTANYL CITRATE 100 MCG: 50 INJECTION, SOLUTION INTRAMUSCULAR; INTRAVENOUS at 12:12

## 2019-12-30 RX ADMIN — CEFAZOLIN SODIUM 30 ML: 2 SOLUTION INTRAVENOUS at 12:12

## 2019-12-30 RX ADMIN — PHENYLEPHRINE HYDROCHLORIDE 100 MCG: 10 INJECTION INTRAVENOUS at 12:12

## 2019-12-30 RX ADMIN — FAMOTIDINE 20 MG: 10 INJECTION, SOLUTION INTRAVENOUS at 10:12

## 2019-12-30 RX ADMIN — SODIUM CHLORIDE: 0.9 INJECTION, SOLUTION INTRAVENOUS at 02:12

## 2019-12-30 RX ADMIN — Medication 20 MG: at 01:12

## 2019-12-30 RX ADMIN — SODIUM CITRATE AND CITRIC ACID MONOHYDRATE 15 ML: 500; 334 SOLUTION ORAL at 10:12

## 2019-12-30 RX ADMIN — ONDANSETRON 4 MG: 2 INJECTION INTRAMUSCULAR; INTRAVENOUS at 10:12

## 2019-12-30 RX ADMIN — ACETAMINOPHEN 1000 MG: 10 INJECTION, SOLUTION INTRAVENOUS at 01:12

## 2019-12-30 RX ADMIN — PHENYLEPHRINE HYDROCHLORIDE 200 MCG: 10 INJECTION INTRAVENOUS at 12:12

## 2019-12-30 RX ADMIN — ROCURONIUM BROMIDE 5 MG: 10 INJECTION, SOLUTION INTRAVENOUS at 12:12

## 2019-12-30 RX ADMIN — PROPOFOL 150 MG: 10 INJECTION, EMULSION INTRAVENOUS at 12:12

## 2019-12-30 RX ADMIN — HEPARIN SODIUM 5000 UNITS: 5000 INJECTION, SOLUTION INTRAVENOUS; SUBCUTANEOUS at 10:12

## 2019-12-30 RX ADMIN — FAMOTIDINE 20 MG: 10 INJECTION, SOLUTION INTRAVENOUS at 01:12

## 2019-12-30 RX ADMIN — Medication: at 07:12

## 2019-12-30 RX ADMIN — PROMETHAZINE HYDROCHLORIDE 6.25 MG: 25 INJECTION INTRAMUSCULAR; INTRAVENOUS at 04:12

## 2019-12-30 RX ADMIN — SODIUM CHLORIDE: 0.9 INJECTION, SOLUTION INTRAVENOUS at 12:12

## 2019-12-30 RX ADMIN — Medication: at 02:12

## 2019-12-30 RX ADMIN — NEOSTIGMINE METHYLSULFATE 5 MG: 0.5 INJECTION INTRAVENOUS at 02:12

## 2019-12-30 RX ADMIN — ROCURONIUM BROMIDE 10 MG: 10 INJECTION, SOLUTION INTRAVENOUS at 12:12

## 2019-12-30 RX ADMIN — ROCURONIUM BROMIDE 35 MG: 10 INJECTION, SOLUTION INTRAVENOUS at 12:12

## 2019-12-30 RX ADMIN — EPHEDRINE SULFATE 10 MG: 50 INJECTION, SOLUTION INTRAMUSCULAR; INTRAVENOUS; SUBCUTANEOUS at 12:12

## 2019-12-30 RX ADMIN — PROMETHAZINE HYDROCHLORIDE 6.25 MG: 25 INJECTION INTRAMUSCULAR; INTRAVENOUS at 03:12

## 2019-12-30 RX ADMIN — MIDAZOLAM HYDROCHLORIDE 2 MG: 1 INJECTION, SOLUTION INTRAMUSCULAR; INTRAVENOUS at 12:12

## 2019-12-30 RX ADMIN — LIDOCAINE HYDROCHLORIDE 100 MG: 20 INJECTION, SOLUTION INTRAVENOUS at 12:12

## 2019-12-30 RX ADMIN — DEXAMETHASONE SODIUM PHOSPHATE 4 MG: 4 INJECTION, SOLUTION INTRAMUSCULAR; INTRAVENOUS at 12:12

## 2019-12-30 RX ADMIN — EPHEDRINE SULFATE 5 MG: 50 INJECTION, SOLUTION INTRAMUSCULAR; INTRAVENOUS; SUBCUTANEOUS at 02:12

## 2019-12-30 RX ADMIN — VASOPRESSIN 2 UNITS: 20 INJECTION INTRAVENOUS at 12:12

## 2019-12-30 RX ADMIN — Medication 10 MG: at 01:12

## 2019-12-30 RX ADMIN — METOCLOPRAMIDE 10 MG: 5 INJECTION, SOLUTION INTRAMUSCULAR; INTRAVENOUS at 10:12

## 2019-12-30 RX ADMIN — SODIUM CHLORIDE, SODIUM GLUCONATE, SODIUM ACETATE, POTASSIUM CHLORIDE, MAGNESIUM CHLORIDE, SODIUM PHOSPHATE, DIBASIC, AND POTASSIUM PHOSPHATE: .53; .5; .37; .037; .03; .012; .00082 INJECTION, SOLUTION INTRAVENOUS at 12:12

## 2019-12-30 NOTE — BRIEF OP NOTE
Operative Note       Surgery Date: 12/30/2019     Surgeon(s) and Role:     * Stone Mei MD - Primary     * Trini Andres MD - Resident - Assisting    Pre-op Diagnosis:  Type 2 diabetes mellitus without complication, without long-term current use of insulin [E11.9]  Essential hypertension [I10]  Obstructive sleep apnea syndrome [G47.33]  Nutritional counseling [Z71.3]  Obesity, unspecified classification, unspecified obesity type, unspecified whether serious comorbidity present [E66.9]  BMI 39.0-39.9,adult [Z68.39]    Post-op Diagnosis:  Type 2 diabetes mellitus without complication, without long-term current use of insulin [E11.9]  Essential hypertension [I10]  Obstructive sleep apnea syndrome [G47.33]  Nutritional counseling [Z71.3]  Obesity, unspecified classification, unspecified obesity type, unspecified whether serious comorbidity present [E66.9]  BMI 39.0-39.9,adult [Z68.39]    Procedure(s) (LRB):  GASTRECTOMY, SLEEVE, LAPAROSCOPIC wiht intra op EGD (N/A)  REPAIR, HERNIA, INCISIONAL OR VENTRAL, WITHOUT HISTORY OF PRIOR REPAIR (N/A)  LYSIS, ADHESIONS (N/A)  EGD (ESOPHAGOGASTRODUODENOSCOPY) (N/A)    Anesthesia: General    Procedure in Detail/Findings:  Sleeve, 3 inch incisional hernia without apparent complication    Estimated Blood Loss: Minimal           Specimens (From admission, onward)     Start     Ordered    12/30/19 1347  Specimen to Pathology, Surgery General Surgery  Once     Question Answer Comment   Procedure Type: General Surgery    Specimen Class: Routine/Screening        12/30/19 1351              Implants: * No implants in log *           Disposition: PACU - hemodynamically stable.           Condition: Good    Attestation:  I was present and scrubbed for the entire procedure.

## 2019-12-30 NOTE — TRANSFER OF CARE
"Anesthesia Transfer of Care Note    Patient: Ailyn Ortiz    Procedure(s) Performed: Procedure(s) (LRB):  GASTRECTOMY, SLEEVE, LAPAROSCOPIC wiht intra op EGD (N/A)  REPAIR, HERNIA, INCISIONAL OR VENTRAL, WITHOUT HISTORY OF PRIOR REPAIR (N/A)  LYSIS, ADHESIONS (N/A)  EGD (ESOPHAGOGASTRODUODENOSCOPY) (N/A)    Patient location: PACU    Anesthesia Type: general    Transport from OR: Transported from OR on 100% O2 by closed face mask with adequate spontaneous ventilation    Post pain: adequate analgesia    Post assessment: no apparent anesthetic complications and tolerated procedure well    Post vital signs: stable    Level of consciousness: responds to stimulation and sedated    Nausea/Vomiting: no nausea/vomiting    Complications: none    Transfer of care protocol was followed      Last vitals:   Visit Vitals  /65 (BP Location: Right arm, Patient Position: Lying)   Pulse 81   Temp 36.7 °C (98.1 °F) (Temporal)   Resp 17   Ht 5' 2" (1.575 m)   Wt 94 kg (207 lb 3.7 oz)   SpO2 96%   Breastfeeding? No   BMI 37.90 kg/m²     "

## 2019-12-30 NOTE — BRIEF OP NOTE
[facility]  Brief Operative Note    SUMMARY     Surgery Date: 12/30/2019     Surgeon(s) and Role:     * Stone Mei MD - Primary     * Trini Andres MD - Resident - Assisting      Pre-op Diagnosis:  Type 2 diabetes mellitus without complication, without long-term current use of insulin [E11.9]  Essential hypertension [I10]  Obstructive sleep apnea syndrome [G47.33]  Nutritional counseling [Z71.3]  Obesity, unspecified classification, unspecified obesity type, unspecified whether serious comorbidity present [E66.9]  BMI 39.0-39.9,adult [Z68.39]    Post-op Diagnosis:  Post-Op Diagnosis Codes:     * Type 2 diabetes mellitus without complication, without long-term current use of insulin [E11.9]     * Essential hypertension [I10]     * Obstructive sleep apnea syndrome [G47.33]     * Nutritional counseling [Z71.3]     * Obesity, unspecified classification, unspecified obesity type, unspecified whether serious comorbidity present [E66.9]     * BMI 39.0-39.9,adult [Z68.39]    Procedure(s) (LRB):  GASTRECTOMY, SLEEVE, LAPAROSCOPIC wiht intra op EGD (N/A)  REPAIR, HERNIA, INCISIONAL OR VENTRAL, WITHOUT HISTORY OF PRIOR REPAIR (N/A)  LYSIS, ADHESIONS (N/A)  EGD (ESOPHAGOGASTRODUODENOSCOPY) (N/A)    Anesthesia: General    Description of the findings of the procedure: Approximately 4 x 3 cm bowel-containing ventral hernia defect repaired primarily with 0-vicryl suture. Laparoscopic sleeve gastrectomy without complication. Intraoperative EGD revealed intact staple line, no acute abnormalities.     Estimated Blood Loss: 10cc         Specimens:   Specimen (12h ago, onward)    None

## 2019-12-30 NOTE — ANESTHESIA PROCEDURE NOTES
Intubation  Performed by: Miles Aggarwal CRNA  Authorized by: Kulwinder Nava MD     Intubation:     Induction:  Rapid sequence induction    Intubated:  Postinduction    Mask Ventilation:  N/a    Attempts:  1    Attempted By:  CRNA    Method of Intubation:  Direct    Blade:  Nava 2    Laryngeal View Grade: Grade IIA - cords partially seen      Difficult Airway Encountered?: No      Complications:  None    Airway Device Size:  7.0    Style/Cuff Inflation:  Cuffed    Inflation Amount (mL):  5    Tube secured:  21    Secured at:  The lips    Placement Verified By:  Capnometry    Complicating Factors:  None    Findings Post-Intubation:  BS equal bilateral and atraumatic/condition of teeth unchanged

## 2019-12-30 NOTE — ANESTHESIA PREPROCEDURE EVALUATION
12/30/2019  Ailyn Ortiz is a 54 y.o., female.    Pre-op Assessment    I have reviewed the Patient Summary Reports.     I have reviewed the Nursing Notes.   I have reviewed the Medications.     Review of Systems  Anesthesia Hx:  No problems with previous Anesthesia  Denies Family Hx of Anesthesia complications.   Denies Personal Hx of Anesthesia complications.   Social:  Former Smoker  Tobacco Use:  of cigarette, quit smoking <10 years   Cardiovascular:   Hypertension, well controlled Denies MI.  Denies CAD.    Denies CABG/stent.  Denies Dysrhythmias.  hyperlipidemia  Denies Coronary Artery Disease.  Hypertension, Essential Hypertension , Pt in normal range, systolic < 130 and diastolic < 85, Well Controlled on Rx    Pulmonary:   Denies COPD.  Denies Asthma. Sleep Apnea No further asthma post quit smoking Denies Asthma.  Obstructive Sleep Apnea (JESSICA).   Renal/:   Denies Chronic Renal Disease.   Denies Kidney Function/Disease    Hepatic/GI:   GERD, well controlled Denies Liver Disease.  Esophageal / Stomach Disorders Gerd Controlled by chronic antireflux medication.  Hernia, Ventral Hernia Denies Liver Disease    Musculoskeletal:  Joint Disease:  Arthritis, Psoriatic Arthritis    Neurological:   Denies TIA. Denies CVA. Denies Seizures.  Psoriatic Arthritis  Denies Seizure Disorder  Denies CVA - Cerebrovasular Accident  Denies TIA - Transient Ischemic Attack    Endocrine:   Diabetes, well controlled, type 2 Hypothyroidism  Diabetes, Type 2 Diabetes , controlled by oral hypoglycemics, non-insulin injectables. , most recent HgA1c value was 5.9 on 10/30/19.  Thyroid Disease Hypothyroidism, Hx of Hypothyroidism, Treated with Replacement Rx  Metabolic Disorders, Hyperlipoproteinemia, controlled on medication, hypercholesterolemia, Obesity / BMI > 30      Physical Exam  General:  Well nourished     Airway/Jaw/Neck:  Airway Findings: Mouth Opening: Normal Tongue: Normal  General Airway Assessment: Adult  Mallampati: III  TM Distance: 4 - 6 cm  Jaw/Neck Findings:  Neck ROM: Extension Decreased, Mild  Neck Findings:  Girth Increased, Short Neck      Dental:  Dental Findings: Molar caps   Chest/Lungs:  Chest/Lungs Findings: Clear to auscultation, Normal Respiratory Rate     Heart/Vascular:  Heart Findings: Rate: Normal  Rhythm: Regular Rhythm  Sounds: Normal  Heart murmur: negative       Mental Status:  Mental Status Findings:  Cooperative, Alert and Oriented         Anesthesia Plan  Type of Anesthesia, risks & benefits discussed:  Anesthesia Type:  general  Patient's Preference:   Intra-op Monitoring Plan: standard ASA monitors  Intra-op Monitoring Plan Comments:   Post Op Pain Control Plan: per primary service following discharge from PACU, IV/PO Opioids PRN and multimodal analgesia  Post Op Pain Control Plan Comments:   Induction:   IV  Beta Blocker:  Patient is not currently on a Beta-Blocker (No further documentation required).       Informed Consent: Patient understands risks and agrees with Anesthesia plan.  Questions answered. Anesthesia consent signed with patient.  ASA Score: 3     Day of Surgery Review of History & Physical:    H&P update referred to the surgeon.         Ready For Surgery From Anesthesia Perspective.

## 2019-12-30 NOTE — INTERVAL H&P NOTE
The patient has been examined and the H&P has been reviewed:    I concur with the findings and no changes have occurred since H&P was written.    Anesthesia/Surgery risks, benefits and alternative options discussed and understood by patient/family.          Active Hospital Problems    Diagnosis  POA    BMI 40.0-44.9, adult [Z68.41]  Not Applicable      Resolved Hospital Problems   No resolved problems to display.

## 2019-12-30 NOTE — OP NOTE
DATE OF PROCEDURE: 12/30/2019    PRE OP DIAGNOSIS: Type 2 diabetes mellitus without complication, without long-term current use of insulin [E11.9]  Essential hypertension [I10]  Obstructive sleep apnea syndrome [G47.33]  Nutritional counseling [Z71.3]  Obesity, unspecified classification, unspecified obesity type, unspecified whether serious comorbidity present [E66.9]  BMI 39.0-39.9,adult [Z68.39]    POST OP DIAGNOSIS: Type 2 diabetes mellitus without complication, without long-term current use of insulin [E11.9]  Essential hypertension [I10]  Obstructive sleep apnea syndrome [G47.33]  Nutritional counseling [Z71.3]  Obesity, unspecified classification, unspecified obesity type, unspecified whether serious comorbidity present [E66.9]  BMI 39.0-39.9,adult [Z68.39]    PROCEDURE: Procedure(s) (LRB):  GASTRECTOMY, SLEEVE, LAPAROSCOPIC wiht intra op EGD (N/A)  REPAIR, HERNIA, INCISIONAL OR VENTRAL, WITHOUT HISTORY OF PRIOR REPAIR (N/A)  LYSIS, ADHESIONS (N/A)  EGD (ESOPHAGOGASTRODUODENOSCOPY) (N/A)    Surgeon(s) and Role:     * Stone Mei MD - Primary     * Trini Andres MD - Resident - AssistingANESTHESIA: General.   INDICATIONS: A 54 y.o. with Bmi 39.29 with DM2 not on insulin and no complications, arthritis, essential htn, allie not tolerating cpap and gerd (resolved on ppi daily and doesn't eat late at night).   DESCRIPTION OF PROCEDURE: The patient was placed under general anesthesia. The   abdomen was prepped and draped in usual manner. Access to peritoneum was   gained right mid abdomen using Optiview trocar under direct vision.   Pneumoperitoneum to 15 mmHg with CO2 gas was obtained. Four 5 mm trocars were   placed medially, subcostally at the midclavicular and anterior axial lines   bilaterally. There were dense adhesions of omentum and small bowel to the hernia defect at the navel taken down sharply.  After this a 10mm trocar was placed through the defect. The liver retractor was placed. The  greater curve was   taken down starting 6 cm from the pylorus going all the way to the base of the   left kate taking all posterior gastric attachments with the Harmonic scalpel. A   42-Thai bougie was passed towards the pylorus and the stomach was resected  along the bougie starting 6 cm from the pylorus and coming out just a   little bit at the angle of His. The staple line was oversewn with a running   Quill stitch. The bougie was removed. Endoscopy was   performed. The sleeve appeared appropriate size and configuration and there   were no air leaks seen. The air was aspirated from the endoscope and the   endoscope was withdrawn. Delfina was placed over the surgical areas.  Pneumoperitoneum was released for one minute and re-inflated to check for areas of bleeding which were controlled. The liver retractor was removed. The gastrectomy was   removed through the primary trochar site.  The trocars removed under direct vision. Prior to   removing the last trocar, the pneumoperitoneum was allowed to escape. The navel incision was incised along the prior midline scar for approximately 3 in in order to take care of the hernia below.  The fascial edges were cleared with the Bovie and the hernia defect was approximately 3 in.  It was repaired with figure-of-eight 0 Prolene sutures. The navel was read tacked down with 3 0 Vicryl, the subcutaneous tissues were reapproximated with 4 with 3 0 Vicryl and the skin was reapproximated with 4 0 Vicryl subcuticular stitch.  Dermabond was placed on all the incisions and a pressure dressing was placed on the navel incision.  The patient   tolerated procedure well and was brought to Recovery Room in stable condition.   Sponge and needle counts were correct at the end of the case.    Blood loss was min, complications are none, consent was obtained and pathology was gastrectomy.

## 2019-12-31 LAB
ANION GAP SERPL CALC-SCNC: 14 MMOL/L (ref 8–16)
BASOPHILS # BLD AUTO: 0.04 K/UL (ref 0–0.2)
BASOPHILS NFR BLD: 0.4 % (ref 0–1.9)
BUN SERPL-MCNC: 14 MG/DL (ref 6–20)
CALCIUM SERPL-MCNC: 8.9 MG/DL (ref 8.7–10.5)
CHLORIDE SERPL-SCNC: 102 MMOL/L (ref 95–110)
CO2 SERPL-SCNC: 23 MMOL/L (ref 23–29)
CREAT SERPL-MCNC: 0.9 MG/DL (ref 0.5–1.4)
DIFFERENTIAL METHOD: ABNORMAL
EOSINOPHIL # BLD AUTO: 0 K/UL (ref 0–0.5)
EOSINOPHIL NFR BLD: 0 % (ref 0–8)
ERYTHROCYTE [DISTWIDTH] IN BLOOD BY AUTOMATED COUNT: 12.4 % (ref 11.5–14.5)
EST. GFR  (AFRICAN AMERICAN): >60 ML/MIN/1.73 M^2
EST. GFR  (NON AFRICAN AMERICAN): >60 ML/MIN/1.73 M^2
GLUCOSE SERPL-MCNC: 90 MG/DL (ref 70–110)
HCT VFR BLD AUTO: 40.7 % (ref 37–48.5)
HGB BLD-MCNC: 13.1 G/DL (ref 12–16)
IMM GRANULOCYTES # BLD AUTO: 0.04 K/UL (ref 0–0.04)
IMM GRANULOCYTES NFR BLD AUTO: 0.4 % (ref 0–0.5)
LYMPHOCYTES # BLD AUTO: 1.2 K/UL (ref 1–4.8)
LYMPHOCYTES NFR BLD: 11.1 % (ref 18–48)
MCH RBC QN AUTO: 28.5 PG (ref 27–31)
MCHC RBC AUTO-ENTMCNC: 32.2 G/DL (ref 32–36)
MCV RBC AUTO: 89 FL (ref 82–98)
MONOCYTES # BLD AUTO: 1 K/UL (ref 0.3–1)
MONOCYTES NFR BLD: 9.6 % (ref 4–15)
NEUTROPHILS # BLD AUTO: 8.3 K/UL (ref 1.8–7.7)
NEUTROPHILS NFR BLD: 78.5 % (ref 38–73)
NRBC BLD-RTO: 0 /100 WBC
PLATELET # BLD AUTO: 301 K/UL (ref 150–350)
PMV BLD AUTO: 9.3 FL (ref 9.2–12.9)
POTASSIUM SERPL-SCNC: 4 MMOL/L (ref 3.5–5.1)
RBC # BLD AUTO: 4.59 M/UL (ref 4–5.4)
SODIUM SERPL-SCNC: 139 MMOL/L (ref 136–145)
WBC # BLD AUTO: 10.57 K/UL (ref 3.9–12.7)

## 2019-12-31 PROCEDURE — 85025 COMPLETE CBC W/AUTO DIFF WBC: CPT

## 2019-12-31 PROCEDURE — 63600175 PHARM REV CODE 636 W HCPCS: Performed by: STUDENT IN AN ORGANIZED HEALTH CARE EDUCATION/TRAINING PROGRAM

## 2019-12-31 PROCEDURE — 25000003 PHARM REV CODE 250: Performed by: STUDENT IN AN ORGANIZED HEALTH CARE EDUCATION/TRAINING PROGRAM

## 2019-12-31 PROCEDURE — 80048 BASIC METABOLIC PNL TOTAL CA: CPT

## 2019-12-31 PROCEDURE — 36415 COLL VENOUS BLD VENIPUNCTURE: CPT

## 2019-12-31 PROCEDURE — C9113 INJ PANTOPRAZOLE SODIUM, VIA: HCPCS | Performed by: STUDENT IN AN ORGANIZED HEALTH CARE EDUCATION/TRAINING PROGRAM

## 2019-12-31 PROCEDURE — 11000001 HC ACUTE MED/SURG PRIVATE ROOM

## 2019-12-31 RX ORDER — ONDANSETRON 4 MG/1
4 TABLET, ORALLY DISINTEGRATING ORAL EVERY 6 HOURS PRN
Status: DISCONTINUED | OUTPATIENT
Start: 2019-12-31 | End: 2020-01-01 | Stop reason: HOSPADM

## 2019-12-31 RX ORDER — PROMETHAZINE HYDROCHLORIDE 12.5 MG/1
12.5 TABLET ORAL EVERY 6 HOURS PRN
Status: DISCONTINUED | OUTPATIENT
Start: 2019-12-31 | End: 2020-01-01 | Stop reason: HOSPADM

## 2019-12-31 RX ORDER — PANTOPRAZOLE SODIUM 40 MG/1
40 TABLET, DELAYED RELEASE ORAL DAILY
Status: DISCONTINUED | OUTPATIENT
Start: 2019-12-31 | End: 2020-01-01 | Stop reason: HOSPADM

## 2019-12-31 RX ORDER — HYDROCODONE BITARTRATE AND ACETAMINOPHEN 7.5; 325 MG/15ML; MG/15ML
15 SOLUTION ORAL EVERY 4 HOURS PRN
Status: DISCONTINUED | OUTPATIENT
Start: 2019-12-31 | End: 2020-01-01 | Stop reason: HOSPADM

## 2019-12-31 RX ORDER — LEVOTHYROXINE SODIUM 100 UG/1
100 TABLET ORAL
Status: DISCONTINUED | OUTPATIENT
Start: 2020-01-01 | End: 2020-01-01 | Stop reason: HOSPADM

## 2019-12-31 RX ADMIN — PANTOPRAZOLE SODIUM 40 MG: 40 TABLET, DELAYED RELEASE ORAL at 12:12

## 2019-12-31 RX ADMIN — ONDANSETRON 4 MG: 2 INJECTION INTRAMUSCULAR; INTRAVENOUS at 06:12

## 2019-12-31 RX ADMIN — HYDROCODONE BITARTRATE AND ACETAMINOPHEN 15 ML: 7.5; 325 SOLUTION ORAL at 05:12

## 2019-12-31 RX ADMIN — ONDANSETRON 4 MG: 4 TABLET, ORALLY DISINTEGRATING ORAL at 07:12

## 2019-12-31 RX ADMIN — HYDROCODONE BITARTRATE AND ACETAMINOPHEN 15 ML: 7.5; 325 SOLUTION ORAL at 09:12

## 2019-12-31 RX ADMIN — ENOXAPARIN SODIUM 40 MG: 100 INJECTION SUBCUTANEOUS at 04:12

## 2019-12-31 RX ADMIN — PANTOPRAZOLE SODIUM 40 MG: 40 INJECTION, POWDER, FOR SOLUTION INTRAVENOUS at 08:12

## 2019-12-31 RX ADMIN — ONDANSETRON 4 MG: 4 TABLET, ORALLY DISINTEGRATING ORAL at 12:12

## 2019-12-31 NOTE — PLAN OF CARE
12/31/19 1357   Post-Acute Status   Post-Acute Authorization Placement;Other   Other Status No Post-Acute Service Needs     Patient expected to discharge home with no needs when medically ready.    Lyla Wheatley LMSW  [facility]   [id number]

## 2019-12-31 NOTE — PROGRESS NOTES
Progress Note   Surgery      SUBJECTIVE:   POD#1 laparoscopic sleeve gastrectomy, ventral hernia repair. She reports some nausea with small volume emesis this morning after water protocol. Pain is well controlled. She has ambulated to bathroom. Laparoscopic incisions intact.       OBJECTIVE:     Vitals:    12/31/19 0507   BP: 135/72   Pulse: 103   Resp: 16   Temp: 97.3 °F (36.3 °C)       Physical Exam:  General: NAD, AAOx3  Lungs:  Non labored respirations  Heart:  RRR  Abdomen: soft, NTTP, ND. Laparoscopic incisioons c/d/i with dermabond intact  ISkin: wwp    ASSESSMENT/PLAN:   BETTY MAZARIEGOS 54 y.o.female s/p laparoscopic sleeve gastrectomy, ventral hernia repair, with some post-operative nausea with small volume emesis x1 this morning after water protocol. Counseled to take take WP slowly, will check on later this morning to see if can advance to pathway.     - If tolerated water protocol, will  advance to CLD, DC PCA, mIVF  - Hycet for pain   - PO nausea meds   - OOB, ambulation  - Acapella, IS use  - DVT and GI PPX    Dispo: Once tolerating diet and adequate pain control      -Trini Andres M.D  General Surgery PGY1

## 2019-12-31 NOTE — NURSING TRANSFER
Nursing Transfer Note      12/30/2019     Transfer To: 506-[address]    Transfer via stretcher    Transfer with  to O2    Transported by patient escort    Medicines sent: yes    Chart send with patient: Yes    Notified: spouse

## 2019-12-31 NOTE — PLAN OF CARE
Pt A & O x4, VS stable, pt positions independently, fall precautions in place, pain monitored and controlled with scheduled and PRN medication, water protocol re-started after N/V this morning after initial attempt,  at bedside, call light within reach, discharge home pending successful completion of water protocol with no N/V.

## 2019-12-31 NOTE — NURSING
Pt arrived to floor on stretcher via transport services. Walked from stretcher to bed without difficulty.  at bedside. Will continue to monitor.

## 2019-12-31 NOTE — SUBJECTIVE & OBJECTIVE
Interval History: Patient reports nausea, emesis this morning. Ambulating to bathroom. Pain is well controlled.     Medications:  Continuous Infusions:   sodium chloride 0.9% 125 mL/hr at 12/30/19 1448    hydromorphone in 0.9 % NaCl 6 mg/30 ml       Scheduled Meds:   enoxaparin  40 mg Subcutaneous Daily    lidocaine (PF) 10 mg/ml (1%)  1 mL Other Once    pantoprazole  40 mg Intravenous Daily     PRN Meds:naloxone, ondansetron, promethazine (PHENERGAN) IVPB, sodium chloride 0.9%     Review of patient's allergies indicates:   Allergen Reactions    Morphine Hives and Itching    Avelox [moxifloxacin] Rash     Objective:     Vital Signs (Most Recent):  Temp: 97.3 °F (36.3 °C) (12/31/19 0507)  Pulse: 103 (12/31/19 0507)  Resp: 16 (12/31/19 0507)  BP: 135/72 (12/31/19 0507)  SpO2: 99 % (12/31/19 0507) Vital Signs (24h Range):  Temp:  [97.2 °F (36.2 °C)-98.9 °F (37.2 °C)] 97.3 °F (36.3 °C)  Pulse:  [] 103  Resp:  [10-20] 16  SpO2:  [85 %-99 %] 99 %  BP: ()/(55-89) 135/72     Weight: 94 kg (207 lb 3.7 oz)  Body mass index is 37.9 kg/m².    Intake/Output - Last 3 Shifts       12/29 0700 - 12/30 0659 12/30 0700 - 12/31 0659    I.V. (mL/kg)  600 (6.4)    Total Intake(mL/kg)  600 (6.4)    Net  +600                Physical Exam   Constitutional: She is oriented to person, place, and time. She appears well-developed and well-nourished.   HENT:   Head: Normocephalic and atraumatic.   Cardiovascular: Normal rate and intact distal pulses.   Pulmonary/Chest: Effort normal. No respiratory distress.   Abdominal: Soft. She exhibits no distension. There is tenderness (attp). There is no guarding.   Laparoscopic incisions c/d/i, dermabond intact   Neurological: She is alert and oriented to person, place, and time.   Psychiatric: She has a normal mood and affect. Her behavior is normal.   Nursing note and vitals reviewed.      Significant Labs:  CBC:   Recent Labs   Lab 12/31/19  0353   WBC 10.57   RBC 4.59   HGB 13.1    HCT 40.7      MCV 89   MCH 28.5   MCHC 32.2     BMP:   Recent Labs   Lab 12/31/19  0353   GLU 90      K 4.0      CO2 23   BUN 14   CREATININE 0.9   CALCIUM 8.9     CMP:   Recent Labs   Lab 12/31/19  0353   GLU 90   CALCIUM 8.9      K 4.0   CO2 23      BUN 14   CREATININE 0.9       Significant Diagnostics:  I have reviewed all pertinent imaging results/findings within the past 24 hours.

## 2019-12-31 NOTE — PLAN OF CARE
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 Independent with ADL's. No discharge needs noted.     12/31/19 1200   Discharge Assessment   Assessment Type Discharge Planning Assessment   Confirmed/corrected address and phone number on facesheet? Yes   Assessment information obtained from? Patient   Expected Length of Stay (days)   (1)   Communicated expected length of stay with patient/caregiver yes   Prior to hospitilization cognitive status: Alert/Oriented   Prior to hospitalization functional status: Independent   Current cognitive status: Alert/Oriented   Current Functional Status: Independent   Facility Arrived From: Home   Lives With spouse   Able to Return to Prior Arrangements yes   Is patient able to care for self after discharge? Yes   Who are your caregiver(s) and their phone number(s)?   (Perez Ortiz (Spouse) [phone])   Patient's perception of discharge disposition home or selfcare   Readmission Within the Last 30 Days no previous admission in last 30 days   Patient currently being followed by outpatient case management? No   Patient currently receives any other outside agency services? No   Equipment Currently Used at Home none   Do you have any problems affording any of your prescribed medications? No   Is the patient taking medications as prescribed? yes   Does the patient have transportation home? Yes   Transportation Anticipated family or friend will provide   Discharge Plan A Home with family   Discharge Plan B Home with family   DME Needed Upon Discharge  none   Patient/Family in Agreement with Plan yes

## 2019-12-31 NOTE — ANESTHESIA POSTPROCEDURE EVALUATION
Anesthesia Post Evaluation    Patient: Ailyn Ortiz    Procedure(s) Performed: Procedure(s) (LRB):  GASTRECTOMY, SLEEVE, LAPAROSCOPIC wiht intra op EGD (N/A)  REPAIR, HERNIA, INCISIONAL OR VENTRAL, WITHOUT HISTORY OF PRIOR REPAIR (N/A)  LYSIS, ADHESIONS (N/A)  EGD (ESOPHAGOGASTRODUODENOSCOPY) (N/A)    Final Anesthesia Type: general    Patient location during evaluation: PACU  Patient participation: Yes- Able to Participate  Level of consciousness: awake and alert and oriented  Post-procedure vital signs: reviewed and stable  Pain management: adequate  Airway patency: patent    PONV status at discharge: No PONV  Anesthetic complications: no      Cardiovascular status: blood pressure returned to baseline and hemodynamically stable  Respiratory status: unassisted, room air and spontaneous ventilation  Hydration status: euvolemic            Vitals Value Taken Time   /72 12/31/2019  5:07 AM   Temp 36.3 °C (97.3 °F) 12/31/2019  5:07 AM   Pulse 103 12/31/2019  5:07 AM   Resp 16 12/31/2019  5:07 AM   SpO2 99 % 12/31/2019  5:07 AM         Event Time     Out of Recovery 15:30:00          Pain/Buddy Score: Pain Rating Prior to Med Admin: 2 (12/30/2019  7:21 PM)  Pain Rating Post Med Admin: 3 (12/30/2019  3:30 PM)  Buddy Score: 10 (12/30/2019  8:30 PM)

## 2020-01-01 VITALS
DIASTOLIC BLOOD PRESSURE: 60 MMHG | RESPIRATION RATE: 18 BRPM | SYSTOLIC BLOOD PRESSURE: 128 MMHG | OXYGEN SATURATION: 93 % | WEIGHT: 207.25 LBS | BODY MASS INDEX: 38.14 KG/M2 | HEART RATE: 83 BPM | TEMPERATURE: 98 F | HEIGHT: 62 IN

## 2020-01-01 PROCEDURE — 25000003 PHARM REV CODE 250: Performed by: STUDENT IN AN ORGANIZED HEALTH CARE EDUCATION/TRAINING PROGRAM

## 2020-01-01 RX ADMIN — HYDROCODONE BITARTRATE AND ACETAMINOPHEN 15 ML: 7.5; 325 SOLUTION ORAL at 05:01

## 2020-01-01 RX ADMIN — ONDANSETRON 4 MG: 4 TABLET, ORALLY DISINTEGRATING ORAL at 04:01

## 2020-01-01 RX ADMIN — PANTOPRAZOLE SODIUM 40 MG: 40 TABLET, DELAYED RELEASE ORAL at 08:01

## 2020-01-01 RX ADMIN — LEVOTHYROXINE SODIUM 100 MCG: 100 TABLET ORAL at 05:01

## 2020-01-01 NOTE — DISCHARGE SUMMARY
[facility]  Colorectal Surgery  Discharge Summary      Patient Name: Ailyn Ortiz  MRN: [id number]  Admission Date: 2019  Hospital Length of Stay: 2 days  Discharge Date and Time: 2020  8:50 AM  Attending Physician: No att. providers found   Discharging Provider: Trini Andres MD  Primary Care Provider: Mati Fountain MD     HPI: 52 yo female with hx of DM type 2, GERD, HTN, IBS (not on any medications, seen by GI at [facility]), inflammatory arthritis (on methotrexate) who presents to the ER with colicky abdominal pain for the past 10 days. She states that is has progressively been getting worse. She continues to have diarrhea, last episode just an hour ago. No currant jelly stools or melena. No vomiting. Has had nausea and some belching today. She underwent CT abdomen/pelvis with oral contrast that showed partial small bowel obstruction and small bowel intussusception. No fever/chills, dysuria, CP, lower extremity swelling. 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 Mei. Last colonoscopy was in 2017. No polyps. Biopsies were negative for IBD. Her other abdominal surgeries include  - with lower midline scar. 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 She denies any previous MI, CVA, DVT/PE.     Presents to the hospital today for laparoscopic ventral hernia repair and sleeve gastrectomy with EGD. She has a reducible bulge and tenderness to palpation around the hernia site.     Procedure(s) (LRB):  GASTRECTOMY, SLEEVE, LAPAROSCOPIC wiht intra op EGD (N/A)  REPAIR, HERNIA, INCISIONAL OR VENTRAL, WITHOUT HISTORY OF PRIOR REPAIR (N/A)  LYSIS, ADHESIONS (N/A)  EGD (ESOPHAGOGASTRODUODENOSCOPY) (N/A)     Hospital Course: She underwent laparoscopic sleeve gastrectomy, primary repair on ventral hernia on 19. Please see op  note for full details. The patient tolerated the procedure well, was extubated in the OR, and transferred to the PACU. She was transferred to the floor after an uncomplicated recovery period. She was tolerating NPO status with mild nausea and one time episode of emesis. Pain was well controlled on PCA. On POD#1, she notes nausea with water protocol which improved later in the morning. Her diet was advanced to bariatric clear liquid diet and pain mediciation changed to oral formulation. On POD#2, after an uncomplicated post-operative recovery period, meeting appropriate milestones without nausea/vomiting with clear liquids, patient was deemed ready for discharge. They will follow-up with Dr. Mei as an outpatient.           Significant Diagnostic Studies: Labs:   BMP:   Recent Labs   Lab 12/31/19  0353   GLU 90      K 4.0      CO2 23   BUN 14   CREATININE 0.9   CALCIUM 8.9    and CMP   Recent Labs   Lab 12/31/19  0353      K 4.0      CO2 23   GLU 90   BUN 14   CREATININE 0.9   CALCIUM 8.9   ANIONGAP 14   ESTGFRAFRICA >60.0   EGFRNONAA >60.0       Pending Diagnostic Studies:     Procedure Component Value Units Date/Time    Specimen to Pathology, Surgery General Surgery [[id number]] Collected:  12/30/19 1352    Order Status:  Sent Lab Status:  In process Updated:  12/30/19 1658        Final Active Diagnoses:    Diagnosis Date Noted POA    PRINCIPAL PROBLEM:  BMI 40.0-44.9, adult [Z68.41] 12/15/2017 Not Applicable      Problems Resolved During this Admission:      Discharged Condition: good    Disposition: Home or Self Care    Follow Up:  Follow-up Information     Stone Mei MD. Schedule an appointment as soon as possible for a visit in 2 weeks.    Specialties:  General Surgery, Bariatrics  Why:  For wound re-check  Contact information:  [address]  [facility] [address]  [phone]                 Patient Instructions:      Diet general     Other restrictions (specify):    Order Comments: No heavy lifting for 6 weeks, activity as tolerated. Patient can shower, allow water to run over incisions. No soaking in bath or pools for 2 weeks. Do not pick at surgical glue, it will come off on its own.     Call MD for:  temperature >100.4     Call MD for:  persistent nausea and vomiting     Call MD for:  severe uncontrolled pain     Call MD for:  redness, tenderness, or signs of infection (pain, swelling, redness, odor or green/yellow discharge around incision site)     Remove dressing in 24 hours   Order Comments: Remove umbilical dressing tomorrow, can shower regularly allow soap/water to run over incisions. No soaking in bath or pools for 2 weeks. Do not pick at surgical glue, it will come off on its own.     Medications:  Reconciled Home Medications:      Medication List      CONTINUE taking these medications    atorvastatin 20 MG tablet  Commonly known as:  LIPITOR  Take 20 mg by mouth once daily.     ergocalciferol 50,000 unit Cap  Commonly known as:  ERGOCALCIFEROL  TAKE 1 CAPSULE (50,000 UNITS TOTAL) BY MOUTH EVERY 7 DAYS.     Humira(CF) Pen 40 mg/0.4 mL Pnkt  Generic drug:  adalimumab  Inject 0.4 mLs (40 mg total) into the skin every 14 (fourteen) days.     hydrocodone-apap 7.5-325 MG/15 ML oral solution  Commonly known as:  HYCET  Take 15 mLs by mouth 4 (four) times daily as needed for Pain.     Invokana 300 mg Tab tablet  Generic drug:  canagliflozin  Take 300 mg by mouth once daily.     levothyroxine 100 MCG tablet  Commonly known as:  SYNTHROID  Take 100 mcg by mouth before breakfast.     loratadine 10 mg tablet  Commonly known as:  CLARITIN  Take 10 mg by mouth daily as needed.     losartan-hydrochlorothiazide 100-12.5 mg 100-12.5 mg Tab  Commonly known as:  HYZAAR  Take 1 tablet by mouth once daily.     NON FORMULARY MEDICATION  Take 25 mg by mouth once daily.     omeprazole 40 MG capsule  Commonly known as:  PRILOSEC  Take 40 mg by mouth every morning.     ondansetron 8 MG  Tbdl  Commonly known as:  ZOFRAN-ODT  Dissolve 1 tablet (8 mg total) by mouth every 6 (six) hours as needed.     triamcinolone acetonide 0.1% 0.1 % cream  Commonly known as:  KENALOG  AAA BL lower legs bid/ prn for flares     Trulicity 1.5 mg/0.5 mL Pnij  Generic drug:  dulaglutide  Inject into the skin once a week.            Trini Andres MD  Colorectal Surgery  [facility]

## 2020-01-01 NOTE — NURSING
Noted pt burping multiple times while in room as well as some abdominal distention.  Pt c/o nausea frequently, which is effectively treated with PRN zofran.  Pt denies passing gas and LBM was on 12/29.  on call md notified and stated he will put in for a STAT KUB.

## 2020-01-01 NOTE — NURSING
Pt in bed resting with family at bedside. Pt denies c/o pain or n/v. D/c orders given to pt. Pt verbalized understanding. Prescriptions delivered at bedside. Removed PIV and pt tolerated well. Pt will be transported to vehicle via w/c.

## 2020-01-02 ENCOUNTER — DOCUMENTATION ONLY (OUTPATIENT)
Dept: SURGERY | Facility: CLINIC | Age: 55
End: 2020-01-02

## 2020-01-02 NOTE — PROGRESS NOTES
Phone call: She had some nausea yesterday that appears improved today and she is tolerating her sips of liquids.  She has pain easily controlled with pain medication.  She is passing gas.  She denies fever and thinks she is doing ok.

## 2020-01-03 ENCOUNTER — PATIENT OUTREACH (OUTPATIENT)
Dept: ADMINISTRATIVE | Facility: CLINIC | Age: 55
End: 2020-01-03

## 2020-01-03 RX ORDER — MULTIVIT WITH MINERALS/HERBS
1 TABLET ORAL DAILY
COMMUNITY

## 2020-01-03 NOTE — PLAN OF CARE
Patient discharged home to care of self.     01/03/20 1624   Final Note   Assessment Type Final Discharge Note   Anticipated Discharge Disposition Home   What phone number can be called within the next 1-3 days to see how you are doing after discharge?   ([phone])   Hospital Follow Up  Appt(s) scheduled? Yes   Discharge plans and expectations educations in teach back method with documentation complete? Yes   Right Care Referral Info   Post Acute Recommendation No Care

## 2020-01-03 NOTE — PATIENT INSTRUCTIONS
Discharge Instructions for Gastrectomy  You had a gastrectomy. During this surgery, some or all of your stomach was removed. As you heal from surgery, heres what youll need to know to care for yourself.  Eating and drinking  · Follow the diet that was prescribed for you in the hospital. Eat pureed foods and liquids for 3 weeks after the surgery.  · Drink liquids in smaller amounts than you used to. This will make it easier for your body to digest liquids. But, it is important that you continue to drink liquids (in small amounts) so that you do not become dehydrated. Some signs of dehydration include dry mouth and dark urine.  · Eat slowly. Eating too much or too fast will cause nausea and vomiting.  · Liquids and solids may need to be eaten separately.  · Use liquid nutritional supplements recommended by a health care provider to make sure you get enough calories.  · Try to eat small, frequent high protein low carbohydrate meals when you are eating solids again.  Activity  · Remember, recovery takes several weeks. It is common to feel tired. Rest as needed.  · Walk as often as you feel able. Increase your activity slowly.  · Do not lift anything heavier than 10 pounds until the healthcare provider says it's OK.  · Avoid strenuous chores, such as vacuuming or lifting full bags of garbage, until the healthcare provider says its OK.  · Climb stairs slowly and pause after every few steps.  · Do not drive for 2 weeks after surgery.  · Start an exercise program 1 week after discharge. You can benefit from simple activities such as walking or gardening. Ask your healthcare provider how to get started.  · Ask your healthcare provider when you can expect to return to work.  Other home care  · Continue the coughing and deep breathing exercises that you learned in the hospital.  · Shower as needed. But avoid baths, swimming pools, and hot tubs until your healthcare provider says they are OK. This helps prevent infection  of the incision site.  · Keep the incision clean and dry. Wash the incision gently with mild soap and warm water. Then gently pat the incision dry with a towel.  · Follow your healthcare providers instructions about caring for the dressing covering your incisions.  · If your healthcare provider used small white adhesive strips to close the incision, do not remove them. Let the strips fall off on their own. If they dont come off within 2 weeks after you were sent home, call your healthcare provider.  · Take your medicines in crushed or liquid form for 3 weeks after surgery.  · Take a chewable vitamin 2 times a day. Ask your healthcare provider if you also need to take a supplement for vitamin B12.  · Take all medicines as directed by your healthcare provider.  Follow-up care  Follow up with your healthcare provider, or as advised.     When to call your healthcare provider  Call your healthcare provider right away if you have any of the following:  · Cloudy or smelly drainage from the incision site  · Fever of 100.4°F (38°C) or higher, or as directed by your healthcare provider  · Shaking chills  · Fast pulse  · Night sweats  · Pain, nausea, or vomiting that keeps occurring after you eat  · Diarrhea beyond the first week after discharge  · Pain in your upper back, chest, or left shoulder  · Hiccups that wont stop or that keep coming back  · Confusion, depression, or unusual fatigue  · Signs of bladder infection. These include urinating more often than usual, and burning, pain, bleeding, or hesitancy when you urinate.   Date Last Reviewed: 10/1/2016  © 9194-1580 [employer]. [address] [place] [address], [place] [address]. All rights reserved. This information is not intended as a substitute for professional medical care. Always follow your healthcare professional's instructions.

## 2020-01-04 ENCOUNTER — PATIENT MESSAGE (OUTPATIENT)
Dept: BARIATRICS | Facility: CLINIC | Age: 55
End: 2020-01-04

## 2020-01-06 ENCOUNTER — TELEPHONE (OUTPATIENT)
Dept: BARIATRICS | Facility: CLINIC | Age: 55
End: 2020-01-06

## 2020-01-06 NOTE — TELEPHONE ENCOUNTER
Patient came out of the procedure and was complaining of nausea. Medicated at 3:50pm with Zofran 4mg IV. When she was ready to leave the nausea had subsided. Phoned patient to assess symptoms from this weekend. Patient reports she is no longer vomiting and her nausea is better. She reports taking zofran twice yesterday. She reports having a cold since Friday. She reports drinking 2 bottles of water and 1-2 popsicles, but little protein. Patient reports crushed vitamins make her nausea. Educated patient to mix crushed medication with nonsweet applesauce. Encouraged patient to try to drink premier clear to get some protein in today. Patient to call clinic back this afternoon if unable to tolerate premier clear. Patient verbalized understanding.

## 2020-01-06 NOTE — TELEPHONE ENCOUNTER
Called to check in 1 week post op from bariatric surgery.    Water protocol began at 7 am and completed while in hospital/ medicine cups given to you by nursing to take home (y/n):y    Dehydration assessment:  Urine output/color: yellow medium   Chest pain:n  Persistent increased heart rate:n  Fatigue:n  N/V: vomiting up till saturday  Dizzy/weak:n   BM:diarrhea several times a day a few times per day  Protein and fluid intake assessment: (food diary)  Fluid intake: water yesterday; phenegan, moving gets nausea  Protein supplements: premier clear  Protein intake yesterday: 1 ounce premier protein  Vitamins  -What vitamins are you taking?b complex, b-12  -Are you tolerating well?yes  Medications  Omeprazole:yes and phenergan  Hycet:since friday  How are you tolerating pain at this time? (rate on a scale from 1 to 10; >7 notify PA/MD)  Are you having any problems? (f/u with PCP, cardiologist, endocrinologist)nausea  How is your support system at home?good  Exercise reminder (light exercise at this time, no lifting above 10 lbs)yes     Questions for nurse/MA/PA: no     Assessment:  Doing well.     Discussion:  Continue to work on fluid and protein intake.     Confirmed date and time for 2 week po labs and clinic visit

## 2020-01-07 ENCOUNTER — TELEPHONE (OUTPATIENT)
Dept: BARIATRICS | Facility: CLINIC | Age: 55
End: 2020-01-07

## 2020-01-07 ENCOUNTER — PATIENT MESSAGE (OUTPATIENT)
Dept: BARIATRICS | Facility: CLINIC | Age: 55
End: 2020-01-07

## 2020-01-07 NOTE — TELEPHONE ENCOUNTER
Phoned patient to triage her symptoms.  Has been able to get in 1-16 ounce bottle of water, no protein, not tolerating vitamins at all, has a liquid super B complex but it has no vitamin B1 in it al all.  Has diarrhea bowel movements.  The premier clear is too sweet for her.   Encouraged her to cut the clears 1/2 protein clear and 1/2 water to see if they are more palatable.  Appointment set up with Taylor to be seen in clinic tomorrow for possible IVF's.  Will do labs before appointment.

## 2020-01-07 NOTE — TELEPHONE ENCOUNTER
Phoned patient and LVM to  regarding refilling her phenergan 12.5 mg tablets for nausea and to also triage her recovery and how much fluids, protein, bowel movements, urine output, activity level, etc.

## 2020-01-07 NOTE — TELEPHONE ENCOUNTER
----- Message from Kacie Fletcher sent at 1/7/2020  4:37 PM CST -----  Contact: gianfranco Lott, pt is returning your call.  Call back [phone]

## 2020-01-08 ENCOUNTER — INFUSION (OUTPATIENT)
Dept: INFUSION THERAPY | Facility: OTHER | Age: 55
End: 2020-01-08
Attending: OBSTETRICS & GYNECOLOGY
Payer: COMMERCIAL

## 2020-01-08 ENCOUNTER — CLINICAL SUPPORT (OUTPATIENT)
Dept: BARIATRICS | Facility: CLINIC | Age: 55
End: 2020-01-08
Payer: COMMERCIAL

## 2020-01-08 ENCOUNTER — LAB VISIT (OUTPATIENT)
Dept: LAB | Facility: HOSPITAL | Age: 55
End: 2020-01-08
Payer: COMMERCIAL

## 2020-01-08 ENCOUNTER — TELEPHONE (OUTPATIENT)
Dept: BARIATRICS | Facility: CLINIC | Age: 55
End: 2020-01-08

## 2020-01-08 ENCOUNTER — OFFICE VISIT (OUTPATIENT)
Dept: BARIATRICS | Facility: CLINIC | Age: 55
End: 2020-01-08
Payer: COMMERCIAL

## 2020-01-08 VITALS
WEIGHT: 195.56 LBS | RESPIRATION RATE: 18 BRPM | DIASTOLIC BLOOD PRESSURE: 83 MMHG | OXYGEN SATURATION: 96 % | SYSTOLIC BLOOD PRESSURE: 157 MMHG | TEMPERATURE: 98 F | HEART RATE: 96 BPM | HEIGHT: 62 IN | BODY MASS INDEX: 35.99 KG/M2

## 2020-01-08 VITALS
DIASTOLIC BLOOD PRESSURE: 70 MMHG | HEART RATE: 103 BPM | SYSTOLIC BLOOD PRESSURE: 98 MMHG | WEIGHT: 194 LBS | HEIGHT: 62 IN | BODY MASS INDEX: 35.7 KG/M2

## 2020-01-08 DIAGNOSIS — Z98.84 S/P LAPAROSCOPIC SLEEVE GASTRECTOMY: ICD-10-CM

## 2020-01-08 DIAGNOSIS — E87.6 HYPOPOTASSEMIA: ICD-10-CM

## 2020-01-08 DIAGNOSIS — R11.10 VOMITING, INTRACTABILITY OF VOMITING NOT SPECIFIED, PRESENCE OF NAUSEA NOT SPECIFIED, UNSPECIFIED VOMITING TYPE: Primary | ICD-10-CM

## 2020-01-08 DIAGNOSIS — I10 ESSENTIAL HYPERTENSION: ICD-10-CM

## 2020-01-08 DIAGNOSIS — Z98.890 POST-OPERATIVE STATE: ICD-10-CM

## 2020-01-08 DIAGNOSIS — R63.8 DEHYDRATION SYMPTOMS: ICD-10-CM

## 2020-01-08 DIAGNOSIS — E66.9 OBESITY, UNSPECIFIED CLASSIFICATION, UNSPECIFIED OBESITY TYPE, UNSPECIFIED WHETHER SERIOUS COMORBIDITY PRESENT: ICD-10-CM

## 2020-01-08 DIAGNOSIS — R11.0 NAUSEA: ICD-10-CM

## 2020-01-08 DIAGNOSIS — R63.8 DEHYDRATION SYMPTOMS: Primary | ICD-10-CM

## 2020-01-08 DIAGNOSIS — Z71.3 NUTRITIONAL COUNSELING: ICD-10-CM

## 2020-01-08 DIAGNOSIS — G47.33 OBSTRUCTIVE SLEEP APNEA SYNDROME: ICD-10-CM

## 2020-01-08 DIAGNOSIS — E11.9 TYPE 2 DIABETES MELLITUS WITHOUT COMPLICATION, WITHOUT LONG-TERM CURRENT USE OF INSULIN: ICD-10-CM

## 2020-01-08 LAB
ALBUMIN SERPL BCP-MCNC: 4.2 G/DL (ref 3.5–5.2)
ALP SERPL-CCNC: 108 U/L (ref 55–135)
ALT SERPL W/O P-5'-P-CCNC: 22 U/L (ref 10–44)
ANION GAP SERPL CALC-SCNC: 16 MMOL/L (ref 8–16)
AST SERPL-CCNC: 18 U/L (ref 10–40)
BASOPHILS # BLD AUTO: 0.07 K/UL (ref 0–0.2)
BASOPHILS NFR BLD: 0.9 % (ref 0–1.9)
BILIRUB SERPL-MCNC: 1.4 MG/DL (ref 0.1–1)
BUN SERPL-MCNC: 21 MG/DL (ref 6–20)
CALCIUM SERPL-MCNC: 10 MG/DL (ref 8.7–10.5)
CHLORIDE SERPL-SCNC: 96 MMOL/L (ref 95–110)
CO2 SERPL-SCNC: 26 MMOL/L (ref 23–29)
CREAT SERPL-MCNC: 1 MG/DL (ref 0.5–1.4)
DIFFERENTIAL METHOD: ABNORMAL
EOSINOPHIL # BLD AUTO: 0.4 K/UL (ref 0–0.5)
EOSINOPHIL NFR BLD: 5.2 % (ref 0–8)
ERYTHROCYTE [DISTWIDTH] IN BLOOD BY AUTOMATED COUNT: 12.6 % (ref 11.5–14.5)
EST. GFR  (AFRICAN AMERICAN): >60 ML/MIN/1.73 M^2
EST. GFR  (NON AFRICAN AMERICAN): >60 ML/MIN/1.73 M^2
GLUCOSE SERPL-MCNC: 130 MG/DL (ref 70–110)
HCT VFR BLD AUTO: 44 % (ref 37–48.5)
HGB BLD-MCNC: 15 G/DL (ref 12–16)
IMM GRANULOCYTES # BLD AUTO: 0.01 K/UL (ref 0–0.04)
IMM GRANULOCYTES NFR BLD AUTO: 0.1 % (ref 0–0.5)
LYMPHOCYTES # BLD AUTO: 1.2 K/UL (ref 1–4.8)
LYMPHOCYTES NFR BLD: 15.5 % (ref 18–48)
MCH RBC QN AUTO: 28.6 PG (ref 27–31)
MCHC RBC AUTO-ENTMCNC: 34.1 G/DL (ref 32–36)
MCV RBC AUTO: 84 FL (ref 82–98)
MONOCYTES # BLD AUTO: 0.7 K/UL (ref 0.3–1)
MONOCYTES NFR BLD: 8.7 % (ref 4–15)
NEUTROPHILS # BLD AUTO: 5.2 K/UL (ref 1.8–7.7)
NEUTROPHILS NFR BLD: 69.6 % (ref 38–73)
NRBC BLD-RTO: 0 /100 WBC
PLATELET # BLD AUTO: 346 K/UL (ref 150–350)
PMV BLD AUTO: 9.1 FL (ref 9.2–12.9)
POTASSIUM SERPL-SCNC: 3 MMOL/L (ref 3.5–5.1)
PROT SERPL-MCNC: 7.7 G/DL (ref 6–8.4)
RBC # BLD AUTO: 5.24 M/UL (ref 4–5.4)
SODIUM SERPL-SCNC: 138 MMOL/L (ref 136–145)
VIT B12 SERPL-MCNC: 1517 PG/ML (ref 210–950)
WBC # BLD AUTO: 7.44 K/UL (ref 3.9–12.7)

## 2020-01-08 PROCEDURE — 80053 COMPREHEN METABOLIC PANEL: CPT

## 2020-01-08 PROCEDURE — 84425 ASSAY OF VITAMIN B-1: CPT

## 2020-01-08 PROCEDURE — 99999 PR PBB SHADOW E&M-EST. PATIENT-LVL IV: CPT | Mod: PBBFAC,,, | Performed by: PHYSICIAN ASSISTANT

## 2020-01-08 PROCEDURE — 99499 UNLISTED E&M SERVICE: CPT | Mod: S$GLB,,, | Performed by: DIETITIAN, REGISTERED

## 2020-01-08 PROCEDURE — 99999 PR PBB SHADOW E&M-EST. PATIENT-LVL IV: ICD-10-PCS | Mod: PBBFAC,,, | Performed by: PHYSICIAN ASSISTANT

## 2020-01-08 PROCEDURE — 96366 THER/PROPH/DIAG IV INF ADDON: CPT

## 2020-01-08 PROCEDURE — 99024 PR POST-OP FOLLOW-UP VISIT: ICD-10-PCS | Mod: S$GLB,,, | Performed by: PHYSICIAN ASSISTANT

## 2020-01-08 PROCEDURE — 82607 VITAMIN B-12: CPT

## 2020-01-08 PROCEDURE — 99024 POSTOP FOLLOW-UP VISIT: CPT | Mod: S$GLB,,, | Performed by: PHYSICIAN ASSISTANT

## 2020-01-08 PROCEDURE — 85025 COMPLETE CBC W/AUTO DIFF WBC: CPT

## 2020-01-08 PROCEDURE — 63600175 PHARM REV CODE 636 W HCPCS: Performed by: PHYSICIAN ASSISTANT

## 2020-01-08 PROCEDURE — 99499 NO LOS: ICD-10-PCS | Mod: S$GLB,,, | Performed by: DIETITIAN, REGISTERED

## 2020-01-08 PROCEDURE — 96365 THER/PROPH/DIAG IV INF INIT: CPT

## 2020-01-08 RX ORDER — PROMETHAZINE HYDROCHLORIDE 12.5 MG/1
12.5 TABLET ORAL 4 TIMES DAILY
Qty: 120 TABLET | Refills: 1 | Status: SHIPPED | OUTPATIENT
Start: 2020-01-08 | End: 2020-02-07

## 2020-01-08 RX ORDER — THIAMINE HYDROCHLORIDE 100 MG/ML
100 INJECTION, SOLUTION INTRAMUSCULAR; INTRAVENOUS WEEKLY
Qty: 4 ML | Refills: 0 | Status: SHIPPED | OUTPATIENT
Start: 2020-01-08 | End: 2020-02-07

## 2020-01-08 RX ORDER — HEPARIN 100 UNIT/ML
500 SYRINGE INTRAVENOUS
Status: CANCELLED | OUTPATIENT
Start: 2020-01-08

## 2020-01-08 RX ORDER — PROMETHAZINE HYDROCHLORIDE 12.5 MG/1
12.5 TABLET ORAL 4 TIMES DAILY
COMMUNITY
End: 2020-01-08 | Stop reason: SDUPTHER

## 2020-01-08 RX ORDER — SODIUM CHLORIDE 0.9 % (FLUSH) 0.9 %
10 SYRINGE (ML) INJECTION
Status: CANCELLED | OUTPATIENT
Start: 2020-01-08

## 2020-01-08 RX ORDER — ONDANSETRON 8 MG/1
8 TABLET, ORALLY DISINTEGRATING ORAL EVERY 6 HOURS PRN
Qty: 120 TABLET | Refills: 0 | Status: SHIPPED | OUTPATIENT
Start: 2020-01-08 | End: 2020-02-07

## 2020-01-08 RX ORDER — SODIUM CHLORIDE 0.9 % (FLUSH) 0.9 %
10 SYRINGE (ML) INJECTION
Status: DISCONTINUED | OUTPATIENT
Start: 2020-01-08 | End: 2020-01-08 | Stop reason: HOSPADM

## 2020-01-08 RX ADMIN — POTASSIUM CHLORIDE: 2 INJECTION, SOLUTION, CONCENTRATE INTRAVENOUS at 01:01

## 2020-01-08 NOTE — TELEPHONE ENCOUNTER
Called [facility] and requested 20meq Potassium to be added to infusion scheduled today per Taylor Billingsley, CHRISTINE.  Spoke with Carri, verbal taken, and verbalized it would be administered while pt there today.

## 2020-01-08 NOTE — PATIENT INSTRUCTIONS
Medication regimen:  Zofran 8 mg  - wait 15-20 minutes  Synthroid and Omeprazole 20 mg  - wait 20 minutes  Take chewable Zyrtec or Claritin    Please take Zofran every 6 hours as prescribed.  Jessy Starks will call you to discuss how to improve your fluid and protein intake.

## 2020-01-08 NOTE — PROGRESS NOTES
BARIATRIC FOLLOW UP:    Chief Complaint   Patient presents with    Follow-up       HISTORY OF PRESENT ILLNESS: Ailyn Ortiz is a 54 y.o. female with a Body mass index is 35.48 kg/m². who presents for follow up s/p lap sleeve with Dr Garay on 12/30/19.  she complains of inability to tolerate liquids without severe nausea and vomiting.  She did tolerate the broth of egg drop soup last night, however only 2 teaspoons.  She states that the nausea starts right after swallowing synthroid and capsule contents of omeprazole in the morning.  She has not tried taking anti-emetic before taking these medications.  She reports nausea and vomiting after taking vitamins as well.    Vomiting with liquids and vitamins.    Cold symptoms started Friday night, only took afrin.  She states that this is better.  No cough, no sore throat.    2-3 looser stools daily.  No black/ tarry/ bloody.    Review of Systems   Constitutional: Positive for malaise/fatigue. Negative for chills, fever and weight loss.   Eyes: Negative for blurred vision, double vision and pain.   Respiratory: Negative for cough, shortness of breath and wheezing.    Cardiovascular: Negative for chest pain, palpitations and leg swelling.   Gastrointestinal: Positive for diarrhea, nausea and vomiting. Negative for abdominal pain, blood in stool, constipation, heartburn and melena.   Genitourinary: Negative for dysuria, frequency and hematuria.   Skin: Negative for itching and rash.   Neurological: Negative for headaches.   Psychiatric/Behavioral: Negative for depression and suicidal ideas.       EXERCISE & VITAMINS:  Non-adherent with bariatric vitamin regimen, vomiting.  Exercise- none.    MEDICATIONS/ALLERGIES:  Have been reviewed.    DIET:  Liquid Bariatric Diet.  0 protein shakes daily, <20 grams protein.  ~16 oz H20 and Clear SF Liquids.      Vitals:    01/08/20 1116   BP: 98/70   Pulse: 103       Physical Exam   Constitutional: She is oriented to person,  "place, and time. She appears well-developed and well-nourished. No distress.   HENT:   Head: Normocephalic and atraumatic.   Eyes: Conjunctivae are normal. Right eye exhibits no discharge. Left eye exhibits no discharge. No scleral icterus.   Cardiovascular: Normal rate, regular rhythm, normal heart sounds and intact distal pulses.   No murmur heard.  Pulmonary/Chest: Effort normal and breath sounds normal. No respiratory distress. She has no wheezes.   Abdominal: Soft. Bowel sounds are normal. She exhibits no distension. There is no tenderness. There is no rebound and no guarding.   Well healing surgical incisions, clean, dry, and intact without signs of infection.     Musculoskeletal: She exhibits no edema.   Neurological: She is alert and oriented to person, place, and time.   Skin: Skin is warm and dry. No rash noted. She is not diaphoretic. No erythema. No pallor.   Psychiatric: She has a normal mood and affect. Her behavior is normal. Judgment and thought content normal.   Nursing note and vitals reviewed.      ASSESSMENT:  - Nausea and Vomiting: will continue to monitor, may need UGI and possible EGD if oral intake does not improve with medication regimen change.  - Dehydration  - Low potassium (3.0)  - Morbid obesity, Body mass index is 35.48 kg/m².,  s/p sleeve gastrectomy on 12/30/19.  - Estimated goal weight is 50% EWL  - No Exercise regimen  - Poor Vitamin Regimen  - Poor Diet    PLAN:  - IVF today with a 20 meq KCl IVPB.  - Thiamine injection Rx sent to her pharmacy, her daughter inject IM.    - Medication regimen change, written instructions provided via "[facility] Patient Instructions."  - Regular exercise and adherence to bariatric diet to achieve maximum weight loss.  - Follow-up with dietician via phone call to reinforce diet.  - Full bariatric vitamin regimen  - Ursodiol 500 mg daily for 6 months for the gallbladder.  - Anti-Acid medication, Omeprazole daily for 3 months.  - Lifting restriction, no " lifting more than 10 lbs for 6 weeks total.  - Miralax daily for constipation, no fiber.  - No NSAIDs, Tylenol for pain.  - RTCin 1 week or sooner if needed.  - Call the office for any issues.  - Check labs as scheduled.    20 minute visit, over 50% of time spent counseling patient face to face on diet, exercise, and weight loss.

## 2020-01-09 ENCOUNTER — TELEPHONE (OUTPATIENT)
Dept: BARIATRICS | Facility: CLINIC | Age: 55
End: 2020-01-09

## 2020-01-09 NOTE — TELEPHONE ENCOUNTER
Called and spoke with pt concerning nausea.  Pt states she is 100% better after receiving fluids and K+ yesterday.  She has been sipping on Premier Clear and water without difficulty today.  Pt taking zyrtec at night and Zofran in the morning.  Pt to call if she is experiencing any nausea.  Pt v/u

## 2020-01-11 LAB — VIT B1 BLD-MCNC: 81 UG/L (ref 38–122)

## 2020-01-13 LAB
FINAL PATHOLOGIC DIAGNOSIS: NORMAL
GROSS: NORMAL

## 2020-01-14 ENCOUNTER — CLINICAL SUPPORT (OUTPATIENT)
Dept: BARIATRICS | Facility: CLINIC | Age: 55
End: 2020-01-14
Payer: COMMERCIAL

## 2020-01-14 ENCOUNTER — LAB VISIT (OUTPATIENT)
Dept: LAB | Facility: HOSPITAL | Age: 55
End: 2020-01-14
Payer: COMMERCIAL

## 2020-01-14 ENCOUNTER — OFFICE VISIT (OUTPATIENT)
Dept: BARIATRICS | Facility: CLINIC | Age: 55
End: 2020-01-14
Payer: COMMERCIAL

## 2020-01-14 VITALS
SYSTOLIC BLOOD PRESSURE: 130 MMHG | HEIGHT: 62 IN | HEART RATE: 85 BPM | WEIGHT: 193.81 LBS | DIASTOLIC BLOOD PRESSURE: 74 MMHG | BODY MASS INDEX: 35.66 KG/M2

## 2020-01-14 DIAGNOSIS — E11.9 TYPE 2 DIABETES MELLITUS WITHOUT COMPLICATION, WITHOUT LONG-TERM CURRENT USE OF INSULIN: ICD-10-CM

## 2020-01-14 DIAGNOSIS — I10 ESSENTIAL HYPERTENSION: ICD-10-CM

## 2020-01-14 DIAGNOSIS — Z98.84 S/P LAPAROSCOPIC SLEEVE GASTRECTOMY: ICD-10-CM

## 2020-01-14 DIAGNOSIS — Z98.890 POST-OPERATIVE STATE: Primary | ICD-10-CM

## 2020-01-14 DIAGNOSIS — G47.33 OBSTRUCTIVE SLEEP APNEA SYNDROME: ICD-10-CM

## 2020-01-14 DIAGNOSIS — E66.9 OBESITY, UNSPECIFIED CLASSIFICATION, UNSPECIFIED OBESITY TYPE, UNSPECIFIED WHETHER SERIOUS COMORBIDITY PRESENT: ICD-10-CM

## 2020-01-14 DIAGNOSIS — R63.4 WEIGHT LOSS: ICD-10-CM

## 2020-01-14 DIAGNOSIS — Z71.3 NUTRITIONAL COUNSELING: ICD-10-CM

## 2020-01-14 LAB
ALBUMIN SERPL BCP-MCNC: 4.1 G/DL (ref 3.5–5.2)
ALP SERPL-CCNC: 96 U/L (ref 55–135)
ALT SERPL W/O P-5'-P-CCNC: 21 U/L (ref 10–44)
ANION GAP SERPL CALC-SCNC: 18 MMOL/L (ref 8–16)
AST SERPL-CCNC: 18 U/L (ref 10–40)
BASOPHILS # BLD AUTO: 0.03 K/UL (ref 0–0.2)
BASOPHILS NFR BLD: 0.5 % (ref 0–1.9)
BILIRUB SERPL-MCNC: 1 MG/DL (ref 0.1–1)
BUN SERPL-MCNC: 16 MG/DL (ref 6–20)
CALCIUM SERPL-MCNC: 9.5 MG/DL (ref 8.7–10.5)
CHLORIDE SERPL-SCNC: 98 MMOL/L (ref 95–110)
CO2 SERPL-SCNC: 26 MMOL/L (ref 23–29)
CREAT SERPL-MCNC: 0.9 MG/DL (ref 0.5–1.4)
DIFFERENTIAL METHOD: ABNORMAL
EOSINOPHIL # BLD AUTO: 0.2 K/UL (ref 0–0.5)
EOSINOPHIL NFR BLD: 3.7 % (ref 0–8)
ERYTHROCYTE [DISTWIDTH] IN BLOOD BY AUTOMATED COUNT: 12.5 % (ref 11.5–14.5)
EST. GFR  (AFRICAN AMERICAN): >60 ML/MIN/1.73 M^2
EST. GFR  (NON AFRICAN AMERICAN): >60 ML/MIN/1.73 M^2
GLUCOSE SERPL-MCNC: 93 MG/DL (ref 70–110)
HCT VFR BLD AUTO: 42.3 % (ref 37–48.5)
HGB BLD-MCNC: 14.1 G/DL (ref 12–16)
IMM GRANULOCYTES # BLD AUTO: 0.02 K/UL (ref 0–0.04)
IMM GRANULOCYTES NFR BLD AUTO: 0.3 % (ref 0–0.5)
LYMPHOCYTES # BLD AUTO: 1.1 K/UL (ref 1–4.8)
LYMPHOCYTES NFR BLD: 17.2 % (ref 18–48)
MCH RBC QN AUTO: 28.6 PG (ref 27–31)
MCHC RBC AUTO-ENTMCNC: 33.3 G/DL (ref 32–36)
MCV RBC AUTO: 86 FL (ref 82–98)
MONOCYTES # BLD AUTO: 0.7 K/UL (ref 0.3–1)
MONOCYTES NFR BLD: 11 % (ref 4–15)
NEUTROPHILS # BLD AUTO: 4.3 K/UL (ref 1.8–7.7)
NEUTROPHILS NFR BLD: 67.3 % (ref 38–73)
NRBC BLD-RTO: 0 /100 WBC
PLATELET # BLD AUTO: 261 K/UL (ref 150–350)
PMV BLD AUTO: 9.3 FL (ref 9.2–12.9)
POTASSIUM SERPL-SCNC: 3.3 MMOL/L (ref 3.5–5.1)
PROT SERPL-MCNC: 7.4 G/DL (ref 6–8.4)
RBC # BLD AUTO: 4.93 M/UL (ref 4–5.4)
SODIUM SERPL-SCNC: 142 MMOL/L (ref 136–145)
VIT B12 SERPL-MCNC: >2000 PG/ML (ref 210–950)
WBC # BLD AUTO: 6.44 K/UL (ref 3.9–12.7)

## 2020-01-14 PROCEDURE — 99999 PR PBB SHADOW E&M-EST. PATIENT-LVL IV: CPT | Mod: PBBFAC,,, | Performed by: PHYSICIAN ASSISTANT

## 2020-01-14 PROCEDURE — 85025 COMPLETE CBC W/AUTO DIFF WBC: CPT

## 2020-01-14 PROCEDURE — 99024 POSTOP FOLLOW-UP VISIT: CPT | Mod: S$GLB,,, | Performed by: PHYSICIAN ASSISTANT

## 2020-01-14 PROCEDURE — 99999 PR PBB SHADOW E&M-EST. PATIENT-LVL IV: ICD-10-PCS | Mod: PBBFAC,,, | Performed by: PHYSICIAN ASSISTANT

## 2020-01-14 PROCEDURE — 99499 NO LOS: ICD-10-PCS | Mod: S$GLB,,, | Performed by: DIETITIAN, REGISTERED

## 2020-01-14 PROCEDURE — 36415 COLL VENOUS BLD VENIPUNCTURE: CPT

## 2020-01-14 PROCEDURE — 80053 COMPREHEN METABOLIC PANEL: CPT

## 2020-01-14 PROCEDURE — 82607 VITAMIN B-12: CPT

## 2020-01-14 PROCEDURE — 99499 UNLISTED E&M SERVICE: CPT | Mod: S$GLB,,, | Performed by: DIETITIAN, REGISTERED

## 2020-01-14 PROCEDURE — 84425 ASSAY OF VITAMIN B-1: CPT

## 2020-01-14 PROCEDURE — 99024 PR POST-OP FOLLOW-UP VISIT: ICD-10-PCS | Mod: S$GLB,,, | Performed by: PHYSICIAN ASSISTANT

## 2020-01-14 NOTE — PROGRESS NOTES
NUTRITION NOTE    Referring Physician: Stoen Mei M.D.  Reason for MNT Referral: Follow-up 2 Weeks s/p Gastric Sleeve    PAST MEDICAL HISTORY:  Denies vomiting, constipation and diarrhea.  Reports problems, including nausea.    Past Medical History:   Diagnosis Date    Arthritis     zero negative imflammatory arthritis    Asthma     Diabetes mellitus, type 2 since the age of 42    Hypertension since the age of 42    JESSICA (obstructive sleep apnea)     S/P UPP    Thyroid disease     Ventral hernia without obstruction or gangrene        CLINICAL DATA:  54 y.o. female.    CURRENT DIET:  Bariatric Liquid Diet    Diet Recall: 20-30 grams of protein/day; 17 oz of fluids/day    Diet Includes: 1 tbsp. egg, puree chicken salad  Protein Supplements: Premier Clear, 1 bottle    EXERCISE:  None.    Restrictions to Exercise: Fatigue after light housework    VITAMINS/MINERALS: Inadequate. Difficulty d/t nausea and vomiting.  See BA    ASSESSMENT:  Doing poorly overall.  Inadequate protein intake.  Inadequate fluid intake.    BARIATRIC DIET DISCUSSION:  Instructed and provided written materials on bariatric pureed diet plan.  Reinforced post-op nutrition guidelines.    PLAN/RECOMMONDATIONS:  Advance to bariatric pureed diet.  Increase protein intake.  Increase fluid intake.  No light exercise.  Begin appropriate vitamins & minerals as david.  Adjust vitamins & minerals by: trying FC bid, [place] liquid Ca 1 tbsp 3/day.    Return to clinic in 6 weeks.    SESSION TIME: 15 minutes

## 2020-01-14 NOTE — PROGRESS NOTES
BARIATRIC FOLLOW UP:    Chief Complaint   Patient presents with    Follow-up     2 week post/op       HISTORY OF PRESENT ILLNESS: Ailyn Ortiz is a 54 y.o. female with a Body mass index is 35.44 kg/m². who presents for follow up s/p lap sleeve with Dr Garay on 12/30/19.      Pt states that she is much improved since her last visit. States that this am slightly nauseated especially with the Prilosec. Slight runny nose with post nasal drip may be contributing to nausea.      No more vomiting.    Not taking calcium will stop and get calcium liquid    Able to eat now, eating eggs and chicken salad taking it slow and small amounts.     Hasn't taken BP meds     Has not been able to take invokana  Review of Systems   Constitutional: Positive for malaise/fatigue. Negative for chills, fever and weight loss.   Eyes: Negative for blurred vision, double vision and pain.   Respiratory: Negative for cough, shortness of breath and wheezing.    Cardiovascular: Negative for chest pain, palpitations and leg swelling.   Gastrointestinal: Positive for diarrhea, nausea and vomiting. Negative for abdominal pain, blood in stool, constipation, heartburn and melena.   Genitourinary: Negative for dysuria, frequency and hematuria.   Skin: Negative for itching and rash.   Neurological: Negative for headaches.   Psychiatric/Behavioral: Negative for depression and suicidal ideas.       EXERCISE & VITAMINS:  B12, thiamine injections ( will get liquid B1), will get liquid calcium, will get chewable flinestones  Exercise- none.     MEDICATIONS/ALLERGIES:  Have been reviewed.    DIET:  Liquid Bariatric Diet.   protein shakes daily, <20 grams protein.  ~16 oz H20 and Clear SF Liquids.      Premier clear protein (1)  Water (1)  Broth   Gumbo juice  Soft scrambled egg   Chicken salad      Vitals:    01/14/20 1110   BP: 130/74   Pulse: 85       Physical Exam   Constitutional: She is oriented to person, place, and time. She appears  well-developed and well-nourished. No distress.   HENT:   Head: Normocephalic and atraumatic.   Eyes: Conjunctivae are normal. Right eye exhibits no discharge. Left eye exhibits no discharge. No scleral icterus.   Cardiovascular: Normal rate, regular rhythm, normal heart sounds and intact distal pulses.   No murmur heard.  Pulmonary/Chest: Effort normal and breath sounds normal. No respiratory distress. She has no wheezes.   Abdominal: Soft. Bowel sounds are normal. She exhibits no distension. There is no tenderness. There is no rebound and no guarding.   Well healing surgical incisions, clean, dry, and intact without signs of infection.     Musculoskeletal: She exhibits no edema.   Neurological: She is alert and oriented to person, place, and time.   Skin: Skin is warm and dry. No rash noted. She is not diaphoretic. No erythema. No pallor.   Psychiatric: She has a normal mood and affect. Her behavior is normal. Judgment and thought content normal.   Nursing note and vitals reviewed.      ASSESSMENT:  - Nausea   - Morbid obesity, Body mass index is 35.44 kg/m².,  s/p sleeve gastrectomy on 12/30/19.  - Estimated goal weight is 50% EWL  - No Exercise regimen  - Poor Vitamin Regimen  - Poor Diet    PLAN:  - Advanced diet as tolerated  - continue zofran as needed  - discussed vitamin regimen ( will  other vitamins)  - Regular exercise and adherence to bariatric diet to achieve maximum weight loss.  - Follow-up with dietician via phone call to reinforce diet.  - Full bariatric vitamin regimen  - Ursodiol 500 mg daily for 6 months for the gallbladder.  - Anti-Acid medication, Omeprazole daily for 3 months.  - Lifting restriction, no lifting more than 10 lbs for 6 weeks total.   - Miralax daily for constipation, no fiber.  - No NSAIDs, Tylenol for pain.  - RTC per post op schedule.  - Call the office for any issues.  - Check labs as scheduled.    20 minute visit, over 50% of time spent counseling patient face to  face on diet, exercise, and weight loss.

## 2020-01-14 NOTE — PATIENT INSTRUCTIONS
High Protein Pureed Diet    2 weeks after gastric bypass and sleeve you may be ready to add pureed food to your diet.  All food should be the consistency of baby food, or thinner.  Follow pureed diet for the next 2 weeks.    Protein - It is very important to pay attention to protein intake during this time.      Inadequate protein intake can cause:  ? Delayed Wound Healing  ? Hair Loss  ? Muscle Breakdown    Meal Plan - Eat 3-4 meals per day (2-4 tbsp each), with protein supplements in between to meet protein needs.  Meeting protein needs daily will help increase healing, decrease muscle loss, and increase weight loss.  Your goal is  grams of protein a day.    Protein First - Always eat the foods with the highest protein first.  Foods high in protein include milk, yogurt, cheese, egg whites, and blenderized meat, seafood, and beans.    Fluids - Keep track in your journal of how much you are drinking; you should try to drink at least 64oz of fluids every day.      Foods allowed: Portion size Protein (g)   ? Sugar-free clear liquids As desired 0   ? Skim or 1% milk ½ cup 4   ? Sugar free pudding, light yogurt, custard (use skim or 1% milk in preparation) 3 oz 2.5   ? Strained baby food meats, or home-made pureed lean meats and shrimp 1 oz 7   ? Beans (red, white, black, lima, stoner, fat free refried, hummus) and lentils ¼ cup 4   ? Low-fat/fat free cheese.(cottage cheese, mozzarella string cheese, ricotta cheese, Laughing Cow, Baby Bell, cheddar, etc) ¼ cup 7-8   ? Scrambled eggs or Egg Beaters 1 or ¼ cup 6   ? Edamame or Tofu, mashed ¼ cup 5   ? Unflavored protein powder (add to 1 scoop to  98% fat free soups or SF pudding) 3 Tbsp 9   ? *PB2: peanut powder (45 calories) 2 Tbsp 5     *PB2 powdered peanut butter: 45 calories vs. 190 calories in 2 tbsp of regular peanut butter. Purchase online at [employer], or  at various [employer], [employer], [employer], [employer] and [employer].      Bariatric Liquid/Pureed Sample  Menu    3-4 small meals plus 2-3 protein drinks per day.    8am 1 egg or ¼ cup Egg Beaters   9am 1 cup water, or decaf coffee or tea   10am Protein drink, 30g protein   11am 2 tbsp low-fat cottage cheese, and 1 tbsp pureed peaches   12pm 1 cup water, or sugar-free lemonade    1pm 2 tbsp pureed chicken, and 1 tbsp pureed carrots    2pm 1 cup water, or sugar-free lemonade   3pm Protein drink, 30g protein   5pm 1 cup water    6pm 1 cup hi-protein creamy chicken soup 14g protein (see Recipe below)   7pm 1 cup water, or sugar-free fruit punch    8pm 1 cup water     This sample menu provides approx. 80g protein and 64oz fluids.  Liquid protein supplements should contain 20-30g protein and less than 4 grams of sugar each.    ? Sip fluids continuously in between meals.  Drink at least ¼ cup every 15 minutes.  ? For fluids: ¼ cup = 2 oz = 4 tbsp       RECIPE IDEAS for Bariatric Pureed Diet:    Hi-Protein Creamy Chicken Soup: (10g protein per 1 cup serving)  Empty 1 can of 98% fat free cream of chicken soup into saucepan. Then  blend 1 scoop of unflavored protein powder with 1 can of skim milk until smooth.  Add protein milk to saucepan and heat to warm. (Note: Do NOT boil. Protein powder may clump if heated too hot).     Hi-Protein Pudding: (14g protein per ½ cup serving)  Add 2 scoops protein powder to 2 cups cold skim milk and mix well.  Stir in dry Jell-O Sugar-Free Instant Pudding mix.  Chill and Enjoy!    Tuna Mousse (12g protein per ¼ cup serving) Page 135 in book Eating Well After Weight Loss Surgery.  In a  or , combine all ingredients and pulse until smooth.  2 6-ounce cans tuna packed in water, drained  2 tbsp low-fat mayonnaise  2 tbsp fat-free sour cream  2 tbsp fat-free cream cheese, softened  ½ cup shallots, finely chopped  1 tbsp lemon juice  ¼ tsp ground pepper  ½ tsp celery seed    Chocolate Peanut Butter Mousse  (28g protein total)  6oz plain Greek yogurt  4 tbsp chocolate  PB2      Bariatric Soft Diet           - Start Soft Diet 2 weeks after gastric banding  -   Start Soft Diet 4 weeks after gastric bypass and sleeve    As your stomach heals, your doctor will progress your diet to soft foods.  This diet usually lasts for 2-3 months, but can last longer depending on each individual. Soft foods are those which can be easily mashed with a fork.    Remember these principles:   No liquids with meals. Do no drink 30 minutes before meals and wait 30 minutes to 1 hour after meals to start drinking.   Sip on water, sugar-free beverages or non-fat milk throughout the day.  You will need to continue drinking at least 1 protein drink daily to meet protein needs.   100% fruit juice (no sugar added) is allowed, but limit to 4oz a day because it is high in calories and does not contain any protein.   Chew foods slowly; one meal should take 20-30 minutes.   Eat 3-5 meals per day, without any additional snacking.   Stop eating as soon as you feel full.   Avoid using table sugar and foods made with refined sugar, which can trigger dumping syndrome.   Marinating meats with a low sugar marinade, adding low-fat salad dressing, or adding low calorie gravy (made from powder and water) can help meats to digest easier.     Adding Vegetables and Fruits:    As long as you are consuming >80g total protein daily from combination of foods and protein drinks, you may start adding small bites of fruits and vegetables to your meals. Cooked, tender vegetables and ripe fruits without the peel are tolerated best.    Avoid fruit canned in syrup, sugary fruit juices, and vegetables cooked with oil, butter or bardales.  Bariatric SOFT Diet    EAT THESE FOODS AVOID THESE FOODS   High in Protein: High in Fat/Sugar:   ? Canned tuna or chicken (packed in water)  ? Lean ground turkey breast or ground round  ? Turkey or chicken (no skin); cooked tender and cut in small pieces  ? Lean pork or beef (cook in crock pot until  very tender; cut in small pieces  ? Scrambled, poached, or boiled eggs  ? Baked, broiled, grilled or boiled fish and seafood (not fried!)  ? Silken tofu, Edamame (soybeans)  ? Beans, hummus and lentils  ? Lean deli meats (turkey and chicken breast, ham, roast beef)  ? 1% or Skim Milk, Lactaid, or Soymilk  ? Low-fat or fat-free cottage cheese, soft cheese, mozzarella string cheese, or ricotta  ? Light yogurt, Greek yogurt, SF pudding High fat milk (whole, 2%)  Butter, margarine, oil, mayonnaise  Sour cream, cream cheese, salad dressing  Ice Cream  Cakes, cookies, pies, desserts  Candy  Luncheon meats (bologna, salami, chopped ham)  Sausage, William  Gravy  Fried Foods  ___________________________________  Tough/Crunchy--------------------------------  Tough or dry meats  Corn   Granola/cereal with nuts  Shredded Coconut    May add after 3 months:  Raw veggies  Lettuce  Plain, Unsalted Nuts and Seeds  Protein bars with 0-4 grams of sugar   As long as you are getting >80g PRO: Starchy Carbohydrates. At goal weight, some may include whole grains in small amounts.   Cooked tender vegetables without peel  Ripe fruits without peel  Frozen fruits with no added sugar  Fruit canned in its own juice or in water  Fat free, sugar free, frozen yogurt White and wheat Bread, Rice, Pasta   Cereals (including grits, oatmeal)   Crackers, Pretzels, Chips, Granola  Corn, Popcorn, Peas  White Potatoes, Sweet potatoes  Flour and corn tortillas     Fluids: Always Avoid:   Skim/1% milk, Lactaid, Soymilk  Water and Sugar-free beverages  (decaf and non-carbonated)  Decaf coffee & decaf tea  Sugary drinks  Carbonated drinks  Alcohol  Drinking through straws     Protein Content of Foods Recommended after                   Weight Loss Surgery    Food Name Portion Calories Protein (gms)   Almonds (unsalted) 1/4 cup 160 6   [place] milk, unsweetened 1 cup 30  1   Beef, Roast 1 oz 46 8   Beef, Steak, sirloin, trimmed 1 oz 55 9   Catfish, broiled or  baked 1 oz 30 5   Cheese, American FF 1 oz 40 6   Cheese, Cottage 1% fat ¼ cup 41 7   Cheese, Parmesan, grated ¼ cup 128 12   Cheese, Mozzarella, part skim 1 oz 78 8   Cheese, part skim Ricotta ¼ cup 90 8   Chicken, white breast w/o skin 1 oz 46 9   Chicken, leg w/o skin 1 oz 54 7   Crab, steamed ¼ cup  40 9   Crawfish tails, boiled ¼ cup 35 8   Edamame, shelled ¼ cup 50 4   Egg 1 78 6   Ham, lean 5% 1 oz 44 7   Hamburger, lean 1 oz 56 7   Hummus ¼ cup 100 5   Lobster, steamed 1 oz 26 5   Milk, skim or 1%, soy  1 cup 90 8   Pork Tenderloin 1 oz 46 7   Pudding, SF 1 serv 60 2   Red beans ¼ cup 56 4   Refried beans, fat free ¼ cup 65 4   [place], baked 1 oz 52 7   Shrimp, steamed 1 oz 28 6   Soymilk, plain ½ cup 40 3   [place]ia, white fish, cooked 1 oz 36 8   Tofu ¼ cup 47 5   Trout 1 oz 48 7   Tuna, canned in water 1 oz 37 8   Turkey, white meat 1 oz 35 7   Veal Loin 1 oz 50 7   Yogurt, SF, frozen vanilla 3 oz 72 3.5   Yogurt, Fruit, FF, light 3 oz 40 2.5   Yogurt, Greek 3 oz 70 8     *Abbreviations: SF=sugar free, LF=low fat, FF= fat free, gms=grams  *3oz of cooked meat/protein = size of deck of cards or ladies palm   *1oz cheese = 1inch cube or 1 slice American cheese    Sample Menu for Bariatric Soft Diet  For Gastric Bypass and Sleeve            3 meals + 2 protein drinks  Remember: No drinking with meals.    Time of Day Day 1 Day 2   7am:    1 egg (or ¼ cup Egg Beaters) ¼ cup low-fat cottage cheese, 1 tbsp berries   8am: 1 cup water/SF beverage     9am: 1 cup water/SF beverage     10am:  Protein drink  Protein drink   11am: 1 cup water/SF beverage     12pm:    1-2 oz grilled shrimp, ¼ cup green beans   1-2oz canned chicken, shredded cheese, 1 tbsp salsa   1pm: 1 cup water/SF beverage     3pm:  Protein drink   Protein drink   4pm: 1 cup water/SF beverage     6pm:  ½ cup low fat chili, 1oz low-fat cheese, ¼ cup broccoli 2 oz grilled fish,  ¼  cup lima beans   7pm: 1 cup water/SF beverage       This sample menu  provides approx. 80g protein total, including about 40g protein from foods and at least 40g protein from protein drinks.  Drinking protein drinks daily helps decrease muscle loss, increase weight loss, and prevent hair loss.    ? Sip fluids continuously in between meals.    ? For fluids: 1 cup = 8 oz   ? For food: ¼ cup = 4 tablespoons = 1oz  ? No drinking from 30 minutes before meals to 30 minutes after meals.  ? 3oz meat is approx. the size of a deck of cards.    ? A food scale will help you determine portion size (Can be purchased at [employer])

## 2020-01-15 ENCOUNTER — TELEPHONE (OUTPATIENT)
Dept: BARIATRICS | Facility: CLINIC | Age: 55
End: 2020-01-15

## 2020-01-15 ENCOUNTER — PATIENT MESSAGE (OUTPATIENT)
Dept: BARIATRICS | Facility: CLINIC | Age: 55
End: 2020-01-15

## 2020-01-15 NOTE — TELEPHONE ENCOUNTER
Called and spoke with pt concerning foods that would be high in potassium at puree phase ie pureed fruits and vegetables listed on the email sent through the portal----- Message from Lemuel Mccarthy PA-C sent at 1/14/2020  1:12 PM CST -----  Low K please inform the patient and give her high  potassium foods.

## 2020-01-16 ENCOUNTER — TELEPHONE (OUTPATIENT)
Dept: BARIATRICS | Facility: CLINIC | Age: 55
End: 2020-01-16

## 2020-01-16 NOTE — TELEPHONE ENCOUNTER
----- Message from Lemuel Mccarthy PA-C sent at 1/15/2020 10:59 AM CST -----  Regarding: Insurance coding   Shelley,   I saw this patient yesterday she states that something was wrong with the coding on her insurance, now they are quoting her a wrong price she would like to speak to you, she all ready spoke to Linh.    KENDY

## 2020-01-17 ENCOUNTER — TELEPHONE (OUTPATIENT)
Dept: SURGERY | Facility: CLINIC | Age: 55
End: 2020-01-17

## 2020-01-17 NOTE — TELEPHONE ENCOUNTER
Called patient.  She stated that her current position does not allow light duty; however, she has another part time job that she may.  Her 6 week limitations for light duty ends on 2/10/2020 at which time, she can resume work without restrictions.  Returned call from [facility] Short-term disability- [phone] ext [phone].  Spoke with Zaire - she asked if procedures were medically necessary - notified her that the surgeons deemed it medically necessary as she had met designated criteria.  She asked if it was billed to insurance- notified her that the hernia was to billed to the insurance and the sleeve was a self pay pricing.

## 2020-01-17 NOTE — TELEPHONE ENCOUNTER
----- Message from Zeenat Nava sent at 1/17/2020  8:50 AM CST -----  Contact: Zaire From [facility] Short Term Disability   Zaire is requesting to speak to someone about procedure pt had on 12/30/19.  Contact info is [phone] ext [phone]

## 2020-01-20 ENCOUNTER — PATIENT MESSAGE (OUTPATIENT)
Dept: BARIATRICS | Facility: CLINIC | Age: 55
End: 2020-01-20

## 2020-01-21 LAB — VIT B1 BLD-MCNC: 102 UG/L (ref 38–122)

## 2020-01-28 ENCOUNTER — PATIENT MESSAGE (OUTPATIENT)
Dept: BARIATRICS | Facility: CLINIC | Age: 55
End: 2020-01-28

## 2020-01-30 ENCOUNTER — TELEPHONE (OUTPATIENT)
Dept: BARIATRICS | Facility: CLINIC | Age: 55
End: 2020-01-30

## 2020-01-30 ENCOUNTER — PATIENT MESSAGE (OUTPATIENT)
Dept: BARIATRICS | Facility: CLINIC | Age: 55
End: 2020-01-30

## 2020-01-30 NOTE — TELEPHONE ENCOUNTER
Reviewed photo with Dr. Mei. Phoned patient and updated her that Dr. Mei would be happy to see her in clinic today, but it looks okay for now and she can also keep cleaning it as she has been doing. Informed patient not to put antibiotic ointment on the incision site. Patient educated to contact clinic if incision gets worse. Patient verbalized understanding.

## 2020-02-10 ENCOUNTER — PATIENT MESSAGE (OUTPATIENT)
Dept: BARIATRICS | Facility: CLINIC | Age: 55
End: 2020-02-10

## 2020-02-10 ENCOUNTER — TELEPHONE (OUTPATIENT)
Dept: PHARMACY | Facility: CLINIC | Age: 55
End: 2020-02-10

## 2020-02-10 NOTE — PROGRESS NOTES
BARIATRIC FOLLOW UP:    Chief Complaint   Patient presents with    Follow-up       HISTORY OF PRESENT ILLNESS: Ailyn Ortiz is a 54 y.o. female with a Body mass index is 33.67 kg/m². who presents for follow up s/p lap sleeve with Dr Mei on 12/30/19. She has been directed to PCP to stop all DM meds. No hypoglycemia.     Presents today for delayed wound healing to umbilical incision. She reports wound healing vascillates, almost closes and then opens back up. Last week she saw an exposed suture and she cut it with a suture kit. She notes the site looks better in the past 2 days. She reports h/o delayed wound healing last year following small bowel resection. Denies fevers. Slight bloody drainage and reports odor at night. No pus. No pain, but slightly tender with friction from belt of pants or if leaning on the counter. No issue with yoga pants  She reports having CBC yesterday- WBC 3.5. She reports work will not let her return until site completely healed.     Exercise- walking most days 2 miles, ~4 days/week    DIET:   Bariatric Diet.  2 protein water daily, 3 meals (eg BF 1 egg, VICENTE and DIN 2oz lean pro) ~80 grams protein.  ~50 oz H20 and Clear SF Liquids.      Review of Systems   Constitutional: Negative for chills, fever and malaise/fatigue.   Eyes: Negative for blurred vision and double vision.   Respiratory: Negative for cough, shortness of breath and wheezing.    Cardiovascular: Negative for chest pain and palpitations.   Gastrointestinal: Negative for abdominal pain, blood in stool, constipation, diarrhea, heartburn, melena, nausea and vomiting.   Genitourinary: Negative for dysuria, frequency, hematuria and urgency.   Skin:        Per HPI   Neurological: Negative for dizziness, tingling and headaches.   Psychiatric/Behavioral: Negative for depression. The patient is not nervous/anxious.      Vitals:    02/11/20 1045   BP: 125/67   Pulse: 72       Physical Exam   Constitutional: She is oriented  to person, place, and time. She appears well-developed and well-nourished. No distress.   HENT:   Head: Normocephalic and atraumatic.   Eyes: Conjunctivae are normal. Right eye exhibits no discharge. Left eye exhibits no discharge. No scleral icterus.   Cardiovascular: Normal rate, regular rhythm, normal heart sounds and intact distal pulses.   No murmur heard.  Pulmonary/Chest: Effort normal and breath sounds normal. No respiratory distress.   Abdominal: Soft. Bowel sounds are normal. She exhibits no distension and no mass. There is no tenderness. There is no rebound and no guarding. No hernia.   Umbilical site: 1cm x 0.4cmx x < 0.1cm depth. Well granulated base. No drainage, odor, warmth, tenderness, induration, fluctuance. All other surgical sites well healing.     Musculoskeletal: She exhibits no edema.   Neurological: She is alert and oriented to person, place, and time.   Skin: Skin is warm and dry. No rash noted. She is not diaphoretic. No erythema. No pallor.   Psychiatric: She has a normal mood and affect. Her behavior is normal. Judgment and thought content normal.   Nursing note and vitals reviewed.      ASSESSMENT:  - delayed wound healing without evidence of infection  - Morbid obesity, Body mass index is 33.67 kg/m².,  s/p sleeve gastrectomy on 12/30/19.  - Estimated goal weight is 50% EWL      PLAN:  - Referral to wound care. Discussed site care until that time. Avoid all friction/pressure. Monitor for signs of infection. Call for any concerns.   - Regular exercise and adherence to bariatric diet to achieve maximum weight loss.  - Full bariatric vitamin regimen  - Anti-Acid medication, Omeprazole daily for 3 months.  - Lifting restriction, no lifting more than 10 lbs for 6 weeks total.   - Miralax daily for constipation, no fiber.  - No NSAIDs, Tylenol for pain.  - RTC per post op schedule. Keep 8 week follow up on file.  - Call the office for any issues.  - Check labs as scheduled.    20 minute  visit, over 50% of time spent counseling patient face to face on diet, exercise, and weight loss.

## 2020-02-11 ENCOUNTER — OFFICE VISIT (OUTPATIENT)
Dept: BARIATRICS | Facility: CLINIC | Age: 55
End: 2020-02-11
Payer: COMMERCIAL

## 2020-02-11 ENCOUNTER — TELEPHONE (OUTPATIENT)
Dept: BARIATRICS | Facility: CLINIC | Age: 55
End: 2020-02-11

## 2020-02-11 VITALS
HEART RATE: 72 BPM | HEIGHT: 62 IN | DIASTOLIC BLOOD PRESSURE: 67 MMHG | WEIGHT: 184.06 LBS | SYSTOLIC BLOOD PRESSURE: 125 MMHG | BODY MASS INDEX: 33.87 KG/M2

## 2020-02-11 DIAGNOSIS — R63.4 WEIGHT LOSS: ICD-10-CM

## 2020-02-11 DIAGNOSIS — Z98.84 S/P LAPAROSCOPIC SLEEVE GASTRECTOMY: ICD-10-CM

## 2020-02-11 DIAGNOSIS — T14.8XXD DELAYED WOUND HEALING: ICD-10-CM

## 2020-02-11 DIAGNOSIS — Z98.890 POSTOPERATIVE STATE: Primary | ICD-10-CM

## 2020-02-11 PROBLEM — E66.09 CLASS 1 OBESITY DUE TO EXCESS CALORIES IN ADULT: Status: ACTIVE | Noted: 2017-12-15

## 2020-02-11 PROBLEM — E66.811 CLASS 1 OBESITY DUE TO EXCESS CALORIES IN ADULT: Status: ACTIVE | Noted: 2017-12-15

## 2020-02-11 PROCEDURE — 99024 POSTOP FOLLOW-UP VISIT: CPT | Mod: S$GLB,,, | Performed by: NURSE PRACTITIONER

## 2020-02-11 PROCEDURE — 99024 PR POST-OP FOLLOW-UP VISIT: ICD-10-PCS | Mod: S$GLB,,, | Performed by: NURSE PRACTITIONER

## 2020-02-11 PROCEDURE — 99999 PR PBB SHADOW E&M-EST. PATIENT-LVL V: CPT | Mod: PBBFAC,,, | Performed by: NURSE PRACTITIONER

## 2020-02-11 PROCEDURE — 99999 PR PBB SHADOW E&M-EST. PATIENT-LVL V: ICD-10-PCS | Mod: PBBFAC,,, | Performed by: NURSE PRACTITIONER

## 2020-02-11 NOTE — TELEPHONE ENCOUNTER
Per coding and finance, Hiatal hernia surgery was resubmitted without the bariatric surgery claim.  Called patient to confer- she stated that everything appears to be resolved. No further action needed.

## 2020-02-13 ENCOUNTER — INITIAL CONSULT (OUTPATIENT)
Dept: WOUND CARE | Facility: CLINIC | Age: 55
End: 2020-02-13
Payer: COMMERCIAL

## 2020-02-13 DIAGNOSIS — T81.89XA NON-HEALING SURGICAL WOUND, INITIAL ENCOUNTER: Primary | ICD-10-CM

## 2020-02-13 DIAGNOSIS — E11.9 TYPE 2 DIABETES MELLITUS WITHOUT COMPLICATION, WITHOUT LONG-TERM CURRENT USE OF INSULIN: ICD-10-CM

## 2020-02-13 PROCEDURE — 99201 PR OFFICE/OUTPT VISIT,NEW,LEVL I: CPT | Mod: S$GLB,,, | Performed by: CLINICAL NURSE SPECIALIST

## 2020-02-13 PROCEDURE — 99999 PR PBB SHADOW E&M-EST. PATIENT-LVL II: ICD-10-PCS | Mod: PBBFAC,,, | Performed by: CLINICAL NURSE SPECIALIST

## 2020-02-13 PROCEDURE — 3044F PR MOST RECENT HEMOGLOBIN A1C LEVEL <7.0%: ICD-10-PCS | Mod: CPTII,S$GLB,, | Performed by: CLINICAL NURSE SPECIALIST

## 2020-02-13 PROCEDURE — 99999 PR PBB SHADOW E&M-EST. PATIENT-LVL II: CPT | Mod: PBBFAC,,, | Performed by: CLINICAL NURSE SPECIALIST

## 2020-02-13 PROCEDURE — 99201 PR OFFICE/OUTPT VISIT,NEW,LEVL I: ICD-10-PCS | Mod: S$GLB,,, | Performed by: CLINICAL NURSE SPECIALIST

## 2020-02-13 PROCEDURE — 3044F HG A1C LEVEL LT 7.0%: CPT | Mod: CPTII,S$GLB,, | Performed by: CLINICAL NURSE SPECIALIST

## 2020-02-13 NOTE — LETTER
February 13, 2020      Stone Mei MD  [address]  [facility] [address]           [facility]  [address]  [facility] [address]  Phone: [phone]          Patient: Ailyn Ortiz   MR Number: [id number]   YOB: 1965   Date of Visit: 2/13/2020       Dear Dr. Stone Mei:    Thank you for referring Ailyn Ortiz to me for evaluation. Attached you will find relevant portions of my assessment and plan of care.    If you have questions, please do not hesitate to call me. I look forward to following Ailyn Ortiz along with you.    Sincerely,    Leonie Toscano, CNS    Enclosure  CC:  No Recipients    If you would like to receive this communication electronically, please contact [email or web address] or [phone] to request more information on [facility] access.    For providers and/or their staff who would like to refer a patient to [facility], please contact us through our one-stop-shop provider referral line, [facility] , at [phone].    If you feel you have received this communication in error or would no longer like to receive these types of communications, please e-mail [email or web address]

## 2020-02-13 NOTE — PROGRESS NOTES
Subjective:       Patient ID: Ailyn Ortiz is a 54 y.o. female.    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     Review of Systems   Constitutional: Negative for activity change, appetite change and fever.   HENT: Negative for congestion and rhinorrhea.    Respiratory: Negative for cough and shortness of breath.    Cardiovascular: Negative for chest pain.   Gastrointestinal: Negative.    Genitourinary: Negative.    Musculoskeletal: Negative.    Skin: Positive for wound.   Neurological: Negative.    Psychiatric/Behavioral: Negative.        Objective:      Physical Exam   Constitutional: She appears well-developed and well-nourished.   Pulmonary/Chest: Effort normal. No respiratory distress.   Abdominal: Soft. Bowel sounds are normal.   Musculoskeletal: Normal range of motion. She exhibits no edema.   Skin: Skin is warm and dry.   Psychiatric: She has a normal mood and affect.       Applied a touch of silver nitrate and applied SANARARX concentrate,  Gave her same to use daily on wound and leave open to air      Assessment:       1. Non-healing surgical wound, initial encounter    2. Type 2 diabetes mellitus without complication, without long-term current use of insulin        Plan:       Topical care with Sanara rx daily  Return if problem   I have reviewed the plan of care with the patient and she express understanding. I spent over 50% of this 10 minute visit in face to face counseling.

## 2020-02-14 ENCOUNTER — PATIENT MESSAGE (OUTPATIENT)
Dept: WOUND CARE | Facility: CLINIC | Age: 55
End: 2020-02-14

## 2020-02-17 RX ORDER — ERGOCALCIFEROL 1.25 MG/1
CAPSULE ORAL
Qty: 12 CAPSULE | Refills: 0 | Status: SHIPPED | OUTPATIENT
Start: 2020-02-17 | End: 2020-04-13

## 2020-02-19 ENCOUNTER — PATIENT MESSAGE (OUTPATIENT)
Dept: WOUND CARE | Facility: CLINIC | Age: 55
End: 2020-02-19

## 2020-02-28 ENCOUNTER — PATIENT MESSAGE (OUTPATIENT)
Dept: BARIATRICS | Facility: CLINIC | Age: 55
End: 2020-02-28

## 2020-02-28 ENCOUNTER — TELEPHONE (OUTPATIENT)
Dept: BARIATRICS | Facility: CLINIC | Age: 55
End: 2020-02-28

## 2020-02-28 ENCOUNTER — OFFICE VISIT (OUTPATIENT)
Dept: BARIATRICS | Facility: CLINIC | Age: 55
End: 2020-02-28
Payer: COMMERCIAL

## 2020-02-28 ENCOUNTER — CLINICAL SUPPORT (OUTPATIENT)
Dept: BARIATRICS | Facility: CLINIC | Age: 55
End: 2020-02-28
Payer: COMMERCIAL

## 2020-02-28 ENCOUNTER — LAB VISIT (OUTPATIENT)
Dept: LAB | Facility: HOSPITAL | Age: 55
End: 2020-02-28
Payer: COMMERCIAL

## 2020-02-28 VITALS
HEART RATE: 70 BPM | BODY MASS INDEX: 33.63 KG/M2 | SYSTOLIC BLOOD PRESSURE: 110 MMHG | WEIGHT: 182.75 LBS | DIASTOLIC BLOOD PRESSURE: 62 MMHG | HEIGHT: 62 IN

## 2020-02-28 DIAGNOSIS — E87.6 HYPOKALEMIA: Primary | ICD-10-CM

## 2020-02-28 DIAGNOSIS — R63.4 WEIGHT LOSS: ICD-10-CM

## 2020-02-28 DIAGNOSIS — Z71.3 NUTRITIONAL COUNSELING: ICD-10-CM

## 2020-02-28 DIAGNOSIS — Z98.84 S/P LAPAROSCOPIC SLEEVE GASTRECTOMY: ICD-10-CM

## 2020-02-28 DIAGNOSIS — E11.9 TYPE 2 DIABETES MELLITUS WITHOUT COMPLICATION, WITHOUT LONG-TERM CURRENT USE OF INSULIN: ICD-10-CM

## 2020-02-28 DIAGNOSIS — E66.9 OBESITY, UNSPECIFIED CLASSIFICATION, UNSPECIFIED OBESITY TYPE, UNSPECIFIED WHETHER SERIOUS COMORBIDITY PRESENT: ICD-10-CM

## 2020-02-28 DIAGNOSIS — I10 ESSENTIAL HYPERTENSION: ICD-10-CM

## 2020-02-28 DIAGNOSIS — G47.33 OBSTRUCTIVE SLEEP APNEA SYNDROME: ICD-10-CM

## 2020-02-28 DIAGNOSIS — Z98.890 POSTOPERATIVE STATE: Primary | ICD-10-CM

## 2020-02-28 PROBLEM — K56.600 PARTIAL SMALL BOWEL OBSTRUCTION: Status: RESOLVED | Noted: 2019-03-06 | Resolved: 2020-02-28

## 2020-02-28 LAB
25(OH)D3+25(OH)D2 SERPL-MCNC: 31 NG/ML (ref 30–96)
ALBUMIN SERPL BCP-MCNC: 4 G/DL (ref 3.5–5.2)
ALP SERPL-CCNC: 75 U/L (ref 55–135)
ALT SERPL W/O P-5'-P-CCNC: 19 U/L (ref 10–44)
ANION GAP SERPL CALC-SCNC: 9 MMOL/L (ref 8–16)
AST SERPL-CCNC: 17 U/L (ref 10–40)
BASOPHILS # BLD AUTO: 0.07 K/UL (ref 0–0.2)
BASOPHILS NFR BLD: 1.6 % (ref 0–1.9)
BILIRUB SERPL-MCNC: 1.1 MG/DL (ref 0.1–1)
BUN SERPL-MCNC: 13 MG/DL (ref 6–20)
CALCIUM SERPL-MCNC: 9.1 MG/DL (ref 8.7–10.5)
CHLORIDE SERPL-SCNC: 104 MMOL/L (ref 95–110)
CHOLEST SERPL-MCNC: 213 MG/DL (ref 120–199)
CHOLEST/HDLC SERPL: 5.2 {RATIO} (ref 2–5)
CO2 SERPL-SCNC: 31 MMOL/L (ref 23–29)
CREAT SERPL-MCNC: 0.7 MG/DL (ref 0.5–1.4)
DIFFERENTIAL METHOD: ABNORMAL
EOSINOPHIL # BLD AUTO: 0.3 K/UL (ref 0–0.5)
EOSINOPHIL NFR BLD: 5.8 % (ref 0–8)
ERYTHROCYTE [DISTWIDTH] IN BLOOD BY AUTOMATED COUNT: 14 % (ref 11.5–14.5)
EST. GFR  (AFRICAN AMERICAN): >60 ML/MIN/1.73 M^2
EST. GFR  (NON AFRICAN AMERICAN): >60 ML/MIN/1.73 M^2
GLUCOSE SERPL-MCNC: 87 MG/DL (ref 70–110)
HCT VFR BLD AUTO: 36.8 % (ref 37–48.5)
HDLC SERPL-MCNC: 41 MG/DL (ref 40–75)
HDLC SERPL: 19.2 % (ref 20–50)
HGB BLD-MCNC: 12 G/DL (ref 12–16)
IMM GRANULOCYTES # BLD AUTO: 0.01 K/UL (ref 0–0.04)
IMM GRANULOCYTES NFR BLD AUTO: 0.2 % (ref 0–0.5)
IRON SERPL-MCNC: 73 UG/DL (ref 30–160)
LDLC SERPL CALC-MCNC: 151.2 MG/DL (ref 63–159)
LYMPHOCYTES # BLD AUTO: 1.2 K/UL (ref 1–4.8)
LYMPHOCYTES NFR BLD: 27.9 % (ref 18–48)
MCH RBC QN AUTO: 29.5 PG (ref 27–31)
MCHC RBC AUTO-ENTMCNC: 32.6 G/DL (ref 32–36)
MCV RBC AUTO: 90 FL (ref 82–98)
MONOCYTES # BLD AUTO: 0.5 K/UL (ref 0.3–1)
MONOCYTES NFR BLD: 11.3 % (ref 4–15)
NEUTROPHILS # BLD AUTO: 2.3 K/UL (ref 1.8–7.7)
NEUTROPHILS NFR BLD: 53.2 % (ref 38–73)
NONHDLC SERPL-MCNC: 172 MG/DL
NRBC BLD-RTO: 0 /100 WBC
PLATELET # BLD AUTO: 221 K/UL (ref 150–350)
PMV BLD AUTO: 9.7 FL (ref 9.2–12.9)
POTASSIUM SERPL-SCNC: 2.8 MMOL/L (ref 3.5–5.1)
PROT SERPL-MCNC: 6.6 G/DL (ref 6–8.4)
RBC # BLD AUTO: 4.07 M/UL (ref 4–5.4)
SATURATED IRON: 24 % (ref 20–50)
SODIUM SERPL-SCNC: 144 MMOL/L (ref 136–145)
TOTAL IRON BINDING CAPACITY: 306 UG/DL (ref 250–450)
TRANSFERRIN SERPL-MCNC: 207 MG/DL (ref 200–375)
TRIGL SERPL-MCNC: 104 MG/DL (ref 30–150)
VIT B12 SERPL-MCNC: 383 PG/ML (ref 210–950)
WBC # BLD AUTO: 4.34 K/UL (ref 3.9–12.7)

## 2020-02-28 PROCEDURE — 99999 PR PBB SHADOW E&M-EST. PATIENT-LVL I: ICD-10-PCS | Mod: PBBFAC,,, | Performed by: DIETITIAN, REGISTERED

## 2020-02-28 PROCEDURE — 82306 VITAMIN D 25 HYDROXY: CPT

## 2020-02-28 PROCEDURE — 99999 PR PBB SHADOW E&M-EST. PATIENT-LVL IV: ICD-10-PCS | Mod: PBBFAC,,, | Performed by: NURSE PRACTITIONER

## 2020-02-28 PROCEDURE — 99024 POSTOP FOLLOW-UP VISIT: CPT | Mod: S$GLB,,, | Performed by: NURSE PRACTITIONER

## 2020-02-28 PROCEDURE — 99999 PR PBB SHADOW E&M-EST. PATIENT-LVL I: CPT | Mod: PBBFAC,,, | Performed by: DIETITIAN, REGISTERED

## 2020-02-28 PROCEDURE — 99024 PR POST-OP FOLLOW-UP VISIT: ICD-10-PCS | Mod: S$GLB,,, | Performed by: NURSE PRACTITIONER

## 2020-02-28 PROCEDURE — 84425 ASSAY OF VITAMIN B-1: CPT

## 2020-02-28 PROCEDURE — 82607 VITAMIN B-12: CPT

## 2020-02-28 PROCEDURE — 99499 NO LOS: ICD-10-PCS | Mod: S$GLB,,, | Performed by: DIETITIAN, REGISTERED

## 2020-02-28 PROCEDURE — 80061 LIPID PANEL: CPT

## 2020-02-28 PROCEDURE — 99499 UNLISTED E&M SERVICE: CPT | Mod: S$GLB,,, | Performed by: DIETITIAN, REGISTERED

## 2020-02-28 PROCEDURE — 80053 COMPREHEN METABOLIC PANEL: CPT

## 2020-02-28 PROCEDURE — 85025 COMPLETE CBC W/AUTO DIFF WBC: CPT

## 2020-02-28 PROCEDURE — 83540 ASSAY OF IRON: CPT

## 2020-02-28 PROCEDURE — 99999 PR PBB SHADOW E&M-EST. PATIENT-LVL IV: CPT | Mod: PBBFAC,,, | Performed by: NURSE PRACTITIONER

## 2020-02-28 RX ORDER — POTASSIUM CHLORIDE 20 MEQ/1
20 TABLET, EXTENDED RELEASE ORAL 2 TIMES DAILY
Qty: 6 TABLET | Refills: 0 | Status: SHIPPED | OUTPATIENT
Start: 2020-02-28 | End: 2020-03-02

## 2020-02-28 NOTE — PROGRESS NOTES
NUTRITION NOTE    Referring Physician: Stone Mei M.D.  Reason for MNT Referral: Follow-up 8 weeks s/p Gastric Sleeve    PAST MEDICAL HISTORY:    Denies nausea, vomiting and diarrhea.  Reports problems, including only having BM twice/week. No straining. Solid formed.    Past Medical History:   Diagnosis Date    Arthritis     zero negative imflammatory arthritis    Asthma     Diabetes mellitus, type 2 since the age of 42    Hypertension since the age of 42    JESSICA (obstructive sleep apnea)     S/P UPP    Thyroid disease     Ventral hernia without obstruction or gangrene        CLINICAL DATA:  54 y.o. female.    CURRENT DIET:  Bariatric Diet.  Diet Recall: 60-80 grams of protein/day; 32-48 oz of fluids/day    B: 1 egg  - Premier Clear  L: BK chicken fries  - 1/2 Premier Clear  D: 1-2oz ground taco meat, few bites roasted carrots, 2 bites mashed potatoes    Diet includes:  Meal Pattern: 2-3 meal(s) + 1-2 snack(s) (boiled eggs, deli roll ups) + 1-2 protein supplement(s)  Adequate protein supplement intake. Premier Clear 20g prot.  Inadequate dairy intake. Yogurt, rare. No cheese and no milk.  Adequate vegetable intake. Tried and tolerated lettuce.  Occ fruit intake. Berries.  Starchy CHO: avoids  Other: occ fried meats    EXERCISE:  Adequate light exercise. Walking/jogging - couch to 5K.    Restrictions to Exercise: None.    VITAMINS / MINERALS:  See BA    ASSESSMENT:  Doing well overall.  Weight loss.  Adequate calorie intake.  Inadequate protein intake most days.  Inadequate fluid intake.  Following diet appropriately.  Exercising.  Adequate vitamins & minerals.    BARIATRIC DIET DISCUSSION:  Instructed and provided written materials on bariatric diet plan.  Reinforced post-op nutrition guidelines.    PLAN / RECOMMENDATIONS:  May begin to incorporate raw vegetables, lettuce, unsalted nuts, and protein bars as tolerated.  Continue excellent diet plan.  Increase protein intake.  - Include 2 Premier Clear  daily + 3 prot meals  Increase fluid intake.  Continue exercise.  Continue appropriate vitamins & minerals.    Return to clinic in 4 months.    SESSION TIME: 15 minutes

## 2020-02-28 NOTE — PROGRESS NOTES
BARIATRIC FOLLOW UP:    Chief Complaint   Patient presents with    Follow-up       HISTORY OF PRESENT ILLNESS: Ailyn Ortiz is a 54 y.o. female with a Body mass index is 33.43 kg/m². who presents for follow up s/p lap sleeve with Dr. Mei on 12/30/19.   She has been directed to PCP to stop all DM meds. No hypoglycemia.   Reports good wound healing; saw wound care.   Adherent with kathy vitamins, states B complex has been checked by RD for adequate B12; advised to ensure 500mcg daily.   Exercise- walking most days 2 miles, ~4 days/week.  Reports BM every ~4 days, soft and formed. Does not take Miralax daily.    DIET:  Bariatric Diet. 3 meals.  Egg, fish, protein water, ham, chicken  Rare starchy carbs  ~80 grams protein    ~50 oz H20 and Clear SF Liquids.      Review of Systems   Constitutional: Negative for chills, fever and malaise/fatigue.   Eyes: Negative for blurred vision and double vision.   Respiratory: Negative for cough, shortness of breath and wheezing.    Cardiovascular: Negative for chest pain and palpitations.   Gastrointestinal: Negative for abdominal pain, blood in stool, constipation, diarrhea, heartburn, melena, nausea and vomiting.   Genitourinary: Negative for dysuria, frequency, hematuria and urgency.   Skin:        Per HPI   Neurological: Negative for dizziness, tingling and headaches.   Psychiatric/Behavioral: Negative for depression. The patient is not nervous/anxious.      Vitals:    02/28/20 1026   BP: 110/62   Pulse: 70       Physical Exam   Constitutional: She is oriented to person, place, and time. She appears well-developed and well-nourished. No distress.   HENT:   Head: Normocephalic and atraumatic.   Eyes: Conjunctivae are normal. Right eye exhibits no discharge. Left eye exhibits no discharge. No scleral icterus.   Cardiovascular: Normal rate and regular rhythm.   Pulmonary/Chest: Effort normal. No respiratory distress.   Abdominal: Soft. Bowel sounds are normal. She  exhibits no distension and no mass. There is no tenderness. There is no rebound and no guarding. No hernia.   WHSS     Musculoskeletal: She exhibits no edema.   Neurological: She is alert and oriented to person, place, and time.   Skin: Skin is warm and dry. No rash noted. She is not diaphoretic. No erythema. No pallor.   Psychiatric: She has a normal mood and affect. Her behavior is normal. Judgment and thought content normal.   Nursing note and vitals reviewed.    ASSESSMENT:  - Good wound healing,. umbilical site resolved  - Morbid obesity, Body mass index is 33.43 kg/m².,  s/p sleeve gastrectomy on 12/30/19.  - Estimated goal weight is 50% EWL      PLAN:  - Abdominal incisions healed. Return to work without restrictions.  - Regular exercise and adherence to bariatric diet to achieve maximum weight loss.  - Full bariatric vitamin regimen. Ensure adequate B12.  - Anti-Acid medication, Omeprazole daily for 3 months. Then start taper as discussed.  - No more lifting restriction  - Miralax daily for constipation, no fiber.  - No NSAIDs, Tylenol for pain.  - RTC per post op schedule.   - Call the office for any issues.  - Check labs as scheduled.    30 minute visit, over 50% of time spent counseling patient face to face on diet, exercise, and weight loss.

## 2020-02-28 NOTE — TELEPHONE ENCOUNTER
Per Roxana  Potassium continues to be low.  Called patient to discuss. Also a K supplement rx was sent to her pharmacy. Will send patient a list of high potassium foods she can incorporate into her diet 2 months s/p  Bariatric sx. V/u.

## 2020-02-28 NOTE — PATIENT INSTRUCTIONS
How to taper off of Omeprazole:  - Take 1 Tablet every other day for 2 weeks.  If you do not experience any heartburn, indigestion, nausea symptoms, the following week, take 1 tablet every 3 days.  Again if you remain without the above symptoms, you may completely discontinue the medication.  If symptoms return at any point, please restart the medication.      Fruits and Vegetables       Include 1-2 servings of fruit daily.      1 serving of fruit includes ½ cup unsweetened applesauce, ½ medium banana, tennis ball size piece of fruit, 17 grapes, 1 cup melon, 1 cup strawberries, ¼ cup dried fruit     Include 2-3 servings of vegetables daily. 1 serving is 1 cup raw or ½ cup cooked.     Non-starchy vegetables include artichoke, asparagus, baby corn, bamboo shoots, beans: green/Italian/wax, bean sprouts, beets, broccoli, [place] sprouts, cabbage, carrots, cauliflower, celery, cucumber, eggplant, green onions or scallions, greens, jicama, leeks, mushrooms, okra, onions, pea pods, peppers, radishes, spinach, summer squash, tomatoes and salsa, turnips, vegetable juice cocktail, water chestnuts, zucchini      Remember these principles:   No liquids with meals. Do no drink 30 minutes before meals and wait 30 minutes to 1 hour after meals to start drinking.   Eat PROTEIN rich foods first at every meal or snack.   Sip on water, sugar-free beverages or non-fat milk throughout the day.  You will need to continue drinking at least 1 protein drink daily to meet protein needs.   100% fruit juice (no sugar added) is allowed, but limit to 4oz a day because it is high in calories and does not contain any protein.   Chew foods slowly; one meal should take 20-30 minutes.   Eat 5 meals (3 solid meals and 2 protein shakes) per day, without any additional snacking.   Stop eating as soon as you feel full.   Avoid using table sugar and foods made with refined sugar, which can trigger dumping syndrome.   Marinating meats with a  low sugar marinade, adding low-fat salad dressing, or adding low calorie gravy (made from powder and water) can help meats to digest easier.     May add:  Raw veggies  Lettuce: start with baby spinach leaves  Plain, Unsalted Nuts and Seeds: almonds, peanuts, pistachios.  1 serving daily or less  Protein bars with 0-4 grams of sugar, see attached handout    Snacks: (100-200 calories; >5g protein)    - 1 low-fat cheese stick with 8 cherry tomatoes or 1 serving fresh fruit  - 4 thin slices fat-free turkey breast and 1 slice low-fat cheese  - 4 thin slices fat-free honey ham with wedge of melon  - 2 slices of turkey bardales  - Boiled eggs (can buy at [employer] already boiled w/ shell removed)  - for convenience,  [employer] read, snack, go (deli meat and cheese rolls)  - P3 packets (Protein packs w/ cheese, nuts, lean deli meat)  - MHP Fit and Lean Protein Pudding (find at [employer] - per 1 cup serving = 100 calories, 15 g protein, 0 g sugar)  - 6-8 edamame pods (equivalent to about 1/4 cup edamame without pods).   - 1/4 cup unsalted nuts with ½ cup fruit  - 6-oz container Dannon Light n Fit vanilla yogurt, topped with 1oz unsalted nuts         - apple, celery or baby carrots spread with 2 Tbsp PB2  - apple slices with 1 oz slice low-fat cheese  - Apple slices dipped in 2 Tbsp of PB2  - 2 Tbsp PB2 mixed in light or greek yogurt or sugar-free pudding  - celery, cucumber, bell pepper or baby carrots dipped in ¼ cup hummus bean spread   - celery, cucumber, baby carrots dipped in high protein greek yogurt (Mix 16 oz plain greek yogurt + 1 packet of hidden valley ranch dip mix)  - Teodoro Links Beef Steak - 14g protein! (similar to beef jerky but very lean)  - 2 wedges Laughing Cow - Light Herb & Garlic Cheese with sliced cucumber or green bell pepper  - 1/2 cup low-fat cottage cheese with ¼ cup fruit or ¼ cup salsa  - 1/2 cup low fat cottage cheese with 10-15 cherry tomatoes  - 8 oz glass of FAIRLIFE fat free milk (13 g  "protein)  - 8 oz glass of FAIRLIFE fat free milk + 1 packet of sugar-free hot cocoa  - Add Atkins advantage Cafe Caramel shake to decaf coffee. Serve hot or blend with ice for "frappaccino" like drink  - RTD Protein drinks: Atkins, Low Carb Slim Fast, EAS light, Muscle Milk Light, etc.  - Homemade Protein drinks: GNC Soy95, Isopure, Nectar, UNJURY, Whey Gourmet, etc. Mix 1 scoop powder with 8oz skim/1% milk or light soymilk.  - Protein bars: Atkins, EAS, Pure Protein,  Quest, Think Thin, Detour, etc. Must have 0-4 grams sugar - Read the label.    ** Be CREATIVE. You can always snack on bites of grilled chicken or tuna salad made with low fat garcia, if needed!       "

## 2020-02-28 NOTE — LETTER
February 28, 2020      [facility]  [address]  [facility] [address]  Phone: [phone]  Fax: [phone]       Patient: Ailyn Ortiz   YOB: 1965  Date of Visit: 02/28/2020    To Whom It May Concern:    Farida Ortiz  was at [facility] on 02/28/2020. She may return to work on 02/29/2020 with no restrictions. If you have any questions or concerns, or if I can be of further assistance, please do not hesitate to contact me.    Sincerely,    POLLO Duke

## 2020-03-03 ENCOUNTER — TELEPHONE (OUTPATIENT)
Dept: BARIATRICS | Facility: CLINIC | Age: 55
End: 2020-03-03

## 2020-03-03 ENCOUNTER — PATIENT MESSAGE (OUTPATIENT)
Dept: BARIATRICS | Facility: CLINIC | Age: 55
End: 2020-03-03

## 2020-03-03 ENCOUNTER — LAB VISIT (OUTPATIENT)
Dept: LAB | Facility: HOSPITAL | Age: 55
End: 2020-03-03
Payer: COMMERCIAL

## 2020-03-03 DIAGNOSIS — E87.6 HYPOKALEMIA: ICD-10-CM

## 2020-03-03 LAB
ANION GAP SERPL CALC-SCNC: 11 MMOL/L (ref 8–16)
BUN SERPL-MCNC: 13 MG/DL (ref 6–20)
CALCIUM SERPL-MCNC: 9.3 MG/DL (ref 8.7–10.5)
CHLORIDE SERPL-SCNC: 104 MMOL/L (ref 95–110)
CO2 SERPL-SCNC: 29 MMOL/L (ref 23–29)
CREAT SERPL-MCNC: 0.7 MG/DL (ref 0.5–1.4)
EST. GFR  (AFRICAN AMERICAN): >60 ML/MIN/1.73 M^2
EST. GFR  (NON AFRICAN AMERICAN): >60 ML/MIN/1.73 M^2
GLUCOSE SERPL-MCNC: 69 MG/DL (ref 70–110)
POTASSIUM SERPL-SCNC: 2.8 MMOL/L (ref 3.5–5.1)
SODIUM SERPL-SCNC: 144 MMOL/L (ref 136–145)

## 2020-03-03 PROCEDURE — 36415 COLL VENOUS BLD VENIPUNCTURE: CPT

## 2020-03-03 PROCEDURE — 80048 BASIC METABOLIC PNL TOTAL CA: CPT

## 2020-03-03 RX ORDER — POTASSIUM CHLORIDE 1.5 G/1.58G
20 POWDER, FOR SOLUTION ORAL 2 TIMES DAILY
Qty: 6 PACKET | Refills: 0 | Status: SHIPPED | OUTPATIENT
Start: 2020-03-03 | End: 2020-03-06

## 2020-03-03 NOTE — TELEPHONE ENCOUNTER
Telephone call. Potassium unchanged after reported completion of BID supplementation x 3 days. She reports occasional cramping to her hands, but otherwise denies any symptoms of low potassium. We will repeat supplementation with powder packets, she will start this evening, and I will route note to PCP. Discussed that I do not see any medication adjustments that can be made at this time to address level. She states she will call PCP for appointment tomorrow. Advised her to present to ED for any progression of symptoms of low K. Discussed plan with Dr. Mei.

## 2020-03-03 NOTE — TELEPHONE ENCOUNTER
Phoned Dr. Fountain's office. Patient has appointment scheduled for tomorrow at 3:30 pm. Routed lab results to PCP.

## 2020-03-04 LAB — VIT B1 BLD-MCNC: 80 UG/L (ref 38–122)

## 2020-03-09 DIAGNOSIS — Z86.39 HISTORY OF LOW POTASSIUM: Primary | ICD-10-CM

## 2020-03-09 RX ORDER — POTASSIUM CHLORIDE 20 MEQ/1
20 TABLET, EXTENDED RELEASE ORAL 2 TIMES DAILY
Qty: 6 TABLET | Refills: 0 | Status: SHIPPED | OUTPATIENT
Start: 2020-03-09 | End: 2020-03-12

## 2020-03-10 ENCOUNTER — TELEPHONE (OUTPATIENT)
Dept: BARIATRICS | Facility: CLINIC | Age: 55
End: 2020-03-10

## 2020-03-10 ENCOUNTER — TELEPHONE (OUTPATIENT)
Dept: PHARMACY | Facility: CLINIC | Age: 55
End: 2020-03-10

## 2020-03-10 NOTE — TELEPHONE ENCOUNTER
Phoned pharmacy and canceled script from Lemuel Mccarthy PA-C due to patient being treated by PCP.

## 2020-03-10 NOTE — TELEPHONE ENCOUNTER
----- Message from Lemuel Mccarthy PA-C sent at 3/9/2020 10:04 AM CDT -----  Low K, please make sure patients  prescription and takes as directed. Schedule f/u lab in 5 days.

## 2020-03-10 NOTE — TELEPHONE ENCOUNTER
Spoke with patient. Potassium is being treated and managed by PCP. Advised patient not to  script from [facility] as to not double up on potassium treatment. Patient verbalized understanding.

## 2020-03-18 ENCOUNTER — LAB VISIT (OUTPATIENT)
Dept: LAB | Facility: HOSPITAL | Age: 55
End: 2020-03-18
Attending: INTERNAL MEDICINE
Payer: COMMERCIAL

## 2020-03-18 DIAGNOSIS — M06.00 SERONEGATIVE RHEUMATOID ARTHRITIS: ICD-10-CM

## 2020-03-18 LAB
CRP SERPL-MCNC: 0.8 MG/L (ref 0–8.2)
ERYTHROCYTE [SEDIMENTATION RATE] IN BLOOD BY WESTERGREN METHOD: 16 MM/HR (ref 0–20)

## 2020-03-18 PROCEDURE — 36415 COLL VENOUS BLD VENIPUNCTURE: CPT

## 2020-03-18 PROCEDURE — 86140 C-REACTIVE PROTEIN: CPT

## 2020-03-18 PROCEDURE — 85651 RBC SED RATE NONAUTOMATED: CPT

## 2020-04-06 ENCOUNTER — TELEPHONE (OUTPATIENT)
Dept: PHARMACY | Facility: CLINIC | Age: 55
End: 2020-04-06

## 2020-04-06 ENCOUNTER — PATIENT MESSAGE (OUTPATIENT)
Dept: RHEUMATOLOGY | Facility: CLINIC | Age: 55
End: 2020-04-06

## 2020-04-06 NOTE — TELEPHONE ENCOUNTER
Patient confirmed shipping of Humira on  to arrive 4/10. Address and  verified. $0 copay in 004. Patient stated she has 0 doses on hand. Patient stated she injected today. Next dose due . Pt reported 0 missed doses. She reported no  new allergies or health conditions. Patient stated she has not started any new medications or had any dose changes. Pt declined need for new sharps today. Patient had no further questions or concerns.

## 2020-04-13 RX ORDER — ERGOCALCIFEROL 1.25 MG/1
CAPSULE ORAL
Qty: 12 CAPSULE | Refills: 0 | Status: SHIPPED | OUTPATIENT
Start: 2020-04-13 | End: 2021-03-24

## 2020-04-21 DIAGNOSIS — Z01.84 ANTIBODY RESPONSE EXAMINATION: ICD-10-CM

## 2020-04-30 ENCOUNTER — TELEPHONE (OUTPATIENT)
Dept: PHARMACY | Facility: CLINIC | Age: 55
End: 2020-04-30

## 2020-04-30 ENCOUNTER — PATIENT MESSAGE (OUTPATIENT)
Dept: PHARMACY | Facility: CLINIC | Age: 55
End: 2020-04-30

## 2020-05-04 DIAGNOSIS — M06.00 SERONEGATIVE RHEUMATOID ARTHRITIS: Primary | ICD-10-CM

## 2020-05-21 DIAGNOSIS — Z01.84 ANTIBODY RESPONSE EXAMINATION: ICD-10-CM

## 2020-05-22 ENCOUNTER — PATIENT MESSAGE (OUTPATIENT)
Dept: RHEUMATOLOGY | Facility: CLINIC | Age: 55
End: 2020-05-22

## 2020-05-25 NOTE — TELEPHONE ENCOUNTER
RX call attempt 1 regarding Humira CF refill from [facility]. Patient was not reached, left voicemail. $0 copay. RBA

## 2020-05-26 ENCOUNTER — PATIENT MESSAGE (OUTPATIENT)
Dept: RHEUMATOLOGY | Facility: CLINIC | Age: 55
End: 2020-05-26

## 2020-05-29 ENCOUNTER — PATIENT MESSAGE (OUTPATIENT)
Dept: RHEUMATOLOGY | Facility: CLINIC | Age: 55
End: 2020-05-29

## 2020-06-18 ENCOUNTER — TELEPHONE (OUTPATIENT)
Dept: PHARMACY | Facility: CLINIC | Age: 55
End: 2020-06-18

## 2020-06-20 DIAGNOSIS — Z01.84 ANTIBODY RESPONSE EXAMINATION: ICD-10-CM

## 2020-07-08 ENCOUNTER — OFFICE VISIT (OUTPATIENT)
Dept: RHEUMATOLOGY | Facility: CLINIC | Age: 55
End: 2020-07-08
Payer: COMMERCIAL

## 2020-07-08 DIAGNOSIS — Z01.84 ENCOUNTER FOR ANTIBODY RESPONSE EXAMINATION: ICD-10-CM

## 2020-07-08 DIAGNOSIS — M13.80 SERONEGATIVE ARTHRITIS: Primary | ICD-10-CM

## 2020-07-08 PROCEDURE — 99214 OFFICE O/P EST MOD 30 MIN: CPT | Mod: 95,,, | Performed by: INTERNAL MEDICINE

## 2020-07-08 PROCEDURE — 99214 PR OFFICE/OUTPT VISIT, EST, LEVL IV, 30-39 MIN: ICD-10-PCS | Mod: 95,,, | Performed by: INTERNAL MEDICINE

## 2020-07-08 NOTE — PROGRESS NOTES
Rapid3 Question Responses and Scores 7/7/2020   MDHAQ Score 0.2   Psychologic Score 0   Pain Score 2.5   When you awakened in the morning OVER THE LAST WEEK, did you feel stiff? Yes   If Yes, please indicate the number of hours until you are as limber as you will be for the day 1   Fatigue Score 2   Global Health Score 1   RAPID3 Score 1.38       Answers for HPI/ROS submitted by the patient on 7/7/2020   fever: No  eye redness: No  headaches: Yes  shortness of breath: No  chest pain: No  trouble swallowing: No  diarrhea: No  constipation: No  unexpected weight change: No  genital sore: No  dysuria: No  During the last 3 days, have you had a skin rash?: No  Bruises or bleeds easily: No  cough: No

## 2020-07-08 NOTE — PROGRESS NOTES
Chief Complaint   Patient presents with    Disease Management       Patient is here for a follow up    The patient location is: home  The chief complaint leading to consultation is: rheumatoid arthritis    Visit type: audiovisual    Face to Face time with patient: 30  minutes of total time spent on the encounter, which includes face to face time and non-face to face time preparing to see the patient (eg, review of tests), Obtaining and/or reviewing separately obtained history, Documenting clinical information in the electronic or other health record, Independently interpreting results (not separately reported) and communicating results to the patient/family/caregiver, or Care coordination (not separately reported).       Each patient to whom he or she provides medical services by telemedicine is:  (1) informed of the relationship between the physician and patient and the respective role of any other health care provider with respect to management of the patient; and (2) notified that he or she may decline to receive medical services by telemedicine and may withdraw from such care at any time.    Notes:       History of presenting illness    54 year old female with seronegative rheumatoid arthritis for a follow up    She is now on humira  Joints feel great    Right knee has bothered her  Pain with activity  Going up and down the stairs  Getting in and out of the car hurts   Both arms ache   Muscles of the thighs ache    In the past took mtx,had severe hair fall     Recent small bowel obstruction due to lipomas     She has a previous diagnosis of uveitis  Eyes are doing good now    3/2020    ESR,CRP nml    Right wrist MRI : mass like area of abnormal signal and enhancement within dorsal subcutaneous fat of the wrist : inflammatory process vs cellulitis vs neoplasm/desmoid tumor    Sent her to ortho : she had CTS right one operated    She had trigger thumbs and trigger fingers and she had few surgeries     She has  lost weight and she has done really better       TAQUERIA negative  RF,CCP negative  Hepatitis b and c negative  HLAB27 negative    CRP 96.9  ESR 92    SPEP no M spike    Uric acid 2.2    Vit d 8,went upto 26  TSH normal    Ck,aldolase normal    Xrays    Hands degenerative changes  Ankles degenerative changes and bone spur  Knees degenerative changes  Feet : degenerative changes     ID clearance done  Did hepatitis A and B vaccine  Doing the shingrix vaccine    54 year old white female presented with the following symptoms :    December around Christmas she had the upper respiratory tract symptoms with myalgia and nasal congestion  Following this she started to experience severe joint pain  All her joints in the hands,elbows,knees,feet started to hurt and swell  EMS for 1 hour  Alleve helps minimally    Hands : thumbs and index fingers and the 3rd digits hurt the worse  She cant make a fist  Thinks that the left 2nd finger swells like a sausage  Swelling along the tendons as per her PCP,she says he thought she has tenosynovitis     Hips and shoulders ache but no stiffness,good ROM in them    Cold weather makes her symptoms worse     Claims to have had labs : she had ESR,CRP elevation apparently     Past history: asthma,diabetes,JESSICA,thyroid disease,HTN  Occasional tendinitis feet b/l    Has a rash every winter,dermatology thinks it is eczema  This time started in November on the elbows,back,torso,legs,didnt get better,but derm suggested stronger steroids  Has not been told to have psoriasis     Recently had small bowel resection for lipoma    Family history : psoriasis and psoriatic arthritis runs in the family     Social history : quit smoking a while ago,drinks occasionally     Review of Systems   Constitutional: Negative for activity change, appetite change, chills, diaphoresis, fatigue, fever and unexpected weight change.   HENT: Negative for congestion, dental problem, drooling, ear discharge, ear pain, facial  swelling, hearing loss, mouth sores, nosebleeds, postnasal drip, rhinorrhea, sinus pressure, sinus pain, sneezing, sore throat, tinnitus, trouble swallowing and voice change.    Eyes: Negative for photophobia, pain, discharge, redness, itching and visual disturbance.   Respiratory: Negative for apnea, cough, choking, chest tightness, shortness of breath, wheezing and stridor.    Cardiovascular: Negative for chest pain, palpitations and leg swelling.   Gastrointestinal: Negative for abdominal distention, abdominal pain, anal bleeding, blood in stool, constipation, diarrhea, nausea, rectal pain and vomiting.   Endocrine: Negative for cold intolerance, heat intolerance, polydipsia, polyphagia and polyuria.   Genitourinary: Negative for decreased urine volume, difficulty urinating, dysuria, enuresis, flank pain, frequency, genital sores, hematuria and urgency.   Musculoskeletal: Negative for back pain, gait problem, myalgias, neck pain and neck stiffness.   Skin: Negative for color change, pallor, rash and wound.   Allergic/Immunologic: Negative for environmental allergies, food allergies and immunocompromised state.   Neurological: Negative for dizziness, tremors, seizures, syncope, facial asymmetry, speech difficulty, weakness, light-headedness, numbness and headaches.   Hematological: Negative for adenopathy. Does not bruise/bleed easily.   Psychiatric/Behavioral: Negative for agitation, behavioral problems, confusion, decreased concentration, dysphoric mood, hallucinations, self-injury, sleep disturbance and suicidal ideas. The patient is not nervous/anxious and is not hyperactive.        Physical Exam     MARTE-28 tender joint count: 0  MARTE-28 swollen joint count: 0       Physical Exam   Constitutional: She is oriented to person, place, and time and well-developed, well-nourished, and in no distress. No distress.   HENT:   Head: Normocephalic.   Mouth/Throat: Oropharynx is clear and moist.   Eyes: Conjunctivae are  normal. Pupils are equal, round, and reactive to light. Right eye exhibits no discharge. Left eye exhibits no discharge. No scleral icterus.   Neck: Normal range of motion. No thyromegaly present.   Cardiovascular: Normal rate, regular rhythm, normal heart sounds and intact distal pulses.    Pulmonary/Chest: Effort normal and breath sounds normal. No stridor.   Abdominal: Soft. Bowel sounds are normal.   Lymphadenopathy:     She has no cervical adenopathy.   Neurological: She is alert and oriented to person, place, and time.   Skin: Skin is warm. No rash noted. She is not diaphoretic.     Psychiatric: Affect and judgment normal.   Musculoskeletal: Tenderness present.         Assessment     54 year old white female presented to me with 1 month of polyarthralgias in the hands,elbows,knees and feet : s/p URT symptoms during [place] : is also s/p bowel resection for a lipoma : reported swelling in addition to pain and stiffness in her joints : significant EMS : symmetric involvement : in addition reported to having dactylitis   On exam : synovitis noted in many joints,no evidence of dactylitis.    She has inflammatory arthritis,seronegative,unclear yet if she has psoriatic arthritis since the rash might be eczema vs psoriasis  Reactive arthritis possible too    Labs revealed very high inflammatory markers with negative RA and TAQUERIA and HLAB27 panel  Xrays with degenerative arthritis     She did well on prednisone  I then introduced mtx 5 tabs weekly and tapered her prednisone   Then titrated mtx to 8 tabs weekly  She didn't  tolerate the mtx     We switched to humira and she has done well    She has a diagnosis of uveitis   But last eye exam normal    New small bowel obstruction  Due to lipomas   She has recurrent lipomas this is the second SBO due to lipomas      Some knee pain due to OA  Hand arthralgias due to OA    Labs pending     1. Seronegative arthritis    2. Encounter for antibody response examination           Plan    Continue humira    ID follow ups for the hepatitis and shingrix vaccines    Vit d maintanence    Tb,hepatitis panel     Ailyn was seen today for disease management.    Diagnoses and all orders for this visit:    Seronegative arthritis  -     CBC auto differential; Future  -     Comprehensive metabolic panel; Future  -     Sedimentation rate; Future  -     C-Reactive Protein; Future  -     Quantiferon Gold TB; Future  -     HEPATITIS PANEL, ACUTE; Future    Encounter for antibody response examination  -     COVID-19 (SARS CoV-2) IgG Antibody; Future        rtc in 6 months

## 2020-07-10 ENCOUNTER — TELEPHONE (OUTPATIENT)
Dept: BARIATRICS | Facility: CLINIC | Age: 55
End: 2020-07-10

## 2020-07-10 NOTE — TELEPHONE ENCOUNTER
Left vm for patient. Waiting on callback to reschedule patient for a virtual; or in person visit after the 27th of July.

## 2020-07-17 ENCOUNTER — TELEPHONE (OUTPATIENT)
Dept: BARIATRICS | Facility: CLINIC | Age: 55
End: 2020-07-17

## 2020-07-17 ENCOUNTER — OFFICE VISIT (OUTPATIENT)
Dept: BARIATRICS | Facility: CLINIC | Age: 55
End: 2020-07-17
Payer: COMMERCIAL

## 2020-07-17 VITALS — BODY MASS INDEX: 29.63 KG/M2 | WEIGHT: 161 LBS | HEIGHT: 62 IN

## 2020-07-17 DIAGNOSIS — Z98.84 S/P LAPAROSCOPIC SLEEVE GASTRECTOMY: ICD-10-CM

## 2020-07-17 DIAGNOSIS — R63.4 WEIGHT LOSS: Primary | ICD-10-CM

## 2020-07-17 PROCEDURE — 99213 PR OFFICE/OUTPT VISIT, EST, LEVL III, 20-29 MIN: ICD-10-PCS | Mod: 95,,, | Performed by: PHYSICIAN ASSISTANT

## 2020-07-17 PROCEDURE — 3008F PR BODY MASS INDEX (BMI) DOCUMENTED: ICD-10-PCS | Mod: CPTII,,, | Performed by: PHYSICIAN ASSISTANT

## 2020-07-17 PROCEDURE — 3008F BODY MASS INDEX DOCD: CPT | Mod: CPTII,,, | Performed by: PHYSICIAN ASSISTANT

## 2020-07-17 PROCEDURE — 99213 OFFICE O/P EST LOW 20 MIN: CPT | Mod: 95,,, | Performed by: PHYSICIAN ASSISTANT

## 2020-07-17 NOTE — PROGRESS NOTES
BARIATRIC FOLLOW UP:  The patient location is: in car  The chief complaint leading to consultation is: 6 month    Visit type: audiovisual    Face to Face time with patient: 15 minutes   of total time spent on the encounter, which includes face to face time and non-face to face time preparing to see the patient (eg, review of tests), Obtaining and/or reviewing separately obtained history, Documenting clinical information in the electronic or other health record, Independently interpreting results (not separately reported) and communicating results to the patient/family/caregiver, or Care coordination (not separately reported).         Each patient to whom he or she provides medical services by telemedicine is:  (1) informed of the relationship between the physician and patient and the respective role of any other health care provider with respect to management of the patient; and (2) notified that he or she may decline to receive medical services by telemedicine and may withdraw from such care at any time.    Notes:   No chief complaint on file.      HISTORY OF PRESENT ILLNESS: Ailyn Ortiz is a 54 y.o. female with a Body mass index is 29.45 kg/m². who presents for follow up s/p lap sleeve with Dr. Mei on 12/30/19.     Pt states that she is doing well, did have some issues with eating ( poor dietary habits) at the beginning of covid but they have since returned to normal. Would like to lose more weight. Currently surpassed goal weight of 173    DIET:  Bariatric Diet. 3 meals.  Egg, fish, protein water, ham, chicken  Rare starchy carbs  ~80 grams protein    ~50 oz H20 and Clear SF Liquids.      Review of Systems   Constitutional: Negative for chills, fever and malaise/fatigue.   Eyes: Negative for blurred vision and double vision.   Respiratory: Negative for cough, shortness of breath and wheezing.    Cardiovascular: Negative for chest pain and palpitations.   Gastrointestinal: Negative for abdominal  pain, blood in stool, constipation, diarrhea, heartburn, melena, nausea and vomiting.   Genitourinary: Negative for dysuria, frequency, hematuria and urgency.   Skin:        Per HPI   Neurological: Negative for dizziness, tingling and headaches.   Psychiatric/Behavioral: Negative for depression. The patient is not nervous/anxious.      There were no vitals filed for this visit.    Physical Exam  Vitals signs and nursing note reviewed.   Constitutional:       General: She is not in acute distress.     Appearance: She is well-developed. She is obese.   HENT:      Head: Normocephalic and atraumatic.   Eyes:      Extraocular Movements: Extraocular movements intact.      Conjunctiva/sclera: Conjunctivae normal.   Neck:      Musculoskeletal: Normal range of motion.   Pulmonary:      Effort: Pulmonary effort is normal. No respiratory distress.   Abdominal:      Comments: WHSS     Neurological:      General: No focal deficit present.      Mental Status: She is alert and oriented to person, place, and time.   Psychiatric:         Behavior: Behavior normal.         Thought Content: Thought content normal.         Judgment: Judgment normal.       ASSESSMENT:  - Good wound healing,. umbilical site resolved  - Morbid obesity, There is no height or weight on file to calculate BMI.,  s/p sleeve gastrectomy on 12/30/19.  - Estimated goal weight is 50% EWL      PLAN:  .  - Regular exercise and adherence to bariatric diet to achieve maximum weight loss.  - Full bariatric vitamin regimen. Ensure adequate B12.  -  Miralax daily for constipation, no fiber.  - No NSAIDs, Tylenol for pain.  - RTC per post op schedule.   - Call the office for any issues.  - Check labs as scheduled.    30 minute visit, over 50% of time spent counseling patient face to face on diet, exercise, and weight loss.

## 2020-07-17 NOTE — TELEPHONE ENCOUNTER
----- Message from Lemuel Mccarthy PA-C sent at 7/17/2020 11:14 AM CDT -----  Regarding: Lab  Labs placed please call to schedule.     BM     Happy Friday

## 2020-07-20 ENCOUNTER — LAB VISIT (OUTPATIENT)
Dept: LAB | Facility: HOSPITAL | Age: 55
End: 2020-07-20
Attending: INTERNAL MEDICINE
Payer: COMMERCIAL

## 2020-07-20 DIAGNOSIS — M13.80 SERONEGATIVE ARTHRITIS: ICD-10-CM

## 2020-07-20 PROCEDURE — 86480 TB TEST CELL IMMUN MEASURE: CPT

## 2020-07-21 ENCOUNTER — TELEPHONE (OUTPATIENT)
Dept: PHARMACY | Facility: CLINIC | Age: 55
End: 2020-07-21

## 2020-07-21 LAB
GAMMA INTERFERON BACKGROUND BLD IA-ACNC: 0.01 IU/ML
M TB IFN-G CD4+ BCKGRND COR BLD-ACNC: 0 IU/ML
MITOGEN IGNF BCKGRD COR BLD-ACNC: 7.06 IU/ML
TB GOLD PLUS: NEGATIVE
TB2 - NIL: 0 IU/ML

## 2020-08-14 ENCOUNTER — TELEPHONE (OUTPATIENT)
Dept: PHARMACY | Facility: CLINIC | Age: 55
End: 2020-08-14

## 2020-09-22 ENCOUNTER — TELEPHONE (OUTPATIENT)
Dept: PHARMACY | Facility: CLINIC | Age: 55
End: 2020-09-22

## 2020-10-06 ENCOUNTER — TELEPHONE (OUTPATIENT)
Dept: PHARMACY | Facility: CLINIC | Age: 55
End: 2020-10-06

## 2020-11-17 ENCOUNTER — TELEPHONE (OUTPATIENT)
Dept: PHARMACY | Facility: CLINIC | Age: 55
End: 2020-11-17

## 2020-11-18 ENCOUNTER — SPECIALTY PHARMACY (OUTPATIENT)
Dept: PHARMACY | Facility: CLINIC | Age: 55
End: 2020-11-18

## 2020-11-18 NOTE — TELEPHONE ENCOUNTER
Specialty Pharmacy - Refill Coordination    Specialty Medication Orders Linked to Encounter      Most Recent Value   Medication #1  adalimumab 40 mg/0.4 mL PnKt (Order#[id number], Rx#[id number])          Refill Questions - Documented Responses      Most Recent Value   Relationship to patient of person spoken to?  Self   HIPAA/medical authority confirmed?  Yes   Any changes in contact preferences or allowed representatives?  No   Has the patient had any insurance changes?  No   Has the patient had any changes to specialty medication, dose, or instructions?  No   Has the patient started taking any new medications, herbals, or supplements?  No   Has the patient been diagnosed with any new medical conditions?  No   Does the patient have any new allergies to medications or foods?  No   Does the patient have any concerns about side effects?  No   Can the patient call emergency services (911) in the event of an emergency?  Yes   Does the patient have any concerns or questions about taking or administering this medication as prescribed?  No   How many doses did the patient miss in the past 4 weeks or since the last fill?  0   How many doses does the patient have on hand?  0   How many days does the patient report on hand quantity will last?  0   Does the number of doses/days supply remaining match pharmacy expected amounts?  Yes   Does the patient feel that this medication is effective?  Yes   During the past 4 weeks, has patient missed any activities due to condition or medication?  No   During the past 4 weeks, did patient have any of the following urgent care visits?  None   How will the patient receive the medication?  Mail   When does the patient need to receive the medication?  11/21/20   Shipping Address  Home   Address in [facility] confirmed and updated if neccessary?  Yes   Expected Copay ($)  0   Is the patient able to afford the medication copay?  Yes   Payment Method  zero copay   Days supply of Refill   28   Would patient like to speak to a pharmacist?  No   Do you want to trigger an intervention?  No   Do you want to trigger an additional referral task?  No   Refill activity completed?  Yes   Refill activity plan  Refill scheduled   Shipment/Pickup Date:  11/19/20          Current Outpatient Medications   Medication Sig    adalimumab 40 mg/0.4 mL PnKt Inject 0.4 mLs (40 mg total) into the skin every 14 (fourteen) days.    atorvastatin (LIPITOR) 10 MG tablet Lipitor 10 mg tablet   Take 1 tablet every day by oral route.    b complex vitamins tablet Take 1 tablet by mouth once daily.    calcium/D3/mag ox//gato/Zn (CALTRATE + D3 PLUS MINERALS ORAL) Take 1 capsule by mouth 3 (three) times daily.    cefPROZIL (CEFZIL) 250 MG tablet     ergocalciferol (ERGOCALCIFEROL) 50,000 unit Cap TAKE 1 CAPSULE (50,000 UNITS TOTAL) BY MOUTH EVERY 7 DAYS.    levothyroxine (SYNTHROID) 100 MCG tablet Take 100 mcg by mouth before breakfast.     loratadine (CLARITIN) 10 mg tablet Take 10 mg by mouth daily as needed.     multivitamin capsule Take 1 capsule by mouth 2 (two) times daily.    multivitamin with minerals tablet Take 1 tablet by mouth 2 (two) times daily.     NON FORMULARY MEDICATION Take 25 mg by mouth once daily.    omeprazole (PRILOSEC) 40 MG capsule Take 40 mg by mouth every morning.     ondansetron (ZOFRAN-ODT) 8 MG TbDL ondansetron 8 mg disintegrating tablet   prn    potassium chloride 10% (KAYCIEL) 20 mEq/15 mL oral solution     potassium chloride SA (K-DUR,KLOR-CON) 10 MEQ tablet     promethazine (PHENERGAN) 12.5 MG Tab promethazine 12.5 mg tablet   prn    triamcinolone acetonide 0.1% (KENALOG) 0.1 % cream AAA BL lower legs bid/ prn for flares   Last reviewed on 11/18/2020 12:10 PM by Martha Lopez    Review of patient's allergies indicates:   Allergen Reactions    Morphine Hives and Itching    Avelox [moxifloxacin] Rash    Last reviewed on  7/17/2020 11:15 AM by Lemuel Mccarthy      Tasks added this  encounter   12/11/2020 - Refill Call (Auto Added)   Tasks due within next 3 months   No tasks due.     [facility]  [address] [facility] [address]  Phone: [phone]  Fax: [phone]

## 2020-12-12 ENCOUNTER — PATIENT MESSAGE (OUTPATIENT)
Dept: PHARMACY | Facility: CLINIC | Age: 55
End: 2020-12-12

## 2020-12-19 DIAGNOSIS — M06.00 SERONEGATIVE RHEUMATOID ARTHRITIS: Primary | ICD-10-CM

## 2020-12-21 ENCOUNTER — SPECIALTY PHARMACY (OUTPATIENT)
Dept: PHARMACY | Facility: CLINIC | Age: 55
End: 2020-12-21

## 2020-12-21 ENCOUNTER — PATIENT MESSAGE (OUTPATIENT)
Dept: PHARMACY | Facility: CLINIC | Age: 55
End: 2020-12-21

## 2020-12-21 NOTE — TELEPHONE ENCOUNTER
Specialty Pharmacy - Refill Coordination    Specialty Medication Orders Linked to Encounter      Most Recent Value   Medication #1  adalimumab 40 mg/0.4 mL PnKt (Order#[id number], Rx#[id number])          Refill Questions - Documented Responses      Most Recent Value   Relationship to patient of person spoken to?  Self   HIPAA/medical authority confirmed?  Yes   Any changes in contact preferences or allowed representatives?  No   Has the patient had any insurance changes?  No   Has the patient had any changes to specialty medication, dose, or instructions?  No   Has the patient started taking any new medications, herbals, or supplements?  No   Has the patient been diagnosed with any new medical conditions?  No   Does the patient have any new allergies to medications or foods?  No   Does the patient have any concerns about side effects?  No   Can the patient store medication/sharps container properly (at the correct temperature, away from children/pets, etc.)?  Yes   Can the patient call emergency services (911) in the event of an emergency?  Yes   Does the patient have any concerns or questions about taking or administering this medication as prescribed?  No   How many doses did the patient miss in the past 4 weeks or since the last fill?  0   How many doses does the patient have on hand?  0   How many days does the patient report on hand quantity will last?  0   Does the number of doses/days supply remaining match pharmacy expected amounts?  Yes   Does the patient feel that this medication is effective?  Yes   During the past 4 weeks, has patient missed any activities due to condition or medication?  No   During the past 4 weeks, did patient have any of the following urgent care visits?  None   How will the patient receive the medication?  Mail   When does the patient need to receive the medication?  12/24/20   Shipping Address  Home   Address in [facility] confirmed and updated if neccessary?  Yes   Expected  Copay ($)  0   Is the patient able to afford the medication copay?  Yes   Payment Method  zero copay   Days supply of Refill  28   Would patient like to speak to a pharmacist?  Yes   Do you want to trigger an intervention?  Yes   Do you want to trigger an additional referral task?  No   Refill activity completed?  Yes   Refill activity plan  Refill scheduled   Shipment/Pickup Date:  12/21/20          Current Outpatient Medications   Medication Sig    adalimumab 40 mg/0.4 mL PnKt Inject 0.4 mLs (40 mg total) into the skin every 14 (fourteen) days.    atorvastatin (LIPITOR) 10 MG tablet Lipitor 10 mg tablet   Take 1 tablet every day by oral route.    b complex vitamins tablet Take 1 tablet by mouth once daily.    calcium/D3/mag ox//gato/Zn (CALTRATE + D3 PLUS MINERALS ORAL) Take 1 capsule by mouth 3 (three) times daily.    cefPROZIL (CEFZIL) 250 MG tablet     ergocalciferol (ERGOCALCIFEROL) 50,000 unit Cap TAKE 1 CAPSULE (50,000 UNITS TOTAL) BY MOUTH EVERY 7 DAYS.    levothyroxine (SYNTHROID) 100 MCG tablet Take 100 mcg by mouth before breakfast.     loratadine (CLARITIN) 10 mg tablet Take 10 mg by mouth daily as needed.     multivitamin capsule Take 1 capsule by mouth 2 (two) times daily.    multivitamin with minerals tablet Take 1 tablet by mouth 2 (two) times daily.     NON FORMULARY MEDICATION Take 25 mg by mouth once daily.    omeprazole (PRILOSEC) 40 MG capsule Take 40 mg by mouth every morning.     ondansetron (ZOFRAN-ODT) 8 MG TbDL ondansetron 8 mg disintegrating tablet   prn    potassium chloride 10% (KAYCIEL) 20 mEq/15 mL oral solution     potassium chloride SA (K-DUR,KLOR-CON) 10 MEQ tablet     promethazine (PHENERGAN) 12.5 MG Tab promethazine 12.5 mg tablet   prn    triamcinolone acetonide 0.1% (KENALOG) 0.1 % cream AAA BL lower legs bid/ prn for flares   Last reviewed on 11/18/2020 12:10 PM by Martha Lopez    Review of patient's allergies indicates:   Allergen Reactions    Morphine  Hives and Itching    Avelox [moxifloxacin] Rash    Last reviewed on  12/21/2020 3:12 PM by Martha Lopez      Tasks added this encounter   1/13/2021 - Refill Call (Auto Added)   Tasks due within next 3 months   1/22/2021 - Clinical - Follow Up Assesement (90 day)     Martha Lopez  [facility]  [address] [place] [id number]  Phone: [phone]  Fax: [phone]

## 2021-01-13 ENCOUNTER — OFFICE VISIT (OUTPATIENT)
Dept: RHEUMATOLOGY | Facility: CLINIC | Age: 56
End: 2021-01-13
Payer: COMMERCIAL

## 2021-01-13 DIAGNOSIS — M05.79 RHEUMATOID ARTHRITIS INVOLVING MULTIPLE SITES WITH POSITIVE RHEUMATOID FACTOR: Primary | ICD-10-CM

## 2021-01-13 PROCEDURE — 1125F AMNT PAIN NOTED PAIN PRSNT: CPT | Mod: ,,, | Performed by: INTERNAL MEDICINE

## 2021-01-13 PROCEDURE — 1125F PR PAIN SEVERITY QUANTIFIED, PAIN PRESENT: ICD-10-PCS | Mod: ,,, | Performed by: INTERNAL MEDICINE

## 2021-01-13 PROCEDURE — 99214 PR OFFICE/OUTPT VISIT, EST, LEVL IV, 30-39 MIN: ICD-10-PCS | Mod: 95,,, | Performed by: INTERNAL MEDICINE

## 2021-01-13 PROCEDURE — 99214 OFFICE O/P EST MOD 30 MIN: CPT | Mod: 95,,, | Performed by: INTERNAL MEDICINE

## 2021-01-14 ENCOUNTER — PATIENT MESSAGE (OUTPATIENT)
Dept: RHEUMATOLOGY | Facility: CLINIC | Age: 56
End: 2021-01-14

## 2021-01-16 ENCOUNTER — SPECIALTY PHARMACY (OUTPATIENT)
Dept: PHARMACY | Facility: CLINIC | Age: 56
End: 2021-01-16

## 2021-01-17 ENCOUNTER — PATIENT MESSAGE (OUTPATIENT)
Dept: RHEUMATOLOGY | Facility: CLINIC | Age: 56
End: 2021-01-17

## 2021-02-22 ENCOUNTER — PATIENT MESSAGE (OUTPATIENT)
Dept: PHARMACY | Facility: CLINIC | Age: 56
End: 2021-02-22

## 2021-02-23 ENCOUNTER — PATIENT MESSAGE (OUTPATIENT)
Dept: RHEUMATOLOGY | Facility: CLINIC | Age: 56
End: 2021-02-23

## 2021-03-05 ENCOUNTER — SPECIALTY PHARMACY (OUTPATIENT)
Dept: PHARMACY | Facility: CLINIC | Age: 56
End: 2021-03-05

## 2021-03-29 ENCOUNTER — PATIENT MESSAGE (OUTPATIENT)
Dept: PHARMACY | Facility: CLINIC | Age: 56
End: 2021-03-29

## 2021-04-15 ENCOUNTER — SPECIALTY PHARMACY (OUTPATIENT)
Dept: PHARMACY | Facility: CLINIC | Age: 56
End: 2021-04-15

## 2021-04-30 ENCOUNTER — PATIENT MESSAGE (OUTPATIENT)
Dept: OBSTETRICS AND GYNECOLOGY | Facility: CLINIC | Age: 56
End: 2021-04-30

## 2021-05-03 ENCOUNTER — OFFICE VISIT (OUTPATIENT)
Dept: OBSTETRICS AND GYNECOLOGY | Facility: CLINIC | Age: 56
End: 2021-05-03
Payer: COMMERCIAL

## 2021-05-03 DIAGNOSIS — N89.8 VAGINAL LESION: Primary | ICD-10-CM

## 2021-05-03 LAB
BILIRUB SERPL-MCNC: NORMAL MG/DL
BLOOD URINE, POC: NORMAL
CANDIDA RRNA VAG QL PROBE: NEGATIVE
CLARITY, POC UA: NORMAL
COLOR, POC UA: YELLOW
G VAGINALIS RRNA GENITAL QL PROBE: NEGATIVE
GLUCOSE UR QL STRIP: NORMAL
KETONES UR QL STRIP: NORMAL
LEUKOCYTE ESTERASE URINE, POC: NORMAL
NITRITE, POC UA: NORMAL
PH, POC UA: 7
PROTEIN, POC: NORMAL
SPECIFIC GRAVITY, POC UA: NORMAL
T VAGINALIS RRNA GENITAL QL PROBE: NEGATIVE
UROBILINOGEN, POC UA: NORMAL

## 2021-05-03 PROCEDURE — 87510 GARDNER VAG DNA DIR PROBE: CPT | Performed by: ADVANCED PRACTICE MIDWIFE

## 2021-05-03 PROCEDURE — 99212 PR OFFICE/OUTPT VISIT, EST, LEVL II, 10-19 MIN: ICD-10-PCS | Mod: 25,S$GLB,, | Performed by: ADVANCED PRACTICE MIDWIFE

## 2021-05-03 PROCEDURE — 99999 PR PBB SHADOW E&M-EST. PATIENT-LVL III: ICD-10-PCS | Mod: PBBFAC,,, | Performed by: ADVANCED PRACTICE MIDWIFE

## 2021-05-03 PROCEDURE — 87660 TRICHOMONAS VAGIN DIR PROBE: CPT | Performed by: ADVANCED PRACTICE MIDWIFE

## 2021-05-03 PROCEDURE — 99999 PR PBB SHADOW E&M-EST. PATIENT-LVL III: CPT | Mod: PBBFAC,,, | Performed by: ADVANCED PRACTICE MIDWIFE

## 2021-05-03 PROCEDURE — 81002 POCT URINE DIPSTICK WITHOUT MICROSCOPE: ICD-10-PCS | Mod: S$GLB,,, | Performed by: ADVANCED PRACTICE MIDWIFE

## 2021-05-03 PROCEDURE — 81002 URINALYSIS NONAUTO W/O SCOPE: CPT | Mod: S$GLB,,, | Performed by: ADVANCED PRACTICE MIDWIFE

## 2021-05-03 PROCEDURE — 99212 OFFICE O/P EST SF 10 MIN: CPT | Mod: 25,S$GLB,, | Performed by: ADVANCED PRACTICE MIDWIFE

## 2021-05-04 ENCOUNTER — PATIENT MESSAGE (OUTPATIENT)
Dept: OBSTETRICS AND GYNECOLOGY | Facility: CLINIC | Age: 56
End: 2021-05-04

## 2021-05-08 ENCOUNTER — SPECIALTY PHARMACY (OUTPATIENT)
Dept: PHARMACY | Facility: CLINIC | Age: 56
End: 2021-05-08

## 2021-05-22 ENCOUNTER — PATIENT MESSAGE (OUTPATIENT)
Dept: OBSTETRICS AND GYNECOLOGY | Facility: CLINIC | Age: 56
End: 2021-05-22

## 2021-05-22 DIAGNOSIS — Z12.31 BREAST CANCER SCREENING BY MAMMOGRAM: Primary | ICD-10-CM

## 2021-06-03 ENCOUNTER — HOSPITAL ENCOUNTER (OUTPATIENT)
Dept: RADIOLOGY | Facility: OTHER | Age: 56
Discharge: HOME | End: 2021-06-03
Attending: OBSTETRICS & GYNECOLOGY
Payer: COMMERCIAL

## 2021-06-03 ENCOUNTER — OFFICE VISIT (OUTPATIENT)
Dept: OBSTETRICS AND GYNECOLOGY | Facility: CLINIC | Age: 56
End: 2021-06-03
Payer: COMMERCIAL

## 2021-06-03 VITALS
DIASTOLIC BLOOD PRESSURE: 80 MMHG | HEIGHT: 62 IN | SYSTOLIC BLOOD PRESSURE: 134 MMHG | BODY MASS INDEX: 32.46 KG/M2 | WEIGHT: 176.38 LBS

## 2021-06-03 DIAGNOSIS — Z12.4 CERVICAL CANCER SCREENING: Primary | ICD-10-CM

## 2021-06-03 DIAGNOSIS — Z12.31 BREAST CANCER SCREENING BY MAMMOGRAM: ICD-10-CM

## 2021-06-03 PROCEDURE — 77063 MAMMO DIGITAL SCREENING BILAT WITH TOMO: ICD-10-PCS | Mod: 26,,, | Performed by: RADIOLOGY

## 2021-06-03 PROCEDURE — 87624 HPV HI-RISK TYP POOLED RSLT: CPT | Performed by: OBSTETRICS & GYNECOLOGY

## 2021-06-03 PROCEDURE — 88175 CYTOPATH C/V AUTO FLUID REDO: CPT | Performed by: OBSTETRICS & GYNECOLOGY

## 2021-06-03 PROCEDURE — 3008F BODY MASS INDEX DOCD: CPT | Mod: CPTII,S$GLB,, | Performed by: OBSTETRICS & GYNECOLOGY

## 2021-06-03 PROCEDURE — 77067 SCR MAMMO BI INCL CAD: CPT | Mod: TC

## 2021-06-03 PROCEDURE — 99999 PR PBB SHADOW E&M-EST. PATIENT-LVL III: CPT | Mod: PBBFAC,,, | Performed by: OBSTETRICS & GYNECOLOGY

## 2021-06-03 PROCEDURE — 99396 PR PREVENTIVE VISIT,EST,40-64: ICD-10-PCS | Mod: 25,S$GLB,, | Performed by: OBSTETRICS & GYNECOLOGY

## 2021-06-03 PROCEDURE — 3008F PR BODY MASS INDEX (BMI) DOCUMENTED: ICD-10-PCS | Mod: CPTII,S$GLB,, | Performed by: OBSTETRICS & GYNECOLOGY

## 2021-06-03 PROCEDURE — 77067 SCR MAMMO BI INCL CAD: CPT | Mod: 26,,, | Performed by: RADIOLOGY

## 2021-06-03 PROCEDURE — 1126F AMNT PAIN NOTED NONE PRSNT: CPT | Mod: S$GLB,,, | Performed by: OBSTETRICS & GYNECOLOGY

## 2021-06-03 PROCEDURE — 77067 MAMMO DIGITAL SCREENING BILAT WITH TOMO: ICD-10-PCS | Mod: 26,,, | Performed by: RADIOLOGY

## 2021-06-03 PROCEDURE — 99396 PREV VISIT EST AGE 40-64: CPT | Mod: 25,S$GLB,, | Performed by: OBSTETRICS & GYNECOLOGY

## 2021-06-03 PROCEDURE — 77063 BREAST TOMOSYNTHESIS BI: CPT | Mod: 26,,, | Performed by: RADIOLOGY

## 2021-06-03 PROCEDURE — 99999 PR PBB SHADOW E&M-EST. PATIENT-LVL III: ICD-10-PCS | Mod: PBBFAC,,, | Performed by: OBSTETRICS & GYNECOLOGY

## 2021-06-03 PROCEDURE — 1126F PR PAIN SEVERITY QUANTIFIED, NO PAIN PRESENT: ICD-10-PCS | Mod: S$GLB,,, | Performed by: OBSTETRICS & GYNECOLOGY

## 2021-06-03 RX ORDER — ADALIMUMAB 40MG/0.4ML
KIT SUBCUTANEOUS
COMMUNITY
End: 2022-08-16 | Stop reason: SDUPTHER

## 2021-06-03 RX ORDER — LEVOTHYROXINE SODIUM 88 UG/1
TABLET ORAL
COMMUNITY

## 2021-06-03 RX ORDER — POTASSIUM CHLORIDE 750 MG/1
TABLET, EXTENDED RELEASE ORAL
COMMUNITY

## 2021-06-03 RX ORDER — OMEPRAZOLE 20 MG/1
CAPSULE, DELAYED RELEASE ORAL
COMMUNITY
End: 2023-01-09

## 2021-06-03 RX ORDER — PREDNISONE 5 MG/1
TABLET ORAL
COMMUNITY
End: 2023-05-10

## 2021-06-07 ENCOUNTER — SPECIALTY PHARMACY (OUTPATIENT)
Dept: PHARMACY | Facility: CLINIC | Age: 56
End: 2021-06-07

## 2021-06-09 LAB
FINAL PATHOLOGIC DIAGNOSIS: NORMAL
Lab: NORMAL

## 2021-06-10 LAB
HPV HR 12 DNA SPEC QL NAA+PROBE: NEGATIVE
HPV16 AG SPEC QL: NEGATIVE
HPV18 DNA SPEC QL NAA+PROBE: NEGATIVE

## 2021-07-16 ENCOUNTER — PATIENT MESSAGE (OUTPATIENT)
Dept: BARIATRICS | Facility: CLINIC | Age: 56
End: 2021-07-16

## 2021-07-16 ENCOUNTER — PATIENT MESSAGE (OUTPATIENT)
Dept: PHARMACY | Facility: CLINIC | Age: 56
End: 2021-07-16

## 2021-07-19 ENCOUNTER — SPECIALTY PHARMACY (OUTPATIENT)
Dept: PHARMACY | Facility: CLINIC | Age: 56
End: 2021-07-19

## 2021-07-26 ENCOUNTER — PATIENT MESSAGE (OUTPATIENT)
Dept: BARIATRICS | Facility: CLINIC | Age: 56
End: 2021-07-26

## 2021-08-11 ENCOUNTER — SPECIALTY PHARMACY (OUTPATIENT)
Dept: PHARMACY | Facility: CLINIC | Age: 56
End: 2021-08-11

## 2021-09-13 ENCOUNTER — SPECIALTY PHARMACY (OUTPATIENT)
Dept: PHARMACY | Facility: CLINIC | Age: 56
End: 2021-09-13

## 2021-10-04 ENCOUNTER — SPECIALTY PHARMACY (OUTPATIENT)
Dept: PHARMACY | Facility: CLINIC | Age: 56
End: 2021-10-04

## 2021-11-05 ENCOUNTER — SPECIALTY PHARMACY (OUTPATIENT)
Dept: PHARMACY | Facility: CLINIC | Age: 56
End: 2021-11-05
Payer: COMMERCIAL

## 2021-12-04 ENCOUNTER — PATIENT MESSAGE (OUTPATIENT)
Dept: PHARMACY | Facility: CLINIC | Age: 56
End: 2021-12-04
Payer: COMMERCIAL

## 2021-12-14 ENCOUNTER — SPECIALTY PHARMACY (OUTPATIENT)
Dept: PHARMACY | Facility: CLINIC | Age: 56
End: 2021-12-14
Payer: COMMERCIAL

## 2022-01-06 DIAGNOSIS — M06.00 SERONEGATIVE RHEUMATOID ARTHRITIS: ICD-10-CM

## 2022-01-09 RX ORDER — ADALIMUMAB 40MG/0.4ML
40 KIT SUBCUTANEOUS
Qty: 2 PEN | Refills: 11 | OUTPATIENT
Start: 2022-01-09 | End: 2023-01-09

## 2022-01-13 ENCOUNTER — OFFICE VISIT (OUTPATIENT)
Dept: RHEUMATOLOGY | Facility: CLINIC | Age: 57
End: 2022-01-13
Payer: COMMERCIAL

## 2022-01-13 DIAGNOSIS — M06.00 SERONEGATIVE RHEUMATOID ARTHRITIS: Primary | ICD-10-CM

## 2022-01-13 PROCEDURE — 99214 OFFICE O/P EST MOD 30 MIN: CPT | Mod: 95,,, | Performed by: INTERNAL MEDICINE

## 2022-01-13 PROCEDURE — 99214 PR OFFICE/OUTPT VISIT, EST, LEVL IV, 30-39 MIN: ICD-10-PCS | Mod: 95,,, | Performed by: INTERNAL MEDICINE

## 2022-01-13 RX ORDER — METHYLPREDNISOLONE 4 MG/1
TABLET ORAL
Qty: 1 EACH | Refills: 0 | Status: SHIPPED | OUTPATIENT
Start: 2022-01-13 | End: 2023-01-09

## 2022-01-13 NOTE — PROGRESS NOTES
Chief Complaint   Patient presents with    Disease Management     The patient location is: home  The chief complaint leading to consultation is: seronegative RA    Visit type: audiovisual    Face to Face time with patient: 30  minutes of total time spent on the encounter, which includes face to face time and non-face to face time preparing to see the patient (eg, review of tests), Obtaining and/or reviewing separately obtained history, Documenting clinical information in the electronic or other health record, Independently interpreting results (not separately reported) and communicating results to the patient/family/caregiver, or Care coordination (not separately reported).         Each patient to whom he or she provides medical services by telemedicine is:  (1) informed of the relationship between the physician and patient and the respective role of any other health care provider with respect to management of the patient; and (2) notified that he or she may decline to receive medical services by telemedicine and may withdraw from such care at any time.    Notes:       Patient is here for a follow up    The patient location is: home  The chief complaint leading to consultation is: rheumatoid arthritis    Visit type: audiovisual    Face to Face time with patient: 30  minutes of total time spent on the encounter, which includes face to face time and non-face to face time preparing to see the patient (eg, review of tests), Obtaining and/or reviewing separately obtained history, Documenting clinical information in the electronic or other health record, Independently interpreting results (not separately reported) and communicating results to the patient/family/caregiver, or Care coordination (not separately reported).       Each patient to whom he or she provides medical services by telemedicine is:  (1) informed of the relationship between the physician and patient and the respective role of any other health care  provider with respect to management of the patient; and (2) notified that he or she may decline to receive medical services by telemedicine and may withdraw from such care at any time.    Notes:       History of presenting illness    56 year old female with seronegative rheumatoid arthritis for a follow up    She is now on humira  Joints feel great    Knees are ok now  Hips hurt  Feet hurt since she has started to work 12 hour shifts    In the past took mtx,had severe hair fall     Recent small bowel obstruction due to lipomas     She has a previous diagnosis of uveitis  Eyes are doing good now    3/2020    ESR,CRP nml    Right wrist MRI : mass like area of abnormal signal and enhancement within dorsal subcutaneous fat of the wrist : inflammatory process vs cellulitis vs neoplasm/desmoid tumor    Sent her to ortho : she had CTS right one operated    She had trigger thumbs and trigger fingers and she had few surgeries     She has lost weight and she has done really better       TAQUERIA negative  RF,CCP negative  Hepatitis b and c negative  HLAB27 negative    CRP 96.9  ESR 92    SPEP no M spike    Uric acid 2.2    Vit d 8,went upto 26  TSH normal    Ck,aldolase normal    Xrays    Hands degenerative changes  Ankles degenerative changes and bone spur  Knees degenerative changes  Feet : degenerative changes     ID clearance done  Did hepatitis A and B vaccine  Doing the shingrix vaccine    1/2022    Some upper resp tract infection  Coughing and congested  Nasal tone to her voice    She took humira 2 weeks ago  She is due today    54 year old white female presented with the following symptoms :    December around Christmas she had the upper respiratory tract symptoms with myalgia and nasal congestion  Following this she started to experience severe joint pain  All her joints in the hands,elbows,knees,feet started to hurt and swell  EMS for 1 hour  Alleve helps minimally    Hands : thumbs and index fingers and the 3rd digits  hurt the worse  She cant make a fist  Thinks that the left 2nd finger swells like a sausage  Swelling along the tendons as per her PCP,she says he thought she has tenosynovitis     Hips and shoulders ache but no stiffness,good ROM in them    Cold weather makes her symptoms worse     Claims to have had labs : she had ESR,CRP elevation apparently     Past history: asthma,diabetes,JESSICA,thyroid disease,HTN  Occasional tendinitis feet b/l    Has a rash every winter,dermatology thinks it is eczema  This time started in November on the elbows,back,torso,legs,didnt get better,but derm suggested stronger steroids  Has not been told to have psoriasis     Recently had small bowel resection for lipoma    Family history : psoriasis and psoriatic arthritis runs in the family     Social history : quit smoking a while ago,drinks occasionally     Review of Systems   Constitutional: Negative for activity change, appetite change, chills, diaphoresis, fatigue, fever and unexpected weight change.   HENT: Negative for congestion, dental problem, drooling, ear discharge, ear pain, facial swelling, hearing loss, mouth sores, nosebleeds, postnasal drip, rhinorrhea, sinus pressure, sinus pain, sneezing, sore throat, tinnitus, trouble swallowing and voice change.    Eyes: Negative for photophobia, pain, discharge, redness, itching and visual disturbance.   Respiratory: Negative for apnea, cough, choking, chest tightness, shortness of breath, wheezing and stridor.    Cardiovascular: Negative for chest pain, palpitations and leg swelling.   Gastrointestinal: Negative for abdominal distention, abdominal pain, anal bleeding, blood in stool, constipation, diarrhea, nausea, rectal pain and vomiting.   Endocrine: Negative for cold intolerance, heat intolerance, polydipsia, polyphagia and polyuria.   Genitourinary: Negative for decreased urine volume, difficulty urinating, dysuria, enuresis, flank pain, frequency, genital sores, hematuria and urgency.    Musculoskeletal: Negative for back pain, gait problem, myalgias, neck pain and neck stiffness.   Skin: Negative for color change, pallor, rash and wound.   Allergic/Immunologic: Negative for environmental allergies, food allergies and immunocompromised state.   Neurological: Negative for dizziness, tremors, seizures, syncope, facial asymmetry, speech difficulty, weakness, light-headedness, numbness and headaches.   Hematological: Negative for adenopathy. Does not bruise/bleed easily.   Psychiatric/Behavioral: Negative for agitation, behavioral problems, confusion, decreased concentration, dysphoric mood, hallucinations, self-injury, sleep disturbance and suicidal ideas. The patient is not nervous/anxious and is not hyperactive.        Physical Exam     Physical Exam   Constitutional: She is oriented to person, place, and time. No distress.   HENT:   Head: Normocephalic.   Mouth/Throat: Oropharynx is clear and moist.   Eyes: Pupils are equal, round, and reactive to light. Conjunctivae are normal. Right eye exhibits no discharge. Left eye exhibits no discharge. No scleral icterus.   Neck: No thyromegaly present.   Cardiovascular: Normal rate, regular rhythm and normal heart sounds.   Pulmonary/Chest: Effort normal and breath sounds normal. No stridor.   Abdominal: Soft. Bowel sounds are normal.   Musculoskeletal:         General: Tenderness present.      Cervical back: Normal range of motion.   Lymphadenopathy:     She has no cervical adenopathy.   Neurological: She is alert and oriented to person, place, and time.   Skin: Skin is warm. No rash noted. She is not diaphoretic.   Psychiatric: Affect and judgment normal.       Assessment     56 year old white female presented to me with 1 month of polyarthralgias in the hands,elbows,knees and feet : s/p URT symptoms during [facility] : is also s/p bowel resection for a lipoma : reported swelling in addition to pain and stiffness in her joints : significant EMS :  symmetric involvement : in addition reported to having dactylitis   On exam : synovitis noted in many joints,no evidence of dactylitis.    She has inflammatory arthritis,seronegative,unclear yet if she has psoriatic arthritis since the rash might be eczema vs psoriasis  Reactive arthritis possible too    Labs revealed very high inflammatory markers with negative RA and TAQUERIA and HLAB27 panel  Xrays with degenerative arthritis     She did well on prednisone  I then introduced mtx 5 tabs weekly and tapered her prednisone   Then titrated mtx to 8 tabs weekly  She didn't  tolerate the mtx     We switched to humira and she has done well    She has a diagnosis of uveitis   But last eye exam normal    New small bowel obstruction  Due to lipomas   She has recurrent lipomas this is the second SBO due to lipomas      Some knee pain due to OA  Hand arthralgias due to OA  Hip pain   Feet pain    Upper resp tract infection-recovering      1. Seronegative rheumatoid arthritis          Plan    Continue humira  May be hold off for some time -recover from the infection    utd with all vaccines   covid booster pending  Flu utd    Vit d maintanence    Tb,hepatitis panel     CBC,CMP,ESR,CRP    Ailyn was seen today for disease management.    Diagnoses and all orders for this visit:    Seronegative rheumatoid arthritis  -     CBC Auto Differential; Future  -     Comprehensive Metabolic Panel; Future  -     Sedimentation rate; Future  -     C-Reactive Protein; Future  -     Hepatitis B Surface Ab, Qualitative; Future  -     Hepatitis B Core Antibody, Total; Future  -     Hepatitis B Surface Antigen; Future  -     Quantiferon Gold TB; Future    Other orders  -     methylPREDNISolone (MEDROL DOSEPACK) 4 mg tablet; use as directed        rtc in 6 months

## 2022-01-21 DIAGNOSIS — M06.00 SERONEGATIVE RHEUMATOID ARTHRITIS: ICD-10-CM

## 2022-01-22 RX ORDER — ADALIMUMAB 40MG/0.4ML
40 KIT SUBCUTANEOUS
Qty: 2 PEN | Refills: 11 | Status: SHIPPED | OUTPATIENT
Start: 2022-01-22 | End: 2023-02-14

## 2022-01-29 ENCOUNTER — PATIENT MESSAGE (OUTPATIENT)
Dept: PRIMARY CARE CLINIC | Facility: CLINIC | Age: 57
End: 2022-01-29
Payer: COMMERCIAL

## 2022-01-29 ENCOUNTER — OCCUPATIONAL HEALTH (OUTPATIENT)
Dept: URGENT CARE | Facility: CLINIC | Age: 57
End: 2022-01-29
Payer: COMMERCIAL

## 2022-01-29 DIAGNOSIS — R05.9 COUGH: Primary | ICD-10-CM

## 2022-01-29 DIAGNOSIS — U07.1 COVID-19 VIRUS DETECTED: ICD-10-CM

## 2022-01-29 DIAGNOSIS — R05.9 COUGH: ICD-10-CM

## 2022-01-29 LAB
CTP QC/QA: YES
SARS-COV-2 RDRP RESP QL NAA+PROBE: POSITIVE

## 2022-01-29 PROCEDURE — U0002 COVID-19 LAB TEST NON-CDC: HCPCS | Mod: QW,S$GLB,, | Performed by: PREVENTIVE MEDICINE

## 2022-01-29 PROCEDURE — U0002: ICD-10-PCS | Mod: QW,S$GLB,, | Performed by: PREVENTIVE MEDICINE

## 2022-01-31 ENCOUNTER — SPECIALTY PHARMACY (OUTPATIENT)
Dept: PHARMACY | Facility: CLINIC | Age: 57
End: 2022-01-31
Payer: COMMERCIAL

## 2022-01-31 DIAGNOSIS — M06.9 RHEUMATOID ARTHRITIS, INVOLVING UNSPECIFIED SITE, UNSPECIFIED WHETHER RHEUMATOID FACTOR PRESENT: Primary | ICD-10-CM

## 2022-01-31 NOTE — TELEPHONE ENCOUNTER
Humira for RA new order received. Prior authorization Not required, auth on file through 10/7/2022, $5 copay @004. Order queued to benefits investigation completion.     Pt taking Humira for RA. Per chart COVID positive 1/29/2022. Per ACR pt should hold Humira and re-initiate with in 7-14 days of symptom resolution or as advised by her provider. Will follow-up with patient in a week. Ivent opened, RF activity closed

## 2022-03-07 ENCOUNTER — PATIENT MESSAGE (OUTPATIENT)
Dept: BARIATRICS | Facility: CLINIC | Age: 57
End: 2022-03-07
Payer: COMMERCIAL

## 2022-03-08 ENCOUNTER — PATIENT MESSAGE (OUTPATIENT)
Dept: PHARMACY | Facility: CLINIC | Age: 57
End: 2022-03-08
Payer: COMMERCIAL

## 2022-03-11 ENCOUNTER — SPECIALTY PHARMACY (OUTPATIENT)
Dept: PHARMACY | Facility: CLINIC | Age: 57
End: 2022-03-11
Payer: COMMERCIAL

## 2022-03-11 NOTE — TELEPHONE ENCOUNTER
Specialty Pharmacy - Refill Coordination    Specialty Medication Orders Linked to Encounter    Flowsheet Row Most Recent Value   Medication #1 adalimumab (HUMIRA,CF, PEN) 40 mg/0.4 mL PnKt (Order#[id number], Rx#[id number])          Refill Questions - Documented Responses    Flowsheet Row Most Recent Value   Patient Availability and HIPAA Verification    Does patient want to proceed with activity? Yes   HIPAA/medical authority confirmed? Yes   Relationship to patient of person spoken to? Self   Refill Screening Questions    Changes to allergies? No   Changes to medications? No   New conditions since last clinic visit? No   Unplanned office visit, urgent care, ED, or hospital admission in the last 4 weeks? No   How does patient/caregiver feel medication is working? Good   Financial problems or insurance changes? No   How many doses of your specialty medications were missed in the last 4 weeks? 0   Would patient like to speak to a pharmacist? No   When does the patient need to receive the medication? 03/16/22   Refill Delivery Questions    How will the patient receive the medication? Delivery Iris   When does the patient need to receive the medication? 03/16/22   Shipping Address Home   Address in [facility] confirmed and updated if neccessary? Yes   Expected Copay ($) 5   Is the patient able to afford the medication copay? Yes   Payment Method CC on file   Days supply of Refill 28   Supplies needed? No supplies needed   Refill activity completed? Yes   Refill activity plan Refill scheduled   Shipment/Pickup Date: 03/14/22          Current Outpatient Medications   Medication Sig    adalimumab (HUMIRA,CF, PEN) 40 mg/0.4 mL PnKt Humira(CF) Pen 40 mg/0.4 mL subcutaneous kit    adalimumab (HUMIRA,CF, PEN) 40 mg/0.4 mL PnKt Inject 0.4 mLs (40 mg total) into the skin every 14 (fourteen) days.    atorvastatin (LIPITOR) 10 MG tablet Lipitor 10 mg tablet   Take 1 tablet every day by oral route.    b complex vitamins  tablet Take 1 tablet by mouth once daily.    ergocalciferol (ERGOCALCIFEROL) 50,000 unit Cap TAKE 1 CAPSULE (50,000 UNITS TOTAL) BY MOUTH EVERY 7 DAYS.    levothyroxine (SYNTHROID) 100 MCG tablet Take 100 mcg by mouth before breakfast.     levothyroxine (SYNTHROID) 88 MCG tablet levothyroxine 88 mcg tablet    loratadine (CLARITIN) 10 mg tablet Take 10 mg by mouth daily as needed.     methylPREDNISolone (MEDROL DOSEPACK) 4 mg tablet use as directed    multivit with minerals/lutein (MULTIVITAMIN 50 PLUS ORAL) multivitamin   1 po daily    multivitamin with minerals tablet Take 1 tablet by mouth 2 (two) times daily.     omeprazole (PRILOSEC) 20 MG capsule omeprazole 20 mg capsule,delayed release    omeprazole (PRILOSEC) 40 MG capsule Take 40 mg by mouth every morning.     potassium chloride SA (K-DUR,KLOR-CON) 10 MEQ tablet     potassium chloride SA (K-DUR,KLOR-CON) 10 MEQ tablet potassium chloride ER 10 mEq tablet,extended release(part/cryst)   Take 1 tablet every day by oral route.    predniSONE (DELTASONE) 5 MG tablet prednisone 5 mg tablet   prn   Last reviewed on 6/3/2021 11:59 AM by Celeste Simons MD    Review of patient's allergies indicates:   Allergen Reactions    Morphine Hives and Itching    Avelox [moxifloxacin] Rash    Last reviewed on  1/13/2022 7:53 AM by Brodie Fulton      Tasks added this encounter   No tasks added.   Tasks due within next 3 months   3/3/2022 - Refill Call (Auto Added)     AVRIL GODOY, PharmD  [facility]  [address] [place] [id number]  Phone: [phone]  Fax: [phone]

## 2022-03-17 ENCOUNTER — DOCUMENTATION ONLY (OUTPATIENT)
Dept: ORTHOPEDICS | Facility: CLINIC | Age: 57
End: 2022-03-17
Payer: COMMERCIAL

## 2022-03-17 NOTE — PROGRESS NOTES
Pt completed surgery paperwork at clinic visit on 09/24/19 for R thumb TFR. Pt has not yet contacted [facility] to choose a surgery date as of 03/17/2022.     Should pt reach out to our office to schedule surgery, he/she will need to return to clinic for re-evaluation, surgical discussion, & completion of updated surgery forms including surgical consent.     Surgical documents were discarded 03/17/2022.

## 2022-04-06 ENCOUNTER — PATIENT MESSAGE (OUTPATIENT)
Dept: PHARMACY | Facility: CLINIC | Age: 57
End: 2022-04-06
Payer: COMMERCIAL

## 2022-04-07 ENCOUNTER — SPECIALTY PHARMACY (OUTPATIENT)
Dept: PHARMACY | Facility: CLINIC | Age: 57
End: 2022-04-07
Payer: COMMERCIAL

## 2022-04-07 ENCOUNTER — PATIENT MESSAGE (OUTPATIENT)
Dept: BARIATRICS | Facility: CLINIC | Age: 57
End: 2022-04-07
Payer: COMMERCIAL

## 2022-04-07 NOTE — TELEPHONE ENCOUNTER
Specialty Pharmacy - Refill Coordination    Specialty Medication Orders Linked to Encounter    Flowsheet Row Most Recent Value   Medication #1 adalimumab (HUMIRA,CF, PEN) 40 mg/0.4 mL PnKt (Order#[id number], Rx#[id number])          Refill Questions - Documented Responses    Flowsheet Row Most Recent Value   Patient Availability and HIPAA Verification    Does patient want to proceed with activity? Yes   HIPAA/medical authority confirmed? Yes   Relationship to patient of person spoken to? Self   Refill Screening Questions    Changes to allergies? No   Changes to medications? No   New conditions since last clinic visit? No   Unplanned office visit, urgent care, ED, or hospital admission in the last 4 weeks? No   How does patient/caregiver feel medication is working? Excellent   How many doses of your specialty medications were missed in the last 4 weeks? 0   Would patient like to speak to a pharmacist? No   When does the patient need to receive the medication? 04/07/22   Refill Delivery Questions    How will the patient receive the medication? Delivery Iris   When does the patient need to receive the medication? 04/07/22   Shipping Address Home   Address in [facility] confirmed and updated if neccessary? Yes   Expected Copay ($) 5   Is the patient able to afford the medication copay? Yes   Payment Method zero copay   Days supply of Refill 28   Supplies needed? No supplies needed   Refill activity completed? Yes   Refill activity plan Refill scheduled   Shipment/Pickup Date: 04/08/22          Current Outpatient Medications   Medication Sig    adalimumab (HUMIRA,CF, PEN) 40 mg/0.4 mL PnKt Humira(CF) Pen 40 mg/0.4 mL subcutaneous kit    adalimumab (HUMIRA,CF, PEN) 40 mg/0.4 mL PnKt Inject 0.4 mLs (40 mg total) into the skin every 14 (fourteen) days.    atorvastatin (LIPITOR) 10 MG tablet Lipitor 10 mg tablet   Take 1 tablet every day by oral route.    b complex vitamins tablet Take 1 tablet by mouth once daily.     ergocalciferol (ERGOCALCIFEROL) 50,000 unit Cap TAKE 1 CAPSULE (50,000 UNITS TOTAL) BY MOUTH EVERY 7 DAYS.    levothyroxine (SYNTHROID) 100 MCG tablet Take 100 mcg by mouth before breakfast.     levothyroxine (SYNTHROID) 88 MCG tablet levothyroxine 88 mcg tablet    loratadine (CLARITIN) 10 mg tablet Take 10 mg by mouth daily as needed.     methylPREDNISolone (MEDROL DOSEPACK) 4 mg tablet use as directed    multivit with minerals/lutein (MULTIVITAMIN 50 PLUS ORAL) multivitamin   1 po daily    multivitamin with minerals tablet Take 1 tablet by mouth 2 (two) times daily.     omeprazole (PRILOSEC) 20 MG capsule omeprazole 20 mg capsule,delayed release    omeprazole (PRILOSEC) 40 MG capsule Take 40 mg by mouth every morning.     potassium chloride SA (K-DUR,KLOR-CON) 10 MEQ tablet     potassium chloride SA (K-DUR,KLOR-CON) 10 MEQ tablet potassium chloride ER 10 mEq tablet,extended release(part/cryst)   Take 1 tablet every day by oral route.    predniSONE (DELTASONE) 5 MG tablet prednisone 5 mg tablet   prn    terbinafine HCL (LAMISIL) 250 mg tablet Take 1 tablet (250 mg total) by mouth once daily.   Last reviewed on 6/3/2021 11:59 AM by Celeste Simons MD    Review of patient's allergies indicates:   Allergen Reactions    Morphine Hives and Itching    Avelox [moxifloxacin] Rash    Last reviewed on  3/17/2022 6:08 PM by Laurie MENDEZ Tyrese    Spoke with Mrs. Ortiz. She denied missed doses. She reported a 0-day supply in her possession. Next dose is due today. Counseled her to take missed doses as soon as possible. She denied any changes in allergies, medical conditions, or medications. Delivery BRINDA fish scheduled for 4/8 for delivery on 4/8. $5 copay at 004. Confirmed 2 patient identifiers, allergies, medication list, comorbidities, shipping address, and payment information.    Tasks added this encounter   No tasks added.   Tasks due within next 3 months   4/6/2022 - Refill Call (Auto Added)      Lianna Hansen, PharmD  [facility]  [address]  [facility] [address]  Phone: [phone]  Fax: [phone]

## 2022-04-12 ENCOUNTER — PATIENT MESSAGE (OUTPATIENT)
Dept: BARIATRICS | Facility: CLINIC | Age: 57
End: 2022-04-12
Payer: COMMERCIAL

## 2022-04-18 ENCOUNTER — PATIENT MESSAGE (OUTPATIENT)
Dept: ADMINISTRATIVE | Facility: OTHER | Age: 57
End: 2022-04-18
Payer: COMMERCIAL

## 2022-04-28 ENCOUNTER — PATIENT MESSAGE (OUTPATIENT)
Dept: PHARMACY | Facility: CLINIC | Age: 57
End: 2022-04-28
Payer: COMMERCIAL

## 2022-05-05 ENCOUNTER — PATIENT MESSAGE (OUTPATIENT)
Dept: BARIATRICS | Facility: CLINIC | Age: 57
End: 2022-05-05
Payer: COMMERCIAL

## 2022-05-05 ENCOUNTER — SPECIALTY PHARMACY (OUTPATIENT)
Dept: PHARMACY | Facility: CLINIC | Age: 57
End: 2022-05-05
Payer: COMMERCIAL

## 2022-05-05 NOTE — TELEPHONE ENCOUNTER
Specialty Pharmacy - Refill Coordination    Specialty Medication Orders Linked to Encounter    Flowsheet Row Most Recent Value   Medication #1 adalimumab (HUMIRA,CF, PEN) 40 mg/0.4 mL PnKt (Order#[id number], Rx#[id number])        Patient was due to inject today 5/5. Advised she inject once received tomorrow and she can go back to her original schedule on Thursdays in two weeks. She verbalized understanding.     Refill Questions - Documented Responses    Flowsheet Row Most Recent Value   Patient Availability and HIPAA Verification    Does patient want to proceed with activity? Yes   HIPAA/medical authority confirmed? Yes   Relationship to patient of person spoken to? Self   Refill Screening Questions    Changes to allergies? No   Changes to medications? No   New conditions since last clinic visit? No   Unplanned office visit, urgent care, ED, or hospital admission in the last 4 weeks? No   How does patient/caregiver feel medication is working? Good   Financial problems or insurance changes? No   How many doses of your specialty medications were missed in the last 4 weeks? 0   Would patient like to speak to a pharmacist? No   When does the patient need to receive the medication? 05/06/22   Refill Delivery Questions    How will the patient receive the medication? Delivery Iris   When does the patient need to receive the medication? 05/06/22   Shipping Address Home   Address in [facility] confirmed and updated if neccessary? Yes   Expected Copay ($) 5   Is the patient able to afford the medication copay? Yes   Payment Method CC on file   Days supply of Refill 28   Supplies needed? Injection Device   Refill activity completed? Yes   Refill activity plan Refill scheduled   Shipment/Pickup Date: 05/06/22          Current Outpatient Medications   Medication Sig    adalimumab (HUMIRA,CF, PEN) 40 mg/0.4 mL PnKt Humira(CF) Pen 40 mg/0.4 mL subcutaneous kit    adalimumab (HUMIRA,CF, PEN) 40 mg/0.4 mL PnKt Inject 0.4  Spoke with patient informed her she can get Echo after Surgery per DR Galaviz   mLs (40 mg total) into the skin every 14 (fourteen) days.    atorvastatin (LIPITOR) 10 MG tablet Lipitor 10 mg tablet   Take 1 tablet every day by oral route.    b complex vitamins tablet Take 1 tablet by mouth once daily.    ergocalciferol (ERGOCALCIFEROL) 50,000 unit Cap TAKE 1 CAPSULE (50,000 UNITS TOTAL) BY MOUTH EVERY 7 DAYS.    levothyroxine (SYNTHROID) 100 MCG tablet Take 100 mcg by mouth before breakfast.     levothyroxine (SYNTHROID) 88 MCG tablet levothyroxine 88 mcg tablet    loratadine (CLARITIN) 10 mg tablet Take 10 mg by mouth daily as needed.     methylPREDNISolone (MEDROL DOSEPACK) 4 mg tablet use as directed    multivit with minerals/lutein (MULTIVITAMIN 50 PLUS ORAL) multivitamin   1 po daily    multivitamin with minerals tablet Take 1 tablet by mouth 2 (two) times daily.     omeprazole (PRILOSEC) 20 MG capsule omeprazole 20 mg capsule,delayed release    omeprazole (PRILOSEC) 40 MG capsule Take 40 mg by mouth every morning.     potassium chloride SA (K-DUR,KLOR-CON) 10 MEQ tablet     potassium chloride SA (K-DUR,KLOR-CON) 10 MEQ tablet potassium chloride ER 10 mEq tablet,extended release(part/cryst)   Take 1 tablet every day by oral route.    predniSONE (DELTASONE) 5 MG tablet prednisone 5 mg tablet   prn    terbinafine HCL (LAMISIL) 250 mg tablet Take 1 tablet (250 mg total) by mouth once daily.   Last reviewed on 6/3/2021 11:59 AM by Celeste Simons MD    Review of patient's allergies indicates:   Allergen Reactions    Morphine Hives and Itching    Avelox [moxifloxacin] Rash    Last reviewed on  3/17/2022 6:08 PM by Laurie Xiong      Tasks added this encounter   5/26/2022 - Refill Call (Auto Added)   Tasks due within next 3 months   No tasks due.     Paco Earl, PharmD  [facility]  [address] [place] [id number]  Phone: [phone]  Fax: [phone]

## 2022-05-10 ENCOUNTER — PATIENT MESSAGE (OUTPATIENT)
Dept: BARIATRICS | Facility: CLINIC | Age: 57
End: 2022-05-10
Payer: COMMERCIAL

## 2022-05-26 ENCOUNTER — PATIENT MESSAGE (OUTPATIENT)
Dept: PHARMACY | Facility: CLINIC | Age: 57
End: 2022-05-26
Payer: COMMERCIAL

## 2022-05-26 ENCOUNTER — SPECIALTY PHARMACY (OUTPATIENT)
Dept: PHARMACY | Facility: CLINIC | Age: 57
End: 2022-05-26
Payer: COMMERCIAL

## 2022-05-26 NOTE — TELEPHONE ENCOUNTER
Specialty Pharmacy - Refill Coordination    Specialty Medication Orders Linked to Encounter    Flowsheet Row Most Recent Value   Medication #1 adalimumab (HUMIRA,CF, PEN) 40 mg/0.4 mL PnKt (Order#[id number], Rx#[id number])          Refill Questions - Documented Responses    Flowsheet Row Most Recent Value   Patient Availability and HIPAA Verification    Does patient want to proceed with activity? Yes   HIPAA/medical authority confirmed? Yes   Relationship to patient of person spoken to? Self   Refill Screening Questions    Changes to allergies? No   Changes to medications? No   New conditions since last clinic visit? No   Unplanned office visit, urgent care, ED, or hospital admission in the last 4 weeks? No   How does patient/caregiver feel medication is working? Good   Financial problems or insurance changes? No   How many doses of your specialty medications were missed in the last 4 weeks? 0   Would patient like to speak to a pharmacist? No   When does the patient need to receive the medication? 06/02/22   Refill Delivery Questions    How will the patient receive the medication? Delivery Iris   When does the patient need to receive the medication? 06/02/22   Shipping Address Home   Address in [facility] confirmed and updated if neccessary? Yes   Expected Copay ($) 5   Is the patient able to afford the medication copay? Yes   Payment Method zero copay   Days supply of Refill 28   Supplies needed? No supplies needed   Refill activity completed? Yes   Refill activity plan Refill scheduled   Shipment/Pickup Date: 06/01/22          Current Outpatient Medications   Medication Sig    adalimumab (HUMIRA,CF, PEN) 40 mg/0.4 mL PnKt Humira(CF) Pen 40 mg/0.4 mL subcutaneous kit    adalimumab (HUMIRA,CF, PEN) 40 mg/0.4 mL PnKt Inject 0.4 mLs (40 mg total) into the skin every 14 (fourteen) days.    atorvastatin (LIPITOR) 10 MG tablet Lipitor 10 mg tablet   Take 1 tablet every day by oral route.    b complex vitamins  tablet Take 1 tablet by mouth once daily.    ergocalciferol (ERGOCALCIFEROL) 50,000 unit Cap TAKE 1 CAPSULE (50,000 UNITS TOTAL) BY MOUTH EVERY 7 DAYS.    levothyroxine (SYNTHROID) 100 MCG tablet Take 100 mcg by mouth before breakfast.     levothyroxine (SYNTHROID) 88 MCG tablet levothyroxine 88 mcg tablet    loratadine (CLARITIN) 10 mg tablet Take 10 mg by mouth daily as needed.     methylPREDNISolone (MEDROL DOSEPACK) 4 mg tablet use as directed    multivit with minerals/lutein (MULTIVITAMIN 50 PLUS ORAL) multivitamin   1 po daily    multivitamin with minerals tablet Take 1 tablet by mouth 2 (two) times daily.     omeprazole (PRILOSEC) 20 MG capsule omeprazole 20 mg capsule,delayed release    omeprazole (PRILOSEC) 40 MG capsule Take 40 mg by mouth every morning.     potassium chloride SA (K-DUR,KLOR-CON) 10 MEQ tablet     potassium chloride SA (K-DUR,KLOR-CON) 10 MEQ tablet potassium chloride ER 10 mEq tablet,extended release(part/cryst)   Take 1 tablet every day by oral route.    predniSONE (DELTASONE) 5 MG tablet prednisone 5 mg tablet   prn    terbinafine HCL (LAMISIL) 250 mg tablet Take 1 tablet (250 mg total) by mouth once daily.   Last reviewed on 6/3/2021 11:59 AM by Celeste Simons MD    Review of patient's allergies indicates:   Allergen Reactions    Morphine Hives and Itching    Avelox [moxifloxacin] Rash    Last reviewed on  3/17/2022 6:08 PM by Laurie Xiong      Tasks added this encounter   6/23/2022 - Refill Call (Auto Added)   Tasks due within next 3 months   No tasks due.     Coleman Alvares, PharmD  [facility]  [address] [place] [id number]  Phone: [phone]  Fax: [phone]

## 2022-05-30 ENCOUNTER — TELEPHONE (OUTPATIENT)
Dept: BARIATRICS | Facility: CLINIC | Age: 57
End: 2022-05-30
Payer: COMMERCIAL

## 2022-06-10 ENCOUNTER — PATIENT MESSAGE (OUTPATIENT)
Dept: BARIATRICS | Facility: CLINIC | Age: 57
End: 2022-06-10
Payer: COMMERCIAL

## 2022-06-14 ENCOUNTER — PATIENT MESSAGE (OUTPATIENT)
Dept: BARIATRICS | Facility: CLINIC | Age: 57
End: 2022-06-14
Payer: COMMERCIAL

## 2022-06-22 ENCOUNTER — SPECIALTY PHARMACY (OUTPATIENT)
Dept: PHARMACY | Facility: CLINIC | Age: 57
End: 2022-06-22
Payer: COMMERCIAL

## 2022-06-22 DIAGNOSIS — M06.9 RHEUMATOID ARTHRITIS, INVOLVING UNSPECIFIED SITE, UNSPECIFIED WHETHER RHEUMATOID FACTOR PRESENT: Primary | ICD-10-CM

## 2022-06-22 NOTE — TELEPHONE ENCOUNTER
Specialty Pharmacy - Clinical Reassessment    Specialty Medication Orders Linked to Encounter    Flowsheet Row Most Recent Value   Medication #1 adalimumab (HUMIRA,CF, PEN) 40 mg/0.4 mL PnKt (Order#[id number], Rx#[id number])        Patient Diagnosis   M06.9 - Rheumatoid arthritis, involving unspecified site, unspecified whether rheumatoid factor present    Specialty clinical pharmacist review completed for an annual review of reassessment. Reviewed the following areas: current med list, reports of adverse effects, adherence and progress towards therapeutic goals.    Recommendations: none at this time.    Tasks added this encounter   No tasks added.   Tasks due within next 3 months   6/23/2022 - Refill Call (Auto Added)     Bisi Parish, LelandD  [facility]  [address] [facility] [address]  Phone: [phone]  Fax: [phone]

## 2022-06-23 ENCOUNTER — PATIENT MESSAGE (OUTPATIENT)
Dept: PHARMACY | Facility: CLINIC | Age: 57
End: 2022-06-23
Payer: COMMERCIAL

## 2022-06-24 ENCOUNTER — SPECIALTY PHARMACY (OUTPATIENT)
Dept: PHARMACY | Facility: CLINIC | Age: 57
End: 2022-06-24
Payer: COMMERCIAL

## 2022-06-24 DIAGNOSIS — M06.9 RHEUMATOID ARTHRITIS, INVOLVING UNSPECIFIED SITE, UNSPECIFIED WHETHER RHEUMATOID FACTOR PRESENT: Primary | ICD-10-CM

## 2022-06-24 NOTE — TELEPHONE ENCOUNTER
Specialty Pharmacy - Refill Coordination    Specialty Medication Orders Linked to Encounter    Flowsheet Row Most Recent Value   Medication #1 adalimumab (HUMIRA,CF, PEN) 40 mg/0.4 mL PnKt (Order#[id number], Rx#[id number])          Refill Questions - Documented Responses    Flowsheet Row Most Recent Value   Patient Availability and HIPAA Verification    Does patient want to proceed with activity? Yes   HIPAA/medical authority confirmed? Yes   Relationship to patient of person spoken to? Self   Refill Screening Questions    Changes to allergies? No   Changes to medications? No   New conditions since last clinic visit? No   Unplanned office visit, urgent care, ED, or hospital admission in the last 4 weeks? No   How does patient/caregiver feel medication is working? Good   Financial problems or insurance changes? No   How many doses of your specialty medications were missed in the last 4 weeks? 0   Would patient like to speak to a pharmacist? No   When does the patient need to receive the medication? 06/30/22   Refill Delivery Questions    How will the patient receive the medication? Delivery Iris   When does the patient need to receive the medication? 06/30/22   Shipping Address Home   Address in [facility] confirmed and updated if neccessary? Yes   Expected Copay ($) 5   Is the patient able to afford the medication copay? Yes   Payment Method CC on file   Days supply of Refill 28   Supplies needed? No supplies needed   Refill activity completed? Yes   Refill activity plan Refill scheduled   Shipment/Pickup Date: 06/28/22          Current Outpatient Medications   Medication Sig    adalimumab (HUMIRA,CF, PEN) 40 mg/0.4 mL PnKt Humira(CF) Pen 40 mg/0.4 mL subcutaneous kit    adalimumab (HUMIRA,CF, PEN) 40 mg/0.4 mL PnKt Inject 0.4 mL (40 mg total) into the skin every 14 (fourteen) days.    atorvastatin (LIPITOR) 10 MG tablet Lipitor 10 mg tablet   Take 1 tablet every day by oral route.    b complex vitamins  tablet Take 1 tablet by mouth once daily.    ergocalciferol (ERGOCALCIFEROL) 50,000 unit Cap TAKE 1 CAPSULE (50,000 UNITS TOTAL) BY MOUTH EVERY 7 DAYS.    levothyroxine (SYNTHROID) 100 MCG tablet Take 100 mcg by mouth before breakfast.     levothyroxine (SYNTHROID) 88 MCG tablet levothyroxine 88 mcg tablet    loratadine (CLARITIN) 10 mg tablet Take 10 mg by mouth daily as needed.     methylPREDNISolone (MEDROL DOSEPACK) 4 mg tablet use as directed    multivit with minerals/lutein (MULTIVITAMIN 50 PLUS ORAL) multivitamin   1 po daily    multivitamin with minerals tablet Take 1 tablet by mouth 2 (two) times daily.     omeprazole (PRILOSEC) 20 MG capsule omeprazole 20 mg capsule,delayed release    omeprazole (PRILOSEC) 40 MG capsule Take 40 mg by mouth every morning.     potassium chloride SA (K-DUR,KLOR-CON) 10 MEQ tablet     potassium chloride SA (K-DUR,KLOR-CON) 10 MEQ tablet potassium chloride ER 10 mEq tablet,extended release(part/cryst)   Take 1 tablet every day by oral route.    predniSONE (DELTASONE) 5 MG tablet prednisone 5 mg tablet   prn   Last reviewed on 6/3/2021 11:59 AM by Celeste Simons MD    Review of patient's allergies indicates:   Allergen Reactions    Morphine Hives and Itching    Avelox [moxifloxacin] Rash    Last reviewed on  3/17/2022 6:08 PM by Laurie Xiong      Tasks added this encounter   7/21/2022 - Refill Call (Auto Added)   Tasks due within next 3 months   No tasks due.     Bisi Parish, LelandD  [facility] - [facility]  [address] [place] [id number]  Phone: [phone]  Fax: [phone]

## 2022-06-29 ENCOUNTER — OFFICE VISIT (OUTPATIENT)
Dept: BARIATRICS | Facility: CLINIC | Age: 57
End: 2022-06-29
Payer: COMMERCIAL

## 2022-06-29 ENCOUNTER — LAB VISIT (OUTPATIENT)
Dept: LAB | Facility: HOSPITAL | Age: 57
End: 2022-06-29
Attending: NURSE PRACTITIONER
Payer: COMMERCIAL

## 2022-06-29 VITALS
TEMPERATURE: 98 F | BODY MASS INDEX: 34.52 KG/M2 | WEIGHT: 188.69 LBS | DIASTOLIC BLOOD PRESSURE: 76 MMHG | SYSTOLIC BLOOD PRESSURE: 158 MMHG | OXYGEN SATURATION: 96 % | HEART RATE: 71 BPM

## 2022-06-29 DIAGNOSIS — Z98.84 S/P LAPAROSCOPIC SLEEVE GASTRECTOMY: Primary | ICD-10-CM

## 2022-06-29 DIAGNOSIS — Z98.84 S/P LAPAROSCOPIC SLEEVE GASTRECTOMY: ICD-10-CM

## 2022-06-29 LAB
25(OH)D3+25(OH)D2 SERPL-MCNC: 29 NG/ML (ref 30–96)
ALBUMIN SERPL BCP-MCNC: 4.4 G/DL (ref 3.5–5.2)
ALP SERPL-CCNC: 75 U/L (ref 55–135)
ALT SERPL W/O P-5'-P-CCNC: 14 U/L (ref 10–44)
ANION GAP SERPL CALC-SCNC: 8 MMOL/L (ref 8–16)
AST SERPL-CCNC: 16 U/L (ref 10–40)
BASOPHILS # BLD AUTO: 0.05 K/UL (ref 0–0.2)
BASOPHILS NFR BLD: 1.1 % (ref 0–1.9)
BILIRUB SERPL-MCNC: 0.8 MG/DL (ref 0.1–1)
BUN SERPL-MCNC: 15 MG/DL (ref 6–20)
CALCIUM SERPL-MCNC: 9.2 MG/DL (ref 8.7–10.5)
CHLORIDE SERPL-SCNC: 104 MMOL/L (ref 95–110)
CHOLEST SERPL-MCNC: 234 MG/DL (ref 120–199)
CHOLEST/HDLC SERPL: 3.9 {RATIO} (ref 2–5)
CO2 SERPL-SCNC: 30 MMOL/L (ref 23–29)
CREAT SERPL-MCNC: 0.7 MG/DL (ref 0.5–1.4)
DIFFERENTIAL METHOD: NORMAL
EOSINOPHIL # BLD AUTO: 0.2 K/UL (ref 0–0.5)
EOSINOPHIL NFR BLD: 3.6 % (ref 0–8)
ERYTHROCYTE [DISTWIDTH] IN BLOOD BY AUTOMATED COUNT: 12.1 % (ref 11.5–14.5)
EST. GFR  (AFRICAN AMERICAN): >60 ML/MIN/1.73 M^2
EST. GFR  (NON AFRICAN AMERICAN): >60 ML/MIN/1.73 M^2
GLUCOSE SERPL-MCNC: 91 MG/DL (ref 70–110)
HCT VFR BLD AUTO: 37.1 % (ref 37–48.5)
HDLC SERPL-MCNC: 60 MG/DL (ref 40–75)
HDLC SERPL: 25.6 % (ref 20–50)
HGB BLD-MCNC: 12.2 G/DL (ref 12–16)
IMM GRANULOCYTES # BLD AUTO: 0 K/UL (ref 0–0.04)
IMM GRANULOCYTES NFR BLD AUTO: 0 % (ref 0–0.5)
IRON SERPL-MCNC: 110 UG/DL (ref 30–160)
LDLC SERPL CALC-MCNC: 160.4 MG/DL (ref 63–159)
LYMPHOCYTES # BLD AUTO: 1.6 K/UL (ref 1–4.8)
LYMPHOCYTES NFR BLD: 33.3 % (ref 18–48)
MCH RBC QN AUTO: 29.6 PG (ref 27–31)
MCHC RBC AUTO-ENTMCNC: 32.9 G/DL (ref 32–36)
MCV RBC AUTO: 90 FL (ref 82–98)
MONOCYTES # BLD AUTO: 0.6 K/UL (ref 0.3–1)
MONOCYTES NFR BLD: 13 % (ref 4–15)
NEUTROPHILS # BLD AUTO: 2.3 K/UL (ref 1.8–7.7)
NEUTROPHILS NFR BLD: 49 % (ref 38–73)
NONHDLC SERPL-MCNC: 174 MG/DL
NRBC BLD-RTO: 0 /100 WBC
PLATELET # BLD AUTO: 250 K/UL (ref 150–450)
PMV BLD AUTO: 9.4 FL (ref 9.2–12.9)
POTASSIUM SERPL-SCNC: 4 MMOL/L (ref 3.5–5.1)
PROT SERPL-MCNC: 6.9 G/DL (ref 6–8.4)
RBC # BLD AUTO: 4.12 M/UL (ref 4–5.4)
SATURATED IRON: 31 % (ref 20–50)
SODIUM SERPL-SCNC: 142 MMOL/L (ref 136–145)
TOTAL IRON BINDING CAPACITY: 351 UG/DL (ref 250–450)
TRANSFERRIN SERPL-MCNC: 237 MG/DL (ref 200–375)
TRIGL SERPL-MCNC: 68 MG/DL (ref 30–150)
VIT B12 SERPL-MCNC: 441 PG/ML (ref 210–950)
WBC # BLD AUTO: 4.69 K/UL (ref 3.9–12.7)

## 2022-06-29 PROCEDURE — 1159F PR MEDICATION LIST DOCUMENTED IN MEDICAL RECORD: ICD-10-PCS | Mod: CPTII,S$GLB,, | Performed by: NURSE PRACTITIONER

## 2022-06-29 PROCEDURE — 36415 COLL VENOUS BLD VENIPUNCTURE: CPT | Performed by: NURSE PRACTITIONER

## 2022-06-29 PROCEDURE — 82607 VITAMIN B-12: CPT | Performed by: NURSE PRACTITIONER

## 2022-06-29 PROCEDURE — 3008F PR BODY MASS INDEX (BMI) DOCUMENTED: ICD-10-PCS | Mod: CPTII,S$GLB,, | Performed by: NURSE PRACTITIONER

## 2022-06-29 PROCEDURE — 80061 LIPID PANEL: CPT | Performed by: NURSE PRACTITIONER

## 2022-06-29 PROCEDURE — 3077F SYST BP >= 140 MM HG: CPT | Mod: CPTII,S$GLB,, | Performed by: NURSE PRACTITIONER

## 2022-06-29 PROCEDURE — 82306 VITAMIN D 25 HYDROXY: CPT | Performed by: NURSE PRACTITIONER

## 2022-06-29 PROCEDURE — 1159F MED LIST DOCD IN RCRD: CPT | Mod: CPTII,S$GLB,, | Performed by: NURSE PRACTITIONER

## 2022-06-29 PROCEDURE — 1160F PR REVIEW ALL MEDS BY PRESCRIBER/CLIN PHARMACIST DOCUMENTED: ICD-10-PCS | Mod: CPTII,S$GLB,, | Performed by: NURSE PRACTITIONER

## 2022-06-29 PROCEDURE — 3044F PR MOST RECENT HEMOGLOBIN A1C LEVEL <7.0%: ICD-10-PCS | Mod: CPTII,S$GLB,, | Performed by: NURSE PRACTITIONER

## 2022-06-29 PROCEDURE — 80053 COMPREHEN METABOLIC PANEL: CPT | Performed by: NURSE PRACTITIONER

## 2022-06-29 PROCEDURE — 99999 PR PBB SHADOW E&M-EST. PATIENT-LVL V: ICD-10-PCS | Mod: PBBFAC,,, | Performed by: NURSE PRACTITIONER

## 2022-06-29 PROCEDURE — 1160F RVW MEDS BY RX/DR IN RCRD: CPT | Mod: CPTII,S$GLB,, | Performed by: NURSE PRACTITIONER

## 2022-06-29 PROCEDURE — 3078F DIAST BP <80 MM HG: CPT | Mod: CPTII,S$GLB,, | Performed by: NURSE PRACTITIONER

## 2022-06-29 PROCEDURE — 99214 OFFICE O/P EST MOD 30 MIN: CPT | Mod: S$GLB,,, | Performed by: NURSE PRACTITIONER

## 2022-06-29 PROCEDURE — 99214 PR OFFICE/OUTPT VISIT, EST, LEVL IV, 30-39 MIN: ICD-10-PCS | Mod: S$GLB,,, | Performed by: NURSE PRACTITIONER

## 2022-06-29 PROCEDURE — 3008F BODY MASS INDEX DOCD: CPT | Mod: CPTII,S$GLB,, | Performed by: NURSE PRACTITIONER

## 2022-06-29 PROCEDURE — 84425 ASSAY OF VITAMIN B-1: CPT | Performed by: NURSE PRACTITIONER

## 2022-06-29 PROCEDURE — 85025 COMPLETE CBC W/AUTO DIFF WBC: CPT | Performed by: NURSE PRACTITIONER

## 2022-06-29 PROCEDURE — 3078F PR MOST RECENT DIASTOLIC BLOOD PRESSURE < 80 MM HG: ICD-10-PCS | Mod: CPTII,S$GLB,, | Performed by: NURSE PRACTITIONER

## 2022-06-29 PROCEDURE — 3044F HG A1C LEVEL LT 7.0%: CPT | Mod: CPTII,S$GLB,, | Performed by: NURSE PRACTITIONER

## 2022-06-29 PROCEDURE — 3077F PR MOST RECENT SYSTOLIC BLOOD PRESSURE >= 140 MM HG: ICD-10-PCS | Mod: CPTII,S$GLB,, | Performed by: NURSE PRACTITIONER

## 2022-06-29 PROCEDURE — 84466 ASSAY OF TRANSFERRIN: CPT | Performed by: NURSE PRACTITIONER

## 2022-06-29 PROCEDURE — 99999 PR PBB SHADOW E&M-EST. PATIENT-LVL V: CPT | Mod: PBBFAC,,, | Performed by: NURSE PRACTITIONER

## 2022-06-29 NOTE — PATIENT INSTRUCTIONS
Meal Ideas for Regular Bariatric Diet  *Recipes and products available at [email or web address]      Breakfast: (15-20g protein)    - Egg white omelet: 2 egg whites or ½ cup Egg Beaters. (Optional proteins: cheese, shrimp, black beans, chicken, sliced turkey) (Optional veggies: tomatoes, salsa, spinach, mushrooms, onions, green peppers, or small slice avocado)     - Egg and sausage: 1 egg or ¼ cup Egg Beaters (any variety), with 1 jakob or 2 links of Turkey sausage or Veggie breakfast sausage ([employer] or Timeful)    - Crust-less breakfast quiche: To make a glass pie dish, mix 4oz part skim Ricotta, 1 cup skim milk, and 2 eggs as your base. Add protein: shredded cheese, sliced lean ham or turkey, turkey bardales/sausage. Add veggies: tomato, onion, green onion, mushroom, green pepper, spinach, etc.    - Yogurt parfait: Mix 1 - 6oz container Dannon Light N Fit vanilla yogurt, with ¼ cup crushed unsalted nuts    - Cottage cheese and fruit: ½ cup part-skim cottage cheese or ricotta cheese topped with fresh fruit or sugar free preserves     - Chandni Erickson's Vanilla Egg custard* (add 2 Tbsp instant coffee granules to make Cappuccino Custard*)    - Hi-Protein café latte (skim milk, decaf coffee, 1 scoop protein powder). Optional to add Sugar free syrup or extract flavoring.    - Breakfast Lox: spread fat free cream cheese on slices of smoked salmon. Serve over scrambled or egg over easy (sauteed with nonstick cookspray) OR on a cucumber slice    - Eggwhich: Scramble or cook 1 large egg over easy using nonstick cookspray. Place between 2 slices of Syrian bardales and low fat cheese.     Lunch: (20-30g protein)    - ½ cup Black bean soup (Homemade or Progresso), with ¼ cup shredded low-fat cheese. Top with chopped tomato or fresh salsa.     - Lean deli turkey breast and low-fat sliced cheese, mustard or light garcia to moisten, rolled up together, or wrapped in a Darryn lettuce leaf    - Chicken salad made from dinner  leftovers, moisten with low-fat salad dressing or light garcia. Also try leftover salmon, shrimp, tuna or boiled eggs. Serve ½ cup over dark green salad    - Fat-free canned refried beans, topped with ¼ cup shredded low-fat cheese. Top with chopped tomato or fresh salsa.     - Greek salad: Top mixed greens with 1-2oz grilled chicken, tomatoes, red onions, 2-3 kalamata olives, and sprinkle lightly with feta cheese. Spritz with Balsamic vinegar to taste.     - Crust-less lunch quiche: To make a glass pie dish, mix 4oz part skim Ricotta, 1 cup skim milk, and 2 eggs as your base. Add protein: shredded cheese, sliced lean ham or turkey, shrimp, chicken. Add veggies: tomato, onion, green onion, mushroom, green pepper, spinach, artichoke, broccoli, etc.    - Pizza bake: spread a  nya fermín mushroom with tomato sauce, low-fat shredded mozzarella and turkey pepperoni or [place] bardales. Add any veggies. Roast for 10-15 minutes, until cheese melted.     - Cucumber crab bites: Spread ¼ cup crab dip (lump crabmeat + light cream cheese and green onions) over sliced cucumber.     - Chicken with light spinach and artichoke dip*: Puree in : 6oz cooked and drained spinach, 2 cloves garlic, 1 can cannelloni beans, ½ cup chopped green onions, 1 can drained artichoke hearts (not marinated in oil), lemon juice and basil. Mix in 2oz chopped up chicken.    Supper: (20-30g protein)    - Serve grilled fish over dark green salad tossed with low-fat dressing, served with grilled asparagus frost     - Rotisserie chicken salad: served with sliced strawberries, walnuts, fat-free feta cheese crumbles and 1 tbsp Rigginss Own Light Raspberry [place] Vinaigrette    - Shrimp cocktail: Dip cold boiled shrimp in homemade low-sugar cocktail sauce (1/2 cup Inge One Carb ketchup, 2 tbsp horseradish, 1/4 tsp hot sauce, 1 tsp Worcestershire sauce, 1 tbsp freshly-squeezed lemon juice). Serve with dark green salad, walnuts, and crumbled blue  cheese drizzled with olive oil and Balsamic vinegar    - Tuna Melt: Spread tuna salad onto 2 thick slices of tomato. Top with low-fat cheese and broil until cheese is melted. May also be made with chicken salad of shrimp salad. [place] with different types of cheeses.    - Chicken or beef fajitas (no tortilla, rice, beans, chips). Top meat and veggies w/ fresh salsa, fat free sour cream.     - Homemade low-fat Chili using extra lean ground beef or ground turkey. Top with shredded cheese and salsa as desired. May add dollop fat-free sour cream if desired    - Chicken parmesan: Top chicken breast w/ low sugar marinara sauce, mozzarella cheese and bake until chicken reaches 165*.  Serve w/ spaghetti SQUASH or Icelandic cut green beans    - Dinner Omelet with shrimp or chicken and onion, green peppers and chives.    - No noodle lasagna: Use sliced zucchini or eggplant in place of noodles.  Layer with part skim ricotta cheese and low sugar meat sauce (use very lean ground beef or ground turkey).    - Mexican chicken bake: Bake chunks of chicken breast or thigh with taco seasoning, Pace brand enchilada sauce, green onions and low-fat cheese. Serve with ¼ cup black beans or fat free refried beans topped with chopped tomatoes or salsa.    - Robinson frozen meatballs, simmered in Classico Marinara sauce. Different flavors of salsa or spaghetti sauce create different dishes! Sprinkle with parmesan cheese. Serve with grilled or steamed veggies, or a dark green salad.    - Simmer boneless skinless chicken thigh chunks in Classico Marinara sauce or roasted salsa until tender with chopped onion, bell pepper, garlic, mushrooms, spinach, etc.     - Hamburger or veggie burger, without the bun, dressed the way you like. Served with grilled or steamed veggies.    - Eggplant parmesan: Bake slices of eggplant at 350 degrees for 15 minutes. Layer tomato sauce, sliced eggplant and low-fat mozzarella cheese in a baking dish and cover with  foil. Bake 30-40 more minutes or until bubbly. Uncover and bake at 400 degrees for about 15 more minutes, or until top is slightly crisp.    - Fish tacos: grilled/baked white fish, wrapped in Darryn lettuce leaf, topped with salsa, shredded low-fat cheese, and light coleslaw.    - Chicken gina: Sprinkle chicken w/ 1 tsp of hidden [place] ranch dip mix. Then grill chicken and top with black beans, salsa and 1 tsp fat free sour cream.     - Cauliflower pizza crust: Use cauliflower as crust (see recipe on pinterest, no flour!). Top w/ low fat cheese, turkey pepperoni and veggies and bake again    - chicken or turkey crust pizza: use ground chicken or turkey instead of cauliflower, spread in [place] and bake at 350 for about 20-30 minutes(may want to add garlic, black pepper, oregano and other herbs to ground meat mixture).  Remove and top w/ low fat cheese, turkey pepperoni and veggies and bake again for another 10 minutes or until cheese is browned.     Snacks: (100-200 calories; >5g protein)    - 1 low-fat cheese stick with 8 cherry tomatoes or 1 serving fresh fruit  - 4 thin slices fat-free turkey breast and 1 slice low-fat cheese  - 4 thin slices fat-free honey ham with wedge of melon  - 6-8 edamame pods (equivalent to about 1/4 cup edamame without pods).   - 1/4 cup unsalted nuts with ½ cup fruit  - 6-oz container Dannon Light n Fit vanilla yogurt, topped with 1oz unsalted nuts         - apple, celery or baby carrots spread with 2 Tbsp PB2  - apple slices with 1 oz slice low-fat cheese  - Apple slices dipped in 2 Tbsp of PB2  - celery, cucumber, bell pepper or baby carrots dipped in ¼ cup hummus bean spread or light spinach and artichoke dip (*recipe in lunch section)  - celery, cucumber, baby carrots dipped in high protein greek yogurt (Mix 16 oz plain greek yogurt + 1 packet of hidden [place] ranch dip mix)  - Teodoro Links Beef Steak - 14g protein! (similar to beef jerky)  - 2 wedges Laughing Cow - Light Herb  & Garlic Cheese with sliced cucumber or green bell pepper  - 1/2 cup low-fat cottage cheese with ¼ cup fruit or ¼ cup salsa  - RTD Protein drinks: Atkins, Low Carb Slim Fast, EAS light, Muscle Milk Light, etc.  - Homemade Protein drinks: GNC Soy95, Isopure, Nectar, UNJURY, Whey Gourmet, etc. Mix 1 scoop powder with 8oz skim/1% milk or light soymilk.  - Protein bars: Atkins, EAS, Pure Protein, Think Thin, Detour, etc. Must have 0-4 grams sugar - Read the label.    Takeout Options: No more than twice/week  Deli - Salads (no pasta or rice), meats, cheeses. Roasted chicken. Lox (salmon)    Mexican - Platters which don't include tortillas, chips, or rice. Go easy on the beans. Example: Fajitas without the tortillas. Ask the  not to bring chips to the table if they are too tempting.    Greek - Meat or fish and vegetable, but no bread or rice. Including hummus, baba ganoush, etc, is OK. Most sit-down Greek restaurants can provide you with cucumber slices for dipping instead of naldo bread.    Fast Food (Avoid as much as possible) - Salads (no croutons and limit salad dressing to 2 tbsp), grilled chicken sandwich without the bun and ask for no garcia. Maceys low fat chili or Taco Bell pintos and cheese.    BBQ - The meats are fine if you ask for sauces on the side, but most of the traditional side dishes are loaded with carbs. Carlos slaw, baked beans and BBQ sauce are typically made with sugar.    Chinese - Nothing deep-fried, no rice or noodles. Many Chinese sauces have starch and sugar in them, so you'll have to use your judgement. If you find that these sauces trigger cravings, or cause Dumping, you can ask for the sauce to be made without sugar or just use soy sauce.

## 2022-06-29 NOTE — PROGRESS NOTES
BARIATRIC POST-OPERATIVE FOLLOW UP:    HPI:  56 y.o.-year-old female presents for 2 year follow up.    Denies: nausea, vomiting, abdominal pain, changes in bowel movement pattern, fever, chills, dysphagia, chest pain, and shortness of breath.      ROS:    Review of Systems   Constitutional: Negative for activity change and fatigue.   Respiratory: Negative for cough and shortness of breath.    Cardiovascular: Negative for chest pain, palpitations and leg swelling.   Gastrointestinal: Negative for abdominal pain, nausea and vomiting.   Endocrine: Negative for polydipsia, polyphagia and polyuria.   Genitourinary: Negative for dysuria.   Musculoskeletal: Negative for gait problem.   Skin: Negative for rash.   Allergic/Immunologic: Negative for immunocompromised state.   Neurological: Negative for dizziness, syncope and weakness.   Hematological: Does not bruise/bleed easily.   Psychiatric/Behavioral: Negative for behavioral problems.       EXERCISE:  Walking daily     VITAMINS:  Tolerating well     MEDICATIONS/ALLERGIES:  Have been reviewed.    DIET:  Bariatric Regular Diet   Protein ~ no tracking, eggs, cheese, sausage bardales, deli meat, tuna fish, ground meats, veggies  Fluids~64+    PHYSICAL EXAM:  GENERAL: 56 y.o.-year-old female in NAD, A&O x3  VITAL SIGNS:  BP:    CHEST: Clear to auscultation, regular effort.  HEART: Regular rate and rhythm. No murmurs, clicks, or gallops.  ABDOMEN: Bowel sounds present in all four quadrants. Soft, non-tender, non-distended. Well-healing surgical scars are clean, dry, and intact without signs of infection or bleeding.  EXTREMITIES: No clubbing, cyanosis, or edema.      ASSESSMENT:  - Morbid obesity s/p sleeve gastrectomy on 12/2019.  - Co-morbidities: diabetes mellitus and hypertension  - Weight loss  Lbs  EWL preop 216  - Exercise regimen  - Diet fair improving per patient     PLAN:  - Emphasized the importance of regular exercise and adherence to bariatric diet to achieve maximum  weight loss.  - Encouraged patient to begin OR continue regular exercise.  - Follow-up with dietician to reinforce diet.  - Continue daily vitamins and medications.  - RTC in 12 months or sooner if needed.  - Call the office for any issues.  - Check labs today.

## 2022-07-05 ENCOUNTER — PATIENT MESSAGE (OUTPATIENT)
Dept: BARIATRICS | Facility: CLINIC | Age: 57
End: 2022-07-05
Payer: COMMERCIAL

## 2022-07-07 LAB — VIT B1 BLD-MCNC: 100 UG/L (ref 38–122)

## 2022-07-21 ENCOUNTER — PATIENT MESSAGE (OUTPATIENT)
Dept: PHARMACY | Facility: CLINIC | Age: 57
End: 2022-07-21
Payer: COMMERCIAL

## 2022-07-26 ENCOUNTER — PATIENT MESSAGE (OUTPATIENT)
Dept: PHARMACY | Facility: CLINIC | Age: 57
End: 2022-07-26
Payer: COMMERCIAL

## 2022-08-01 ENCOUNTER — PATIENT MESSAGE (OUTPATIENT)
Dept: PHARMACY | Facility: CLINIC | Age: 57
End: 2022-08-01
Payer: COMMERCIAL

## 2022-08-01 ENCOUNTER — SPECIALTY PHARMACY (OUTPATIENT)
Dept: PHARMACY | Facility: CLINIC | Age: 57
End: 2022-08-01
Payer: COMMERCIAL

## 2022-08-01 NOTE — TELEPHONE ENCOUNTER
Specialty Pharmacy - Refill Coordination    Specialty Medication Orders Linked to Encounter    Flowsheet Row Most Recent Value   Medication #1 adalimumab (HUMIRA,CF, PEN) 40 mg/0.4 mL PnKt (Order#[id number], Rx#[id number])        Refill Questions - Documented Responses    Flowsheet Row Most Recent Value   Patient Availability and HIPAA Verification    Does patient want to proceed with activity? Unable to Reach        We have had multiple attempts to the patient and have been unsuccessful to reach the patient. We will stop reaching out to the patient but in the event that the patient needs the med and contacts us, we will communicate and begin dispensing for the patient. At your next visit with the patient, please review the importance of being in contact with our specialty pharmacy as a part of our care team.    Interventions added this encounter   Closed: [facility] Provider Intervention - Drug therapy adherence: adalimumab (HUMIRA,CF, PEN) 40 mg/0.4 mL PnKt     Brijesh Dejesus, [facility]  [address] [place] [id number]  Phone: [phone]  Fax: [phone]

## 2022-08-02 ENCOUNTER — PATIENT MESSAGE (OUTPATIENT)
Dept: BARIATRICS | Facility: CLINIC | Age: 57
End: 2022-08-02
Payer: COMMERCIAL

## 2022-08-04 ENCOUNTER — PATIENT MESSAGE (OUTPATIENT)
Dept: BARIATRICS | Facility: CLINIC | Age: 57
End: 2022-08-04
Payer: COMMERCIAL

## 2022-08-16 NOTE — TELEPHONE ENCOUNTER
Specialty Pharmacy - Refill Coordination    Specialty Medication Orders Linked to Encounter    Flowsheet Row Most Recent Value   Medication #1 adalimumab (HUMIRA,CF, PEN) 40 mg/0.4 mL PnKt (Order#[id number], Rx#[id number])          Refill Questions - Documented Responses    Flowsheet Row Most Recent Value   Patient Availability and HIPAA Verification    Does patient want to proceed with activity? Yes   HIPAA/medical authority confirmed? Yes   Relationship to patient of person spoken to? Self   Refill Screening Questions    Changes to allergies? No   Changes to medications? No   New conditions since last clinic visit? No   Unplanned office visit, urgent care, ED, or hospital admission in the last 4 weeks? No   How does patient/caregiver feel medication is working? Fair   Financial problems or insurance changes? No   How many doses of your specialty medications were missed in the last 4 weeks? 2   Why were doses missed? Other (comment)  [Communication issues]   Would patient like to speak to a pharmacist? Yes   When does the patient need to receive the medication? 08/18/22   Refill Delivery Questions    How will the patient receive the medication? Delivery I[facility]   When does the patient need to receive the medication? 08/18/22   Shipping Address Home   Address in [facility] confirmed and updated if neccessary? Yes   Expected Copay ($) 5   Is the patient able to afford the medication copay? Yes   Payment Method new CC added to file   Days supply of Refill 28   Supplies needed? No supplies needed   Refill activity completed? Yes   Refill activity plan Refill scheduled   Shipment/Pickup Date: 08/17/22          Current Outpatient Medications   Medication Sig    adalimumab (HUMIRA,CF, PEN) 40 mg/0.4 mL PnKt Humira(CF) Pen 40 mg/0.4 mL subcutaneous kit    adalimumab (HUMIRA,CF, PEN) 40 mg/0.4 mL PnKt Inject 0.4 mL (40 mg total) into the skin every 14 (fourteen) days.    atorvastatin (LIPITOR) 10 MG tablet Lipitor 10  mg tablet   Take 1 tablet every day by oral route.    b complex vitamins tablet Take 1 tablet by mouth once daily.    ergocalciferol (ERGOCALCIFEROL) 50,000 unit Cap TAKE 1 CAPSULE (50,000 UNITS TOTAL) BY MOUTH EVERY 7 DAYS.    levothyroxine (SYNTHROID) 100 MCG tablet Take 100 mcg by mouth before breakfast.     levothyroxine (SYNTHROID) 88 MCG tablet levothyroxine 88 mcg tablet    loratadine (CLARITIN) 10 mg tablet Take 10 mg by mouth daily as needed.     methylPREDNISolone (MEDROL DOSEPACK) 4 mg tablet use as directed (Patient not taking: Reported on 6/29/2022)    multivit with minerals/lutein (MULTIVITAMIN 50 PLUS ORAL) multivitamin   1 po daily    multivitamin with minerals tablet Take 1 tablet by mouth 2 (two) times daily.     omeprazole (PRILOSEC) 20 MG capsule omeprazole 20 mg capsule,delayed release    omeprazole (PRILOSEC) 40 MG capsule Take 40 mg by mouth every morning.     potassium chloride SA (K-DUR,KLOR-CON) 10 MEQ tablet     potassium chloride SA (K-DUR,KLOR-CON) 10 MEQ tablet potassium chloride ER 10 mEq tablet,extended release(part/cryst)   Take 1 tablet every day by oral route.    predniSONE (DELTASONE) 5 MG tablet prednisone 5 mg tablet   prn   Last reviewed on 6/29/2022 12:19 PM by Kaye Mai NP    Review of patient's allergies indicates:   Allergen Reactions    Morphine Hives and Itching    Avelox [moxifloxacin] Rash    Last reviewed on  6/29/2022 12:19 PM by Kaye Mai    Interventions added this encounter   Closed: [facility] Provider Intervention - Drug therapy adherence: adalimumab (HUMIRA,CF, PEN) 40 mg/0.4 mL PnKt     Tasks added this encounter   9/8/2022 - Refill Call (Auto Added)   Tasks due within next 3 months   No tasks due.     Blake Gifford, PharmD  [facility]  [address] [place] [address]  Phone: [phone]  Fax: [phone]

## 2022-09-07 ENCOUNTER — PATIENT MESSAGE (OUTPATIENT)
Dept: BARIATRICS | Facility: CLINIC | Age: 57
End: 2022-09-07
Payer: COMMERCIAL

## 2022-09-08 ENCOUNTER — SPECIALTY PHARMACY (OUTPATIENT)
Dept: PHARMACY | Facility: CLINIC | Age: 57
End: 2022-09-08
Payer: COMMERCIAL

## 2022-09-08 NOTE — TELEPHONE ENCOUNTER
Specialty Pharmacy - Refill Coordination    Specialty Medication Orders Linked to Encounter      Flowsheet Row Most Recent Value   Medication #1 adalimumab (HUMIRA,CF, PEN) 40 mg/0.4 mL PnKt (Order#[id number], Rx#[id number])            Refill Questions - Documented Responses      Flowsheet Row Most Recent Value   Patient Availability and HIPAA Verification    Does patient want to proceed with activity? Yes   HIPAA/medical authority confirmed? Yes   Relationship to patient of person spoken to? Self   Refill Screening Questions    Changes to allergies? No   Changes to medications? No   New conditions since last clinic visit? No   Unplanned office visit, urgent care, ED, or hospital admission in the last 4 weeks? No   How does patient/caregiver feel medication is working? Good   Financial problems or insurance changes? No   How many doses of your specialty medications were missed in the last 4 weeks? 0   Would patient like to speak to a pharmacist? No   When does the patient need to receive the medication? 09/11/22   Refill Delivery Questions    How will the patient receive the medication? Delivery Iris   When does the patient need to receive the medication? 09/11/22   Shipping Address Home   Address in [facility] confirmed and updated if neccessary? Yes   Expected Copay ($) 5   Is the patient able to afford the medication copay? Yes   Payment Method CC on file   Days supply of Refill 28   Supplies needed? No supplies needed   Refill activity completed? Yes   Refill activity plan Refill scheduled   Shipment/Pickup Date: 09/09/22            Current Outpatient Medications   Medication Sig    adalimumab (HUMIRA,CF, PEN) 40 mg/0.4 mL PnKt Inject 0.4 mL (40 mg total) into the skin every 14 (fourteen) days.    atorvastatin (LIPITOR) 10 MG tablet Lipitor 10 mg tablet   Take 1 tablet every day by oral route.    b complex vitamins tablet Take 1 tablet by mouth once daily.    ergocalciferol (ERGOCALCIFEROL) 50,000 unit  Cap TAKE 1 CAPSULE (50,000 UNITS TOTAL) BY MOUTH EVERY 7 DAYS.    levothyroxine (SYNTHROID) 100 MCG tablet Take 100 mcg by mouth before breakfast.     levothyroxine (SYNTHROID) 88 MCG tablet levothyroxine 88 mcg tablet    loratadine (CLARITIN) 10 mg tablet Take 10 mg by mouth daily as needed.     methylPREDNISolone (MEDROL DOSEPACK) 4 mg tablet use as directed (Patient not taking: Reported on 6/29/2022)    multivit with minerals/lutein (MULTIVITAMIN 50 PLUS ORAL) multivitamin   1 po daily    multivitamin with minerals tablet Take 1 tablet by mouth 2 (two) times daily.     omeprazole (PRILOSEC) 20 MG capsule omeprazole 20 mg capsule,delayed release    omeprazole (PRILOSEC) 40 MG capsule Take 40 mg by mouth every morning.     potassium chloride SA (K-DUR,KLOR-CON) 10 MEQ tablet     potassium chloride SA (K-DUR,KLOR-CON) 10 MEQ tablet potassium chloride ER 10 mEq tablet,extended release(part/cryst)   Take 1 tablet every day by oral route.    predniSONE (DELTASONE) 5 MG tablet prednisone 5 mg tablet   prn   Last reviewed on 6/29/2022 12:19 PM by Kaye Mai NP    Review of patient's allergies indicates:   Allergen Reactions    Morphine Hives and Itching    Avelox [moxifloxacin] Rash    Last reviewed on  6/29/2022 12:19 PM by Kaye Mai      Tasks added this encounter   10/2/2022 - Refill Call (Auto Added)   Tasks due within next 3 months   No tasks due.     [facility]  [address] [place] [id number]  Phone: [phone]  Fax: [phone]

## 2022-10-03 ENCOUNTER — PATIENT MESSAGE (OUTPATIENT)
Dept: PHARMACY | Facility: CLINIC | Age: 57
End: 2022-10-03
Payer: COMMERCIAL

## 2022-10-06 ENCOUNTER — PATIENT MESSAGE (OUTPATIENT)
Dept: BARIATRICS | Facility: CLINIC | Age: 57
End: 2022-10-06
Payer: COMMERCIAL

## 2022-10-06 ENCOUNTER — SPECIALTY PHARMACY (OUTPATIENT)
Dept: PHARMACY | Facility: CLINIC | Age: 57
End: 2022-10-06
Payer: COMMERCIAL

## 2022-10-06 NOTE — TELEPHONE ENCOUNTER
Specialty Pharmacy - Refill Coordination    Specialty Medication Orders Linked to Encounter      Flowsheet Row Most Recent Value   Medication #1 adalimumab (HUMIRA,CF, PEN) 40 mg/0.4 mL PnKt (Order#[id number], Rx#[id number])            Refill Questions - Documented Responses      Flowsheet Row Most Recent Value   Patient Availability and HIPAA Verification    Does patient want to proceed with activity? Yes   HIPAA/medical authority confirmed? Yes   Relationship to patient of person spoken to? Self   Refill Screening Questions    Changes to allergies? No   Changes to medications? No   New conditions since last clinic visit? No   Unplanned office visit, urgent care, ED, or hospital admission in the last 4 weeks? No   How does patient/caregiver feel medication is working? Good   Financial problems or insurance changes? No   How many doses of your specialty medications were missed in the last 4 weeks? 0   Would patient like to speak to a pharmacist? No   When does the patient need to receive the medication? 10/07/22   Refill Delivery Questions    How will the patient receive the medication? MEDRx   When does the patient need to receive the medication? 10/07/22   Shipping Address Home   Address in [facility] confirmed and updated if neccessary? Yes   Expected Copay ($) 5   Is the patient able to afford the medication copay? Yes   Payment Method CC on file   Days supply of Refill 28   Supplies needed? No supplies needed   Refill activity completed? Yes   Refill activity plan Refill scheduled   Shipment/Pickup Date: 10/06/22            Current Outpatient Medications   Medication Sig    adalimumab (HUMIRA,CF, PEN) 40 mg/0.4 mL PnKt Inject 0.4 mL (40 mg total) into the skin every 14 (fourteen) days.    atorvastatin (LIPITOR) 10 MG tablet Lipitor 10 mg tablet   Take 1 tablet every day by oral route.    b complex vitamins tablet Take 1 tablet by mouth once daily.    ergocalciferol (ERGOCALCIFEROL) 50,000 unit Cap TAKE 1  CAPSULE (50,000 UNITS TOTAL) BY MOUTH EVERY 7 DAYS.    levothyroxine (SYNTHROID) 100 MCG tablet Take 100 mcg by mouth before breakfast.     levothyroxine (SYNTHROID) 88 MCG tablet levothyroxine 88 mcg tablet    loratadine (CLARITIN) 10 mg tablet Take 10 mg by mouth daily as needed.     methylPREDNISolone (MEDROL DOSEPACK) 4 mg tablet use as directed (Patient not taking: Reported on 6/29/2022)    multivit with minerals/lutein (MULTIVITAMIN 50 PLUS ORAL) multivitamin   1 po daily    multivitamin with minerals tablet Take 1 tablet by mouth 2 (two) times daily.     omeprazole (PRILOSEC) 20 MG capsule omeprazole 20 mg capsule,delayed release    omeprazole (PRILOSEC) 40 MG capsule Take 40 mg by mouth every morning.     potassium chloride SA (K-DUR,KLOR-CON) 10 MEQ tablet     potassium chloride SA (K-DUR,KLOR-CON) 10 MEQ tablet potassium chloride ER 10 mEq tablet,extended release(part/cryst)   Take 1 tablet every day by oral route.    predniSONE (DELTASONE) 5 MG tablet prednisone 5 mg tablet   prn   Last reviewed on 6/29/2022 12:19 PM by Kaye Mai NP    Review of patient's allergies indicates:   Allergen Reactions    Morphine Hives and Itching    Avelox [moxifloxacin] Rash    Last reviewed on  6/29/2022 12:19 PM by Kaye Mai      Tasks added this encounter   10/28/2022 - Refill Call (Auto Added)   Tasks due within next 3 months   No tasks due.     [facility]  [address] [place] [id number]  Phone: [phone]  Fax: [phone]

## 2022-10-17 ENCOUNTER — TELEPHONE (OUTPATIENT)
Dept: RHEUMATOLOGY | Facility: CLINIC | Age: 57
End: 2022-10-17
Payer: COMMERCIAL

## 2022-10-28 ENCOUNTER — SPECIALTY PHARMACY (OUTPATIENT)
Dept: PHARMACY | Facility: CLINIC | Age: 57
End: 2022-10-28
Payer: COMMERCIAL

## 2022-10-28 DIAGNOSIS — M06.9 RHEUMATOID ARTHRITIS, INVOLVING UNSPECIFIED SITE, UNSPECIFIED WHETHER RHEUMATOID FACTOR PRESENT: Primary | ICD-10-CM

## 2022-10-28 NOTE — TELEPHONE ENCOUNTER
Outgoing call regarding humira refill; pt is due to inject on 11/5; informed her that a PA was required, and once approved [facility] will follow up to schedule delivery; routed to [facility]

## 2022-11-01 NOTE — TELEPHONE ENCOUNTER
Specialty Pharmacy - Refill Coordination  Specialty Pharmacy - Medication/Referral Authorization    Specialty Medication Orders Linked to Encounter      Flowsheet Row Most Recent Value   Medication #1 adalimumab (HUMIRA,CF, PEN) 40 mg/0.4 mL PnKt (Order#[id number], Rx#[id number])            Refill Questions - Documented Responses      Flowsheet Row Most Recent Value   Patient Availability and HIPAA Verification    Does patient want to proceed with activity? Yes   HIPAA/medical authority confirmed? Yes   Relationship to patient of person spoken to? Self   Refill Screening Questions    Changes to allergies? No   Changes to medications? No   New conditions since last clinic visit? No   Unplanned office visit, urgent care, ED, or hospital admission in the last 4 weeks? No   How does patient/caregiver feel medication is working? Good   Financial problems or insurance changes? No   How many doses of your specialty medications were missed in the last 4 weeks? 0   Would patient like to speak to a pharmacist? No   When does the patient need to receive the medication? 11/06/22   Refill Delivery Questions    How will the patient receive the medication? MEDRx   When does the patient need to receive the medication? 11/06/22   Shipping Address Home   Address in [facility] confirmed and updated if neccessary? Yes   Expected Copay ($) 5   Is the patient able to afford the medication copay? Yes   Payment Method CC on file   Days supply of Refill 28   Supplies needed? No supplies needed   Refill activity completed? Yes   Refill activity plan Refill scheduled   Shipment/Pickup Date: 11/02/22            Current Outpatient Medications   Medication Sig    adalimumab (HUMIRA,CF, PEN) 40 mg/0.4 mL PnKt Inject 0.4 mL (40 mg total) into the skin every 14 (fourteen) days.    atorvastatin (LIPITOR) 10 MG tablet Lipitor 10 mg tablet   Take 1 tablet every day by oral route.    b complex vitamins tablet Take 1 tablet by mouth once daily.     ergocalciferol (ERGOCALCIFEROL) 50,000 unit Cap TAKE 1 CAPSULE (50,000 UNITS TOTAL) BY MOUTH EVERY 7 DAYS.    levothyroxine (SYNTHROID) 100 MCG tablet Take 100 mcg by mouth before breakfast.     levothyroxine (SYNTHROID) 88 MCG tablet levothyroxine 88 mcg tablet    loratadine (CLARITIN) 10 mg tablet Take 10 mg by mouth daily as needed.     methylPREDNISolone (MEDROL DOSEPACK) 4 mg tablet use as directed (Patient not taking: Reported on 6/29/2022)    multivit with minerals/lutein (MULTIVITAMIN 50 PLUS ORAL) multivitamin   1 po daily    multivitamin with minerals tablet Take 1 tablet by mouth 2 (two) times daily.     omeprazole (PRILOSEC) 20 MG capsule omeprazole 20 mg capsule,delayed release    omeprazole (PRILOSEC) 40 MG capsule Take 40 mg by mouth every morning.     potassium chloride SA (K-DUR,KLOR-CON) 10 MEQ tablet     potassium chloride SA (K-DUR,KLOR-CON) 10 MEQ tablet potassium chloride ER 10 mEq tablet,extended release(part/cryst)   Take 1 tablet every day by oral route.    predniSONE (DELTASONE) 5 MG tablet prednisone 5 mg tablet   prn   Last reviewed on 6/29/2022 12:19 PM by Kaye Mai NP    Review of patient's allergies indicates:   Allergen Reactions    Morphine Hives and Itching    Avelox [moxifloxacin] Rash    Last reviewed on  6/29/2022 12:19 PM by Kaye Mai      Tasks added this encounter   11/27/2022 - Refill Call (Auto Added)   Tasks due within next 3 months   No tasks due.     [facility]isi Parish, PharmD  [facility] - Specialty Pharmacy  [address] [place] [address]  Phone: [phone]  Fax: [phone]

## 2022-11-04 ENCOUNTER — PATIENT MESSAGE (OUTPATIENT)
Dept: BARIATRICS | Facility: CLINIC | Age: 57
End: 2022-11-04
Payer: COMMERCIAL

## 2022-11-28 ENCOUNTER — SPECIALTY PHARMACY (OUTPATIENT)
Dept: PHARMACY | Facility: CLINIC | Age: 57
End: 2022-11-28
Payer: COMMERCIAL

## 2022-11-28 NOTE — TELEPHONE ENCOUNTER
Outgoing call regarding Humira refill. Pt stated that she is due to inject on 12/2/22. Informed pt her funding has ran out and she needs to call [phone] and ask them to conduct benefits investigation and to tell them to opt her out of the cost maximizer program and when its complete to call us back so we can help her with the insurance and funding. Pt verbalized understanding.

## 2022-11-29 NOTE — TELEPHONE ENCOUNTER
Incoming call from patient asking status of [facility]. She had not yet called Humira copay card so re-iterated the information below as [facility] cannot move forward until she contacts them. Once completed, we can try to reprocess. Depending on the copay card we can then call for an override.     Will await pt callback.

## 2022-11-30 NOTE — TELEPHONE ENCOUNTER
Outgoing call to patient, spoke to [employer] rep whom states to run Humira DEBIT card in 24 hours and the funding will be loaded. Left claim pending in [facility] in order for debit card to load, will run debit card in am 12/1.     Outgoing call to patient, LVM to inform of info above, wanted to set up possible shipment for patient but to also let patient know there may be a delay in shipment if debit card does not run.

## 2022-11-30 NOTE — TELEPHONE ENCOUNTER
Pt called with debit card number for Humira. Input into system. Co-pay and what pt believes is loaded on the card does not match. Escalated to supervisor to review. Pt due to inject Friday.

## 2022-11-30 NOTE — TELEPHONE ENCOUNTER
Incoming call from patient, informed patient, we will attempt to set up delivery for 12/1, if debit card processes.       Patient states she will also be in town on 12/2 and  is an option, will submit refill tomorrow 12/1.

## 2022-12-01 NOTE — TELEPHONE ENCOUNTER
Specialty Pharmacy - Refill Coordination    Specialty Medication Orders Linked to Encounter      Flowsheet Row Most Recent Value   Medication #1 adalimumab (HUMIRA,CF, PEN) 40 mg/0.4 mL PnKt (Order#[id number], Rx#[id number])            Refill Questions - Documented Responses      Flowsheet Row Most Recent Value   Patient Availability and HIPAA Verification    Does patient want to proceed with activity? Yes   HIPAA/medical authority confirmed? Yes   Relationship to patient of person spoken to? Self   Refill Screening Questions    Changes to allergies? No   Changes to medications? No   New conditions since last clinic visit? No   Unplanned office visit, urgent care, ED, or hospital admission in the last 4 weeks? No   How does patient/caregiver feel medication is working? Good   Financial problems or insurance changes? No   How many doses of your specialty medications were missed in the last 4 weeks? 0   Would patient like to speak to a pharmacist? No   When does the patient need to receive the medication? 12/02/22   Refill Delivery Questions    How will the patient receive the medication? MEDRx   When does the patient need to receive the medication? 12/02/22   Shipping Address Home   Address in [facility] confirmed and updated if neccessary? Yes   Expected Copay ($) 0   Is the patient able to afford the medication copay? Yes   Payment Method zero copay   Days supply of Refill 28   Supplies needed? No supplies needed   Refill activity completed? Yes   Refill activity plan Refill scheduled   Shipment/Pickup Date: 12/01/22            Current Outpatient Medications   Medication Sig    adalimumab (HUMIRA,CF, PEN) 40 mg/0.4 mL PnKt Inject 0.4 mL (40 mg total) into the skin every 14 (fourteen) days.    atorvastatin (LIPITOR) 10 MG tablet Lipitor 10 mg tablet   Take 1 tablet every day by oral route.    b complex vitamins tablet Take 1 tablet by mouth once daily.    ergocalciferol (ERGOCALCIFEROL) 50,000 unit Cap TAKE 1  CAPSULE (50,000 UNITS TOTAL) BY MOUTH EVERY 7 DAYS.    levothyroxine (SYNTHROID) 100 MCG tablet Take 100 mcg by mouth before breakfast.     levothyroxine (SYNTHROID) 88 MCG tablet levothyroxine 88 mcg tablet    loratadine (CLARITIN) 10 mg tablet Take 10 mg by mouth daily as needed.     methylPREDNISolone (MEDROL DOSEPACK) 4 mg tablet use as directed (Patient not taking: Reported on 6/29/2022)    multivit with minerals/lutein (MULTIVITAMIN 50 PLUS ORAL) multivitamin   1 po daily    multivitamin with minerals tablet Take 1 tablet by mouth 2 (two) times daily.     omeprazole (PRILOSEC) 20 MG capsule omeprazole 20 mg capsule,delayed release    omeprazole (PRILOSEC) 40 MG capsule Take 40 mg by mouth every morning.     potassium chloride SA (K-DUR,KLOR-CON) 10 MEQ tablet     potassium chloride SA (K-DUR,KLOR-CON) 10 MEQ tablet potassium chloride ER 10 mEq tablet,extended release(part/cryst)   Take 1 tablet every day by oral route.    predniSONE (DELTASONE) 5 MG tablet prednisone 5 mg tablet   prn   Last reviewed on 6/29/2022 12:19 PM by Kaye Mai NP    Review of patient's allergies indicates:   Allergen Reactions    Morphine Hives and Itching    Avelox [moxifloxacin] Rash    Last reviewed on  6/29/2022 12:19 PM by Kaye Mai      Tasks added this encounter   No tasks added.   Tasks due within next 3 months   11/27/2022 - Refill Call (Auto Added)     [facility]  [address] [place], [place] [address]  [facility] [address]  Phone: [phone]  Fax: [phone]

## 2022-12-01 NOTE — TELEPHONE ENCOUNTER
Incoming call from [employer] providing new humira credit card to run. Entered into [facility], routing to [facility] for follow up

## 2022-12-01 NOTE — TELEPHONE ENCOUNTER
Specialty Pharmacy - Refill Coordination    Specialty Medication Orders Linked to Encounter      Flowsheet Row Most Recent Value   Medication #1 adalimumab (HUMIRA,CF, PEN) 40 mg/0.4 mL PnKt (Order#[id number], Rx#[id number])            Refill Questions - Documented Responses      Flowsheet Row Most Recent Value   Patient Availability and HIPAA Verification    Does patient want to proceed with activity? Yes   HIPAA/medical authority confirmed? Yes   Relationship to patient of person spoken to? Self   Refill Screening Questions    Changes to allergies? No   Changes to medications? No   New conditions since last clinic visit? No   Unplanned office visit, urgent care, ED, or hospital admission in the last 4 weeks? No   How does patient/caregiver feel medication is working? Good   Financial problems or insurance changes? No   How many doses of your specialty medications were missed in the last 4 weeks? 0   Would patient like to speak to a pharmacist? No   When does the patient need to receive the medication? 12/02/22   Refill Delivery Questions    How will the patient receive the medication? MEDRx   When does the patient need to receive the medication? 12/02/22   Shipping Address Home   Address in [facility] confirmed and updated if neccessary? Yes   Expected Copay ($) 1490.35   Is the patient able to afford the medication copay? Yes   Payment Method CC on file   Days supply of Refill 28   Supplies needed? No supplies needed   Refill activity completed? Yes   Refill activity plan Refill scheduled   Shipment/Pickup Date: 12/01/22            Current Outpatient Medications   Medication Sig    adalimumab (HUMIRA,CF, PEN) 40 mg/0.4 mL PnKt Inject 0.4 mL (40 mg total) into the skin every 14 (fourteen) days.    atorvastatin (LIPITOR) 10 MG tablet Lipitor 10 mg tablet   Take 1 tablet every day by oral route.    b complex vitamins tablet Take 1 tablet by mouth once daily.    ergocalciferol (ERGOCALCIFEROL) 50,000 unit Cap  TAKE 1 CAPSULE (50,000 UNITS TOTAL) BY MOUTH EVERY 7 DAYS.    levothyroxine (SYNTHROID) 100 MCG tablet Take 100 mcg by mouth before breakfast.     levothyroxine (SYNTHROID) 88 MCG tablet levothyroxine 88 mcg tablet    loratadine (CLARITIN) 10 mg tablet Take 10 mg by mouth daily as needed.     methylPREDNISolone (MEDROL DOSEPACK) 4 mg tablet use as directed (Patient not taking: Reported on 6/29/2022)    multivit with minerals/lutein (MULTIVITAMIN 50 PLUS ORAL) multivitamin   1 po daily    multivitamin with minerals tablet Take 1 tablet by mouth 2 (two) times daily.     omeprazole (PRILOSEC) 20 MG capsule omeprazole 20 mg capsule,delayed release    omeprazole (PRILOSEC) 40 MG capsule Take 40 mg by mouth every morning.     potassium chloride SA (K-DUR,KLOR-CON) 10 MEQ tablet     potassium chloride SA (K-DUR,KLOR-CON) 10 MEQ tablet potassium chloride ER 10 mEq tablet,extended release(part/cryst)   Take 1 tablet every day by oral route.    predniSONE (DELTASONE) 5 MG tablet prednisone 5 mg tablet   prn   Last reviewed on 6/29/2022 12:19 PM by Kaye Mai NP    Review of patient's allergies indicates:   Allergen Reactions    Morphine Hives and Itching    Avelox [moxifloxacin] Rash    Last reviewed on  6/29/2022 12:19 PM by Kaye Mai      Tasks added this encounter   12/23/2022 - Refill Call (Auto Added)   Tasks due within next 3 months   No tasks due.     Lesli Abarca, PharmD  [facility]  [address] [place] [id number]  Phone: [phone]  Fax: [phone]

## 2022-12-01 NOTE — TELEPHONE ENCOUNTER
Outgoing call [facility] escalation, per Yves WU he will attempt to call patient in 30 mins in regards to copay, patient can also call [phone] [facility] in [facility] to [facility].     Debit card declined, did not cover copay amount.    Outgoing call to patient unable to reach, LVM.

## 2022-12-07 ENCOUNTER — PATIENT MESSAGE (OUTPATIENT)
Dept: BARIATRICS | Facility: CLINIC | Age: 57
End: 2022-12-07
Payer: COMMERCIAL

## 2022-12-13 ENCOUNTER — TELEPHONE (OUTPATIENT)
Dept: OBSTETRICS AND GYNECOLOGY | Facility: CLINIC | Age: 57
End: 2022-12-13
Payer: COMMERCIAL

## 2022-12-13 DIAGNOSIS — Z12.39 ENCOUNTER FOR SCREENING FOR MALIGNANT NEOPLASM OF BREAST, UNSPECIFIED SCREENING MODALITY: Primary | ICD-10-CM

## 2022-12-13 NOTE — TELEPHONE ENCOUNTER
called patient to let her know that the orders are in for her mammogram and the department will call her to schedule

## 2022-12-23 ENCOUNTER — SPECIALTY PHARMACY (OUTPATIENT)
Dept: PHARMACY | Facility: CLINIC | Age: 57
End: 2022-12-23
Payer: COMMERCIAL

## 2022-12-23 DIAGNOSIS — M06.9 RHEUMATOID ARTHRITIS, INVOLVING UNSPECIFIED SITE, UNSPECIFIED WHETHER RHEUMATOID FACTOR PRESENT: Primary | ICD-10-CM

## 2022-12-27 NOTE — TELEPHONE ENCOUNTER
Spoke w pt regarding Humira refill. Pt stated she injected on 12/23 and will be due 1/6. Asked pt if we could call her a week before she is due and she agreed.

## 2022-12-27 NOTE — TELEPHONE ENCOUNTER
Called patient to discuss liquid diet and vitamins.    Pt using premier protein, premier clear, broth with unflavored protein powder    Discussion:  -  gms of protein per day  - 600-800 calories per day   - Less than 4gm sugar per shake  - SF, Decaf, non-carbonated Fluids  - No Fruits, juices, yogurt or pudding on liquid diet  - No vitamins or minerals for 1 week prior to surgery    Remind pt per nursing and medical team to inform our department if taking antibiotics within the 30 days post bariatric surgery or it any other surgeries/procedures are scheduled within 30 days after bariatric surgery.    Reminded pt of pre-op and surgery dates.    Pt to call back with any questions.  ----- Message from Annamarie Rabago RN sent at 2019  4:09 PM CST -----  Regardin week liq diet  1 week liquid diet starts 19  Sleeve surgery scheduled 19  preop appt scheduled 12/10/19    
Detail Level: Detailed
Additional Notes: Covid vaccines received
Quality 110: Preventive Care And Screening: Influenza Immunization: Influenza Immunization Administered during Influenza season

## 2023-01-03 NOTE — TELEPHONE ENCOUNTER
Specialty Pharmacy - Refill Coordination    Specialty Medication Orders Linked to Encounter      Flowsheet Row Most Recent Value   Medication #1 adalimumab (HUMIRA,CF, PEN) 40 mg/0.4 mL PnKt (Order#[id number], Rx#[id number])            Refill Questions - Documented Responses      Flowsheet Row Most Recent Value   Patient Availability and HIPAA Verification    Does patient want to proceed with activity? Yes   HIPAA/medical authority confirmed? Yes   Relationship to patient of person spoken to? Self   Refill Screening Questions    Changes to allergies? No   Changes to medications? No   New conditions since last clinic visit? No   Unplanned office visit, urgent care, ED, or hospital admission in the last 4 weeks? No   How does patient/caregiver feel medication is working? Good   Financial problems or insurance changes? No   How many doses of your specialty medications were missed in the last 4 weeks? 0   Would patient like to speak to a pharmacist? No   When does the patient need to receive the medication? 01/06/23   Refill Delivery Questions    How will the patient receive the medication? MEDRx   When does the patient need to receive the medication? 01/06/23   Shipping Address Home   Address in [facility] confirmed and updated if neccessary? Yes   Expected Copay ($) 2750   Is the patient able to afford the medication copay? Yes   Payment Method  CC on file   Days supply of Refill 28   Supplies needed? No supplies needed   Refill activity completed? Yes   Refill activity plan Refill scheduled   Shipment/Pickup Date: 01/04/23            Current Outpatient Medications   Medication Sig    adalimumab (HUMIRA,CF, PEN) 40 mg/0.4 mL PnKt Inject 0.4 mL (40 mg total) into the skin every 14 (fourteen) days.    atorvastatin (LIPITOR) 10 MG tablet Lipitor 10 mg tablet   Take 1 tablet every day by oral route.    b complex vitamins tablet Take 1 tablet by mouth once daily.    ergocalciferol (ERGOCALCIFEROL) 50,000  unit Cap TAKE 1 CAPSULE (50,000 UNITS TOTAL) BY MOUTH EVERY 7 DAYS.    levothyroxine (SYNTHROID) 100 MCG tablet Take 100 mcg by mouth before breakfast.     levothyroxine (SYNTHROID) 88 MCG tablet levothyroxine 88 mcg tablet    loratadine (CLARITIN) 10 mg tablet Take 10 mg by mouth daily as needed.     methylPREDNISolone (MEDROL DOSEPACK) 4 mg tablet use as directed (Patient not taking: Reported on 6/29/2022)    multivit with minerals/lutein (MULTIVITAMIN 50 PLUS ORAL) multivitamin   1 po daily    multivitamin with minerals tablet Take 1 tablet by mouth 2 (two) times daily.     omeprazole (PRILOSEC) 20 MG capsule omeprazole 20 mg capsule,delayed release    omeprazole (PRILOSEC) 40 MG capsule Take 40 mg by mouth every morning.     potassium chloride SA (K-DUR,KLOR-CON) 10 MEQ tablet     potassium chloride SA (K-DUR,KLOR-CON) 10 MEQ tablet potassium chloride ER 10 mEq tablet,extended release(part/cryst)   Take 1 tablet every day by oral route.    predniSONE (DELTASONE) 5 MG tablet prednisone 5 mg tablet   prn   Last reviewed on 6/29/2022 12:19 PM by Kaye Mai NP    Review of patient's allergies indicates:   Allergen Reactions    Morphine Hives and Itching    Avelox [moxifloxacin] Rash    Last reviewed on  6/29/2022 12:19 PM by Kaye Mai      Tasks added this encounter   1/27/2023 - Refill Call (Auto Added)   Tasks due within next 3 months   3/22/2023 - Clinical - Follow Up Assesement (Annual)     Bisi Parish, LelandD  [facility]  [address] [place] [id number]  Phone: [phone]  Fax: [phone]

## 2023-01-09 ENCOUNTER — OFFICE VISIT (OUTPATIENT)
Dept: OBSTETRICS AND GYNECOLOGY | Facility: CLINIC | Age: 58
End: 2023-01-09
Payer: COMMERCIAL

## 2023-01-09 ENCOUNTER — PATIENT MESSAGE (OUTPATIENT)
Dept: BARIATRICS | Facility: CLINIC | Age: 58
End: 2023-01-09
Payer: COMMERCIAL

## 2023-01-09 VITALS
BODY MASS INDEX: 34.92 KG/M2 | WEIGHT: 190.94 LBS | DIASTOLIC BLOOD PRESSURE: 90 MMHG | SYSTOLIC BLOOD PRESSURE: 147 MMHG

## 2023-01-09 DIAGNOSIS — Z01.419 WELL WOMAN EXAM WITH ROUTINE GYNECOLOGICAL EXAM: Primary | ICD-10-CM

## 2023-01-09 DIAGNOSIS — N95.2 POSTMENOPAUSAL ATROPHIC VAGINITIS: ICD-10-CM

## 2023-01-09 DIAGNOSIS — N64.4 BREAST PAIN: ICD-10-CM

## 2023-01-09 PROBLEM — Z23 NEED FOR VACCINATION: Status: RESOLVED | Noted: 2018-03-06 | Resolved: 2023-01-09

## 2023-01-09 PROCEDURE — 1159F MED LIST DOCD IN RCRD: CPT | Mod: CPTII,S$GLB,, | Performed by: NURSE PRACTITIONER

## 2023-01-09 PROCEDURE — 99999 PR PBB SHADOW E&M-EST. PATIENT-LVL IV: ICD-10-PCS | Mod: PBBFAC,,, | Performed by: NURSE PRACTITIONER

## 2023-01-09 PROCEDURE — 3080F PR MOST RECENT DIASTOLIC BLOOD PRESSURE >= 90 MM HG: ICD-10-PCS | Mod: CPTII,S$GLB,, | Performed by: NURSE PRACTITIONER

## 2023-01-09 PROCEDURE — 99999 PR PBB SHADOW E&M-EST. PATIENT-LVL IV: CPT | Mod: PBBFAC,,, | Performed by: NURSE PRACTITIONER

## 2023-01-09 PROCEDURE — 99396 PR PREVENTIVE VISIT,EST,40-64: ICD-10-PCS | Mod: S$GLB,,, | Performed by: NURSE PRACTITIONER

## 2023-01-09 PROCEDURE — 99396 PREV VISIT EST AGE 40-64: CPT | Mod: S$GLB,,, | Performed by: NURSE PRACTITIONER

## 2023-01-09 PROCEDURE — 1159F PR MEDICATION LIST DOCUMENTED IN MEDICAL RECORD: ICD-10-PCS | Mod: CPTII,S$GLB,, | Performed by: NURSE PRACTITIONER

## 2023-01-09 PROCEDURE — 3080F DIAST BP >= 90 MM HG: CPT | Mod: CPTII,S$GLB,, | Performed by: NURSE PRACTITIONER

## 2023-01-09 PROCEDURE — 3008F PR BODY MASS INDEX (BMI) DOCUMENTED: ICD-10-PCS | Mod: CPTII,S$GLB,, | Performed by: NURSE PRACTITIONER

## 2023-01-09 PROCEDURE — 3077F SYST BP >= 140 MM HG: CPT | Mod: CPTII,S$GLB,, | Performed by: NURSE PRACTITIONER

## 2023-01-09 PROCEDURE — 3008F BODY MASS INDEX DOCD: CPT | Mod: CPTII,S$GLB,, | Performed by: NURSE PRACTITIONER

## 2023-01-09 PROCEDURE — 3077F PR MOST RECENT SYSTOLIC BLOOD PRESSURE >= 140 MM HG: ICD-10-PCS | Mod: CPTII,S$GLB,, | Performed by: NURSE PRACTITIONER

## 2023-01-09 RX ORDER — SEMAGLUTIDE 1.34 MG/ML
INJECTION, SOLUTION SUBCUTANEOUS
COMMUNITY
Start: 2022-11-17 | End: 2023-12-06

## 2023-01-09 NOTE — PROGRESS NOTES
CC: Annual  HPI: Pt is a 57 y.o.  female who presents for routine annual exam. New to me- saw Dr. Simons last visit. She is postmenopausal. She is not on HRT. SA with her partner but does have dryness, uses otc lubrication. Has some discomfort after intercourse. Bilateral breast pain, L>R, started last month- intermittent, mainly in outer quadrants. Overdue for mammogram. Denies any lumps or skin changes. Started ozempic in November. 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    NOTES FROM LAST VISIT WITH LAM IN -  CC: 54 yo  female, here for AE  HPI: Here for WWE. No GYN concerns today. She is an RN. SA with . Went through menopause in her early 40s. No bleeding since then. Lives with her daughter (nursing school) and 7 yo grand daughter. She has PCP. Due for mammogram. Last pap 2017, NILM. Was doing cross fit for exercise, but then had a hernia so has stopped.  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 Drives 45 minutes to work.       FH:   Breast cancer: none  Colon cancer: none  Ovarian cancer: none  Uterine cancer: none    HPV vaccine: na  Last pap smear:  nilm, hpv negative  History of abnormal pap smears: no    Colonoscopy: due  DEXA: na  Mammogram: due  STD history: no  Birth control: btl,   OB history:   Tobacco use: no  Hx gastric sleeve    ROS:  GENERAL: Feeling well overall. Denies fever or chills.   SKIN: Denies rash or lesions.   HEAD: Denies head injury or headache.   NODES: Denies enlarged lymph nodes.   CHEST: Denies chest pain or shortness of breath.   CARDIOVASCULAR: Denies palpitations or left sided chest pain.   ABDOMEN: No abdominal pain, constipation, diarrhea, nausea, vomiting or rectal bleeding.   URINARY: No dysuria, hematuria, or burning on urination.  REPRODUCTIVE: See HPI.   BREASTS: Denies pain, lumps, or nipple discharge.   HEMATOLOGIC: No easy bruisability or excessive bleeding.   MUSCULOSKELETAL: Denies joint pain or swelling.   NEUROLOGIC: Denies syncope or weakness.    PSYCHIATRIC: Denies depression, anxiety or mood swings.    PE:   APPEARANCE: Well nourished, well developed, White female in no acute distress.  NODES: no cervical, supraclavicular, or inguinal lymphadenopathy  BREASTS: Symmetrical, no skin changes or visible lesions. No palpable masses, nipple discharge or adenopathy bilaterally.  ABDOMEN: Soft. No tenderness or masses. No distention. No hernias palpated. No CVA tenderness.  VULVA: No lesions. Normal external female genitalia.  URETHRAL MEATUS: Normal size and location, no lesions, no prolapse.  URETHRA: No masses, tenderness, or prolapse.  VAGINA: atrophic. No lesions or lacerations noted. No abnormal discharge present. No odor present.   CERVIX: No lesions or discharge. No cervical motion tenderness.   UTERUS: Normal size, regular shape, mobile, non-tender.  ADNEXA: No tenderness. No fullness or masses palpated in the adnexal regions.   ANUS PERINEUM: Normal.      Diagnosis:  1. Well woman exam with routine gynecological exam    2. Breast pain    3. Postmenopausal atrophic vaginitis        Plan:     Orders Placed This Encounter    Mammo Digital Diagnostic Left with Cj    Mammo Digital Diagnostic Right with Cj    US Breast Bilateral Limited    prasterone, dhea, (INTRAROSA) 6.5 mg Inst     Diagnostic mammogram with US ordered  Pap current, due next year  Colonoscopy due- pt will schedule with GI herself  Start Intrarosa, coupon given today    Patient was counseled today on the new ACS guidelines for cervical cytology screening as well as the current recommendations for breast cancer screening. She was counseled to follow up with her PCP for other routine health maintenance. Counseling session lasted approximately 10 minutes, and all her questions were answered.  For women over the age of 65, you can stop having cervical cancer screenings if you have never had abnormal cervical cells or cervical cancer, and youve had three negative Pap tests in a row. (You  also can stop screening if youve had two negative Pap and HPV tests in a row in the past 10 years, with at least one test in the past 5 years.),    Follow-up with me in 1 year for routine exam; pap in 1 year.     I spent a total of  30 minutes on the day of the visit.This includes face to face time and non-face to face time preparing to see the patient (eg, review of tests), obtaining and/or reviewing separately obtained history, documenting clinical information in the electronic or other health record, independently interpreting results and communicating results to the patient/family/caregiver, or care coordinator.    As of April 1, 2021, the Cures Act has been passed nationally. This new law requires that all doctors progress notes, lab results, pathology reports and radiology reports be released IMMEDIATELY to the patient in the patient portal. That means that the results are released to you at the EXACT same time they are released to me. Therefore, with all of the patients that I have I am not able to reply to each patient exactly when the results come in. So there will be a delay from when you see the results to when I see them and have time to come up with a response to send you. Also I only see these results when I am on the computer at work. So if the results come in over the weekend or after 5 pm of a work day, I will not see them until the next business day. As you can tell, this is a challenge as a provider to give every patient the quick response they hope for and deserve. So please be patient!     Thanks for your understanding and patience.

## 2023-01-27 ENCOUNTER — SPECIALTY PHARMACY (OUTPATIENT)
Dept: PHARMACY | Facility: CLINIC | Age: 58
End: 2023-01-27
Payer: COMMERCIAL

## 2023-01-27 DIAGNOSIS — M06.00 SERONEGATIVE RHEUMATOID ARTHRITIS: ICD-10-CM

## 2023-01-27 DIAGNOSIS — M06.9 RHEUMATOID ARTHRITIS, INVOLVING UNSPECIFIED SITE, UNSPECIFIED WHETHER RHEUMATOID FACTOR PRESENT: Primary | ICD-10-CM

## 2023-01-27 NOTE — TELEPHONE ENCOUNTER
Outgoing call regarding Humira refill. Pt stated she last injected on 1/24/23, her next injection is due on 2/7/23. Pt stated she was late on her dose due to forgetting and lost track. Informed pt refill request has been sent to provider for approval and will follow up with her to set up refill, Pt verbalized understanding. Routing to assigned pharmacist due to delay of dose.

## 2023-01-28 RX ORDER — ADALIMUMAB 40MG/0.4ML
40 KIT SUBCUTANEOUS
Qty: 2 PEN | Refills: 11 | OUTPATIENT
Start: 2023-01-28 | End: 2024-01-28

## 2023-01-31 NOTE — TELEPHONE ENCOUNTER
Humira for RA refill request denied by rheum. Called pt to notify and advise she reach out to her provider, she may be due for appt.

## 2023-02-03 ENCOUNTER — PATIENT MESSAGE (OUTPATIENT)
Dept: RHEUMATOLOGY | Facility: CLINIC | Age: 58
End: 2023-02-03
Payer: COMMERCIAL

## 2023-02-09 ENCOUNTER — PATIENT MESSAGE (OUTPATIENT)
Dept: BARIATRICS | Facility: CLINIC | Age: 58
End: 2023-02-09
Payer: COMMERCIAL

## 2023-02-09 NOTE — TELEPHONE ENCOUNTER
Humira for RA refills pending rheum office visit. Pt scheduled appt with Rheum 2/15/23. Will reach out to pt after appt if we do not hear back from pt or receive new order before then.

## 2023-02-09 NOTE — TELEPHONE ENCOUNTER
Incoming call from pt regarding Humira.  Informed pt that [facility] has not received refill authorization from MD yet and pharmacist has reached out to MD.  Pt voiced understanding.

## 2023-02-10 ENCOUNTER — OFFICE VISIT (OUTPATIENT)
Dept: RHEUMATOLOGY | Facility: CLINIC | Age: 58
End: 2023-02-10
Payer: COMMERCIAL

## 2023-02-10 DIAGNOSIS — M06.00 SERONEGATIVE RHEUMATOID ARTHRITIS: ICD-10-CM

## 2023-02-10 DIAGNOSIS — M70.62 TROCHANTERIC BURSITIS OF BOTH HIPS: ICD-10-CM

## 2023-02-10 DIAGNOSIS — M70.61 TROCHANTERIC BURSITIS OF BOTH HIPS: ICD-10-CM

## 2023-02-10 DIAGNOSIS — M25.559 HIP PAIN: Primary | ICD-10-CM

## 2023-02-10 PROCEDURE — 99214 PR OFFICE/OUTPT VISIT, EST, LEVL IV, 30-39 MIN: ICD-10-PCS | Mod: 95,,, | Performed by: INTERNAL MEDICINE

## 2023-02-10 PROCEDURE — 1159F PR MEDICATION LIST DOCUMENTED IN MEDICAL RECORD: ICD-10-PCS | Mod: CPTII,95,, | Performed by: INTERNAL MEDICINE

## 2023-02-10 PROCEDURE — 1160F RVW MEDS BY RX/DR IN RCRD: CPT | Mod: CPTII,95,, | Performed by: INTERNAL MEDICINE

## 2023-02-10 PROCEDURE — 1159F MED LIST DOCD IN RCRD: CPT | Mod: CPTII,95,, | Performed by: INTERNAL MEDICINE

## 2023-02-10 PROCEDURE — 1160F PR REVIEW ALL MEDS BY PRESCRIBER/CLIN PHARMACIST DOCUMENTED: ICD-10-PCS | Mod: CPTII,95,, | Performed by: INTERNAL MEDICINE

## 2023-02-10 PROCEDURE — 99214 OFFICE O/P EST MOD 30 MIN: CPT | Mod: 95,,, | Performed by: INTERNAL MEDICINE

## 2023-02-10 NOTE — PROGRESS NOTES
Rapid3 Question Responses and Scores 2/10/2023   MDHAQ Score 0   Psychologic Score 0   Pain Score 3   When you awakened in the morning OVER THE LAST WEEK, did you feel stiff? Yes   If Yes, please indicate the number of hours until you are as limber as you will be for the day 0.5   Fatigue Score 2   Global Health Score 2   RAPID3 Score 1.67     Answers submitted by the patient for this visit:  Rheumatology Questionnaire (Submitted on 2/10/2023)  fever: No  eye redness: No  mouth sores: No  headaches: Yes  shortness of breath: No  chest pain: No  trouble swallowing: No  diarrhea: No  constipation: No  unexpected weight change: No  genital sore: No  dysuria: No  During the last 3 days, have you had a skin rash?: No  Bruises or bleeds easily: No  cough: No

## 2023-02-10 NOTE — PROGRESS NOTES
Chief Complaint   Patient presents with    Disease Management     The patient location is: home  The chief complaint leading to consultation is: seronegative RA    Visit type: audiovisual    Face to Face time with patient: 30  minutes of total time spent on the encounter, which includes face to face time and non-face to face time preparing to see the patient (eg, review of tests), Obtaining and/or reviewing separately obtained history, Documenting clinical information in the electronic or other health record, Independently interpreting results (not separately reported) and communicating results to the patient/family/caregiver, or Care coordination (not separately reported).         Each patient to whom he or she provides medical services by telemedicine is:  (1) informed of the relationship between the physician and patient and the respective role of any other health care provider with respect to management of the patient; and (2) notified that he or she may decline to receive medical services by telemedicine and may withdraw from such care at any time.    Notes:       Patient is here for a follow up    The patient location is: home  The chief complaint leading to consultation is: rheumatoid arthritis    Visit type: audiovisual    Face to Face time with patient: 30  minutes of total time spent on the encounter, which includes face to face time and non-face to face time preparing to see the patient (eg, review of tests), Obtaining and/or reviewing separately obtained history, Documenting clinical information in the electronic or other health record, Independently interpreting results (not separately reported) and communicating results to the patient/family/caregiver, or Care coordination (not separately reported).       Each patient to whom he or she provides medical services by telemedicine is:  (1) informed of the relationship between the physician and patient and the respective role of any other health care  provider with respect to management of the patient; and (2) notified that he or she may decline to receive medical services by telemedicine and may withdraw from such care at any time.    Notes:       History of presenting illness    57 year old female with seronegative rheumatoid arthritis for a follow up    She is now on humira  Joints feel great    Knees are ok now  Hips hurt  Feet hurt since she has started to work 12 hour shifts    In the past took mtx,had severe hair fall     Recent small bowel obstruction due to lipomas     She has a previous diagnosis of uveitis  Eyes are doing good now    3/2020    ESR,CRP nml    Right wrist MRI : mass like area of abnormal signal and enhancement within dorsal subcutaneous fat of the wrist : inflammatory process vs cellulitis vs neoplasm/desmoid tumor    Sent her to ortho : she had CTS right one operated    She had trigger thumbs and trigger fingers and she had few surgeries     She has lost weight and she has done really better       TAQUERIA negative  RF,CCP negative  Hepatitis b and c negative  HLAB27 negative    CRP 96.9  ESR 92    SPEP no M spike    Uric acid 2.2    Vit d 8,went upto 26  TSH normal    Ck,aldolase normal    Xrays    Hands degenerative changes  Ankles degenerative changes and bone spur  Knees degenerative changes  Feet : degenerative changes     ID clearance done  Did hepatitis A and B vaccine  Doing the shingrix vaccine    1/2022    Some upper resp tract infection  Coughing and congested  Nasal tone to her voice    She took humira 2 weeks ago  She is due today    54 year old white female presented with the following symptoms :    December around Christmas she had the upper respiratory tract symptoms with myalgia and nasal congestion  Following this she started to experience severe joint pain  All her joints in the hands,elbows,knees,feet started to hurt and swell  EMS for 1 hour  Alleve helps minimally    Hands : thumbs and index fingers and the 3rd digits  hurt the worse  She cant make a fist  Thinks that the left 2nd finger swells like a sausage  Swelling along the tendons as per her PCP,she says he thought she has tenosynovitis     Hips and shoulders ache but no stiffness,good ROM in them    Cold weather makes her symptoms worse     Claims to have had labs : she had ESR,CRP elevation apparently     Past history: asthma,diabetes,JESSICA,thyroid disease,HTN  Occasional tendinitis feet b/l    Has a rash every winter,dermatology thinks it is eczema  This time started in November on the elbows,back,torso,legs,didnt get better,but derm suggested stronger steroids  Has not been told to have psoriasis     Recently had small bowel resection for lipoma    2/2023    Morning stiffness-  Hands and feet-stiff   She gets up and takes a hot shower    Both hips hurt,left> right  Sitting in certain positions is hard  Getting up from the chair is hard  Hurts outside the hips    Overall very functional      Family history : psoriasis and psoriatic arthritis runs in the family     Social history : quit smoking a while ago,drinks occasionally     Review of Systems   Constitutional:  Negative for activity change, appetite change, chills, diaphoresis, fatigue, fever and unexpected weight change.   HENT:  Negative for congestion, dental problem, drooling, ear discharge, ear pain, facial swelling, hearing loss, mouth sores, nosebleeds, postnasal drip, rhinorrhea, sinus pressure, sinus pain, sneezing, sore throat, tinnitus, trouble swallowing and voice change.    Eyes:  Negative for photophobia, pain, discharge, redness, itching and visual disturbance.   Respiratory:  Negative for apnea, cough, choking, chest tightness, shortness of breath, wheezing and stridor.    Cardiovascular:  Negative for chest pain, palpitations and leg swelling.   Gastrointestinal:  Negative for abdominal distention, abdominal pain, anal bleeding, blood in stool, constipation, diarrhea, nausea, rectal pain and vomiting.    Endocrine: Negative for cold intolerance, heat intolerance, polydipsia, polyphagia and polyuria.   Genitourinary:  Negative for decreased urine volume, difficulty urinating, dysuria, enuresis, flank pain, frequency, genital sores, hematuria and urgency.   Musculoskeletal:  Negative for back pain, gait problem, myalgias, neck pain and neck stiffness.   Skin:  Negative for color change, pallor, rash and wound.   Allergic/Immunologic: Negative for environmental allergies, food allergies and immunocompromised state.   Neurological:  Negative for dizziness, tremors, seizures, syncope, facial asymmetry, speech difficulty, weakness, light-headedness, numbness and headaches.   Hematological:  Negative for adenopathy. Does not bruise/bleed easily.   Psychiatric/Behavioral:  Negative for agitation, behavioral problems, confusion, decreased concentration, dysphoric mood, hallucinations, self-injury, sleep disturbance and suicidal ideas. The patient is not nervous/anxious and is not hyperactive.      Physical Exam     Physical Exam   Constitutional: She is oriented to person, place, and time. No distress.   HENT:   Head: Normocephalic.   Mouth/Throat: Oropharynx is clear and moist.   Eyes: Pupils are equal, round, and reactive to light. Conjunctivae are normal. Right eye exhibits no discharge. Left eye exhibits no discharge. No scleral icterus.   Neck: No thyromegaly present.   Cardiovascular: Normal rate, regular rhythm and normal heart sounds.   Pulmonary/Chest: Effort normal and breath sounds normal. No stridor.   Abdominal: Soft. Bowel sounds are normal.   Musculoskeletal:         General: Tenderness present.      Cervical back: Normal range of motion.   Lymphadenopathy:     She has no cervical adenopathy.   Neurological: She is alert and oriented to person, place, and time.   Skin: Skin is warm. No rash noted. She is not diaphoretic.   Psychiatric: Affect and judgment normal.     Assessment     57 year old white female  presented to me with 1 month of polyarthralgias in the hands,elbows,knees and feet : s/p URT symptoms during [place] : is also s/p bowel resection for a lipoma : reported swelling in addition to pain and stiffness in her joints : significant EMS : symmetric involvement : in addition reported to having dactylitis   On exam : synovitis noted in many joints,no evidence of dactylitis.    She has inflammatory arthritis,seronegative,unclear yet if she has psoriatic arthritis since the rash might be eczema vs psoriasis  Reactive arthritis possible too    Labs revealed very high inflammatory markers with negative RA and TAQUERIA and HLAB27 panel  Xrays with degenerative arthritis     She did well on prednisone  I then introduced mtx 5 tabs weekly and tapered her prednisone   Then titrated mtx to 8 tabs weekly  She didn't  tolerate the mtx     We switched to humira and she has done well    She has a diagnosis of uveitis   But last eye exam normal    New small bowel obstruction  Due to lipomas   She has recurrent lipomas this is the second SBO due to lipomas      Some knee pain due to OA  Hand arthralgias due to OA  Hip pain   Feet pain    Upper resp tract infection-recovering    2/2023    Morning stiffness-  Hands and feet-stiff   She gets up and takes a hot shower    Both hips hurt,left> right  Sitting in certain positions is hard  Getting up from the chair is hard  Hurts outside the hips    Overall very functional    1. Hip pain    2. Trochanteric bursitis of both hips    3. Seronegative rheumatoid arthritis            Plan    Continue humira    Hip xrays  Hip bursitis exercises    utd with all vaccines   covid booster pending  Flu utd    Vit d maintanence    Tb,hepatitis panel     CBC,CMP,ESR,CRP    Ailyn was seen today for disease management.    Diagnoses and all orders for this visit:    Hip pain  -     X-Ray Hips Bilateral 2 View [employer] AP Pelvis; Future  -     Ambulatory referral/consult to Physical/Occupational Therapy;  Future  -     CBC Auto Differential; Future  -     Comprehensive Metabolic Panel; Future  -     Sedimentation rate; Future  -     C-Reactive Protein; Future    Trochanteric bursitis of both hips  -     Ambulatory referral/consult to Physical/Occupational Therapy; Future  -     CBC Auto Differential; Future  -     Comprehensive Metabolic Panel; Future  -     Sedimentation rate; Future  -     C-Reactive Protein; Future    Seronegative rheumatoid arthritis  -     CBC Auto Differential; Future  -     Comprehensive Metabolic Panel; Future  -     Sedimentation rate; Future  -     C-Reactive Protein; Future          rtc in 6 months

## 2023-02-14 ENCOUNTER — PATIENT MESSAGE (OUTPATIENT)
Dept: BARIATRICS | Facility: CLINIC | Age: 58
End: 2023-02-14
Payer: COMMERCIAL

## 2023-02-14 DIAGNOSIS — M06.00 SERONEGATIVE RHEUMATOID ARTHRITIS: ICD-10-CM

## 2023-02-14 RX ORDER — ADALIMUMAB 40MG/0.4ML
40 KIT SUBCUTANEOUS
Qty: 2 PEN | Refills: 11 | Status: SHIPPED | OUTPATIENT
Start: 2023-02-14 | End: 2023-06-29

## 2023-02-14 RX ORDER — ADALIMUMAB 40MG/0.4ML
40 KIT SUBCUTANEOUS
Qty: 2 PEN | Refills: 11 | Status: CANCELLED | OUTPATIENT
Start: 2023-02-14 | End: 2024-02-14

## 2023-02-14 NOTE — TELEPHONE ENCOUNTER
Called [facility] spoke with rep [employer] reports there was a duplicate claim that needed to be reversed on [employer]'s end. Rep requested urgent reversal and said it should take about 1 to 2 business days then reprocess claim. Will check back in the morning.

## 2023-02-14 NOTE — TELEPHONE ENCOUNTER
Pt had virtual visit with provider 2/10 and to resume Humira for RA. Refill was not sent to [facility]. Pt states her RA symptoms are beginning to onset due to missed Humira dose 2/7. Refill request re-sent to Dr. Fulton and message sent to provider to send refill if appropriate. Explained to pt will call her to set up shipment once authorized voiced understanding.

## 2023-02-14 NOTE — TELEPHONE ENCOUNTER
Humira refill order received, claim rejects for cost maximizer. needs to call [phone] and ask them to conduct benefits investigation and to tell them to opt her out of the cost maximizer program and when its complete to call us back so we can set up. Pt reached and contacted Bela three way call to assist. Pt had to get off the phone bc she was at work.

## 2023-02-15 NOTE — TELEPHONE ENCOUNTER
Specialty Pharmacy - Refill Coordination  Specialty Pharmacy - Clinical Intervention    Specialty Medication Orders Linked to Encounter      Flowsheet Row Most Recent Value   Medication #1 adalimumab (HUMIRA,CF, PEN) 40 mg/0.4 mL PnKt (Order#[id number], Rx#[id number])            Refill Questions - Documented Responses      Flowsheet Row Most Recent Value   Patient Availability and HIPAA Verification    Does patient want to proceed with activity? Yes   HIPAA/medical authority confirmed? Yes   Relationship to patient of person spoken to? Self   Refill Screening Questions    Changes to allergies? No   Changes to medications? No   New conditions since last clinic visit? No   Unplanned office visit, urgent care, ED, or hospital admission in the last 4 weeks? No   How does patient/caregiver feel medication is working? Good   Financial problems or insurance changes? No   How many doses of your specialty medications were missed in the last 4 weeks? 1   Why were doses missed? Cost  [waited on office vist for new prescription then Humira copay card rejected, addressed in ivent.]   Would patient like to speak to a pharmacist? No   When does the patient need to receive the medication? 02/16/23   Refill Delivery Questions    How will the patient receive the medication? MEDRx   When does the patient need to receive the medication? 02/16/23   Shipping Address Home   Address in [facility] confirmed and updated if neccessary? Yes   Expected Copay ($) 5   Is the patient able to afford the medication copay? Yes   Payment Method  CC on file, CC on file   Days supply of Refill 28   Supplies needed? No supplies needed   Refill activity completed? Yes   Refill activity plan Refill scheduled   Shipment/Pickup Date: 02/15/23            Current Outpatient Medications   Medication Sig    adalimumab (HUMIRA,CF, PEN) 40 mg/0.4 mL PnKt Inject 0.4 mL (40 mg total) into the skin every 14 (fourteen) days.    atorvastatin  (LIPITOR) 10 MG tablet Lipitor 10 mg tablet   Take 1 tablet every day by oral route.    b complex vitamins tablet Take 1 tablet by mouth once daily.    ergocalciferol (ERGOCALCIFEROL) 50,000 unit Cap TAKE 1 CAPSULE (50,000 UNITS TOTAL) BY MOUTH EVERY 7 DAYS.    levothyroxine (SYNTHROID) 88 MCG tablet levothyroxine 88 mcg tablet    loratadine (CLARITIN) 10 mg tablet Take 10 mg by mouth daily as needed.     multivit with minerals/lutein (MULTIVITAMIN 50 PLUS ORAL) multivitamin   1 po daily    multivitamin with minerals tablet Take 1 tablet by mouth 2 (two) times daily.     omeprazole (PRILOSEC) 40 MG capsule Take 40 mg by mouth every morning.     potassium chloride SA (K-DUR,KLOR-CON) 10 MEQ tablet potassium chloride ER 10 mEq tablet,extended release(part/cryst)   Take 1 tablet every day by oral route.    prasterone, dhea, (INTRAROSA) 6.5 mg Inst Place 6.5 mg vaginally every evening.    predniSONE (DELTASONE) 5 MG tablet prednisone 5 mg tablet   prn    semaglutide (OZEMPIC) 0.25 mg or 0.5 mg(2 mg/1.5 mL) pen injector    Last reviewed on 2/10/2023 10:00 AM by Brodie Fulton MD    Review of patient's allergies indicates:   Allergen Reactions    Morphine Hives and Itching    Avelox [moxifloxacin] Rash    Last reviewed on  2/14/2023 11:36 AM by Brodie Fulton    Interventions added this encounter   Closed: [facility] Patient Intervention - Drug therapy adherence: adalimumab (HUMIRA,CF, PEN) 40 mg/0.4 mL PnKt     Tasks added this encounter   3/9/2023 - Refill Call (Auto Added)   Tasks due within next 3 months   3/22/2023 - Clinical - Follow Up Assesement (Annual)     Bisi Parish, PharmD  [facility]  [address] [place] [id number]  Phone: [phone]  Fax: [phone]

## 2023-02-22 ENCOUNTER — PATIENT MESSAGE (OUTPATIENT)
Dept: BARIATRICS | Facility: CLINIC | Age: 58
End: 2023-02-22
Payer: COMMERCIAL

## 2023-03-02 ENCOUNTER — HOSPITAL ENCOUNTER (OUTPATIENT)
Dept: RADIOLOGY | Facility: OTHER | Age: 58
Discharge: HOME | End: 2023-03-02
Attending: NURSE PRACTITIONER
Payer: COMMERCIAL

## 2023-03-02 DIAGNOSIS — N64.4 BREAST PAIN: ICD-10-CM

## 2023-03-02 DIAGNOSIS — Z12.31 VISIT FOR SCREENING MAMMOGRAM: ICD-10-CM

## 2023-03-02 PROCEDURE — 77067 SCR MAMMO BI INCL CAD: CPT | Mod: TC

## 2023-03-02 PROCEDURE — 77067 MAMMO DIGITAL SCREENING BILAT WITH TOMO: ICD-10-PCS | Mod: 26,,, | Performed by: RADIOLOGY

## 2023-03-02 PROCEDURE — 77067 SCR MAMMO BI INCL CAD: CPT | Mod: 26,,, | Performed by: RADIOLOGY

## 2023-03-02 PROCEDURE — 77063 BREAST TOMOSYNTHESIS BI: CPT | Mod: 26,,, | Performed by: RADIOLOGY

## 2023-03-02 PROCEDURE — 77063 MAMMO DIGITAL SCREENING BILAT WITH TOMO: ICD-10-PCS | Mod: 26,,, | Performed by: RADIOLOGY

## 2023-03-08 ENCOUNTER — PATIENT MESSAGE (OUTPATIENT)
Dept: BARIATRICS | Facility: CLINIC | Age: 58
End: 2023-03-08
Payer: COMMERCIAL

## 2023-03-14 ENCOUNTER — PATIENT MESSAGE (OUTPATIENT)
Dept: BARIATRICS | Facility: CLINIC | Age: 58
End: 2023-03-14
Payer: COMMERCIAL

## 2023-03-20 ENCOUNTER — SPECIALTY PHARMACY (OUTPATIENT)
Dept: PHARMACY | Facility: CLINIC | Age: 58
End: 2023-03-20
Payer: COMMERCIAL

## 2023-03-20 DIAGNOSIS — M06.9 RHEUMATOID ARTHRITIS, INVOLVING UNSPECIFIED SITE, UNSPECIFIED WHETHER RHEUMATOID FACTOR PRESENT: Primary | ICD-10-CM

## 2023-03-20 NOTE — TELEPHONE ENCOUNTER
Specialty Pharmacy - Refill Coordination    Specialty Medication Orders Linked to Encounter      Flowsheet Row Most Recent Value   Medication #1 adalimumab (HUMIRA,CF, PEN) 40 mg/0.4 mL PnKt (Order#[id number], Rx#[id number])            Refill Questions - Documented Responses      Flowsheet Row Most Recent Value   Patient Availability and HIPAA Verification    Does patient want to proceed with activity? Yes   HIPAA/medical authority confirmed? Yes   Relationship to patient of person spoken to? Self   Refill Screening Questions    Changes to allergies? No   Changes to medications? No   New conditions since last clinic visit? No   Unplanned office visit, urgent care, ED, or hospital admission in the last 4 weeks? No   How does patient/caregiver feel medication is working? Good   Financial problems or insurance changes? No   How many doses of your specialty medications were missed in the last 4 weeks? 0   Would patient like to speak to a pharmacist? No   When does the patient need to receive the medication? 03/27/23   Refill Delivery Questions    How will the patient receive the medication? MEDRx   When does the patient need to receive the medication? 03/27/23   Shipping Address Home   Address in [facility] confirmed and updated if neccessary? Yes   Expected Copay ($) 25.68   Is the patient able to afford the medication copay? Yes   Payment Method CC on file,  CC on file   Days supply of Refill 28   Supplies needed? No supplies needed   Refill activity completed? Yes   Refill activity plan Refill scheduled   Shipment/Pickup Date: 03/21/23            Current Outpatient Medications   Medication Sig    adalimumab (HUMIRA,CF, PEN) 40 mg/0.4 mL PnKt Inject 0.4 mL (40 mg total) into the skin every 14 (fourteen) days.    atorvastatin (LIPITOR) 10 MG tablet Lipitor 10 mg tablet   Take 1 tablet every day by oral route.    b complex vitamins tablet Take 1 tablet by mouth once daily.    ergocalciferol  (ERGOCALCIFEROL) 50,000 unit Cap TAKE 1 CAPSULE (50,000 UNITS TOTAL) BY MOUTH EVERY 7 DAYS.    levothyroxine (SYNTHROID) 88 MCG tablet levothyroxine 88 mcg tablet    loratadine (CLARITIN) 10 mg tablet Take 10 mg by mouth daily as needed.     multivit with minerals/lutein (MULTIVITAMIN 50 PLUS ORAL) multivitamin   1 po daily    multivitamin with minerals tablet Take 1 tablet by mouth 2 (two) times daily.     omeprazole (PRILOSEC) 40 MG capsule Take 40 mg by mouth every morning.     potassium chloride SA (K-DUR,KLOR-CON) 10 MEQ tablet potassium chloride ER 10 mEq tablet,extended release(part/cryst)   Take 1 tablet every day by oral route.    prasterone, dhea, (INTRAROSA) 6.5 mg Inst Place 6.5 mg vaginally every evening.    predniSONE (DELTASONE) 5 MG tablet prednisone 5 mg tablet   prn    semaglutide (OZEMPIC) 0.25 mg or 0.5 mg(2 mg/1.5 mL) pen injector    Last reviewed on 2/10/2023 10:00 AM by Brodie Fulton MD    Review of patient's allergies indicates:   Allergen Reactions    Morphine Hives and Itching    Avelox [moxifloxacin] Rash    Last reviewed on  2/17/2023 10:55 AM by Randee Flynn      Tasks added this encounter   4/17/2023 - Refill Call (Auto Added)   Tasks due within next 3 months   3/22/2023 - Clinical - Follow Up Assesement (Annual)     Bisi Parish, LelandD  [facility]  [address] [place] [id number]  Phone: [phone]  Fax: [phone]

## 2023-04-05 ENCOUNTER — PATIENT MESSAGE (OUTPATIENT)
Dept: BARIATRICS | Facility: CLINIC | Age: 58
End: 2023-04-05
Payer: COMMERCIAL

## 2023-04-11 ENCOUNTER — PATIENT MESSAGE (OUTPATIENT)
Dept: BARIATRICS | Facility: CLINIC | Age: 58
End: 2023-04-11
Payer: COMMERCIAL

## 2023-04-18 ENCOUNTER — SPECIALTY PHARMACY (OUTPATIENT)
Dept: PHARMACY | Facility: CLINIC | Age: 58
End: 2023-04-18
Payer: COMMERCIAL

## 2023-04-18 DIAGNOSIS — M06.9 RHEUMATOID ARTHRITIS, INVOLVING UNSPECIFIED SITE, UNSPECIFIED WHETHER RHEUMATOID FACTOR PRESENT: Primary | ICD-10-CM

## 2023-04-18 NOTE — TELEPHONE ENCOUNTER
Specialty Pharmacy - Clinical Reassessment    Specialty Medication Orders Linked to Encounter      Flowsheet Row Most Recent Value   Medication #1 adalimumab (HUMIRA,CF, PEN) 40 mg/0.4 mL PnKt (Order#[id number], Rx#[id number])          Patient Diagnosis   M06.9 - Rheumatoid arthritis, involving unspecified site, unspecified whether rheumatoid factor present    Ailyn Ortiz is a 57 y.o. female, who is followed by the specialty pharmacy service for management and education of her Humira.  She has been on therapy with Humira since June 2019.  I have reviewed her electronic medical record and current medication list and determined that specialty medication adjustment Is not needed at this time.    Patient has not experienced adverse events.  She Is adherent reporting 1 missed doses since last review.  Adherence has been encouraged with the following mechanism(s): proactive refill calls.  She is on target to meet goals of therapy and will continue treatment.        3/20/2023 2/15/2023 1/3/2023 12/1/2022 11/1/2022 10/6/2022 9/8/2022   Follow Up Review   # of missed doses 0 1 0 0 0 0 0   Reason  Cost        New Medications? No No No No No No No   New Conditions? No No No No No No No   New Allergies? No No No No No No No   Med Effective? Good Good Good Good Good Good Good   Urgent Care? No No No No No No No   Requested Pharmacist? No No No No No No No            Therapy is appropriate to continue.    Therapy is effective: Yes  On scale of 1 to 10, how does patient rank quality of life? (10 - Best): Unable to Assess  Recommendations: none at this time.  Review Method: Chart Review    Tasks added this encounter   1/18/2024 - Clinical - Follow Up Assesement (Annual)   Tasks due within next 3 months   4/17/2023 - Refill Call (Auto Added)     Joseph Rodríguez, [facility]  [address]  [facility] [address]  Phone: [phone]  Fax: [phone]

## 2023-04-19 DIAGNOSIS — M06.00 SERONEGATIVE RHEUMATOID ARTHRITIS: Primary | ICD-10-CM

## 2023-04-24 DIAGNOSIS — Z78.0 POST-MENOPAUSAL: Primary | ICD-10-CM

## 2023-05-01 ENCOUNTER — SPECIALTY PHARMACY (OUTPATIENT)
Dept: PHARMACY | Facility: CLINIC | Age: 58
End: 2023-05-01
Payer: COMMERCIAL

## 2023-05-01 NOTE — TELEPHONE ENCOUNTER
Humira copay card rejecting due to cost maximizer plan. Pt enrolled in [employer] insurance. All [facility] pts should have been opted out of the cost maximizer. Escalating issue to leadership team for resolution.

## 2023-05-01 NOTE — TELEPHONE ENCOUNTER
Specialty Pharmacy - Refill Coordination    Specialty Medication Orders Linked to Encounter      Flowsheet Row Most Recent Value   Medication #1 adalimumab (HUMIRA,CF, PEN) 40 mg/0.4 mL PnKt (Order#[id number], Rx#[id number])            Refill Questions - Documented Responses      Flowsheet Row Most Recent Value   Refill Screening Questions    Changes to allergies? No   Changes to medications? No   New conditions since last clinic visit? No   Unplanned office visit, urgent care, ED, or hospital admission in the last 4 weeks? No   How does patient/caregiver feel medication is working? Very good   Financial problems or insurance changes? No   How many doses of your specialty medications were missed in the last 4 weeks? 0   Would patient like to speak to a pharmacist? No   When does the patient need to receive the medication? 05/02/23   Refill Delivery Questions    How will the patient receive the medication? MEDRx   When does the patient need to receive the medication? 05/02/23   Shipping Address Home   Address in [facility] confirmed and updated if neccessary? Yes   Expected Copay ($) 5   Is the patient able to afford the medication copay? Yes   Payment Method CC on file   Days supply of Refill 28   Supplies needed? No supplies needed   Refill activity completed? Yes   Refill activity plan Refill scheduled   Shipment/Pickup Date: 05/01/23            Current Outpatient Medications   Medication Sig    adalimumab (HUMIRA,CF, PEN) 40 mg/0.4 mL PnKt Inject 0.4 mL (40 mg total) into the skin every 14 (fourteen) days.    atorvastatin (LIPITOR) 10 MG tablet Lipitor 10 mg tablet   Take 1 tablet every day by oral route.    b complex vitamins tablet Take 1 tablet by mouth once daily.    ergocalciferol (ERGOCALCIFEROL) 50,000 unit Cap TAKE 1 CAPSULE (50,000 UNITS TOTAL) BY MOUTH EVERY 7 DAYS.    levothyroxine (SYNTHROID) 88 MCG tablet levothyroxine 88 mcg tablet    loratadine (CLARITIN) 10 mg tablet Take 10 mg by mouth  daily as needed.     multivit with minerals/lutein (MULTIVITAMIN 50 PLUS ORAL) multivitamin   1 po daily    multivitamin with minerals tablet Take 1 tablet by mouth 2 (two) times daily.     omeprazole (PRILOSEC) 40 MG capsule Take 40 mg by mouth every morning.     potassium chloride SA (K-DUR,KLOR-CON) 10 MEQ tablet potassium chloride ER 10 mEq tablet,extended release(part/cryst)   Take 1 tablet every day by oral route.    prasterone, dhea, (INTRAROSA) 6.5 mg Inst Place 6.5 mg vaginally every evening.    predniSONE (DELTASONE) 5 MG tablet prednisone 5 mg tablet   prn    semaglutide (OZEMPIC) 0.25 mg or 0.5 mg(2 mg/1.5 mL) pen injector    Last reviewed on 2/10/2023 10:00 AM by Brodie Fulton MD    Review of patient's allergies indicates:   Allergen Reactions    Morphine Hives and Itching    Avelox [moxifloxacin] Rash    Last reviewed on  2/17/2023 10:55 AM by Randee Flynn      Tasks added this encounter   No tasks added.   Tasks due within next 3 months   4/29/2023 - Refill Coordination Outreach (1 time occurrence)     Blake Gifford, PharmD  [facility]  [address] [facility] [address]  Phone: [phone]  Fax: [phone]

## 2023-05-01 NOTE — TELEPHONE ENCOUNTER
[place] for Humeria. Due to inject today. Secondary insurance rejecting, message to assigned Rph who will resolve. Pt agrees to a call back.

## 2023-05-03 ENCOUNTER — PATIENT MESSAGE (OUTPATIENT)
Dept: BARIATRICS | Facility: CLINIC | Age: 58
End: 2023-05-03
Payer: COMMERCIAL

## 2023-05-09 ENCOUNTER — PATIENT MESSAGE (OUTPATIENT)
Dept: BARIATRICS | Facility: CLINIC | Age: 58
End: 2023-05-09
Payer: COMMERCIAL

## 2023-05-10 RX ORDER — MAGNESIUM 30 MG
TABLET ORAL ONCE
COMMUNITY

## 2023-05-10 RX ORDER — ONDANSETRON 4 MG/1
TABLET, ORALLY DISINTEGRATING ORAL
COMMUNITY
Start: 2023-04-17

## 2023-05-12 ENCOUNTER — OFFICE VISIT (OUTPATIENT)
Dept: RHEUMATOLOGY | Facility: CLINIC | Age: 58
End: 2023-05-12
Payer: COMMERCIAL

## 2023-05-12 DIAGNOSIS — M06.00 SERONEGATIVE RHEUMATOID ARTHRITIS: Primary | ICD-10-CM

## 2023-05-12 PROCEDURE — 1159F MED LIST DOCD IN RCRD: CPT | Mod: CPTII,95,, | Performed by: INTERNAL MEDICINE

## 2023-05-12 PROCEDURE — 99214 PR OFFICE/OUTPT VISIT, EST, LEVL IV, 30-39 MIN: ICD-10-PCS | Mod: 95,,, | Performed by: INTERNAL MEDICINE

## 2023-05-12 PROCEDURE — 99214 OFFICE O/P EST MOD 30 MIN: CPT | Mod: 95,,, | Performed by: INTERNAL MEDICINE

## 2023-05-12 PROCEDURE — 1159F PR MEDICATION LIST DOCUMENTED IN MEDICAL RECORD: ICD-10-PCS | Mod: CPTII,95,, | Performed by: INTERNAL MEDICINE

## 2023-05-12 RX ORDER — AZITHROMYCIN 250 MG/1
TABLET, FILM COATED ORAL
COMMUNITY
End: 2023-12-06

## 2023-05-12 RX ORDER — CLONAZEPAM 0.5 MG/1
TABLET ORAL
COMMUNITY

## 2023-05-12 RX ORDER — PRASTERONE 6.5 MG/1
INSERT VAGINAL
COMMUNITY

## 2023-05-12 NOTE — PROGRESS NOTES
Rapid3 Question Responses and Scores 5/12/2023   MDHAQ Score 0   Psychologic Score 0   Pain Score 1   When you awakened in the morning OVER THE LAST WEEK, did you feel stiff? Yes   If Yes, please indicate the number of hours until you are as limber as you will be for the day 1   Fatigue Score 0.5   Global Health Score 0   RAPID3 Score 0.33     Answers submitted by the patient for this visit:  Rheumatology Questionnaire (Submitted on 5/12/2023)  fever: No  eye redness: No  mouth sores: No  headaches: No  shortness of breath: No  chest pain: No  trouble swallowing: No  diarrhea: Yes  constipation: No  unexpected weight change: No  genital sore: No  dysuria: No  During the last 3 days, have you had a skin rash?: No  Bruises or bleeds easily: No  cough: Yes

## 2023-05-12 NOTE — PROGRESS NOTES
Chief Complaint   Patient presents with    Disease Management     The patient location is: home  The chief complaint leading to consultation is: seronegative RA    Visit type: audiovisual    Face to Face time with patient: 30  minutes of total time spent on the encounter, which includes face to face time and non-face to face time preparing to see the patient (eg, review of tests), Obtaining and/or reviewing separately obtained history, Documenting clinical information in the electronic or other health record, Independently interpreting results (not separately reported) and communicating results to the patient/family/caregiver, or Care coordination (not separately reported).         Each patient to whom he or she provides medical services by telemedicine is:  (1) informed of the relationship between the physician and patient and the respective role of any other health care provider with respect to management of the patient; and (2) notified that he or she may decline to receive medical services by telemedicine and may withdraw from such care at any time.    Notes:       Patient is here for a follow up    The patient location is: home  The chief complaint leading to consultation is: rheumatoid arthritis    Visit type: audiovisual    Face to Face time with patient: 30  minutes of total time spent on the encounter, which includes face to face time and non-face to face time preparing to see the patient (eg, review of tests), Obtaining and/or reviewing separately obtained history, Documenting clinical information in the electronic or other health record, Independently interpreting results (not separately reported) and communicating results to the patient/family/caregiver, or Care coordination (not separately reported).       Each patient to whom he or she provides medical services by telemedicine is:  (1) informed of the relationship between the physician and patient and the respective role of any other health care  provider with respect to management of the patient; and (2) notified that he or she may decline to receive medical services by telemedicine and may withdraw from such care at any time.    Notes:       History of presenting illness    57 year old female with seronegative rheumatoid arthritis for a follow up    She is now on humira  Joints feel great    Knees are ok now  Hips hurt  Feet hurt since she has started to work 12 hour shifts    In the past took mtx,had severe hair fall     Recent small bowel obstruction due to lipomas     She has a previous diagnosis of uveitis  Eyes are doing good now    3/2020    ESR,CRP nml    Right wrist MRI : mass like area of abnormal signal and enhancement within dorsal subcutaneous fat of the wrist : inflammatory process vs cellulitis vs neoplasm/desmoid tumor    Sent her to ortho : she had CTS right one operated    She had trigger thumbs and trigger fingers and she had few surgeries     She has lost weight and she has done really better       TAQUERIA negative  RF,CCP negative  Hepatitis b and c negative  HLAB27 negative    CRP 96.9  ESR 92    SPEP no M spike    Uric acid 2.2    Vit d 8,went upto 26  TSH normal    Ck,aldolase normal    Xrays    Hands degenerative changes  Ankles degenerative changes and bone spur  Knees degenerative changes  Feet : degenerative changes     ID clearance done  Did hepatitis A and B vaccine  Doing the shingrix vaccine    1/2022    Some upper resp tract infection  Coughing and congested  Nasal tone to her voice    She took humira 2 weeks ago  She is due today    5/2023    She has a bad cold- she is treating her     She took humira  Joint pains -manageable    Vacation- did a lot of walking      54 year old white female presented with the following symptoms :    December around Christmas she had the upper respiratory tract symptoms with myalgia and nasal congestion  Following this she started to experience severe joint pain  All her joints in the  hands,elbows,knees,feet started to hurt and swell  EMS for 1 hour  Alleve helps minimally    Hands : thumbs and index fingers and the 3rd digits hurt the worse  She cant make a fist  Thinks that the left 2nd finger swells like a sausage  Swelling along the tendons as per her PCP,she says he thought she has tenosynovitis     Hips and shoulders ache but no stiffness,good ROM in them    Cold weather makes her symptoms worse     Claims to have had labs : she had ESR,CRP elevation apparently     Past history: asthma,diabetes,JESSICA,thyroid disease,HTN  Occasional tendinitis feet b/l    Has a rash every winter,dermatology thinks it is eczema  This time started in November on the elbows,back,torso,legs,didnt get better,but derm suggested stronger steroids  Has not been told to have psoriasis     Recently had small bowel resection for lipoma    2/2023    Morning stiffness-  Hands and feet-stiff   She gets up and takes a hot shower    Both hips hurt,left> right  Sitting in certain positions is hard  Getting up from the chair is hard  Hurts outside the hips    Overall very functional      Family history : psoriasis and psoriatic arthritis runs in the family     Social history : quit smoking a while ago,drinks occasionally     Review of Systems   Constitutional:  Negative for activity change, appetite change, chills, diaphoresis, fatigue, fever and unexpected weight change.   HENT:  Negative for congestion, dental problem, drooling, ear discharge, ear pain, facial swelling, hearing loss, mouth sores, nosebleeds, postnasal drip, rhinorrhea, sinus pressure, sinus pain, sneezing, sore throat, tinnitus, trouble swallowing and voice change.    Eyes:  Negative for photophobia, pain, discharge, redness, itching and visual disturbance.   Respiratory:  Negative for apnea, cough, choking, chest tightness, shortness of breath, wheezing and stridor.    Cardiovascular:  Negative for chest pain, palpitations and leg swelling.    Gastrointestinal:  Negative for abdominal distention, abdominal pain, anal bleeding, blood in stool, constipation, diarrhea, nausea, rectal pain and vomiting.   Endocrine: Negative for cold intolerance, heat intolerance, polydipsia, polyphagia and polyuria.   Genitourinary:  Negative for decreased urine volume, difficulty urinating, dysuria, enuresis, flank pain, frequency, genital sores, hematuria and urgency.   Musculoskeletal:  Negative for back pain, gait problem, myalgias, neck pain and neck stiffness.   Skin:  Negative for color change, pallor, rash and wound.   Allergic/Immunologic: Negative for environmental allergies, food allergies and immunocompromised state.   Neurological:  Negative for dizziness, tremors, seizures, syncope, facial asymmetry, speech difficulty, weakness, light-headedness, numbness and headaches.   Hematological:  Negative for adenopathy. Does not bruise/bleed easily.   Psychiatric/Behavioral:  Negative for agitation, behavioral problems, confusion, decreased concentration, dysphoric mood, hallucinations, self-injury, sleep disturbance and suicidal ideas. The patient is not nervous/anxious and is not hyperactive.      Physical Exam     Physical Exam   Constitutional: She is oriented to person, place, and time. No distress.   HENT:   Head: Normocephalic.   Mouth/Throat: Oropharynx is clear and moist.   Eyes: Pupils are equal, round, and reactive to light. Conjunctivae are normal. Right eye exhibits no discharge. Left eye exhibits no discharge. No scleral icterus.   Neck: No thyromegaly present.   Cardiovascular: Normal rate, regular rhythm and normal heart sounds.   Pulmonary/Chest: Effort normal and breath sounds normal. No stridor.   Abdominal: Soft. Bowel sounds are normal.   Musculoskeletal:         General: Tenderness present.      Cervical back: Normal range of motion.   Lymphadenopathy:     She has no cervical adenopathy.   Neurological: She is alert and oriented to person,  place, and time.   Skin: Skin is warm. No rash noted. She is not diaphoretic.   Psychiatric: Affect and judgment normal.     Assessment     57 year old white female presented to me with 1 month of polyarthralgias in the hands,elbows,knees and feet : s/p URT symptoms during [facility] : is also s/p bowel resection for a lipoma : reported swelling in addition to pain and stiffness in her joints : significant EMS : symmetric involvement : in addition reported to having dactylitis   On exam : synovitis noted in many joints,no evidence of dactylitis.    She has inflammatory arthritis,seronegative,unclear yet if she has psoriatic arthritis since the rash might be eczema vs psoriasis  Reactive arthritis possible too    Labs revealed very high inflammatory markers with negative RA and TAQUERIA and HLAB27 panel  Xrays with degenerative arthritis     She did well on prednisone  I then introduced mtx 5 tabs weekly and tapered her prednisone   Then titrated mtx to 8 tabs weekly  She didn't  tolerate the mtx     We switched to humira and she has done well    She has a diagnosis of uveitis   But last eye exam normal    New small bowel obstruction  Due to lipomas   She has recurrent lipomas this is the second SBO due to lipomas      Some knee pain due to OA  Hand arthralgias due to OA  Hip pain   Feet pain    Upper resp tract infection-recovering    2/2023    Morning stiffness-  Hands and feet-stiff   She gets up and takes a hot shower    Both hips hurt,left> right  Sitting in certain positions is hard  Getting up from the chair is hard  Hurts outside the hips    Hip xrays nml  Hip bursitis exercises  Overall very functional    5/2023    She has a bad cold- she is treating herself symptomatically    She took humira  Joint pains -manageable    Vacation- did a lot of walking    Eyes- fine  Skin fine    1. Seronegative rheumatoid arthritis              Plan    Continue humira    utd with all vaccines   covid booster pending  Flu utd    Vit  d maintanence    Tb,hepatitis panel   CBC,CMP,ESR,CRP    Rtc 6 months    Ailyn was seen today for disease management.    Diagnoses and all orders for this visit:    Seronegative rheumatoid arthritis  -     Quantiferon Gold TB; Future  -     Hepatitis B Surface Antigen; Future  -     Hepatitis B Surface Ab, Qualitative; Future  -     Hepatitis B Core Antibody, Total; Future  -     CBC Auto Differential; Future  -     Comprehensive Metabolic Panel; Future  -     Sedimentation rate; Future  -     C-Reactive Protein; Future            rtc in 6 months

## 2023-05-23 RX ORDER — ERGOCALCIFEROL 1.25 MG/1
CAPSULE ORAL
Qty: 12 CAPSULE | Refills: 0 | Status: SHIPPED | OUTPATIENT
Start: 2023-05-23 | End: 2023-09-05

## 2023-05-25 ENCOUNTER — SPECIALTY PHARMACY (OUTPATIENT)
Dept: PHARMACY | Facility: CLINIC | Age: 58
End: 2023-05-25
Payer: COMMERCIAL

## 2023-05-25 NOTE — TELEPHONE ENCOUNTER
Specialty Pharmacy - Refill Coordination    Specialty Medication Orders Linked to Encounter      Flowsheet Row Most Recent Value   Medication #1 adalimumab (HUMIRA,CF, PEN) 40 mg/0.4 mL PnKt (Order#[id number], Rx#[id number])            Refill Questions - Documented Responses      Flowsheet Row Most Recent Value   Patient Availability and HIPAA Verification    Does patient want to proceed with activity? Yes   HIPAA/medical authority confirmed? Yes   Relationship to patient of person spoken to? Self   Refill Screening Questions    Changes to allergies? No   Changes to medications? No   New conditions since last clinic visit? No   Unplanned office visit, urgent care, ED, or hospital admission in the last 4 weeks? No   How does patient/caregiver feel medication is working? Good   Financial problems or insurance changes? No   How many doses of your specialty medications were missed in the last 4 weeks? 0   Would patient like to speak to a pharmacist? No   When does the patient need to receive the medication? 06/01/23   Refill Delivery Questions    How will the patient receive the medication? MEDRx   When does the patient need to receive the medication? 06/01/23   Shipping Address Home   Address in [facility] confirmed and updated if neccessary? Yes   Expected Copay ($) 5   Is the patient able to afford the medication copay? Yes   Payment Method CC on file   Days supply of Refill 28   Supplies needed? No supplies needed   Refill activity completed? Yes   Refill activity plan Refill scheduled   Shipment/Pickup Date: 05/30/23            Current Outpatient Medications   Medication Sig    adalimumab (HUMIRA,CF, PEN) 40 mg/0.4 mL PnKt Inject 0.4 mL (40 mg total) into the skin every 14 (fourteen) days.    atorvastatin (LIPITOR) 10 MG tablet Lipitor 10 mg tablet   Take 1 tablet every day by oral route.    azithromycin (Z-MYRA) 250 MG tablet azithromycin 250 mg tablet   TAKE 2 TABLETS (500 MG) BY MOUTH ONCE DAILY FOR 1 DAY  THEN 1 TABLET (250 MG) BY MOUTH ONCE DAILY FOR 4 DAYS    b complex vitamins tablet Take 1 tablet by mouth once daily.    clonazePAM (KLONOPIN) 0.5 MG tablet clonazepam 0.5 mg tablet   TAKE 1 TABLET BY MOUTH TWICE A DAY AS NEEDED    ergocalciferol (ERGOCALCIFEROL) 50,000 unit Cap TAKE 1 CAPSULE (50,000 UNITS TOTAL) BY MOUTH EVERY 7 DAYS.    levothyroxine (SYNTHROID) 88 MCG tablet levothyroxine 88 mcg tablet    loratadine (CLARITIN) 10 mg tablet Take 10 mg by mouth daily as needed.     magnesium 30 mg Tab Take by mouth once.    multivit with minerals/lutein (MULTIVITAMIN 50 PLUS ORAL) multivitamin   1 po daily    multivitamin with minerals tablet Take 1 tablet by mouth 2 (two) times daily.     omeprazole (PRILOSEC) 40 MG capsule Take 40 mg by mouth every morning.     ondansetron (ZOFRAN-ODT) 4 MG TbDL SMARTSI Tablet(s) Sublingual Every 8 Hours PRN    potassium chloride SA (K-DUR,KLOR-CON) 10 MEQ tablet potassium chloride ER 10 mEq tablet,extended release(part/cryst)   Take 1 tablet every day by oral route.    prasterone, dhea, (INTRAROSA) 6.5 mg Inst Intrarosa 6.5 mg vaginal insert   PLACE 6.5 MG VAGINALLY EVERY EVENING.    semaglutide (OZEMPIC) 0.25 mg or 0.5 mg(2 mg/1.5 mL) pen injector    Last reviewed on 2023 10:08 AM by Mary Kate Villeda MA    Review of patient's allergies indicates:   Allergen Reactions    Morphine Hives and Itching    Avelox [moxifloxacin] Rash    Last reviewed on  2023 10:07 AM by Mary Kate Villeda      Tasks added this encounter   No tasks added.   Tasks due within next 3 months   2023 - Refill Coordination Outreach (1 time occurrence)     [facility]  [address] [place] [id number]  Phone: [phone]  Fax: [phone]

## 2023-06-07 ENCOUNTER — PATIENT MESSAGE (OUTPATIENT)
Dept: BARIATRICS | Facility: CLINIC | Age: 58
End: 2023-06-07
Payer: COMMERCIAL

## 2023-06-13 ENCOUNTER — PATIENT MESSAGE (OUTPATIENT)
Dept: BARIATRICS | Facility: CLINIC | Age: 58
End: 2023-06-13
Payer: COMMERCIAL

## 2023-06-20 ENCOUNTER — SPECIALTY PHARMACY (OUTPATIENT)
Dept: PHARMACY | Facility: CLINIC | Age: 58
End: 2023-06-20
Payer: COMMERCIAL

## 2023-06-20 NOTE — TELEPHONE ENCOUNTER
"Co-pay card rejection:     "PATIENT HAS BEEN IDENTIFIED TO BE ENROLLED IN A MAXIMIZER PLAN AND NO LONGER ELIGIBLE FOR THE SAVINGS PROGRAM. PLEASE COORDINATE PATIENT BENEFITS WITH MAXIMIZER PLAN. NO ADDITIONAL COPAY SUPPORT AVAILABLE."    Spoke with Tanisha at [facility]. She stated that the patient does have a maximizer plan and that her co-pay card benefits are not being applied to any accumulators. She doesn't know when the plan changed.    Spoke with Taylor at [employer]. She stated that the patient was converted over from a co-pay card to a debit card. Patient needs to call [employer] at [phone] to request the debit card number and expiration date.     Patient updated by telephone. Whoever receives the incoming call should be able to set up her Humira refill. Please add in the Humira debit card information to [facility]. Patient needs an injection for 07/01/2023.  "

## 2023-06-20 NOTE — TELEPHONE ENCOUNTER
Outgoing call regarding pt umerria. Pt forgot to injection on 6/15 and stated she will inject today. Next injection is 7/01. Copay card rejected. Routing to [facility] to renew.

## 2023-06-26 NOTE — TELEPHONE ENCOUNTER
Patient called Humira [facility] but was given her deactivated co-pay card information. I asked her to call back and ask for the debit card information. I offered to have a 3-way call with the program if she was unable to get the debit card info.

## 2023-06-27 ENCOUNTER — PATIENT MESSAGE (OUTPATIENT)
Dept: RHEUMATOLOGY | Facility: CLINIC | Age: 58
End: 2023-06-27
Payer: COMMERCIAL

## 2023-06-27 NOTE — TELEPHONE ENCOUNTER
Specialty Pharmacy - Refill Coordination    Specialty Medication Orders Linked to Encounter      Flowsheet Row Most Recent Value   Medication #1 adalimumab (HUMIRA,CF, PEN) 40 mg/0.4 mL PnKt (Order#[id number], Rx#[id number])            Refill Questions - Documented Responses      Flowsheet Row Most Recent Value   Patient Availability and HIPAA Verification    Does patient want to proceed with activity? Yes   HIPAA/medical authority confirmed? Yes   Relationship to patient of person spoken to? Self   Refill Screening Questions    Changes to allergies? No   Changes to medications? No   New conditions since last clinic visit? No   Unplanned office visit, urgent care, ED, or hospital admission in the last 4 weeks? No   How does patient/caregiver feel medication is working? Good   Financial problems or insurance changes? No   How many doses of your specialty medications were missed in the last 4 weeks? 0   Would patient like to speak to a pharmacist? No   When does the patient need to receive the medication? 06/29/23   Refill Delivery Questions    How will the patient receive the medication? MEDRx   When does the patient need to receive the medication? 06/29/23   Shipping Address Home   Address in [facility] confirmed and updated if neccessary? Yes   Expected Copay ($) 5   Is the patient able to afford the medication copay? Yes   Payment Method  CC on file   Days supply of Refill 28   Supplies needed? No supplies needed   Refill activity completed? Yes   Refill activity plan Refill scheduled   Shipment/Pickup Date: 06/27/23            Current Outpatient Medications   Medication Sig    adalimumab (HUMIRA,CF, PEN) 40 mg/0.4 mL PnKt Inject 0.4 mL (40 mg total) into the skin every 14 (fourteen) days.    atorvastatin (LIPITOR) 10 MG tablet Lipitor 10 mg tablet   Take 1 tablet every day by oral route.    azithromycin (Z-MYRA) 250 MG tablet azithromycin 250 mg tablet   TAKE 2 TABLETS (500 MG) BY MOUTH ONCE  DAILY FOR 1 DAY THEN 1 TABLET (250 MG) BY MOUTH ONCE DAILY FOR 4 DAYS    b complex vitamins tablet Take 1 tablet by mouth once daily.    clonazePAM (KLONOPIN) 0.5 MG tablet clonazepam 0.5 mg tablet   TAKE 1 TABLET BY MOUTH TWICE A DAY AS NEEDED    ergocalciferol (ERGOCALCIFEROL) 50,000 unit Cap TAKE 1 CAPSULE (50,000 UNITS TOTAL) BY MOUTH EVERY 7 DAYS.    levothyroxine (SYNTHROID) 88 MCG tablet levothyroxine 88 mcg tablet    loratadine (CLARITIN) 10 mg tablet Take 10 mg by mouth daily as needed.     magnesium 30 mg Tab Take by mouth once.    multivit with minerals/lutein (MULTIVITAMIN 50 PLUS ORAL) multivitamin   1 po daily    multivitamin with minerals tablet Take 1 tablet by mouth 2 (two) times daily.     omeprazole (PRILOSEC) 40 MG capsule Take 40 mg by mouth every morning.     ondansetron (ZOFRAN-ODT) 4 MG TbDL SMARTSI Tablet(s) Sublingual Every 8 Hours PRN    potassium chloride SA (K-DUR,KLOR-CON) 10 MEQ tablet potassium chloride ER 10 mEq tablet,extended release(part/cryst)   Take 1 tablet every day by oral route.    prasterone, dhea, (INTRAROSA) 6.5 mg Inst Intrarosa 6.5 mg vaginal insert   PLACE 6.5 MG VAGINALLY EVERY EVENING.    semaglutide (OZEMPIC) 0.25 mg or 0.5 mg(2 mg/1.5 mL) pen injector    Last reviewed on 2023 10:08 AM by Mary Kate Villeda MA    Review of patient's allergies indicates:   Allergen Reactions    Morphine Hives and Itching    Avelox [moxifloxacin] Rash    Last reviewed on  2023 10:07 AM by Mary Kate Villeda      Tasks added this encounter   No tasks added.   Tasks due within next 3 months   No tasks due.     Nando Jade, PharmD  [facility]  [address] [place] [id number]  Phone: [phone]  Fax: [phone]

## 2023-06-27 NOTE — TELEPHONE ENCOUNTER
Debit card rejected. Spoke with a representative at [employer] who informed me that the patient is not eligible for any  assistance. She referred me to the insurance plan to ask about the maximizer benefits.     Spoke with [place] at [facility] who stated that there is no co-pay assistance offered through the maximizer plan. Humira is a Tier 4 Brand medication. She will be responsible for 25% of the cost for branded specialty medications for the entire 12 months. She has an individual OOPmax of $4800 ($3354.37 applied in 2023) and family OOPmax of $18785 (3516.13 applied in 2023).

## 2023-06-27 NOTE — TELEPHONE ENCOUNTER
Patient updated. She does not want to receive Humira d/t the high co-pay. Refill canceled. Message sent to Dr. Fulton recommending Cimzia subQ self-administered and through buy and bill so that we can compare the patient's cost for each. I [facility] ([employer] co-pay support) who stated that patients with maximizer plans are not excluded from the 's program.

## 2023-06-28 NOTE — TELEPHONE ENCOUNTER
Patient returned call and was informed of the possible switch to Cimzia. MD willing to change if patient gives consent. Patient states she will look over everything tonight and give [facility] a call back tomorrow.

## 2023-06-29 RX ORDER — CERTOLIZUMAB PEGOL 200 MG/ML
400 INJECTION, SOLUTION SUBCUTANEOUS
Qty: 1 EACH | Refills: 0 | Status: SHIPPED | OUTPATIENT
Start: 2023-06-29 | End: 2023-07-11

## 2023-06-29 RX ORDER — CERTOLIZUMAB PEGOL 200 MG/ML
200 INJECTION, SOLUTION SUBCUTANEOUS
Qty: 2 EACH | Refills: 11 | Status: SHIPPED | OUTPATIENT
Start: 2023-06-29 | End: 2023-07-11

## 2023-07-05 ENCOUNTER — TELEPHONE (OUTPATIENT)
Dept: PHARMACY | Facility: CLINIC | Age: 58
End: 2023-07-05
Payer: COMMERCIAL

## 2023-07-05 NOTE — TELEPHONE ENCOUNTER
Anshul, this is Perfecto, clinical pharmacist with [facility] that is part of your care team.  We have begun working on your prescription that your doctor has sent us. Our next steps include:     Working with your insurance company to obtain approval for your medication  Working with you to ensure your medication is affordable     We will be calling you along the way with updates on your medication but if you have any concerns or receive information that you would like to discuss please reach us at [phone].    Welcome call outcome: Patient/caregiver reached

## 2023-07-07 ENCOUNTER — PATIENT MESSAGE (OUTPATIENT)
Dept: BARIATRICS | Facility: CLINIC | Age: 58
End: 2023-07-07
Payer: COMMERCIAL

## 2023-07-13 ENCOUNTER — PATIENT MESSAGE (OUTPATIENT)
Dept: BARIATRICS | Facility: CLINIC | Age: 58
End: 2023-07-13
Payer: COMMERCIAL

## 2023-07-15 DIAGNOSIS — M06.00 SERONEGATIVE RHEUMATOID ARTHRITIS: Primary | ICD-10-CM

## 2023-07-15 RX ORDER — ETANERCEPT 50 MG/ML
50 SOLUTION SUBCUTANEOUS WEEKLY
Qty: 4 ML | Refills: 11 | Status: ACTIVE | OUTPATIENT
Start: 2023-07-15 | End: 2024-07-14

## 2023-07-18 ENCOUNTER — TELEPHONE (OUTPATIENT)
Dept: PHARMACY | Facility: CLINIC | Age: 58
End: 2023-07-18
Payer: COMMERCIAL

## 2023-07-18 ENCOUNTER — SPECIALTY PHARMACY (OUTPATIENT)
Dept: PHARMACY | Facility: CLINIC | Age: 58
End: 2023-07-18
Payer: COMMERCIAL

## 2023-07-18 NOTE — TELEPHONE ENCOUNTER
Anshul, this is Perfecto, clinical pharmacist with [facility] that is part of your care team.  We have begun working on your prescription that your doctor has sent us. Our next steps include:     Working with your insurance company to obtain approval for your medication  Working with you to ensure your medication is affordable     We will be calling you along the way with updates on your medication but if you have any concerns or receive information that you would like to discuss please reach us at [phone].    Welcome call outcome: Patient/caregiver reached

## 2023-07-19 ENCOUNTER — SPECIALTY PHARMACY (OUTPATIENT)
Dept: PHARMACY | Facility: CLINIC | Age: 58
End: 2023-07-19
Payer: COMMERCIAL

## 2023-07-19 DIAGNOSIS — M06.9 RHEUMATOID ARTHRITIS, INVOLVING UNSPECIFIED SITE, UNSPECIFIED WHETHER RHEUMATOID FACTOR PRESENT: Primary | ICD-10-CM

## 2023-07-19 NOTE — TELEPHONE ENCOUNTER
Specialty Pharmacy - Initial Clinical Assessment    Specialty Medication Orders Linked to Encounter      Flowsheet Row Most Recent Value   Medication #1 etanercept (ENBREL SURECLICK) 50 mg/mL (1 mL) (Order#[id number], Rx#[id number])          Patient Diagnosis   M06.9 - Rheumatoid arthritis, involving unspecified site, unspecified whether rheumatoid factor present    Subjective    Ailyn Ortiz is a 57 y.o. female, who is followed by the specialty pharmacy service for management and education.    Recent Encounters       Date Type Provider Description    07/19/2023 [facility]ty Pharmacy [facility] Initial Clinical Assessment    07/18/2023 [facility]ty Pharmacy [facility] Referral Authorization    06/20/2023 Specialty Pharmacy Ivis Vela Refill Coordination    05/25/2023 Specialty Pharmacy Debbie Baker Refill Coordination    05/01/2023 Specialty Pharmacy Rachana Brannon PharmD Refill Coordination            Current Outpatient Medications   Medication Sig    atorvastatin (LIPITOR) 10 MG tablet Lipitor 10 mg tablet   Take 1 tablet every day by oral route.    azithromycin (Z-MYRA) 250 MG tablet azithromycin 250 mg tablet   TAKE 2 TABLETS (500 MG) BY MOUTH ONCE DAILY FOR 1 DAY THEN 1 TABLET (250 MG) BY MOUTH ONCE DAILY FOR 4 DAYS    b complex vitamins tablet Take 1 tablet by mouth once daily.    clonazePAM (KLONOPIN) 0.5 MG tablet clonazepam 0.5 mg tablet   TAKE 1 TABLET BY MOUTH TWICE A DAY AS NEEDED    ergocalciferol (ERGOCALCIFEROL) 50,000 unit Cap TAKE 1 CAPSULE (50,000 UNITS TOTAL) BY MOUTH EVERY 7 DAYS.    etanercept (ENBREL SURECLICK) 50 mg/mL (1 mL) Inject 1 mL (50 mg total) into the skin once a week.    levothyroxine (SYNTHROID) 88 MCG tablet levothyroxine 88 mcg tablet    loratadine (CLARITIN) 10 mg tablet Take 10 mg by mouth daily as needed.     magnesium 30 mg Tab Take by mouth once.    multivit with minerals/lutein (MULTIVITAMIN 50 PLUS ORAL) multivitamin   1 po daily    multivitamin with minerals  tablet Take 1 tablet by mouth 2 (two) times daily.     omeprazole (PRILOSEC) 40 MG capsule Take 40 mg by mouth every morning.     ondansetron (ZOFRAN-ODT) 4 MG TbDL SMARTSI Tablet(s) Sublingual Every 8 Hours PRN    potassium chloride SA (K-DUR,KLOR-CON) 10 MEQ tablet potassium chloride ER 10 mEq tablet,extended release(part/cryst)   Take 1 tablet every day by oral route.    prasterone, dhea, (INTRAROSA) 6.5 mg Inst Intrarosa 6.5 mg vaginal insert   PLACE 6.5 MG VAGINALLY EVERY EVENING.    semaglutide (OZEMPIC) 0.25 mg or 0.5 mg(2 mg/1.5 mL) pen injector    Last reviewed on 2023 10:08 AM by Mary Kate Villeda MA    Review of patient's allergies indicates:   Allergen Reactions    Morphine Hives and Itching    Avelox [moxifloxacin] Rash   Last reviewed on  2023 10:55 AM by Perfecto Bradley    Drug Interactions    Drug interactions evaluated: yes  Clinically relevant drug interactions identified: no  Provided the patient with educational material regarding drug interactions: not applicable         Adverse Effects    Joint swelling: Pos       Assessment Questions - Documented Responses      Flowsheet Row Most Recent Value   Assessment    Medication Reconciliation completed for patient Yes   During the past 4 weeks, has patient missed any activities due to condition or medication? No   During the past 4 weeks, did patient have any of the following urgent care visits? None   Goals of Therapy Status Discussed (new start)   Status of the patients ability to self-administer: Is Able   All education points have been covered with patient? Yes, supplemental printed education provided   Welcome packet contents reviewed and discussed with patient? Yes   Assesment completed? Yes   Plan Therapy being initiated   Do you need to open a clinical intervention (i-vent)? No   Do you want to schedule first shipment? Yes   Medication #1 Assessment Info    Patient status New medication, Exisiting to [facility]   Is this medication  "appropriate for the patient? Yes   Is this medication effective? Not yet started          Refill Questions - Documented Responses      Flowsheet Row Most Recent Value   Patient Availability and HIPAA Verification    Does patient want to proceed with activity? Yes   HIPAA/medical authority confirmed? Yes   Relationship to patient of person spoken to? Self   Refill Screening Questions    When does the patient need to receive the medication? 07/20/23   Refill Delivery Questions    How will the patient receive the medication? MEDRx   When does the patient need to receive the medication? 07/20/23   Shipping Address Home   Address in [facility] confirmed and updated if neccessary? Yes   Expected Copay ($) 0   Is the patient able to afford the medication copay? Yes   Payment Method zero copay   Days supply of Refill 28   Supplies needed? No supplies needed   Refill activity completed? Yes   Refill activity plan Refill scheduled   Shipment/Pickup Date: 07/19/23            Objective    She has a past medical history of Arthritis, Asthma, Diabetes mellitus, type 2 (since the age of 42), Hypertension (since the age of 42), JESSICA (obstructive sleep apnea), Partial small bowel obstruction (3/6/2019), Thyroid disease, and Ventral hernia without obstruction or gangrene.    Tried/failed medications: methotrexate    BP Readings from Last 4 Encounters:   01/09/23 (!) 147/90   06/29/22 (!) 158/76   06/03/21 134/80   02/28/20 110/62     Ht Readings from Last 4 Encounters:   06/03/21 5' 2" (1.575 m)   07/17/20 5' 2" (1.575 m)   02/28/20 5' 2" (1.575 m)   02/11/20 5' 2" (1.575 m)     Wt Readings from Last 4 Encounters:   01/09/23 86.6 kg (190 lb 14.7 oz)   06/29/22 85.6 kg (188 lb 11.4 oz)   06/03/21 80 kg (176 lb 5.9 oz)   07/17/20 73 kg (161 lb)       The goals of rheumatoid arthritis treatment include:  Relieving pain and suppressing inflammation  Achieving remission and preventing joint and organ damage  Increasing joint " mobility and strength  Preventing infection and other complications of treatment  Reducing long term complications of rheumatoid arthritis  Improving or maintaining physical function and optimal well-being  Improving or maintaining quality of life  Maintaining optimal therapy adherence  Minimizing and managing side effects    Goals of Therapy Status: Discussed (new start)    Assessment/Plan  Patient plans to start therapy on 07/20/23      Indication, dosage, appropriateness, effectiveness, safety and convenience of her specialty medication(s) were reviewed today.     Patient Education   Patient received education on the following:   Expectations and possible outcomes of therapy  Proper use, timely administration, and missed dose management  Duration of therapy  Side effects, including prevention, minimization, and management  Contraindications and safety precautions  New or changed medications, including prescribe and over the counter medications and supplements  Reviews recommended vaccinations, as appropriate  Storage, safe handling, and disposal    We compared and contrasted the Humira and Enbrel pens and dosing frequency.    Tasks added this encounter   4/19/2024 - Clinical Assessment (1 year recurrence)   Tasks due within next 3 months   No tasks due.     [facility] - [facility]acy  [address]  [facility] [address]  Phone: [phone]  Fax: [phone]

## 2023-08-02 ENCOUNTER — PATIENT MESSAGE (OUTPATIENT)
Dept: BARIATRICS | Facility: CLINIC | Age: 58
End: 2023-08-02
Payer: COMMERCIAL

## 2023-08-09 ENCOUNTER — SPECIALTY PHARMACY (OUTPATIENT)
Dept: PHARMACY | Facility: CLINIC | Age: 58
End: 2023-08-09
Payer: COMMERCIAL

## 2023-08-09 NOTE — TELEPHONE ENCOUNTER
Specialty Pharmacy - Refill Coordination    Specialty Medication Orders Linked to Encounter      Flowsheet Row Most Recent Value   Medication #1 etanercept (ENBREL SURECLICK) 50 mg/mL (1 mL) (Order#[id number], Rx#[id number])            Refill Questions - Documented Responses      Flowsheet Row Most Recent Value   Patient Availability and HIPAA Verification    Does patient want to proceed with activity? Yes   HIPAA/medical authority confirmed? Yes   Relationship to patient of person spoken to? Self   Refill Screening Questions    Changes to allergies? No   Changes to medications? No   New conditions since last clinic visit? No   Unplanned office visit, urgent care, ED, or hospital admission in the last 4 weeks? No   How does patient/caregiver feel medication is working? Good   Financial problems or insurance changes? No   How many doses of your specialty medications were missed in the last 4 weeks? 0   Would patient like to speak to a pharmacist? No   When does the patient need to receive the medication? 08/18/23   Refill Delivery Questions    How will the patient receive the medication? MEDRx   When does the patient need to receive the medication? 08/18/23   Shipping Address Home   Address in [facility] confirmed and updated if neccessary? Yes   Expected Copay ($) 0   Is the patient able to afford the medication copay? Yes   Payment Method zero copay   Days supply of Refill 28   Supplies needed? No supplies needed   Refill activity completed? Yes   Refill activity plan Refill scheduled   Shipment/Pickup Date: 08/16/23            Current Outpatient Medications   Medication Sig    atorvastatin (LIPITOR) 10 MG tablet Lipitor 10 mg tablet   Take 1 tablet every day by oral route.    azithromycin (Z-MYRA) 250 MG tablet azithromycin 250 mg tablet   TAKE 2 TABLETS (500 MG) BY MOUTH ONCE DAILY FOR 1 DAY THEN 1 TABLET (250 MG) BY MOUTH ONCE DAILY FOR 4 DAYS    b complex vitamins tablet Take 1 tablet by mouth once daily.     clonazePAM (KLONOPIN) 0.5 MG tablet clonazepam 0.5 mg tablet   TAKE 1 TABLET BY MOUTH TWICE A DAY AS NEEDED    ergocalciferol (ERGOCALCIFEROL) 50,000 unit Cap TAKE 1 CAPSULE (50,000 UNITS TOTAL) BY MOUTH EVERY 7 DAYS.    etanercept (ENBREL SURECLICK) 50 mg/mL (1 mL) Inject 1 mL (50 mg total) into the skin once a week.    levothyroxine (SYNTHROID) 88 MCG tablet levothyroxine 88 mcg tablet    loratadine (CLARITIN) 10 mg tablet Take 10 mg by mouth daily as needed.     magnesium 30 mg Tab Take by mouth once.    multivit with minerals/lutein (MULTIVITAMIN 50 PLUS ORAL) multivitamin   1 po daily    multivitamin with minerals tablet Take 1 tablet by mouth 2 (two) times daily.     omeprazole (PRILOSEC) 40 MG capsule Take 40 mg by mouth every morning.     ondansetron (ZOFRAN-ODT) 4 MG TbDL SMARTSI Tablet(s) Sublingual Every 8 Hours PRN    potassium chloride SA (K-DUR,KLOR-CON) 10 MEQ tablet potassium chloride ER 10 mEq tablet,extended release(part/cryst)   Take 1 tablet every day by oral route.    prasterone, dhea, (INTRAROSA) 6.5 mg Inst Intrarosa 6.5 mg vaginal insert   PLACE 6.5 MG VAGINALLY EVERY EVENING.    semaglutide (OZEMPIC) 0.25 mg or 0.5 mg(2 mg/1.5 mL) pen injector    Last reviewed on 2023 10:08 AM by Mary Kate Villeda, MA    Review of patient's allergies indicates:   Allergen Reactions    Morphine Hives and Itching    Avelox [moxifloxacin] Rash    Last reviewed on  2023 10:55 AM by Perfecto Bradley      Tasks added this encounter   No tasks added.   Tasks due within next 3 months   2023 - Refill Coordination Outreach (1 time occurrence)     Val [facility] - Specialty Pharmacy  [facility] [address]  [facility] [address]  Phone: [phone]  Fax: [phone]

## 2023-08-10 ENCOUNTER — PATIENT MESSAGE (OUTPATIENT)
Dept: RHEUMATOLOGY | Facility: CLINIC | Age: 58
End: 2023-08-10
Payer: COMMERCIAL

## 2023-08-13 RX ORDER — METHYLPREDNISOLONE 4 MG/1
TABLET ORAL
Qty: 1 EACH | Refills: 1 | Status: SHIPPED | OUTPATIENT
Start: 2023-08-13

## 2023-09-05 RX ORDER — ERGOCALCIFEROL 1.25 MG/1
CAPSULE ORAL
Qty: 12 CAPSULE | Refills: 4 | Status: SHIPPED | OUTPATIENT
Start: 2023-09-05

## 2023-09-06 ENCOUNTER — PATIENT MESSAGE (OUTPATIENT)
Dept: BARIATRICS | Facility: CLINIC | Age: 58
End: 2023-09-06
Payer: COMMERCIAL

## 2023-09-12 ENCOUNTER — PATIENT MESSAGE (OUTPATIENT)
Dept: BARIATRICS | Facility: CLINIC | Age: 58
End: 2023-09-12
Payer: COMMERCIAL

## 2023-10-04 ENCOUNTER — PATIENT MESSAGE (OUTPATIENT)
Dept: BARIATRICS | Facility: CLINIC | Age: 58
End: 2023-10-04
Payer: COMMERCIAL

## 2023-10-10 ENCOUNTER — PATIENT MESSAGE (OUTPATIENT)
Dept: BARIATRICS | Facility: CLINIC | Age: 58
End: 2023-10-10
Payer: COMMERCIAL

## 2023-11-03 ENCOUNTER — OFFICE VISIT (OUTPATIENT)
Dept: PODIATRY | Facility: CLINIC | Age: 58
End: 2023-11-03
Payer: COMMERCIAL

## 2023-11-03 VITALS
HEART RATE: 67 BPM | DIASTOLIC BLOOD PRESSURE: 85 MMHG | HEIGHT: 62 IN | BODY MASS INDEX: 36.15 KG/M2 | OXYGEN SATURATION: 96 % | WEIGHT: 196.44 LBS | TEMPERATURE: 99 F | SYSTOLIC BLOOD PRESSURE: 150 MMHG | RESPIRATION RATE: 18 BRPM

## 2023-11-03 DIAGNOSIS — B35.1 TINEA UNGUIUM: ICD-10-CM

## 2023-11-03 DIAGNOSIS — M79.672 LEFT FOOT PAIN: ICD-10-CM

## 2023-11-03 DIAGNOSIS — E11.42 TYPE 2 DIABETES MELLITUS WITH PERIPHERAL NEUROPATHY: Primary | ICD-10-CM

## 2023-11-03 DIAGNOSIS — L85.3 XEROSIS OF SKIN: ICD-10-CM

## 2023-11-03 PROCEDURE — 3077F PR MOST RECENT SYSTOLIC BLOOD PRESSURE >= 140 MM HG: ICD-10-PCS | Mod: CPTII,S$GLB,, | Performed by: STUDENT IN AN ORGANIZED HEALTH CARE EDUCATION/TRAINING PROGRAM

## 2023-11-03 PROCEDURE — 99204 OFFICE O/P NEW MOD 45 MIN: CPT | Mod: S$GLB,,, | Performed by: STUDENT IN AN ORGANIZED HEALTH CARE EDUCATION/TRAINING PROGRAM

## 2023-11-03 PROCEDURE — 99204 PR OFFICE/OUTPT VISIT, NEW, LEVL IV, 45-59 MIN: ICD-10-PCS | Mod: S$GLB,,, | Performed by: STUDENT IN AN ORGANIZED HEALTH CARE EDUCATION/TRAINING PROGRAM

## 2023-11-03 PROCEDURE — 3079F PR MOST RECENT DIASTOLIC BLOOD PRESSURE 80-89 MM HG: ICD-10-PCS | Mod: CPTII,S$GLB,, | Performed by: STUDENT IN AN ORGANIZED HEALTH CARE EDUCATION/TRAINING PROGRAM

## 2023-11-03 PROCEDURE — 3008F PR BODY MASS INDEX (BMI) DOCUMENTED: ICD-10-PCS | Mod: CPTII,S$GLB,, | Performed by: STUDENT IN AN ORGANIZED HEALTH CARE EDUCATION/TRAINING PROGRAM

## 2023-11-03 PROCEDURE — 99999 PR PBB SHADOW E&M-EST. PATIENT-LVL III: ICD-10-PCS | Mod: PBBFAC,,, | Performed by: STUDENT IN AN ORGANIZED HEALTH CARE EDUCATION/TRAINING PROGRAM

## 2023-11-03 PROCEDURE — 3079F DIAST BP 80-89 MM HG: CPT | Mod: CPTII,S$GLB,, | Performed by: STUDENT IN AN ORGANIZED HEALTH CARE EDUCATION/TRAINING PROGRAM

## 2023-11-03 PROCEDURE — 3008F BODY MASS INDEX DOCD: CPT | Mod: CPTII,S$GLB,, | Performed by: STUDENT IN AN ORGANIZED HEALTH CARE EDUCATION/TRAINING PROGRAM

## 2023-11-03 PROCEDURE — 3077F SYST BP >= 140 MM HG: CPT | Mod: CPTII,S$GLB,, | Performed by: STUDENT IN AN ORGANIZED HEALTH CARE EDUCATION/TRAINING PROGRAM

## 2023-11-03 PROCEDURE — 99999 PR PBB SHADOW E&M-EST. PATIENT-LVL III: CPT | Mod: PBBFAC,,, | Performed by: STUDENT IN AN ORGANIZED HEALTH CARE EDUCATION/TRAINING PROGRAM

## 2023-11-03 RX ORDER — AMMONIUM LACTATE 12 G/100G
LOTION TOPICAL DAILY
Qty: 400 G | Refills: 2 | Status: SHIPPED | OUTPATIENT
Start: 2023-11-03

## 2023-11-03 RX ORDER — DULOXETIN HYDROCHLORIDE 20 MG/1
20 CAPSULE, DELAYED RELEASE ORAL DAILY
Qty: 30 CAPSULE | Refills: 11 | Status: SHIPPED | OUTPATIENT
Start: 2023-11-03 | End: 2023-12-06

## 2023-11-03 RX ORDER — ITRACONAZOLE 100 MG/1
200 CAPSULE ORAL DAILY
Qty: 60 CAPSULE | Refills: 2 | Status: SHIPPED | OUTPATIENT
Start: 2023-11-03 | End: 2024-02-01

## 2023-11-03 NOTE — PROGRESS NOTES
Subjective:     Patient    Ailyn Ortiz is a 58 y.o. female.    Problem    New to [facility] podiatry. Presents for left 1st toenail discoloration, thickening despite 2 courses of terbinafine within the past 1.5 years. Has also tried various topical medications without resolution. Also has left dorsolateral midfoot pain which extends towards the ankle. Has tried change in footwear, has been on steroids recently for other pain without significant improvement in the foot pain. Is a nurse. 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     Primary Care Provider    Primary Care Provider: Brodie Fulton MD   Last Seen: 2023     History    History obtained from patient and review of medical records.     Past Medical History:   Diagnosis Date    Arthritis     zero negative imflammatory arthritis    Asthma     Diabetes mellitus, type 2 since the age of 42    Hypertension since the age of 42    JESSICA (obstructive sleep apnea)     S/P UPP    Partial small bowel obstruction 3/6/2019    Thyroid disease     Ventral hernia without obstruction or gangrene        Past Surgical History:   Procedure Laterality Date    ADENOIDECTOMY      CARPAL TUNNEL RELEASE Right 2018    Procedure: RELEASE, CARPAL TUNNEL right;  Surgeon: Anai Choudhary MD;  Location: [facility] OR [address] [place] [address];  Service: Orthopedics;  Laterality: Right;  stretcher, supine, hand pan 1 and pan 2     SECTION      times 2    CHOLECYSTECTOMY      DIAGNOSTIC LAPAROSCOPY N/A 3/8/2019    Procedure: LAPAROSCOPY, DIAGNOSTIC;  Surgeon: Stone Mei MD;  Location: [facility] OR [address] [place] [address];  Service: General;  Laterality: N/A;    ESOPHAGOGASTRODUODENOSCOPY N/A 2019    Procedure: EGD (ESOPHAGOGASTRODUODENOSCOPY);  Surgeon: Stone Mei MD;  Location: [facility] OR [address] [place] [address];  Service: General;  Laterality: N/A;    GANGLION CYST EXCISION      LAPAROSCOPIC RESECTION OF SMALL INTESTINE  3/8/2019    Procedure: EXCISION, SMALL  INTESTINE, LAPAROSCOPIC;  Surgeon: Stone Mei MD;  [address];  Service: General;;    LAPAROSCOPIC SLEEVE GASTRECTOMY N/A 12/30/2019    Procedure: GASTRECTOMY, SLEEVE, LAPAROSCOPIC wiht intra op EGD;  Surgeon: Stone Mei MD;  [facility];  Service: General;  Laterality: N/A;    LYSIS OF ADHESIONS N/A 12/30/2019    Procedure: LYSIS, ADHESIONS;  Surgeon: Stone Mei MD;  [facility];  Service: General;  Laterality: N/A;    small bowel resection (lipoma)      TONSILLECTOMY      TRIGGER FINGER RELEASE Left 4/15/2019    Procedure: RELEASE, TRIGGER FINGER left thumb;  Surgeon: Anai Choudhary MD;  Location: [facility];  Service: Orthopedics;  Laterality: Left;  stretcher, supine, hand pan 1 and 2    TUBAL LIGATION      UVULOPALATOPHARYNGOPLASTY          Objective:     Vitals  Wt Readings from Last 1 Encounters:   11/03/23 89.1 kg (196 lb 6.9 oz)     Temp Readings from Last 1 Encounters:   11/03/23 98.6 °F (37 °C)     BP Readings from Last 1 Encounters:   11/03/23 (!) 150/85     Pulse Readings from Last 1 Encounters:   11/03/23 67       Dermatological Exam    Skin:   Pedal hair growth, skin color, and skin texture normal on left; scattered dry acral skin    Nails:  All nails normal in length, thickness, color except left 1st toenail thick, discolored medially, fractured and absent down to lunula with exposed dry nail bed          Vascular Exam    Arteries:   Posterior tibial artery palpable on left  Dorsalis pedis artery palpable on left    Veins:   Superficial veins unremarkable on left    Swelling:   None on left    Neurological Exam    Isleta touch test:  Light touch sensation intact to left foot    Provocation Sign:  + at deep fibular nerve, superficial fibular nerve, and dorsal cutaneous nerves on left    Tinel Sign:  + at deep fibular nerve, superficial fibular nerve, and dorsal cutaneous nerves on left     Musculoskeletal  Exam    Footwear:  Casual on right  Casual on left    Gait Exam:   Ambulatory Status: Ambulatory  Gait: Normal  Assistive Devices: None    Foot Progression Angle:  Normal on right  Normal on left      Left Lower Extremity Additional Findings:  Palpable osteophytes dorsal midfoot, mild to moderate pain on palpation dorsolateral midfoot, reproduced with axial loading  Left foot and ankle function, strength, and range of motion unremarkable except as noted above.    Imaging and Other Tests    Imaging:  Independently reviewed and interpreted imaging, findings are as follows:     10/08/22 left foot X ray: dorsal midfoot arthritis, plantar calcaneal spur    Other Tests: The following A1c results were reviewed.   Hemoglobin A1C   Date Value Ref Range Status   02/07/2022 5.9 (H) 4.0 - 5.6 % Final     Comment:     ADA Screening Guidelines:  5.7-6.4%  Consistent with prediabetes  >or=6.5%  Consistent with diabetes    High levels of fetal hemoglobin interfere with the HbA1C  assay. Heterozygous hemoglobin variants (HbS, HgC, etc)do  not significantly interfere with this assay.   However, presence of multiple variants may affect accuracy.     10/30/2019 5.9 4.0 - 6.0 % Final   12/13/2018 6.5 (H) 4.0 - 5.6 % Final     Comment:     ADA Screening Guidelines:  5.7-6.4%  Consistent with prediabetes  >or=6.5%  Consistent with diabetes  High levels of fetal hemoglobin interfere with the HbA1C  assay. Heterozygous hemoglobin variants (HbS, HgC, etc)do  not significantly interfere with this assay.   However, presence of multiple variants may affect accuracy.           Assessment:     Encounter Diagnoses   Name Primary?    Type 2 diabetes mellitus with peripheral neuropathy Yes    Left foot pain     Tinea unguium     Xerosis of skin         Plan:     I counseled the patient on her conditions, their implications and medical management.    Diabetic foot with peripheral neuropathy: chronic stable  -Diabetic foot exam performed.  -Performed  shoe inspection and diabetic foot education. Reviewed importance of blood glucose control, proper nutrition, and foot hygiene to minimize risk of complications of diabetes. Recommended daily foot inspections, daily moisturizer to feet, avoiding sharp instruments to feet, appropriate footwear at all times when ambulating, and following up regularly for routine foot care.   -Diabetic foot risk: 2023 IWGDF very low ulcer risk.   -Next foot exam due November 2024.    Left foot pain: chronic stable  -Likely a combination of neurogenic pain and arthritic pain.  -Ordered duloxetine.     Left 1st toe tinea unguium, xerosis: chronic stable  -Reviewed CMP. Ordered 3 month course of itraconazole.   -Ordered ammonium lactate.       Return to clinic in 1 month for nerve pain, call sooner PRN.

## 2023-11-14 ENCOUNTER — PATIENT MESSAGE (OUTPATIENT)
Dept: BARIATRICS | Facility: CLINIC | Age: 58
End: 2023-11-14
Payer: COMMERCIAL

## 2023-12-06 ENCOUNTER — OFFICE VISIT (OUTPATIENT)
Dept: PODIATRY | Facility: CLINIC | Age: 58
End: 2023-12-06
Payer: COMMERCIAL

## 2023-12-06 VITALS
WEIGHT: 194.25 LBS | BODY MASS INDEX: 35.75 KG/M2 | RESPIRATION RATE: 18 BRPM | DIASTOLIC BLOOD PRESSURE: 65 MMHG | HEIGHT: 62 IN | SYSTOLIC BLOOD PRESSURE: 129 MMHG | TEMPERATURE: 99 F | HEART RATE: 80 BPM | OXYGEN SATURATION: 95 %

## 2023-12-06 DIAGNOSIS — M79.672 LEFT FOOT PAIN: ICD-10-CM

## 2023-12-06 DIAGNOSIS — E11.42 TYPE 2 DIABETES MELLITUS WITH PERIPHERAL NEUROPATHY: Primary | ICD-10-CM

## 2023-12-06 PROCEDURE — 3078F DIAST BP <80 MM HG: CPT | Mod: CPTII,S$GLB,, | Performed by: STUDENT IN AN ORGANIZED HEALTH CARE EDUCATION/TRAINING PROGRAM

## 2023-12-06 PROCEDURE — 3008F BODY MASS INDEX DOCD: CPT | Mod: CPTII,S$GLB,, | Performed by: STUDENT IN AN ORGANIZED HEALTH CARE EDUCATION/TRAINING PROGRAM

## 2023-12-06 PROCEDURE — 99999 PR PBB SHADOW E&M-EST. PATIENT-LVL IV: CPT | Mod: PBBFAC,,, | Performed by: STUDENT IN AN ORGANIZED HEALTH CARE EDUCATION/TRAINING PROGRAM

## 2023-12-06 PROCEDURE — 99999 PR PBB SHADOW E&M-EST. PATIENT-LVL IV: ICD-10-PCS | Mod: PBBFAC,,, | Performed by: STUDENT IN AN ORGANIZED HEALTH CARE EDUCATION/TRAINING PROGRAM

## 2023-12-06 PROCEDURE — 3008F PR BODY MASS INDEX (BMI) DOCUMENTED: ICD-10-PCS | Mod: CPTII,S$GLB,, | Performed by: STUDENT IN AN ORGANIZED HEALTH CARE EDUCATION/TRAINING PROGRAM

## 2023-12-06 PROCEDURE — 1159F PR MEDICATION LIST DOCUMENTED IN MEDICAL RECORD: ICD-10-PCS | Mod: CPTII,S$GLB,, | Performed by: STUDENT IN AN ORGANIZED HEALTH CARE EDUCATION/TRAINING PROGRAM

## 2023-12-06 PROCEDURE — 3074F SYST BP LT 130 MM HG: CPT | Mod: CPTII,S$GLB,, | Performed by: STUDENT IN AN ORGANIZED HEALTH CARE EDUCATION/TRAINING PROGRAM

## 2023-12-06 PROCEDURE — 99214 PR OFFICE/OUTPT VISIT, EST, LEVL IV, 30-39 MIN: ICD-10-PCS | Mod: S$GLB,,, | Performed by: STUDENT IN AN ORGANIZED HEALTH CARE EDUCATION/TRAINING PROGRAM

## 2023-12-06 PROCEDURE — 1159F MED LIST DOCD IN RCRD: CPT | Mod: CPTII,S$GLB,, | Performed by: STUDENT IN AN ORGANIZED HEALTH CARE EDUCATION/TRAINING PROGRAM

## 2023-12-06 PROCEDURE — 99214 OFFICE O/P EST MOD 30 MIN: CPT | Mod: S$GLB,,, | Performed by: STUDENT IN AN ORGANIZED HEALTH CARE EDUCATION/TRAINING PROGRAM

## 2023-12-06 PROCEDURE — 3074F PR MOST RECENT SYSTOLIC BLOOD PRESSURE < 130 MM HG: ICD-10-PCS | Mod: CPTII,S$GLB,, | Performed by: STUDENT IN AN ORGANIZED HEALTH CARE EDUCATION/TRAINING PROGRAM

## 2023-12-06 PROCEDURE — 3078F PR MOST RECENT DIASTOLIC BLOOD PRESSURE < 80 MM HG: ICD-10-PCS | Mod: CPTII,S$GLB,, | Performed by: STUDENT IN AN ORGANIZED HEALTH CARE EDUCATION/TRAINING PROGRAM

## 2023-12-06 RX ORDER — DULOXETIN HYDROCHLORIDE 20 MG/1
20 CAPSULE, DELAYED RELEASE ORAL 2 TIMES DAILY
Qty: 60 CAPSULE | Refills: 11 | Status: SHIPPED | OUTPATIENT
Start: 2023-12-06 | End: 2024-02-05

## 2023-12-06 NOTE — PROGRESS NOTES
Subjective:     Patient    Ailyn Ortiz is a 58 y.o. female.    Problem    23: New to [facility]. Presents for left 1st toenail discoloration, thickening despite 2 courses of terbinafine within the past 1.5 years. Has also tried various topical medications without resolution. Also has left dorsolateral midfoot pain which extends towards the ankle. Has tried change in footwear, has been on steroids recently for other pain without significant improvement in the foot pain. Is a nurse. 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     23: Taking duloxetine 20 mg without side effects with significant improvement in pain. Also on itraconazole with no side effects. No new concerns.     Primary Care Provider    Primary Care Provider: Brodie Fulton MD   Last Seen: 2023     History    History obtained from patient and review of medical records.     Past Medical History:   Diagnosis Date    Arthritis     zero negative imflammatory arthritis    Asthma     Diabetes mellitus, type 2 since the age of 42    Hypertension since the age of 42    JESSICA (obstructive sleep apnea)     S/P UPP    Partial small bowel obstruction 3/6/2019    Thyroid disease     Ventral hernia without obstruction or gangrene        Past Surgical History:   Procedure Laterality Date    ADENOIDECTOMY      CARPAL TUNNEL RELEASE Right 2018    Procedure: RELEASE, CARPAL TUNNEL right;  Surgeon: Anai Choudhary MD;  Location: [facility] OR [address] [place] [address];  Service: Orthopedics;  Laterality: Right;  stretcher, supine, hand pan 1 and pan 2     SECTION      times 2    CHOLECYSTECTOMY      DIAGNOSTIC LAPAROSCOPY N/A 3/8/2019    Procedure: LAPAROSCOPY, DIAGNOSTIC;  Surgeon: Stone Mei MD;  Location: [facility] OR [address] [place] [address];  Service: General;  Laterality: N/A;    ESOPHAGOGASTRODUODENOSCOPY N/A 2019    Procedure: EGD (ESOPHAGOGASTRODUODENOSCOPY);  Surgeon: Stone Mei MD;  Location:  [facility] OR 2ND FLR;  Service: General;  Laterality: N/A;    GANGLION CYST EXCISION      LAPAROSCOPIC RESECTION OF SMALL INTESTINE  3/8/2019    Procedure: EXCISION, SMALL INTESTINE, LAPAROSCOPIC;  Surgeon: Stone Mei MD;  Location: [facility] OR [facility] FLR;  Service: General;;    LAPAROSCOPIC SLEEVE GASTRECTOMY N/A 12/30/2019    Procedure: GASTRECTOMY, SLEEVE, LAPAROSCOPIC wiht intra op EGD;  Surgeon: Stone Mei MD;  Location: [facility] OR [facility] FLR;  Service: General;  Laterality: N/A;    LYSIS OF ADHESIONS N/A 12/30/2019    Procedure: LYSIS, ADHESIONS;  Surgeon: Stone Mei MD;  Location: [facility] OR 2ND FLR;  Service: General;  Laterality: N/A;    small bowel resection (lipoma)      TONSILLECTOMY      TRIGGER FINGER RELEASE Left 4/15/2019    Procedure: RELEASE, TRIGGER FINGER left thumb;  Surgeon: Anai Choudhary MD;  Location: [facility];  Service: Orthopedics;  Laterality: Left;  stretcher, supine, hand pan 1 and 2    TUBAL LIGATION      UVULOPALATOPHARYNGOPLASTY          Objective:     Vitals  Wt Readings from Last 1 Encounters:   12/06/23 88.1 kg (194 lb 3.6 oz)     Temp Readings from Last 1 Encounters:   12/06/23 98.7 °F (37.1 °C)     BP Readings from Last 1 Encounters:   12/06/23 129/65     Pulse Readings from Last 1 Encounters:   12/06/23 80       Dermatological Exam    Skin:   Pedal hair growth, skin color, and skin texture normal on left; scattered dry acral skin    Nails:  All nails normal in length, thickness, color except left 1st toenail thick, discolored medially, fractured and absent down to lunula with exposed dry nail bed       Vascular Exam    Arteries:   Posterior tibial artery palpable on left  Dorsalis pedis artery palpable on left    Veins:   Superficial veins unremarkable on left    Swelling:   None on left    Neurological Exam    [place] touch test:  Light touch sensation intact to left foot    [place]ocation Sign:  + at deep fibular nerve, superficial fibular nerve, and dorsal  cutaneous nerves on left, improved    Tinel Sign:  + at deep fibular nerve, superficial fibular nerve, and dorsal cutaneous nerves on left, improved    Musculoskeletal Exam    Footwear:  Casual on right  Casual on left    Gait Exam:   Ambulatory Status: Ambulatory  Gait: Normal  Assistive Devices: None    Foot Progression Angle:  Normal on right  Normal on left      Left Lower Extremity Additional Findings:  Palpable osteophytes dorsal midfoot, mild pain on palpation dorsolateral midfoot, reproduced with axial loading  Left foot and ankle function, strength, and range of motion unremarkable except as noted above.    Imaging and Other Tests    Imaging:  10/08/22 left foot X ray: dorsal midfoot arthritis, plantar calcaneal spur    Independently reviewed and interpreted imaging, findings are as follows: N/A    Other Tests: The following A1c results were reviewed.   Hemoglobin A1C   Date Value Ref Range Status   02/07/2022 5.9 (H) 4.0 - 5.6 % Final     Comment:     ADA Screening Guidelines:  5.7-6.4%  Consistent with prediabetes  >or=6.5%  Consistent with diabetes    High levels of fetal hemoglobin interfere with the HbA1C  assay. Heterozygous hemoglobin variants (HbS, HgC, etc)do  not significantly interfere with this assay.   However, presence of multiple variants may affect accuracy.     10/30/2019 5.9 4.0 - 6.0 % Final   12/13/2018 6.5 (H) 4.0 - 5.6 % Final     Comment:     ADA Screening Guidelines:  5.7-6.4%  Consistent with prediabetes  >or=6.5%  Consistent with diabetes  High levels of fetal hemoglobin interfere with the HbA1C  assay. Heterozygous hemoglobin variants (HbS, HgC, etc)do  not significantly interfere with this assay.   However, presence of multiple variants may affect accuracy.           Assessment:     Encounter Diagnoses   Name Primary?    Type 2 diabetes mellitus with peripheral neuropathy Yes    Left foot pain           Plan:     I counseled the patient on her conditions, their implications and  medical management.    Diabetic foot with peripheral neuropathy: chronic stable  -Diabetic foot exam performed.  -Performed shoe inspection and diabetic foot education. Reviewed importance of blood glucose control, proper nutrition, and foot hygiene to minimize risk of complications of diabetes. Recommended daily foot inspections, daily moisturizer to feet, avoiding sharp instruments to feet, appropriate footwear at all times when ambulating, and following up regularly for routine foot care.   -Diabetic foot risk: 2023 IWGDF very low ulcer risk.   -Next foot exam due November 2024.    Left foot pain: chronic improving  -Likely a combination of neurogenic pain and arthritic pain.  -Increased duloxetine to 40 mg/day.     Left 1st toe tinea unguium, xerosis: chronic stable  -Continue itraconazole.   -Continue ammonium lactate.       Return to clinic in 1-2 months for nerve pain, call sooner PRN.

## 2023-12-12 ENCOUNTER — PATIENT MESSAGE (OUTPATIENT)
Dept: BARIATRICS | Facility: CLINIC | Age: 58
End: 2023-12-12
Payer: COMMERCIAL

## 2023-12-13 ENCOUNTER — PATIENT MESSAGE (OUTPATIENT)
Dept: BARIATRICS | Facility: CLINIC | Age: 58
End: 2023-12-13
Payer: COMMERCIAL

## 2024-01-03 PROBLEM — M06.00 SERONEGATIVE RHEUMATOID ARTHRITIS: Status: ACTIVE | Noted: 2024-01-03

## 2024-01-04 ENCOUNTER — OFFICE VISIT (OUTPATIENT)
Dept: PODIATRY | Facility: CLINIC | Age: 59
End: 2024-01-04
Payer: COMMERCIAL

## 2024-01-04 VITALS
DIASTOLIC BLOOD PRESSURE: 84 MMHG | HEART RATE: 74 BPM | SYSTOLIC BLOOD PRESSURE: 143 MMHG | HEIGHT: 62 IN | WEIGHT: 198.44 LBS | BODY MASS INDEX: 36.52 KG/M2 | RESPIRATION RATE: 18 BRPM

## 2024-01-04 DIAGNOSIS — M79.2 NEUROGENIC PAIN OF LEFT FOOT: Primary | ICD-10-CM

## 2024-01-04 DIAGNOSIS — B35.1 TINEA UNGUIUM: ICD-10-CM

## 2024-01-04 PROCEDURE — 4010F ACE/ARB THERAPY RXD/TAKEN: CPT | Mod: CPTII,S$GLB,, | Performed by: STUDENT IN AN ORGANIZED HEALTH CARE EDUCATION/TRAINING PROGRAM

## 2024-01-04 PROCEDURE — 3079F DIAST BP 80-89 MM HG: CPT | Mod: CPTII,S$GLB,, | Performed by: STUDENT IN AN ORGANIZED HEALTH CARE EDUCATION/TRAINING PROGRAM

## 2024-01-04 PROCEDURE — 3077F SYST BP >= 140 MM HG: CPT | Mod: CPTII,S$GLB,, | Performed by: STUDENT IN AN ORGANIZED HEALTH CARE EDUCATION/TRAINING PROGRAM

## 2024-01-04 PROCEDURE — 3008F BODY MASS INDEX DOCD: CPT | Mod: CPTII,S$GLB,, | Performed by: STUDENT IN AN ORGANIZED HEALTH CARE EDUCATION/TRAINING PROGRAM

## 2024-01-04 PROCEDURE — 99999 PR PBB SHADOW E&M-EST. PATIENT-LVL III: CPT | Mod: PBBFAC,,, | Performed by: STUDENT IN AN ORGANIZED HEALTH CARE EDUCATION/TRAINING PROGRAM

## 2024-01-04 PROCEDURE — 99213 OFFICE O/P EST LOW 20 MIN: CPT | Mod: S$GLB,,, | Performed by: STUDENT IN AN ORGANIZED HEALTH CARE EDUCATION/TRAINING PROGRAM

## 2024-01-04 RX ORDER — LOSARTAN POTASSIUM 50 MG/1
50 TABLET ORAL
COMMUNITY
Start: 2023-12-11

## 2024-01-04 NOTE — PROGRESS NOTES
Subjective:     Patient    Ailyn Ortiz is a 58 y.o. female.    Problem    23: New to [facility]. Presents for left 1st toenail discoloration, thickening despite 2 courses of terbinafine within the past 1.5 years. Has also tried various topical medications without resolution. Also has left dorsolateral midfoot pain which extends towards the ankle. Has tried change in footwear, has been on steroids recently for other pain without significant improvement in the foot pain. Is a nurse. 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     23: Taking duloxetine 20 mg without side effects with significant improvement in pain. Also on itraconazole with no side effects. No new concerns.     24: Increased duloxetine to 40 mg/day at last visit. Foot pain much better. Monday she worked and both feet were hurting. Majority of time she has no pain. She would like to continue 40 mg/day a little longer before making any adjustment. No issue with itraconazole, believes there may have been some minor improvement in the nail so far.     Primary Care Provider    Primary Care Provider: Brodie Fulton MD   Last Seen: 2023     History    History obtained from patient and review of medical records.     Past Medical History:   Diagnosis Date    Arthritis     zero negative imflammatory arthritis    Asthma     Diabetes mellitus, type 2 since the age of 42    Hypertension since the age of 42    JESSICA (obstructive sleep apnea)     S/P UPP    Partial small bowel obstruction 3/6/2019    Thyroid disease     Ventral hernia without obstruction or gangrene        Past Surgical History:   Procedure Laterality Date    ADENOIDECTOMY      CARPAL TUNNEL RELEASE Right 2018    Procedure: RELEASE, CARPAL TUNNEL right;  Surgeon: Anai Choudhary MD;  Location: [facility] OR [address] [place] [address];  Service: Orthopedics;  Laterality: Right;  stretcher, supine, hand pan 1 and pan 2     SECTION       times 2    CHOLECYSTECTOMY      DIAGNOSTIC LAPAROSCOPY N/A 3/8/2019    Procedure: LAPAROSCOPY, DIAGNOSTIC;  Surgeon: Stone Mei MD;  Location: [address];  Service: General;  Laterality: N/A;    ESOPHAGOGASTRODUODENOSCOPY N/A 12/30/2019    Procedure: EGD (ESOPHAGOGASTRODUODENOSCOPY);  Surgeon: Stone Mei MD;  [address];  Service: General;  Laterality: N/A;    GANGLION CYST EXCISION      LAPAROSCOPIC RESECTION OF SMALL INTESTINE  3/8/2019    Procedure: EXCISION, SMALL INTESTINE, LAPAROSCOPIC;  Surgeon: Stone Mei MD;  Location: [address];  Service: General;;    LAPAROSCOPIC SLEEVE GASTRECTOMY N/A 12/30/2019    Procedure: GASTRECTOMY, SLEEVE, LAPAROSCOPIC wiht intra op EGD;  Surgeon: Stone Mei MD;  Location: [facility] OR [address] [place] [address];  Service: General;  Laterality: N/A;    LYSIS OF ADHESIONS N/A 12/30/2019    Procedure: LYSIS, ADHESIONS;  Surgeon: Stone Mei MD;  Location: [address];  Service: General;  Laterality: N/A;    small bowel resection (lipoma)      TONSILLECTOMY      TRIGGER FINGER RELEASE Left 4/15/2019    Procedure: RELEASE, TRIGGER FINGER left thumb;  Surgeon: Anai Choudhary MD;  Location: [facility];  Service: Orthopedics;  Laterality: Left;  stretcher, supine, hand pan 1 and 2    TUBAL LIGATION      UVULOPALATOPHARYNGOPLASTY          Objective:     Vitals  Wt Readings from Last 1 Encounters:   01/04/24 90 kg (198 lb 6.6 oz)     Temp Readings from Last 1 Encounters:   12/06/23 98.7 °F (37.1 °C)     BP Readings from Last 1 Encounters:   01/04/24 (!) 143/84     Pulse Readings from Last 1 Encounters:   01/04/24 74       Dermatological Exam    Skin:   Pedal hair growth, skin color, and skin texture normal on left; scattered dry acral skin    Nails:  All nails normal in length, thickness, color except left 1st toenail thick, discolored medially       Vascular Exam    Arteries:   Posterior tibial artery palpable on  left  Dorsalis pedis artery palpable on left    Veins:   Superficial veins unremarkable on left    Swelling:   None on left    Neurological Exam    [place] touch test:  Light touch sensation intact to left foot    Provocation Sign:  + at deep fibular nerve, superficial fibular nerve, and dorsal cutaneous nerves on left, improved    Tinel Sign:  + at deep fibular nerve, superficial fibular nerve, and dorsal cutaneous nerves on left, improved    Musculoskeletal Exam    Footwear:  Casual on right  Casual on left    Gait Exam:   Ambulatory Status: Ambulatory  Gait: Normal  Assistive Devices: None    Foot Progression Angle:  Normal on right  Normal on left      Left Lower Extremity Additional Findings:  Palpable osteophytes dorsal midfoot, mild pain on palpation dorsolateral midfoot, reproduced with axial loading  Left foot and ankle function, strength, and range of motion unremarkable except as noted above.    Imaging and Other Tests    Imaging:  10/08/22 left foot X ray: dorsal midfoot arthritis, plantar calcaneal spur    Independently reviewed and interpreted imaging, findings are as follows: N/A    Other Tests: The following A1c results were reviewed.   Hemoglobin A1C   Date Value Ref Range Status   02/07/2022 5.9 (H) 4.0 - 5.6 % Final     Comment:     ADA Screening Guidelines:  5.7-6.4%  Consistent with prediabetes  >or=6.5%  Consistent with diabetes    High levels of fetal hemoglobin interfere with the HbA1C  assay. Heterozygous hemoglobin variants (HbS, HgC, etc)do  not significantly interfere with this assay.   However, presence of multiple variants may affect accuracy.     10/30/2019 5.9 4.0 - 6.0 % Final   12/13/2018 6.5 (H) 4.0 - 5.6 % Final     Comment:     ADA Screening Guidelines:  5.7-6.4%  Consistent with prediabetes  >or=6.5%  Consistent with diabetes  High levels of fetal hemoglobin interfere with the HbA1C  assay. Heterozygous hemoglobin variants (HbS, HgC, etc)do  not significantly interfere with this  assay.   However, presence of multiple variants may affect accuracy.           Assessment:     Encounter Diagnoses   Name Primary?    Neurogenic pain of left foot Yes    Tinea unguium             Plan:     I counseled the patient on her conditions, their implications and medical management.    Diabetic foot with peripheral neuropathy: chronic stable   -Performed shoe inspection and diabetic foot education. Reviewed importance of blood glucose control, proper nutrition, and foot hygiene to minimize risk of complications of diabetes. Recommended daily foot inspections, daily moisturizer to feet, avoiding sharp instruments to feet, appropriate footwear at all times when ambulating, and following up regularly for routine foot care.   -Diabetic foot risk: 2023 IWGDF very low ulcer risk.   -Next foot exam due November 2024.    Left foot pain: chronic improving  -Responding well to duloxetine.  -Continue duloxetine to 40 mg/day.     Left 1st toe tinea unguium, xerosis: chronic stable  -Continue itraconazole.   -Continue ammonium lactate.       Return to clinic in 1 month for nerve pain, call sooner PRN.

## 2024-01-09 ENCOUNTER — PATIENT MESSAGE (OUTPATIENT)
Dept: BARIATRICS | Facility: CLINIC | Age: 59
End: 2024-01-09
Payer: COMMERCIAL

## 2024-02-05 ENCOUNTER — OFFICE VISIT (OUTPATIENT)
Dept: PODIATRY | Facility: CLINIC | Age: 59
End: 2024-02-05
Payer: COMMERCIAL

## 2024-02-05 VITALS
TEMPERATURE: 99 F | DIASTOLIC BLOOD PRESSURE: 78 MMHG | BODY MASS INDEX: 36.95 KG/M2 | RESPIRATION RATE: 18 BRPM | WEIGHT: 200.81 LBS | HEART RATE: 72 BPM | HEIGHT: 62 IN | SYSTOLIC BLOOD PRESSURE: 140 MMHG

## 2024-02-05 DIAGNOSIS — L85.3 XEROSIS OF SKIN: ICD-10-CM

## 2024-02-05 DIAGNOSIS — M79.2 NEUROGENIC PAIN OF LEFT FOOT: ICD-10-CM

## 2024-02-05 DIAGNOSIS — B35.1 TINEA UNGUIUM: ICD-10-CM

## 2024-02-05 DIAGNOSIS — E11.42 TYPE 2 DIABETES MELLITUS WITH PERIPHERAL NEUROPATHY: Primary | ICD-10-CM

## 2024-02-05 PROCEDURE — 4010F ACE/ARB THERAPY RXD/TAKEN: CPT | Mod: CPTII,S$GLB,, | Performed by: STUDENT IN AN ORGANIZED HEALTH CARE EDUCATION/TRAINING PROGRAM

## 2024-02-05 PROCEDURE — 99999 PR PBB SHADOW E&M-EST. PATIENT-LVL IV: CPT | Mod: PBBFAC,,, | Performed by: STUDENT IN AN ORGANIZED HEALTH CARE EDUCATION/TRAINING PROGRAM

## 2024-02-05 PROCEDURE — 3077F SYST BP >= 140 MM HG: CPT | Mod: CPTII,S$GLB,, | Performed by: STUDENT IN AN ORGANIZED HEALTH CARE EDUCATION/TRAINING PROGRAM

## 2024-02-05 PROCEDURE — 99214 OFFICE O/P EST MOD 30 MIN: CPT | Mod: S$GLB,,, | Performed by: STUDENT IN AN ORGANIZED HEALTH CARE EDUCATION/TRAINING PROGRAM

## 2024-02-05 PROCEDURE — 3078F DIAST BP <80 MM HG: CPT | Mod: CPTII,S$GLB,, | Performed by: STUDENT IN AN ORGANIZED HEALTH CARE EDUCATION/TRAINING PROGRAM

## 2024-02-05 PROCEDURE — 1159F MED LIST DOCD IN RCRD: CPT | Mod: CPTII,S$GLB,, | Performed by: STUDENT IN AN ORGANIZED HEALTH CARE EDUCATION/TRAINING PROGRAM

## 2024-02-05 PROCEDURE — 3008F BODY MASS INDEX DOCD: CPT | Mod: CPTII,S$GLB,, | Performed by: STUDENT IN AN ORGANIZED HEALTH CARE EDUCATION/TRAINING PROGRAM

## 2024-02-05 RX ORDER — DULOXETIN HYDROCHLORIDE 60 MG/1
60 CAPSULE, DELAYED RELEASE ORAL DAILY
Qty: 30 CAPSULE | Refills: 11 | Status: SHIPPED | OUTPATIENT
Start: 2024-02-05 | End: 2025-02-04

## 2024-02-05 NOTE — PROGRESS NOTES
Subjective:     Patient    Ailyn Ortiz is a 58 y.o. female.    Problem    11/03/23: New to [facility]. Presents for left 1st toenail discoloration, thickening despite 2 courses of terbinafine within the past 1.5 years. Has also tried various topical medications without resolution. Also has left dorsolateral midfoot pain which extends towards the ankle. Has tried change in footwear, has been on steroids recently for other pain without significant improvement in the foot pain. Is a nurse. 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     12/06/23: Taking duloxetine 20 mg without side effects with significant improvement in pain. Also on itraconazole with no side effects. No new concerns.     01/04/24: Increased duloxetine to 40 mg/day at last visit. Foot pain much better. Monday she worked and both feet were hurting. Majority of time she has no pain. She would like to continue 40 mg/day a little longer before making any adjustment. No issue with itraconazole, believes there may have been some minor improvement in the nail so far.     02/05/24: Returns for nerve pain, on 40 mg/day duloxetine with significant improvement but some residual symptoms. No pain on her off days but on the days that she works, about 12 hours, she has pain significant enough that she limps. This pain is similar to her prior pain. Will be starting a new job at the end of February that will require less time on her feet. Also just completing a 3 month course of itraconazole with some improvement in the nails so far.     Primary Care Provider    Primary Care Provider: Brodie Fulton MD   Last Seen: 5/12/2023     History    History obtained from patient and review of medical records.     Past Medical History:   Diagnosis Date    Arthritis     zero negative imflammatory arthritis    Asthma     Diabetes mellitus, type 2 since the age of 42    Hypertension since the age of 42    JESSICA (obstructive sleep  apnea)     S/P UPP    Partial small bowel obstruction 3/6/2019    Thyroid disease     Ventral hernia without obstruction or gangrene        Past Surgical History:   Procedure Laterality Date    ADENOIDECTOMY      CARPAL TUNNEL RELEASE Right 2018    Procedure: RELEASE, CARPAL TUNNEL right;  Surgeon: Anai Choudhary MD;  Location: [address];  Service: Orthopedics;  Laterality: Right;  stretcher, supine, hand pan 1 and pan 2     SECTION      times 2    CHOLECYSTECTOMY      DIAGNOSTIC LAPAROSCOPY N/A 3/8/2019    Procedure: LAPAROSCOPY, DIAGNOSTIC;  Surgeon: Stone Mei MD;  Location: [facility] OR 2ND FLR;  Service: General;  Laterality: N/A;    ESOPHAGOGASTRODUODENOSCOPY N/A 2019    Procedure: EGD (ESOPHAGOGASTRODUODENOSCOPY);  Surgeon: Stone Mei MD;  Location: [facility] OR [address] [place] [address];  Service: General;  Laterality: N/A;    GANGLION CYST EXCISION      LAPAROSCOPIC RESECTION OF SMALL INTESTINE  3/8/2019    Procedure: EXCISION, SMALL INTESTINE, LAPAROSCOPIC;  Surgeon: Stone Mei MD;  Location: [facility] OR [address] [place] [address];  Service: General;;    LAPAROSCOPIC SLEEVE GASTRECTOMY N/A 2019    Procedure: GASTRECTOMY, SLEEVE, LAPAROSCOPIC wiht intra op EGD;  Surgeon: Stone Mei MD;  Location: [facility] OR [address] [place] [address];  Service: General;  Laterality: N/A;    LYSIS OF ADHESIONS N/A 2019    Procedure: LYSIS, ADHESIONS;  Surgeon: Stone Mei MD;  Location: [facility] OR [address] [place] [address];  Service: General;  Laterality: N/A;    small bowel resection (lipoma)      TONSILLECTOMY      TRIGGER FINGER RELEASE Left 4/15/2019    Procedure: RELEASE, TRIGGER FINGER left thumb;  Surgeon: Anai Choudhary MD;  Location: [facility];  Service: Orthopedics;  Laterality: Left;  stretcher, supine, hand pan 1 and 2    TUBAL LIGATION      UVULOPALATOPHARYNGOPLASTY          Objective:     Vitals  Wt Readings from Last 1 Encounters:   24 91.1 kg (200 lb 13.4 oz)     Temp Readings from Last 1  Encounters:   02/05/24 98.6 °F (37 °C)     BP Readings from Last 1 Encounters:   02/05/24 (!) 140/78     Pulse Readings from Last 1 Encounters:   02/05/24 72       Dermatological Exam    Skin:   Pedal hair growth, skin color, and skin texture normal on left; scattered dry acral skin    Nails:  All nails normal in length, thickness, color except left 1st toenail thick, discolored medially but improving      Vascular Exam    Arteries:   Posterior tibial artery palpable on left  Dorsalis pedis artery palpable on left    Veins:   Superficial veins unremarkable on left    Swelling:   None on left    Neurological Exam    [place] touch test:  Light touch sensation intact to left foot    Provocation Sign:  + at deep fibular nerve, superficial fibular nerve, and dorsal cutaneous nerves on left, improved    Tinel Sign:  + at deep fibular nerve, superficial fibular nerve, and dorsal cutaneous nerves on left, improved    Musculoskeletal Exam    Footwear:  Casual on right  Casual on left    Gait Exam:   Ambulatory Status: Ambulatory  Gait: Normal  Assistive Devices: None    Foot Progression Angle:  Normal on right  Normal on left      Left Lower Extremity Additional Findings:  Palpable osteophytes dorsal midfoot, mild pain on palpation dorsolateral midfoot, reproduced with axial loading  Left foot and ankle function, strength, and range of motion unremarkable except as noted above.    Imaging and Other Tests    Imaging:  10/08/22 left foot X ray: dorsal midfoot arthritis, plantar calcaneal spur    Independently reviewed and interpreted imaging, findings are as follows: N/A    Other Tests: The following A1c results were reviewed.   Hemoglobin A1C   Date Value Ref Range Status   02/07/2022 5.9 (H) 4.0 - 5.6 % Final     Comment:     ADA Screening Guidelines:  5.7-6.4%  Consistent with prediabetes  >or=6.5%  Consistent with diabetes    High levels of fetal hemoglobin interfere with the HbA1C  assay. Heterozygous hemoglobin variants  (HbS, HgC, etc)do  not significantly interfere with this assay.   However, presence of multiple variants may affect accuracy.     10/30/2019 5.9 4.0 - 6.0 % Final   12/13/2018 6.5 (H) 4.0 - 5.6 % Final     Comment:     ADA Screening Guidelines:  5.7-6.4%  Consistent with prediabetes  >or=6.5%  Consistent with diabetes  High levels of fetal hemoglobin interfere with the HbA1C  assay. Heterozygous hemoglobin variants (HbS, HgC, etc)do  not significantly interfere with this assay.   However, presence of multiple variants may affect accuracy.           Assessment:     Encounter Diagnoses   Name Primary?    Type 2 diabetes mellitus with peripheral neuropathy Yes    Neurogenic pain of left foot     Tinea unguium     Xerosis of skin           Plan:     I counseled the patient on her conditions, their implications and medical management.    Diabetic foot with peripheral neuropathy: chronic stable   -Performed shoe inspection and diabetic foot education. Reviewed importance of blood glucose control, proper nutrition, and foot hygiene to minimize risk of complications of diabetes. Recommended daily foot inspections, daily moisturizer to feet, avoiding sharp instruments to feet, appropriate footwear at all times when ambulating, and following up regularly for routine foot care.   -Diabetic foot risk: 2023 IWGDF very low ulcer risk.   -Next foot exam due November 2024.    Left foot pain: chronic improving  -Neurogenic in nature.   -Increase duloxetine to 60 mg/day.     Left 1st toe tinea unguium, xerosis: chronic stable  -Complete itraconazole.   -Continue ammonium lactate.       Return to clinic in 2 months for nerve pain, call sooner PRN.

## 2024-02-14 ENCOUNTER — PATIENT MESSAGE (OUTPATIENT)
Dept: BARIATRICS | Facility: CLINIC | Age: 59
End: 2024-02-14
Payer: COMMERCIAL

## 2024-02-20 ENCOUNTER — PATIENT MESSAGE (OUTPATIENT)
Dept: BARIATRICS | Facility: CLINIC | Age: 59
End: 2024-02-20
Payer: COMMERCIAL

## 2024-02-21 LAB
LEFT EYE DM RETINOPATHY: NEGATIVE
RIGHT EYE DM RETINOPATHY: NEGATIVE

## 2024-02-29 ENCOUNTER — PATIENT MESSAGE (OUTPATIENT)
Dept: BARIATRICS | Facility: CLINIC | Age: 59
End: 2024-02-29
Payer: COMMERCIAL

## 2024-03-04 ENCOUNTER — HOSPITAL ENCOUNTER (OUTPATIENT)
Dept: RADIOLOGY | Facility: OTHER | Age: 59
Discharge: HOME | End: 2024-03-04
Attending: NURSE PRACTITIONER
Payer: COMMERCIAL

## 2024-03-04 DIAGNOSIS — Z12.39 ENCOUNTER FOR SCREENING FOR MALIGNANT NEOPLASM OF BREAST, UNSPECIFIED SCREENING MODALITY: ICD-10-CM

## 2024-03-04 DIAGNOSIS — Z78.0 POST-MENOPAUSAL: ICD-10-CM

## 2024-03-04 PROCEDURE — 77080 DXA BONE DENSITY AXIAL: CPT | Mod: TC

## 2024-03-04 PROCEDURE — 77067 SCR MAMMO BI INCL CAD: CPT | Mod: TC

## 2024-03-04 PROCEDURE — 77080 DXA BONE DENSITY AXIAL: CPT | Mod: 26,,, | Performed by: RADIOLOGY

## 2024-03-04 PROCEDURE — 77067 SCR MAMMO BI INCL CAD: CPT | Mod: 26,,, | Performed by: RADIOLOGY

## 2024-03-04 PROCEDURE — 77063 BREAST TOMOSYNTHESIS BI: CPT | Mod: 26,,, | Performed by: RADIOLOGY

## 2024-03-06 ENCOUNTER — PATIENT MESSAGE (OUTPATIENT)
Dept: BARIATRICS | Facility: CLINIC | Age: 59
End: 2024-03-06
Payer: COMMERCIAL

## 2024-04-03 ENCOUNTER — PATIENT MESSAGE (OUTPATIENT)
Dept: BARIATRICS | Facility: CLINIC | Age: 59
End: 2024-04-03
Payer: COMMERCIAL

## 2024-04-04 ENCOUNTER — PATIENT MESSAGE (OUTPATIENT)
Dept: PODIATRY | Facility: CLINIC | Age: 59
End: 2024-04-04
Payer: COMMERCIAL

## 2024-04-05 ENCOUNTER — OFFICE VISIT (OUTPATIENT)
Dept: PODIATRY | Facility: CLINIC | Age: 59
End: 2024-04-05
Payer: COMMERCIAL

## 2024-04-05 DIAGNOSIS — M79.2 NEUROGENIC PAIN OF LEFT FOOT: Primary | ICD-10-CM

## 2024-04-05 PROCEDURE — 4010F ACE/ARB THERAPY RXD/TAKEN: CPT | Mod: CPTII,95,, | Performed by: STUDENT IN AN ORGANIZED HEALTH CARE EDUCATION/TRAINING PROGRAM

## 2024-04-05 PROCEDURE — 99213 OFFICE O/P EST LOW 20 MIN: CPT | Mod: 95,,, | Performed by: STUDENT IN AN ORGANIZED HEALTH CARE EDUCATION/TRAINING PROGRAM

## 2024-04-05 NOTE — PROGRESS NOTES
The patient location is: car  The chief complaint leading to consultation is: chronic foot pain    Visit type: audiovisual    Face to Face time with patient: 5 min  10 minutes of total time spent on the encounter, which includes face to face time and non-face to face time preparing to see the patient (eg, review of tests), Obtaining and/or reviewing separately obtained history, Documenting clinical information in the electronic or other health record, Independently interpreting results (not separately reported) and communicating results to the patient/family/caregiver, or Care coordination (not separately reported).         Each patient to whom he or she provides medical services by telemedicine is:  (1) informed of the relationship between the physician and patient and the respective role of any other health care provider with respect to management of the patient; and (2) notified that he or she may decline to receive medical services by telemedicine and may withdraw from such care at any time.    Notes:     Seen 2 months ago for chronic left foot pain and other issues; doing well on 60 mg/day duloxetine, pain is significantly improved and she is happy at this dose. Toenail also continues to improve s/p itraconazole course.    RTC in November for diabetic foot exam, sooner PRN.

## 2024-04-09 ENCOUNTER — PATIENT MESSAGE (OUTPATIENT)
Dept: BARIATRICS | Facility: CLINIC | Age: 59
End: 2024-04-09
Payer: COMMERCIAL

## 2024-05-14 ENCOUNTER — PATIENT MESSAGE (OUTPATIENT)
Dept: BARIATRICS | Facility: CLINIC | Age: 59
End: 2024-05-14
Payer: COMMERCIAL

## 2024-06-09 ENCOUNTER — PATIENT MESSAGE (OUTPATIENT)
Dept: ADMINISTRATIVE | Facility: OTHER | Age: 59
End: 2024-06-09
Payer: COMMERCIAL

## 2024-06-11 ENCOUNTER — PATIENT MESSAGE (OUTPATIENT)
Dept: BARIATRICS | Facility: CLINIC | Age: 59
End: 2024-06-11
Payer: COMMERCIAL

## 2024-06-17 ENCOUNTER — TELEPHONE (OUTPATIENT)
Dept: BARIATRICS | Facility: CLINIC | Age: 59
End: 2024-06-17
Payer: COMMERCIAL

## 2024-06-20 ENCOUNTER — TELEPHONE (OUTPATIENT)
Dept: ORTHOPEDICS | Facility: CLINIC | Age: 59
End: 2024-06-20
Payer: COMMERCIAL

## 2024-06-20 DIAGNOSIS — M79.642 LEFT HAND PAIN: Primary | ICD-10-CM

## 2024-06-20 NOTE — TELEPHONE ENCOUNTER
LVM c pt. Attempting to confirm appt location & time c Dr. Rankin 07/09/24  with XR. Requested a call back to the [facility] at [phone] with any questions, concerns or need for a different appointment time.

## 2024-07-01 ENCOUNTER — TELEPHONE (OUTPATIENT)
Dept: ORTHOPEDICS | Facility: CLINIC | Age: 59
End: 2024-07-01
Payer: COMMERCIAL

## 2024-07-01 NOTE — TELEPHONE ENCOUNTER
Spoke to patient and scheduled her with Tor----- Message from Mile Martinez sent at 7/1/2024  9:07 AM CDT -----  Name of Who is Calling: BETTY MAZARIEGOS [[id number]]          What is the request in detail: Pt is requesting a call back to see if she can get a sooner appointment than scheduled. Please assist.           Can the clinic reply by [facility]: No          What Number to Call Back if not in [facility]:  [phone]

## 2024-07-02 ENCOUNTER — TELEPHONE (OUTPATIENT)
Dept: ORTHOPEDICS | Facility: CLINIC | Age: 59
End: 2024-07-02
Payer: COMMERCIAL

## 2024-07-02 NOTE — TELEPHONE ENCOUNTER
Spoke to pt and reminded her of her appointment AND XR Pt voiced understanding and call ended.

## 2024-07-03 ENCOUNTER — OFFICE VISIT (OUTPATIENT)
Dept: ORTHOPEDICS | Facility: CLINIC | Age: 59
End: 2024-07-03
Payer: COMMERCIAL

## 2024-07-03 ENCOUNTER — HOSPITAL ENCOUNTER (OUTPATIENT)
Dept: RADIOLOGY | Facility: OTHER | Age: 59
Discharge: HOME | End: 2024-07-03
Attending: ORTHOPAEDIC SURGERY
Payer: COMMERCIAL

## 2024-07-03 VITALS — BODY MASS INDEX: 36.95 KG/M2 | HEIGHT: 62 IN | WEIGHT: 200.81 LBS

## 2024-07-03 DIAGNOSIS — M79.642 LEFT HAND PAIN: ICD-10-CM

## 2024-07-03 DIAGNOSIS — M65.342 TRIGGER RING FINGER OF LEFT HAND: Primary | ICD-10-CM

## 2024-07-03 PROCEDURE — 99999 PR PBB SHADOW E&M-EST. PATIENT-LVL III: CPT | Mod: PBBFAC,,, | Performed by: SPECIALIST/TECHNOLOGIST

## 2024-07-03 PROCEDURE — 73130 X-RAY EXAM OF HAND: CPT | Mod: 26,LT,, | Performed by: RADIOLOGY

## 2024-07-03 PROCEDURE — 73130 X-RAY EXAM OF HAND: CPT | Mod: TC,FY,LT

## 2024-07-03 RX ORDER — DEXAMETHASONE SODIUM PHOSPHATE 4 MG/ML
4 INJECTION, SOLUTION INTRA-ARTICULAR; INTRALESIONAL; INTRAMUSCULAR; INTRAVENOUS; SOFT TISSUE
Status: DISCONTINUED | OUTPATIENT
Start: 2024-07-03 | End: 2024-07-03 | Stop reason: HOSPADM

## 2024-07-03 RX ADMIN — DEXAMETHASONE SODIUM PHOSPHATE 4 MG: 4 INJECTION, SOLUTION INTRA-ARTICULAR; INTRALESIONAL; INTRAMUSCULAR; INTRAVENOUS; SOFT TISSUE at 01:07

## 2024-07-03 NOTE — PROGRESS NOTES
Subjective:       Patient ID: Ailyn Ortiz is a 58 y.o. female.    Chief Complaint: Pain of the Left Hand    HPI  7/3/24  58-year-old right-hand dominant female with previous medical history of rheumatoid arthritis presents to clinic today with left ring finger triggering.  She has previous history of carpal tunnel release on the right side and trigger thumb on the left that was done by Dr. Rankin.  She notes that all of her tenderness is localized at the A1 pulley of the left ring finger.  She denies any numbness or tingling.  She also notes that she is some tenderness over her basilar thumb.  She states that paraffin wax as well as Voltaren do help relieve her symptoms.  She denies any history of trauma.    Past Medical History:   Diagnosis Date    Arthritis     zero negative imflammatory arthritis    Asthma     Diabetes mellitus, type 2 since the age of 42    Hypertension since the age of 42    JESSICA (obstructive sleep apnea)     S/P UPP    Partial small bowel obstruction 3/6/2019    Thyroid disease     Ventral hernia without obstruction or gangrene      Past Surgical History:   Procedure Laterality Date    ADENOIDECTOMY      CARPAL TUNNEL RELEASE Right 2018    Procedure: RELEASE, CARPAL TUNNEL right;  Surgeon: Anai Choudhary MD;  Location: [facility] OR [address] [place] [address];  Service: Orthopedics;  Laterality: Right;  stretcher, supine, hand pan 1 and pan 2     SECTION      times 2    CHOLECYSTECTOMY      DIAGNOSTIC LAPAROSCOPY N/A 3/8/2019    Procedure: LAPAROSCOPY, DIAGNOSTIC;  Surgeon: Stone Mei MD;  Location: [facility] OR [address] [place] [address];  Service: General;  Laterality: N/A;    ESOPHAGOGASTRODUODENOSCOPY N/A 2019    Procedure: EGD (ESOPHAGOGASTRODUODENOSCOPY);  Surgeon: Stone Mei MD;  Location: [facility] OR [address] [place] [address];  Service: General;  Laterality: N/A;    GANGLION CYST EXCISION      LAPAROSCOPIC RESECTION OF SMALL INTESTINE  3/8/2019    Procedure: EXCISION, SMALL INTESTINE, LAPAROSCOPIC;   Surgeon: Stone Mei MD;  Location: [address];  Service: General;;    LAPAROSCOPIC SLEEVE GASTRECTOMY N/A 2019    Procedure: GASTRECTOMY, SLEEVE, LAPAROSCOPIC wiht intra op EGD;  Surgeon: Stone Mei MD;  Location: [facility] OR [address] [place] [address];  Service: General;  Laterality: N/A;    LYSIS OF ADHESIONS N/A 2019    Procedure: LYSIS, ADHESIONS;  Surgeon: Stone Mei MD;  [facility];  Service: General;  Laterality: N/A;    small bowel resection (lipoma)      TONSILLECTOMY      TRIGGER FINGER RELEASE Left 4/15/2019    Procedure: RELEASE, TRIGGER FINGER left thumb;  Surgeon: Anai Choudhary MD;  Location: [facility];  Service: Orthopedics;  Laterality: Left;  stretcher, supine, hand pan 1 and 2    TUBAL LIGATION      UVULOPALATOPHARYNGOPLASTY       Family History   Problem Relation Name Age of Onset    Hypertension Father      Diabetes Father      Heart disease Maternal Grandfather          CAD    Heart disease Paternal Grandmother          CAD    Diabetes Paternal Grandmother      Heart disease Paternal Grandfather          CAD     Social History     Socioeconomic History    Marital status:    Tobacco Use    Smoking status: Former     Current packs/day: 0.00     Types: Cigarettes     Quit date: 12/15/1993     Years since quittin.5    Smokeless tobacco: Never   Substance and Sexual Activity    Alcohol use: Not Currently     Comment: occasionally    Drug use: No    Sexual activity: Yes     Partners: Male       Current Outpatient Medications   Medication Sig Dispense Refill    ammonium lactate (LAC-HYDRIN) 12 % lotion Apply topically once daily. 400 g 2    b complex vitamins tablet Take 1 tablet by mouth once daily.      clonazePAM (KLONOPIN) 0.5 MG tablet clonazepam 0.5 mg tablet   TAKE 1 TABLET BY MOUTH TWICE A DAY AS NEEDED      DULoxetine (CYMBALTA) 60 MG capsule Take 1 capsule (60 mg total) by mouth once daily. 30 capsule 11    ergocalciferol  "(ERGOCALCIFEROL) 50,000 unit Cap TAKE 1 CAPSULE (50,000 UNITS TOTAL) BY MOUTH EVERY 7 DAYS. 12 capsule 4    etanercept (ENBREL SURECLICK) 50 mg/mL (1 mL) Inject 1 mL (50 mg total) into the skin once a week. 4 mL 11    levothyroxine (SYNTHROID) 88 MCG tablet levothyroxine 88 mcg tablet      loratadine (CLARITIN) 10 mg tablet Take 10 mg by mouth daily as needed.       losartan (COZAAR) 50 MG tablet Take 50 mg by mouth.      magnesium 30 mg Tab Take by mouth once.      multivit with minerals/lutein (MULTIVITAMIN 50 PLUS ORAL) multivitamin   1 po daily      omeprazole (PRILOSEC) 40 MG capsule Take 40 mg by mouth every morning.       ondansetron (ZOFRAN-ODT) 4 MG TbDL SMARTSI Tablet(s) Sublingual Every 8 Hours PRN      potassium chloride SA (K-DUR,KLOR-CON) 10 MEQ tablet potassium chloride ER 10 mEq tablet,extended release(part/cryst)   Take 1 tablet every day by oral route.      atorvastatin (LIPITOR) 10 MG tablet Lipitor 10 mg tablet   Take 1 tablet every day by oral route.      methylPREDNISolone (MEDROL DOSEPACK) 4 mg tablet use as directed 1 each 1    multivitamin with minerals tablet Take 1 tablet by mouth 2 (two) times daily.       prasterone, dhea, (INTRAROSA) 6.5 mg Inst Intrarosa 6.5 mg vaginal insert   PLACE 6.5 MG VAGINALLY EVERY EVENING.       No current facility-administered medications for this visit.     Facility-Administered Medications Ordered in Other Visits   Medication Dose Route Frequency Provider Last Rate Last Admin    0.9%  NaCl infusion   Intravenous Continuous David Chisholm MD   Stopped at 19 1251    mupirocin 2 % ointment   Nasal On Call Procedure David Chisholm MD   Given at 04/15/19 0528     Review of patient's allergies indicates:   Allergen Reactions    Morphine Hives and Itching    Avelox [moxifloxacin] Rash       Review of Systems        Objective:      Vitals:    24 1306   Weight: 91.1 kg (200 lb 13.4 oz)   Height: 5' 2" (1.575 m)     Physical " Exam  Cardiovascular:      Pulses:           Radial pulses are Normal on the right side and Normal on the left side.       Hand/Wrist Musculoskeletal Exam    Inspection    Right      Erythema: none      Ecchymosis: none      Edema: none      Deformity: none    Left      Erythema: none      Ecchymosis: none      Edema: none      Deformity: none    Palpation    Left      Triggering: ring      Ring tenderness to palpation: A1 pulley    Range of Motion      Right Wrist      Right wrist range of motion is normal.      Left Wrist      Left wrist range of motion is normal.      Range of motion additional comments: Able to make a composite fist.  Active Grade 3 triggering of the left ring finger.     Neurovascular    Right       Radial pulse: normal      Capillary refill: brisk and <3 sec      Ulnar nerve sensory distribution: normal      Median nerve sensory distribution: normal      Superficial radial nerve sensory distribution: normal    Left       Radial pulse: normal      Capillary refill: brisk and <3 sec      Ulnar nerve sensory distribution: normal      Median nerve sensory distribution: normal      Superficial radial nerve sensory distribution: normal      Diagnostics Review: X-Ray: Reviewed  Personal interpretation of the XR reveals no signs of fractures or dislocations.  Basilar thumb arthritis noted.       Assessment:       1. Trigger ring finger of left hand        Plan:       We discussed with the patient at length all the different treatment options available including anti-inflammatories, acetaminophen, rest, ice, physical therapy to include strengthening, range of motion exercise, splinting, occasional cortisone injections for temporary relief, or possible surgical interventions.     Treatment options discussed with the patient include injections and surgery.  This time patient would like to proceed with injection of the left Ring finger.  Educated patient on the pathophysiology of trigger finger as well as  range-of-motion exercises of the PIP and DIP joint. Patient will follow up in 6 weeks for potential repeat injection or surgery consideration.         L Ring Finger Tendon Sheath 1st Injection    Date/Time: 7/3/2024 1:00 PM    Performed by: Jean Claude Hidalgo PA-C  Authorized by: Jean Claude Hidalgo PA-C    Consent Done?:  Yes (Verbal)  Indications:  Pain  Timeout: prior to procedure the correct patient, procedure, and site was verified    Local anesthesia used?: Yes    Anesthesia:  Local infiltration  Local anesthetic:  Lidocaine 1% without epinephrine  Anesthetic total (ml):  0.5    Location:  Ring finger  Site:  L ring flexor tendon sheath  Ultrasonic guidance for needle placement?: Yes (Ultrasound guidance was utilized for needle localization. Dynamic visualization of the needle was continuous throughout the procedure and maintained accurate placement. Images were saved and stored for documentation.)    Needle size:  25 G  Approach:  Volar  Medications:  4 mg dexAMETHasone 4 mg/mL  Patient tolerance:  Patient tolerated the procedure well with no immediate complications        Tor Hidalgo PA-C, ATC  Hand and Upper Extremity   [facility]      Please be aware that this note has been generated with the assistance of MModal voice-to-text.  Please excuse any spelling or grammatical errors.

## 2024-07-03 NOTE — PROCEDURES
L Ring Finger Tendon Sheath 1st Injection    Date/Time: 7/3/2024 1:00 PM    Performed by: Jean Claude Hidalgo PA-C  Authorized by: Jean Claude Hidalgo PA-C    Consent Done?:  Yes (Verbal)  Indications:  Pain  Timeout: prior to procedure the correct patient, procedure, and site was verified    Local anesthesia used?: Yes    Anesthesia:  Local infiltration  Local anesthetic:  Lidocaine 1% without epinephrine  Anesthetic total (ml):  0.5    Location:  Ring finger  Site:  L ring flexor tendon sheath  Ultrasonic guidance for needle placement?: Yes (Ultrasound guidance was utilized for needle localization. Dynamic visualization of the needle was continuous throughout the procedure and maintained accurate placement. Images were saved and stored for documentation.)    Needle size:  25 G  Approach:  Volar  Medications:  4 mg dexAMETHasone 4 mg/mL  Patient tolerance:  Patient tolerated the procedure well with no immediate complications

## 2024-07-16 DIAGNOSIS — M06.00 SERONEGATIVE RHEUMATOID ARTHRITIS: ICD-10-CM

## 2024-07-16 RX ORDER — ETANERCEPT 50 MG/ML
50 SOLUTION SUBCUTANEOUS WEEKLY
Qty: 4 ML | Refills: 11 | OUTPATIENT
Start: 2024-07-16 | End: 2025-07-16

## 2024-07-18 ENCOUNTER — OFFICE VISIT (OUTPATIENT)
Dept: RHEUMATOLOGY | Facility: CLINIC | Age: 59
End: 2024-07-18
Payer: COMMERCIAL

## 2024-07-18 DIAGNOSIS — M06.00 SERONEGATIVE RHEUMATOID ARTHRITIS: Primary | ICD-10-CM

## 2024-07-18 PROCEDURE — 4010F ACE/ARB THERAPY RXD/TAKEN: CPT | Mod: CPTII,95,, | Performed by: INTERNAL MEDICINE

## 2024-07-18 PROCEDURE — 1159F MED LIST DOCD IN RCRD: CPT | Mod: CPTII,95,, | Performed by: INTERNAL MEDICINE

## 2024-07-18 PROCEDURE — 99214 OFFICE O/P EST MOD 30 MIN: CPT | Mod: 95,,, | Performed by: INTERNAL MEDICINE

## 2024-07-18 RX ORDER — ADALIMUMAB 40MG/0.8ML
40 KIT SUBCUTANEOUS
Qty: 2 PEN | Refills: 11 | Status: ACTIVE | OUTPATIENT
Start: 2024-07-18 | End: 2025-06-19

## 2024-07-18 RX ORDER — ETANERCEPT 50 MG/ML
SOLUTION SUBCUTANEOUS
Qty: 2 EACH | Refills: 0 | Status: ACTIVE | OUTPATIENT
Start: 2024-07-18

## 2024-07-18 NOTE — PROGRESS NOTES
Chief Complaint   Patient presents with    Follow-up     The patient location is: home  The chief complaint leading to consultation is: seronegative RA    Visit type: audiovisual    Face to Face time with patient: 30  minutes of total time spent on the encounter, which includes face to face time and non-face to face time preparing to see the patient (eg, review of tests), Obtaining and/or reviewing separately obtained history, Documenting clinical information in the electronic or other health record, Independently interpreting results (not separately reported) and communicating results to the patient/family/caregiver, or Care coordination (not separately reported).         Each patient to whom he or she provides medical services by telemedicine is:  (1) informed of the relationship between the physician and patient and the respective role of any other health care provider with respect to management of the patient; and (2) notified that he or she may decline to receive medical services by telemedicine and may withdraw from such care at any time.    Notes:       Patient is here for a follow up    The patient location is: home  The chief complaint leading to consultation is: rheumatoid arthritis    Visit type: audiovisual    Face to Face time with patient: 30  minutes of total time spent on the encounter, which includes face to face time and non-face to face time preparing to see the patient (eg, review of tests), Obtaining and/or reviewing separately obtained history, Documenting clinical information in the electronic or other health record, Independently interpreting results (not separately reported) and communicating results to the patient/family/caregiver, or Care coordination (not separately reported).       Each patient to whom he or she provides medical services by telemedicine is:  (1) informed of the relationship between the physician and patient and the respective role of any other health care provider with  respect to management of the patient; and (2) notified that he or she may decline to receive medical services by telemedicine and may withdraw from such care at any time.    Notes:       History of presenting illness    57 year old female with seronegative rheumatoid arthritis for a follow up    She is now on humira  Joints feel great    Knees are ok now  Hips hurt  Feet hurt since she has started to work 12 hour shifts    In the past took mtx,had severe hair fall     Recent small bowel obstruction due to lipomas     She has a previous diagnosis of uveitis  Eyes are doing good now    3/2020    ESR,CRP nml    Right wrist MRI : mass like area of abnormal signal and enhancement within dorsal subcutaneous fat of the wrist : inflammatory process vs cellulitis vs neoplasm/desmoid tumor    Sent her to ortho : she had CTS right one operated    She had trigger thumbs and trigger fingers and she had few surgeries     She has lost weight and she has done really better       TAQUERIA negative  RF,CCP negative  Hepatitis b and c negative  HLAB27 negative    CRP 96.9  ESR 92    SPEP no M spike    Uric acid 2.2    Vit d 8,went upto 26  TSH normal    Ck,aldolase normal    Xrays    Hands degenerative changes  Ankles degenerative changes and bone spur  Knees degenerative changes  Feet : degenerative changes     ID clearance done  Did hepatitis A and B vaccine  Doing the shingrix vaccine    1/2022    Some upper resp tract infection  Coughing and congested  Nasal tone to her voice    She took humira 2 weeks ago  She is due today      2/2023    Morning stiffness-  Hands and feet-stiff   She gets up and takes a hot shower    Both hips hurt,left> right  Sitting in certain positions is hard  Getting up from the chair is hard  Hurts outside the hips    Overall very functional    5/2023    She has a bad cold- she is treating her     She took humira  Joint pains -manageable    Vacation- did a lot of walking      54 year old white female presented  with the following symptoms :    December around Christmas she had the upper respiratory tract symptoms with myalgia and nasal congestion  Following this she started to experience severe joint pain  All her joints in the hands,elbows,knees,feet started to hurt and swell  EMS for 1 hour  Alleve helps minimally    Hands : thumbs and index fingers and the 3rd digits hurt the worse  She cant make a fist  Thinks that the left 2nd finger swells like a sausage  Swelling along the tendons as per her PCP,she says he thought she has tenosynovitis     Hips and shoulders ache but no stiffness,good ROM in them    Cold weather makes her symptoms worse     Claims to have had labs : she had ESR,CRP elevation apparently     Past history: asthma,diabetes,JESSICA,thyroid disease,HTN  Occasional tendinitis feet b/l    Has a rash every winter,dermatology thinks it is eczema  This time started in November on the elbows,back,torso,legs,didnt get better,but derm suggested stronger steroids  Has not been told to have psoriasis     Recently had small bowel resection for lipoma          Family history : psoriasis and psoriatic arthritis runs in the family     Social history : quit smoking a while ago,drinks occasionally     Review of Systems   Constitutional:  Negative for activity change, appetite change, chills, diaphoresis, fatigue, fever and unexpected weight change.   HENT:  Negative for congestion, dental problem, drooling, ear discharge, ear pain, facial swelling, hearing loss, mouth sores, nosebleeds, postnasal drip, rhinorrhea, sinus pressure, sinus pain, sneezing, sore throat, tinnitus, trouble swallowing and voice change.    Eyes:  Negative for photophobia, pain, discharge, redness, itching and visual disturbance.   Respiratory:  Negative for apnea, cough, choking, chest tightness, shortness of breath, wheezing and stridor.    Cardiovascular:  Negative for chest pain, palpitations and leg swelling.   Gastrointestinal:  Negative for  abdominal distention, abdominal pain, anal bleeding, blood in stool, constipation, diarrhea, nausea, rectal pain and vomiting.   Endocrine: Negative for cold intolerance, heat intolerance, polydipsia, polyphagia and polyuria.   Genitourinary:  Negative for decreased urine volume, difficulty urinating, dysuria, enuresis, flank pain, frequency, genital sores, hematuria and urgency.   Musculoskeletal:  Negative for back pain, gait problem, myalgias, neck pain and neck stiffness.   Skin:  Negative for color change, pallor, rash and wound.   Allergic/Immunologic: Negative for environmental allergies, food allergies and immunocompromised state.   Neurological:  Negative for dizziness, tremors, seizures, syncope, facial asymmetry, speech difficulty, weakness, light-headedness, numbness and headaches.   Hematological:  Negative for adenopathy. Does not bruise/bleed easily.   Psychiatric/Behavioral:  Negative for agitation, behavioral problems, confusion, decreased concentration, dysphoric mood, hallucinations, self-injury, sleep disturbance and suicidal ideas. The patient is not nervous/anxious and is not hyperactive.        Physical Exam     Physical Exam   Constitutional: She is oriented to person, place, and time. No distress.   HENT:   Head: Normocephalic.   Mouth/Throat: Oropharynx is clear and moist.   Eyes: Pupils are equal, round, and reactive to light. Conjunctivae are normal. Right eye exhibits no discharge. Left eye exhibits no discharge. No scleral icterus.   Neck: No thyromegaly present.   Cardiovascular: Normal rate, regular rhythm and normal heart sounds.   Pulmonary/Chest: Effort normal and breath sounds normal. No stridor.   Abdominal: Soft. Bowel sounds are normal.   Musculoskeletal:         General: Tenderness present.      Cervical back: Normal range of motion.   Lymphadenopathy:     She has no cervical adenopathy.   Neurological: She is alert and oriented to person, place, and time.   Skin: Skin is  warm. No rash noted. She is not diaphoretic.   Psychiatric: Affect and judgment normal.       Assessment     58 year old white female presented to me with 1 month of polyarthralgias in the hands,elbows,knees and feet : s/p URT symptoms during [place] : is also s/p bowel resection for a lipoma : reported swelling in addition to pain and stiffness in her joints : significant EMS : symmetric involvement : in addition reported to having dactylitis   On exam : synovitis noted in many joints,no evidence of dactylitis.    She has inflammatory arthritis,seronegative,unclear yet if she has psoriatic arthritis since the rash might be eczema vs psoriasis  Reactive arthritis possible too    Labs revealed very high inflammatory markers with negative RA and TAQUERIA and HLAB27 panel  Xrays with degenerative arthritis     She did well on prednisone  I then introduced mtx 5 tabs weekly and tapered her prednisone   Then titrated mtx to 8 tabs weekly  She didn't  tolerate the mtx     We switched to humira and she has done well    She has a diagnosis of uveitis   But last eye exam normal    New small bowel obstruction  Due to lipomas   She has recurrent lipomas this is the second SBO due to lipomas      Some knee pain due to OA  Hand arthralgias due to OA  Hip pain   Feet pain    Upper resp tract infection-recovering    2/2023    Morning stiffness-  Hands and feet-stiff   She gets up and takes a hot shower    Both hips hurt,left> right  Sitting in certain positions is hard  Getting up from the chair is hard  Hurts outside the hips    Hip xrays nml  Hip bursitis exercises  Overall very functional    5/2023    She has a bad cold- she is treating herself symptomatically    She took humira  Joint pains -manageable    Vacation- did a lot of walking    Eyes- fine  Skin fine      Left hand trigger finger hasn't responded to injections  Left thumb trigger finger years ago which also didn't respond to injections and needed surgery    Lot of joint  pain in the fingers towards the end of the day  Knuckles look bigger  Morning stiffness for an hour    Rest of the joints dont hurt    Paraffin and washing dishes in hot water seems to relieve her symptoms    Changing from humira to enbrel made her symptoms worse    1. Seronegative rheumatoid arthritis                Plan      Trigger finger being managed at [facility]    Since she did really well with humira I will ask [facility] if we can reapply for humira copay support  Or else will change to cimzia      Vaccines discussed  Pneumonia vaccine and covid vaccine due      Table 1. Recommended Vaccinations in Patients with RMDs  Vaccine Dosing Schedule   COVID-19 Pfizer/Moderna  3 weeks (Pfizer) 4 weeks (Moderna) between dose 1 and 2  4 weeks between dose 2 and 3  8 weeks between dose 3, 4, and 5        COVID 19 VACCINES- WE UPDATED THIS    PEOPLE WHO NEVER RECEIVED A COVID 19 VACCINE    PFIZER  DOSE 1- WEEK 0  DOSE 2- 3 WEEKS LATER  DOSE 3- 4 WEEKS LATER    MODERNA  DOSE 1- WEEK 0  DOSE 2- 4 WEEKS LATER  DOSE 3- 4 WEEKS LATER    PEOPLE WHO RECEIVED A COVID 19 VACCINE    PFIZER  IF THEY HAVE RECEIVED A PREVIOUS DOSE AT SOME POINT IN THEIR LIFE  DOSE 2- 3 WEEKS AFTER LAST DOSE  DOSE 3- 4 WEEKS AFTER LAST DOSE    IF THEY HAVE RECEIVED 2 DOSES   DOSE 3- 4 WEEKS AFTER LAST DOSE    IF THEY HAVE RECEIVED 3 DOSES OR MORE  DOSE 4- 8 WEEKS AFTER LAST DOSE    MODERNA  IF THEY HAVE RECEIVED A PREVIOUS DOSE AT SOME POINT IN THEIR LIFE  DOSE 2- 4 WEEKS AFTER LAST DOSE  DOSE 3- 4 WEEKS AFTER LAST DOSE    IF THEY HAVE RECEIVED 2 DOSES   DOSE 3- 4 WEEKS AFTER LAST DOSE    IF THEY HAVE RECEIVED 3 DOSES OR MORE  DOSE 4- 8 WEEKS AFTER LAST DOSE       Hep B   Check Hepatitis B immune status (Hep B surface antibody) at patient's initial visit:  2-dose series: HepB-CpG (Heplisav-B) (0 and 1 month) - preferred  3-dose series: Engerix-B or Recombivax HB (0, 1, 6 months)  3-dose series: Twinrix Hep A and Hep B (0, 1, 6 months)    Check hepatitis B  surface antibody 1-2 months after completion of series  HBsAb ? 10 mIU/mL - immune  HBsAb < 10mIU/mL -  repeat vaccine series above and retest HBsAb 1-2 months after completion of series   HEPATITIS B VACCINE HEPATITIS B VACCINE    4 DOSE SERIES HEP1-HEP B TWINRIX ACCELERATED SCHEDULE OF 3 DOSES AT 0,7,21 to 30 DAYS FOLLOWED BY A BOOSTER DOSE AT 12 MONTHS        HEPATITIS A VACCINE    2 DOSE SERIES HEPATITIS A- HAVRIX 6 TO 12 MONTHS APART OR VAQTA 6 TO 18 MONTHS APART  OR  3 DOSE SERIES HEPATITIS A-B TWINRIX AT 0,1,6 MONTHS , MINIMAL INTERVALS DOSE 1 TO 2 : 4 WEEKS, DOSE 2 TO 3- 5 MONTHS APART         Influenza Vaccine (Standard Quadrivalent Inactivated) 1 dose seasonally for all patients ages 18-64 years old    Influenza Vaccine (High Dose Quadrivalent Inactivated) 1 dose seasonally for all patients ages ?65 years old  1 dose seasonally for all patients ages 18-64 years old on immunosuppressants    HPV VACCINE   PATIENTS BETWEEN 26 TO 45 YEARS WHO HAVE NOT BEEN PREVIOUSLY VACCINATED SHOULD RECEIVE ONE DOSE OF HPV VACCINE     PCV20   All persons ?18 years old  If no previous vaccine: 1 dose of PCV20  If ?1 dose of PPSV23 only  PCV20 at least 1 year after PPSV23  If ?1 dose of PCV13, check algorithm with provider or clinical pharmacist    PNEUMONIA VACCINE- WE UPDATED THIS    FOR ADULTS AGE 19 TO 64 YEARS OF AGE WITH SPECIFIED IMMUNOCOMPROMISED CONDITIONS    IF PATIENTS HAVE NEVER RECEIVED A PNEUMOCOCCAL VACCINE, THEN WE OFFER ONE DOSE OF PCV 20    IF PATIENTS HAVE RECEIVED ONE DOSE OF PCV 13 OR PPSV 23, THEN PCV 20 IS OFFERED A YEAR LATER    IF PATIENTS HAVE RECEIVED ONE DOSE OF PCV 13 AND 1 OR MORE DOSES OF PPSV 23, THEN PCV 20 IS GIVEN 5 YEARS LATER    IF PCV 20 IS NOT AVAILABLE    A DOSE OF PCV 15 AND A DOSE OF PPSV 23 CAN BE GIVEN 8 WEEKS LATER TO PEOPLE WHO NEVER RECEIVED A PNEUMOCOCCAL VACCINE    IF A DOSE OF PPSV 23 WAS GIVEN A YEAR AGO THEN A DOSE OF PCV 15 CAN BE GIVEN     IF A DOSE OF PCV 13 WAS GIVEN THEN  A DOSE OF PPSV 23 CAN BE GIVEN 8 WEEKS LATER AND ANOTHER DOSE OF PPSV 23 CAN BE GIVEN 5 YEARS LATER    IF A DOSE OF PCV 13 AND A DOSE OF PPSV 23 WAS GIVEN, A DOSE OF PPSV 23 CAN BE GIVEN 5 YEARS LATER    IF A DOSE OF PCV 13 AND 2 DOSES OF PPSV 23 WERE GIVEN NO NEED TO VACCINATE UNTIL THEY ARE 65 YEARS OLD      FOR ADULTS >EQUAL TO 65 YEARS AGE    ONE DOSE OF PCV 20 TO BE GIVEN IF NO PRIOR VACCINES WERE GIVEN.   IF PCV 20 IS NOT AVAILABLE, THEN A DOSE OF PCV 15 AND ANOTHER DOSE OF PPSV 23 TO BE GIVEN A YEAR LATER    IF A DOSE OF PPSV 23 WAS RECEIVED AT ANY AGE ,  A DOSE OF PCV 20 TO BE GIVEN A YEAR LATER.     IF PCV 20 IS NOT AVAILABLE, A DOSE OF PCV 15 CAN BE GIVEN A YEAR LATER    IF A DOSE OF PCV 13 WAS GIVEN AT ANY AGE, THEN A DOSE OF PCV 20 CAN BE GIVEN A YEAR LATER. IF PCV 20 IS NOT AVAILABLE THEN A DOSE OF PPSV 23 CAN BE GIVEN  A YEAR LATER    IF PCV 13 WAS GIVEN AT ANY AGE, AND A DOSE OF PPSV 23 WAS GIVEN AT AGE < 65 YEARS, THEN A DOSE OF PCV 20 TO BE GIVEN 5 YEARS LATER.  IF PCV 20 IS NOT AVAILABLE, A DOSE OF PPSV 23 CAN BE GIVEN 5 YEARS LATER     Recombinant Zoster Vaccine (Shingrix)   2 doses (2 to 6 months apart) for all patients ages ?50 years old  Patients ages ?18 years old with increased zoster risk factors:  History of Zoster  Repeated courses of corticosteroids  Tofacitinib with RFs (corticosteroids use, Urs[place], diabetes mellitus, previous TNF failure, long term tofacitinib 10 mg BID)  On combination (biologic + immunomodulator) therapy  **Can get second dose in 1-2 months (no earlier than 4 weeks after first dose)    ZOSTER VACCINE- WE UPDATED THIS    ALL PATIENTS AGE 18 TO 50 YEARS ON IMMUNOSUPPRESSANTS AND ALL PATIENTS > 50 YEARS GET THE 2 DOSES OF SHINGRIX VACCINE    THE DOSES ARE 0.5 ML - 2 TO 6 MONTHS APART    IF PATIENT IS IMMUNOSUPPRESSED - GIVE THE SECOND DOSE 1 TO 2 MONTHS AFTER THE FIRST     TdaP 1 dose every 10 years                                                                                                                                                    RSV VACCINE    ALL ADULTS 60 YEARS AND ABOVE MAY RECEIVE THE SINGLE DOSE OF RSV VACCINE PREFERABLY IN LATE SUMMER AND EARLY FALL    ROTAVIRUS VACCINE     IF MOTHERS ARE EXPOSED TO TNF INHIBITOR DURING THEIR SECOND AND THIRD TRIMESTER, THEN THE INFANT CAN GET THE ROTAVIRUS VACCINE WITHIN THE FIRST 6 MONTHS  IF MOTHERS ARE EXPOSED TO RITUXIMAB IN THE SECOND AND THIRD TRIMESTER, THEN THE INFANT DOESN'T GET ROTAVIRUS VACCINE WITHIN THE FIRST 6 MONTHS               Dexa 2023 ml  Ki 1200 mg and vit d 200 iu    Tb,hepatitis panel   CBC,CMP,ESR,CRP tomorrow morning at [facility]    Cardiovascular risk factor screening    Cancer screening  Mammogram utd  Colonoscopy pending  Gyn utd    Rtc 3 months    Ailyn was seen today for follow-up.    Diagnoses and all orders for this visit:    Seronegative rheumatoid arthritis  -     adalimumab (HUMIRA PEN) PnKt injection; Inject 1 pen  (40 mg total) into the skin every 14 (fourteen) days.  -     etanercept (ENBREL SURECLICK) 50 mg/mL (1 mL); One pen weekly  -     CBC Auto Differential; Future  -     Comprehensive Metabolic Panel; Future  -     Sedimentation rate; Future  -     C-Reactive Protein; Future  -     Quantiferon Gold TB; Future  -     Hepatitis B Surface Antigen; Future  -     Hepatitis B Surface Ab, Qualitative; Future  -     Hepatitis B Core Antibody, Total; Future              rtc in 6 months

## 2024-07-19 ENCOUNTER — LAB VISIT (OUTPATIENT)
Dept: LAB | Facility: HOSPITAL | Age: 59
End: 2024-07-19
Attending: INTERNAL MEDICINE
Payer: COMMERCIAL

## 2024-07-19 DIAGNOSIS — M06.00 SERONEGATIVE RHEUMATOID ARTHRITIS: ICD-10-CM

## 2024-07-19 LAB
ALBUMIN SERPL BCP-MCNC: 4.3 G/DL (ref 3.5–5.2)
ALP SERPL-CCNC: 100 U/L (ref 55–135)
ALT SERPL W/O P-5'-P-CCNC: 25 U/L (ref 10–44)
ANION GAP SERPL CALC-SCNC: 10 MMOL/L (ref 8–16)
AST SERPL-CCNC: 17 U/L (ref 10–40)
BASOPHILS # BLD AUTO: 0.07 K/UL (ref 0–0.2)
BASOPHILS NFR BLD: 1.2 % (ref 0–1.9)
BILIRUB SERPL-MCNC: 0.8 MG/DL (ref 0.1–1)
BUN SERPL-MCNC: 18 MG/DL (ref 6–20)
CALCIUM SERPL-MCNC: 9.7 MG/DL (ref 8.7–10.5)
CHLORIDE SERPL-SCNC: 99 MMOL/L (ref 95–110)
CO2 SERPL-SCNC: 27 MMOL/L (ref 23–29)
CREAT SERPL-MCNC: 0.9 MG/DL (ref 0.5–1.4)
CRP SERPL-MCNC: 3.3 MG/L (ref 0–8.2)
DIFFERENTIAL METHOD BLD: ABNORMAL
EOSINOPHIL # BLD AUTO: 0.3 K/UL (ref 0–0.5)
EOSINOPHIL NFR BLD: 4.9 % (ref 0–8)
ERYTHROCYTE [DISTWIDTH] IN BLOOD BY AUTOMATED COUNT: 11.9 % (ref 11.5–14.5)
ERYTHROCYTE [SEDIMENTATION RATE] IN BLOOD BY WESTERGREN METHOD: 18 MM/HR (ref 0–20)
EST. GFR  (NO RACE VARIABLE): >60 ML/MIN/1.73 M^2
GLUCOSE SERPL-MCNC: 189 MG/DL (ref 70–110)
HBV CORE AB SERPL QL IA: NORMAL
HBV SURFACE AB SER-ACNC: 29.21 MIU/ML
HBV SURFACE AB SER-ACNC: REACTIVE M[IU]/ML
HBV SURFACE AG SERPL QL IA: NORMAL
HCT VFR BLD AUTO: 38.9 % (ref 37–48.5)
HGB BLD-MCNC: 13.1 G/DL (ref 12–16)
IMM GRANULOCYTES # BLD AUTO: 0 K/UL (ref 0–0.04)
IMM GRANULOCYTES NFR BLD AUTO: 0 % (ref 0–0.5)
LYMPHOCYTES # BLD AUTO: 1.3 K/UL (ref 1–4.8)
LYMPHOCYTES NFR BLD: 23.3 % (ref 18–48)
MCH RBC QN AUTO: 29.6 PG (ref 27–31)
MCHC RBC AUTO-ENTMCNC: 33.7 G/DL (ref 32–36)
MCV RBC AUTO: 88 FL (ref 82–98)
MONOCYTES # BLD AUTO: 0.7 K/UL (ref 0.3–1)
MONOCYTES NFR BLD: 11.5 % (ref 4–15)
NEUTROPHILS # BLD AUTO: 3.4 K/UL (ref 1.8–7.7)
NEUTROPHILS NFR BLD: 59.1 % (ref 38–73)
NRBC BLD-RTO: 0 /100 WBC
PLATELET # BLD AUTO: 283 K/UL (ref 150–450)
PMV BLD AUTO: 8.8 FL (ref 9.2–12.9)
POTASSIUM SERPL-SCNC: 4.3 MMOL/L (ref 3.5–5.1)
PROT SERPL-MCNC: 7 G/DL (ref 6–8.4)
RBC # BLD AUTO: 4.42 M/UL (ref 4–5.4)
SODIUM SERPL-SCNC: 136 MMOL/L (ref 136–145)
WBC # BLD AUTO: 5.75 K/UL (ref 3.9–12.7)

## 2024-07-19 PROCEDURE — 86706 HEP B SURFACE ANTIBODY: CPT | Mod: 91 | Performed by: INTERNAL MEDICINE

## 2024-07-19 PROCEDURE — 85025 COMPLETE CBC W/AUTO DIFF WBC: CPT | Performed by: INTERNAL MEDICINE

## 2024-07-19 PROCEDURE — 85651 RBC SED RATE NONAUTOMATED: CPT | Performed by: INTERNAL MEDICINE

## 2024-07-19 PROCEDURE — 87340 HEPATITIS B SURFACE AG IA: CPT | Performed by: INTERNAL MEDICINE

## 2024-07-19 PROCEDURE — 86704 HEP B CORE ANTIBODY TOTAL: CPT | Performed by: INTERNAL MEDICINE

## 2024-07-19 PROCEDURE — 36415 COLL VENOUS BLD VENIPUNCTURE: CPT | Performed by: INTERNAL MEDICINE

## 2024-07-19 PROCEDURE — 86140 C-REACTIVE PROTEIN: CPT | Performed by: INTERNAL MEDICINE

## 2024-07-19 PROCEDURE — 80053 COMPREHEN METABOLIC PANEL: CPT | Performed by: INTERNAL MEDICINE

## 2024-07-22 ENCOUNTER — DOCUMENTATION ONLY (OUTPATIENT)
Dept: RHEUMATOLOGY | Facility: CLINIC | Age: 59
End: 2024-07-22
Payer: COMMERCIAL

## 2024-07-22 ENCOUNTER — PATIENT MESSAGE (OUTPATIENT)
Dept: RHEUMATOLOGY | Facility: CLINIC | Age: 59
End: 2024-07-22
Payer: COMMERCIAL

## 2024-07-24 DIAGNOSIS — M06.00 SERONEGATIVE RHEUMATOID ARTHRITIS: Primary | ICD-10-CM

## 2024-07-24 RX ORDER — ETANERCEPT 50 MG/ML
50 SOLUTION SUBCUTANEOUS WEEKLY
Qty: 4 ML | Refills: 0 | Status: ACTIVE | OUTPATIENT
Start: 2024-07-24 | End: 2024-07-25

## 2024-07-25 DIAGNOSIS — M06.00 SERONEGATIVE RHEUMATOID ARTHRITIS: Primary | ICD-10-CM

## 2024-07-25 RX ORDER — ADALIMUMAB-ADAZ 40 MG/.4ML
40 INJECTION, SOLUTION SUBCUTANEOUS
Qty: 2 PEN | Refills: 11 | Status: ACTIVE | OUTPATIENT
Start: 2024-07-25 | End: 2025-06-26

## 2024-08-12 ENCOUNTER — PATIENT MESSAGE (OUTPATIENT)
Dept: ADMINISTRATIVE | Facility: OTHER | Age: 59
End: 2024-08-12
Payer: COMMERCIAL

## 2024-08-13 ENCOUNTER — OFFICE VISIT (OUTPATIENT)
Dept: INTERNAL MEDICINE | Facility: CLINIC | Age: 59
End: 2024-08-13
Payer: COMMERCIAL

## 2024-08-13 VITALS
RESPIRATION RATE: 16 BRPM | SYSTOLIC BLOOD PRESSURE: 110 MMHG | HEART RATE: 91 BPM | BODY MASS INDEX: 39.56 KG/M2 | DIASTOLIC BLOOD PRESSURE: 68 MMHG | WEIGHT: 214.94 LBS | OXYGEN SATURATION: 97 % | HEIGHT: 62 IN

## 2024-08-13 DIAGNOSIS — E66.01 CLASS 2 SEVERE OBESITY DUE TO EXCESS CALORIES WITH SERIOUS COMORBIDITY AND BODY MASS INDEX (BMI) OF 39.0 TO 39.9 IN ADULT: ICD-10-CM

## 2024-08-13 DIAGNOSIS — Z76.89 ENCOUNTER TO ESTABLISH CARE: Primary | ICD-10-CM

## 2024-08-13 DIAGNOSIS — M79.10 MYALGIA DUE TO STATIN: ICD-10-CM

## 2024-08-13 DIAGNOSIS — E55.9 VITAMIN D DEFICIENCY: ICD-10-CM

## 2024-08-13 DIAGNOSIS — E11.9 TYPE 2 DIABETES MELLITUS WITHOUT COMPLICATION, WITHOUT LONG-TERM CURRENT USE OF INSULIN: ICD-10-CM

## 2024-08-13 DIAGNOSIS — Z90.3 S/P GASTRIC SLEEVE PROCEDURE: ICD-10-CM

## 2024-08-13 DIAGNOSIS — T75.3XXA MOTION SICKNESS, INITIAL ENCOUNTER: ICD-10-CM

## 2024-08-13 DIAGNOSIS — T46.6X5A MYALGIA DUE TO STATIN: ICD-10-CM

## 2024-08-13 DIAGNOSIS — I10 ESSENTIAL HYPERTENSION: ICD-10-CM

## 2024-08-13 DIAGNOSIS — E03.9 HYPOTHYROIDISM, UNSPECIFIED TYPE: ICD-10-CM

## 2024-08-13 PROCEDURE — 99999 PR PBB SHADOW E&M-EST. PATIENT-LVL III: CPT | Mod: PBBFAC,,, | Performed by: INTERNAL MEDICINE

## 2024-08-13 PROCEDURE — 3074F SYST BP LT 130 MM HG: CPT | Mod: CPTII,S$GLB,, | Performed by: INTERNAL MEDICINE

## 2024-08-13 PROCEDURE — 99204 OFFICE O/P NEW MOD 45 MIN: CPT | Mod: S$GLB,,, | Performed by: INTERNAL MEDICINE

## 2024-08-13 PROCEDURE — 3078F DIAST BP <80 MM HG: CPT | Mod: CPTII,S$GLB,, | Performed by: INTERNAL MEDICINE

## 2024-08-13 PROCEDURE — 1160F RVW MEDS BY RX/DR IN RCRD: CPT | Mod: CPTII,S$GLB,, | Performed by: INTERNAL MEDICINE

## 2024-08-13 PROCEDURE — 1159F MED LIST DOCD IN RCRD: CPT | Mod: CPTII,S$GLB,, | Performed by: INTERNAL MEDICINE

## 2024-08-13 PROCEDURE — 3008F BODY MASS INDEX DOCD: CPT | Mod: CPTII,S$GLB,, | Performed by: INTERNAL MEDICINE

## 2024-08-13 PROCEDURE — G2211 COMPLEX E/M VISIT ADD ON: HCPCS | Mod: S$GLB,,, | Performed by: INTERNAL MEDICINE

## 2024-08-13 PROCEDURE — 4010F ACE/ARB THERAPY RXD/TAKEN: CPT | Mod: CPTII,S$GLB,, | Performed by: INTERNAL MEDICINE

## 2024-08-13 RX ORDER — OMEPRAZOLE 40 MG/1
40 CAPSULE, DELAYED RELEASE ORAL EVERY MORNING
Qty: 90 CAPSULE | Refills: 1 | Status: SHIPPED | OUTPATIENT
Start: 2024-08-13

## 2024-08-13 RX ORDER — LEVOTHYROXINE SODIUM 88 UG/1
88 TABLET ORAL
Qty: 90 TABLET | Refills: 1 | Status: SHIPPED | OUTPATIENT
Start: 2024-08-13

## 2024-08-13 RX ORDER — ERGOCALCIFEROL 1.25 MG/1
50000 CAPSULE ORAL WEEKLY
Qty: 12 CAPSULE | Refills: 4 | Status: SHIPPED | OUTPATIENT
Start: 2024-08-13

## 2024-08-13 RX ORDER — EZETIMIBE 10 MG/1
10 TABLET ORAL DAILY
COMMUNITY
Start: 2024-07-05 | End: 2024-08-13 | Stop reason: SDUPTHER

## 2024-08-13 RX ORDER — ONDANSETRON 4 MG/1
4 TABLET, FILM COATED ORAL EVERY 8 HOURS PRN
Qty: 30 TABLET | Refills: 0 | Status: SHIPPED | OUTPATIENT
Start: 2024-08-13

## 2024-08-13 RX ORDER — LOSARTAN POTASSIUM 50 MG/1
50 TABLET ORAL DAILY
Qty: 90 TABLET | Refills: 1 | Status: SHIPPED | OUTPATIENT
Start: 2024-08-13

## 2024-08-13 RX ORDER — DULOXETIN HYDROCHLORIDE 60 MG/1
60 CAPSULE, DELAYED RELEASE ORAL DAILY
Qty: 90 CAPSULE | Refills: 1 | Status: SHIPPED | OUTPATIENT
Start: 2024-08-13 | End: 2025-08-13

## 2024-08-13 RX ORDER — POTASSIUM CHLORIDE 750 MG/1
10 TABLET, EXTENDED RELEASE ORAL DAILY
Qty: 90 TABLET | Refills: 1 | Status: SHIPPED | OUTPATIENT
Start: 2024-08-13

## 2024-08-13 RX ORDER — EZETIMIBE 10 MG/1
10 TABLET ORAL DAILY
Qty: 90 TABLET | Refills: 1 | Status: SHIPPED | OUTPATIENT
Start: 2024-08-13

## 2024-08-13 NOTE — Clinical Note
Eye exam complete in [place]: [facility]. Please add to HM. Thanks!

## 2024-08-13 NOTE — PROGRESS NOTES
"Subjective:       Patient ID: Ailyn Ortiz is a 58 y.o. female.    Chief Complaint: Establish Care      HPI:  Patient is new to clinic and presents to establish care. She has HTN, DM type 2, JESSICA, seronegative RA, neuropathy, hypothyroidism, h/o bowel resection and gastric sleeve.     HTN: on losartan 50mg. BP at goal today. Denies med side effects. Due for routine labs    DM type 2: diet controlled. She was on trulicity in the past. She was able to wean off after gastric sleeve and wasn't taking in much PO.   Statin myalgias-leg cramps. Now on zetia  Eye exam: [facility] [place]-will request records    Following with Poditary for recurrent nail fungus    Hypothyroidism: diagnosed based on labs about 30 years ago.   + fatigue but also noting hyperglycemia last labs  She only take medicine 5x a week on average. Unsure that it is helpful.      RA: follows with rheum. Was on Humira. MTX caused hair loss in the past. Joint sx are currently well controlled. She had to switch to Enbrel due to formulary change but having more joint pains. Plans to resume humira pending insurance    Neuropathy on cymbalta 60mg. Working well. No med side effects.     She is seeing ortho for trigger finger. Getting injection again tomorrow.     S/p bowel resection for lipoma x2. She has intermittent pain and constipation sx. She is able to reduced her diet to liquid and sx resolve.      JESSICA: diagnosed prior to gastric sleeve. After surgery sx improved, no longer needing CPAP     She has recurrent itching of the right ear. She has seen ENT in the past and told "nothing wrong with it". No drainage. NO hearing changes.       Past Medical History:   Diagnosis Date    Arthritis     zero negative imflammatory arthritis    Asthma     Diabetes mellitus, type 2 since the age of 42    Hypertension since the age of 42    JESSICA (obstructive sleep apnea)     S/P UPP    Partial small bowel obstruction 3/6/2019    Thyroid " disease     Ventral hernia without obstruction or gangrene        Family History   Problem Relation Name Age of Onset    Hypertension Father      Diabetes Father      Heart disease Maternal Grandfather          CAD    Heart disease Paternal Grandmother          CAD    Diabetes Paternal Grandmother      Heart disease Paternal Grandfather          CAD       Social History     Socioeconomic History    Marital status:    Tobacco Use    Smoking status: Former     Current packs/day: 0.00     Types: Cigarettes     Quit date: 12/15/1993     Years since quittin.6    Smokeless tobacco: Never   Substance and Sexual Activity    Alcohol use: Not Currently     Comment: occasionally    Drug use: No    Sexual activity: Yes     Partners: Male     Social Determinants of Health     Financial Resource Strain: Low Risk  (2024)    Overall Financial Resource Strain (CARDIA)     Difficulty of Paying Living Expenses: Not hard at all   Food Insecurity: No Food Insecurity (2024)    Hunger Vital Sign     Worried About Running Out of Food in the Last Year: Never true     Ran Out of Food in the Last Year: Never true   Physical Activity: Insufficiently Active (2024)    Exercise Vital Sign     Days of Exercise per Week: 4 days     Minutes of Exercise per Session: 30 min   Stress: No Stress Concern Present (2024)    [place] [employer] - Occupational Stress Questionnaire     Feeling of Stress : Only a little   Housing Stability: Unknown (2024)    Housing Stability Vital Sign     Unable to Pay for Housing in the Last Year: No       Review of Systems   Constitutional:  Positive for fatigue. Negative for activity change, fever and unexpected weight change.   HENT:  Negative for congestion, ear pain, hearing loss, rhinorrhea and sore throat.    Eyes:  Negative for redness and visual disturbance.   Respiratory:  Negative for cough, shortness of breath and wheezing.    Cardiovascular:  Negative  for chest pain, palpitations and leg swelling.   Gastrointestinal:  Negative for abdominal pain, constipation, diarrhea, nausea and vomiting.   Genitourinary:  Negative for dysuria, frequency, pelvic pain and urgency.   Musculoskeletal:  Negative for back pain, joint swelling and neck pain.   Skin:  Negative for color change, rash and wound.   Neurological:  Negative for dizziness, light-headedness and headaches.         Objective:      Physical Exam  Vitals reviewed.   Constitutional:       General: She is not in acute distress.     Appearance: She is well-developed. She is obese.   HENT:      Head: Normocephalic and atraumatic.      Right Ear: External ear normal.      Left Ear: External ear normal.      Nose: Nose normal.   Eyes:      General:         Right eye: No discharge.         Left eye: No discharge.      Conjunctiva/sclera: Conjunctivae normal.   Neck:      Thyroid: No thyromegaly.   Cardiovascular:      Rate and Rhythm: Normal rate and regular rhythm.      Heart sounds: No murmur heard.  Pulmonary:      Effort: Pulmonary effort is normal. No respiratory distress.      Breath sounds: Normal breath sounds.   Abdominal:      General: There is no distension.      Palpations: Abdomen is soft.      Tenderness: There is no abdominal tenderness.   Skin:     General: Skin is warm and dry.   Neurological:      Mental Status: She is alert and oriented to person, place, and time.   Psychiatric:         Behavior: Behavior normal.         Thought Content: Thought content normal.         Assessment:       1. Encounter to establish care    2. Essential hypertension    3. Class 2 severe obesity due to excess calories with serious comorbidity and body mass index (BMI) of 39.0 to 39.9 in adult    4. Motion sickness, initial encounter    5. Vitamin D deficiency    6. Hypothyroidism, unspecified type    7. Myalgia due to statin    8. Type 2 diabetes mellitus without complication, without long-term current use of insulin     9. S/P gastric sleeve procedure        Plan:       1. Encounter to establish care  Meds refilled  Problem list reviewed and updated  PMH, PSH, SH and FH reviewed  -     levothyroxine (SYNTHROID) 88 MCG tablet; Take 1 tablet (88 mcg total) by mouth before breakfast.  Dispense: 90 tablet; Refill: 1    2. Essential hypertension  Chronic controlled  Continue medications at same dose  Low Na diet  Exercise, weight loss  Check BP and keep log for next visit    -     CBC Auto Differential; Future; Expected date: 08/13/2024  -     Comprehensive Metabolic Panel; Future; Expected date: 08/13/2024  -     TSH; Future; Expected date: 08/13/2024  -     Lipid Panel; Future; Expected date: 08/13/2024  -     T4, Free; Future; Expected date: 08/13/2024  -     Hemoglobin A1C; Future; Expected date: 08/13/2024  -     Microalbumin/Creatinine Ratio, Urine; Future; Expected date: 08/13/2024  -     losartan (COZAAR) 50 MG tablet; Take 1 tablet (50 mg total) by mouth once daily.  Dispense: 90 tablet; Refill: 1    3. Class 2 severe obesity due to excess calories with serious comorbidity and body mass index (BMI) of 39.0 to 39.9 in adult  Chronic uncontrolled  Diet, exercise  Update labs  -     CBC Auto Differential; Future; Expected date: 08/13/2024  -     Comprehensive Metabolic Panel; Future; Expected date: 08/13/2024  -     TSH; Future; Expected date: 08/13/2024  -     Lipid Panel; Future; Expected date: 08/13/2024  -     T4, Free; Future; Expected date: 08/13/2024  -     Hemoglobin A1C; Future; Expected date: 08/13/2024  -     Microalbumin/Creatinine Ratio, Urine; Future; Expected date: 08/13/2024  -     ezetimibe (ZETIA) 10 mg tablet; Take 1 tablet (10 mg total) by mouth once daily.  Dispense: 90 tablet; Refill: 1  -     DULoxetine (CYMBALTA) 60 MG capsule; Take 1 capsule (60 mg total) by mouth once daily.  Dispense: 90 capsule; Refill: 1    4. Motion sickness, initial encounter  Chronic stable  Zofran refilled  Traveling to [place]  soon so wanting to take with her  -     ondansetron (ZOFRAN) 4 MG tablet; Take 1 tablet (4 mg total) by mouth every 8 (eight) hours as needed for Nausea.  Dispense: 30 tablet; Refill: 0    5. Vitamin D deficiency  Chronic stable  Cont supplement  Weight loss  Update labs  -     ergocalciferol (ERGOCALCIFEROL) 50,000 unit Cap; Take 1 capsule (50,000 Units total) by mouth once a week.  Dispense: 12 capsule; Refill: 4  -     Vitamin D; Future; Expected date: 08/13/2024    6. Hypothyroidism, unspecified type  Chronic stable  Cont meds same dose pending labs  Doesn't take daily--maybe doesn't need?? Can revisit after labs  -     CBC Auto Differential; Future; Expected date: 08/13/2024  -     Comprehensive Metabolic Panel; Future; Expected date: 08/13/2024  -     TSH; Future; Expected date: 08/13/2024    7. Myalgia due to statin  History noted    8. Type 2 diabetes mellitus without complication, without long-term current use of insulin  Chronic ?uncontrolled  Last lab showed hyperglycemia  Update fasting glucose with A1C  Consider GLP-1 if A1C > 6.5%    -     CBC Auto Differential; Future; Expected date: 08/13/2024  -     Comprehensive Metabolic Panel; Future; Expected date: 08/13/2024  -     TSH; Future; Expected date: 08/13/2024  -     Lipid Panel; Future; Expected date: 08/13/2024  -     T4, Free; Future; Expected date: 08/13/2024  -     Hemoglobin A1C; Future; Expected date: 08/13/2024  -     Microalbumin/Creatinine Ratio, Urine; Future; Expected date: 08/13/2024    9. S/P gastric sleeve procedure  History noted  Diet, exercise  Consider GLP-1 if A1C elevated---will also need to consider h/o GI surgeries however  -     potassium chloride SA (K-DUR,KLOR-CON M) 10 MEQ tablet; Take 1 tablet (10 mEq total) by mouth once daily.  Dispense: 90 tablet; Refill: 1  -     omeprazole (PRILOSEC) 40 MG capsule; Take 1 capsule (40 mg total) by mouth every morning.  Dispense: 90 capsule; Refill: 1       RTC 6 months with labs and PRN  pending labs this week

## 2024-08-14 ENCOUNTER — PATIENT OUTREACH (OUTPATIENT)
Dept: ADMINISTRATIVE | Facility: HOSPITAL | Age: 59
End: 2024-08-14
Payer: COMMERCIAL

## 2024-08-14 ENCOUNTER — OFFICE VISIT (OUTPATIENT)
Dept: ORTHOPEDICS | Facility: CLINIC | Age: 59
End: 2024-08-14
Payer: COMMERCIAL

## 2024-08-14 VITALS
DIASTOLIC BLOOD PRESSURE: 85 MMHG | HEART RATE: 88 BPM | BODY MASS INDEX: 39.14 KG/M2 | SYSTOLIC BLOOD PRESSURE: 164 MMHG | WEIGHT: 214 LBS

## 2024-08-14 DIAGNOSIS — M65.342 TRIGGER RING FINGER OF LEFT HAND: Primary | ICD-10-CM

## 2024-08-14 DIAGNOSIS — E11.9 TYPE 2 DIABETES MELLITUS WITHOUT COMPLICATION, UNSPECIFIED WHETHER LONG TERM INSULIN USE: ICD-10-CM

## 2024-08-14 DIAGNOSIS — M06.00 SERONEGATIVE RHEUMATOID ARTHRITIS: ICD-10-CM

## 2024-08-14 PROCEDURE — 4010F ACE/ARB THERAPY RXD/TAKEN: CPT | Mod: CPTII,S$GLB,, | Performed by: SPECIALIST/TECHNOLOGIST

## 2024-08-14 PROCEDURE — 20550 NJX 1 TENDON SHEATH/LIGAMENT: CPT | Mod: LT,S$GLB,, | Performed by: SPECIALIST/TECHNOLOGIST

## 2024-08-14 PROCEDURE — 1160F RVW MEDS BY RX/DR IN RCRD: CPT | Mod: CPTII,S$GLB,, | Performed by: SPECIALIST/TECHNOLOGIST

## 2024-08-14 PROCEDURE — 99999 PR PBB SHADOW E&M-EST. PATIENT-LVL IV: CPT | Mod: PBBFAC,,, | Performed by: SPECIALIST/TECHNOLOGIST

## 2024-08-14 PROCEDURE — 3079F DIAST BP 80-89 MM HG: CPT | Mod: CPTII,S$GLB,, | Performed by: SPECIALIST/TECHNOLOGIST

## 2024-08-14 PROCEDURE — 3077F SYST BP >= 140 MM HG: CPT | Mod: CPTII,S$GLB,, | Performed by: SPECIALIST/TECHNOLOGIST

## 2024-08-14 PROCEDURE — 99214 OFFICE O/P EST MOD 30 MIN: CPT | Mod: 25,S$GLB,, | Performed by: SPECIALIST/TECHNOLOGIST

## 2024-08-14 PROCEDURE — 1159F MED LIST DOCD IN RCRD: CPT | Mod: CPTII,S$GLB,, | Performed by: SPECIALIST/TECHNOLOGIST

## 2024-08-14 PROCEDURE — 3008F BODY MASS INDEX DOCD: CPT | Mod: CPTII,S$GLB,, | Performed by: SPECIALIST/TECHNOLOGIST

## 2024-08-14 RX ORDER — DEXAMETHASONE SODIUM PHOSPHATE 4 MG/ML
4 INJECTION, SOLUTION INTRA-ARTICULAR; INTRALESIONAL; INTRAMUSCULAR; INTRAVENOUS; SOFT TISSUE
Status: DISCONTINUED | OUTPATIENT
Start: 2024-08-14 | End: 2024-08-14 | Stop reason: HOSPADM

## 2024-08-14 RX ORDER — ETANERCEPT 50 MG/ML
50 SOLUTION SUBCUTANEOUS WEEKLY
Qty: 4 ML | Refills: 0 | Status: ACTIVE | OUTPATIENT
Start: 2024-08-14 | End: 2024-09-13

## 2024-08-14 RX ADMIN — DEXAMETHASONE SODIUM PHOSPHATE 4 MG: 4 INJECTION, SOLUTION INTRA-ARTICULAR; INTRALESIONAL; INTRAMUSCULAR; INTRAVENOUS; SOFT TISSUE at 09:08

## 2024-08-14 NOTE — PROCEDURES
L Ring Tendon Sheath Injection 2nd Injection    Date/Time: 8/14/2024 9:30 AM    Performed by: Jean Claude Hidalgo PA-C  Authorized by: Jean Claude Hidalgo PA-C    Consent Done?:  Yes (Verbal)  Indications:  Pain  Timeout: prior to procedure the correct patient, procedure, and site was verified    Local anesthesia used?: Yes    Anesthesia:  Local infiltration  Local anesthetic:  Lidocaine 1% without epinephrine  Anesthetic total (ml):  0.5    Location:  Ring finger  Site:  L ring flexor tendon sheath  Needle size:  25 G  Approach:  Volar  Medications:  4 mg dexAMETHasone 4 mg/mL  Patient tolerance:  Patient tolerated the procedure well with no immediate complications

## 2024-08-14 NOTE — PROGRESS NOTES
Subjective:       Patient ID: Ailyn Ortiz is a 58 y.o. female.    Chief Complaint: No chief complaint on file.    Interval HPI  24  Patient reports 6 weeks status post left ring finger trigger finger injection.  She notes that she got some mild relief from the previous injection although she still notes tenderness and triggering of the left ring finger.  She denies any numbness or tingling.    HPI  7/3/24  58-year-old right-hand dominant female with previous medical history of rheumatoid arthritis presents to clinic today with left ring finger triggering.  She has previous history of carpal tunnel release on the right side and trigger thumb on the left that was done by Dr. Rankin.  She notes that all of her tenderness is localized at the A1 pulley of the left ring finger.  She denies any numbness or tingling.  She also notes that she is some tenderness over her basilar thumb.  She states that paraffin wax as well as Voltaren do help relieve her symptoms.  She denies any history of trauma.    Past Medical History:   Diagnosis Date    Arthritis     zero negative imflammatory arthritis    Asthma     Diabetes mellitus, type 2 since the age of 42    Hypertension since the age of 42    JESSICA (obstructive sleep apnea)     S/P UPP    Partial small bowel obstruction 3/6/2019    Thyroid disease     Ventral hernia without obstruction or gangrene      Past Surgical History:   Procedure Laterality Date    ADENOIDECTOMY      CARPAL TUNNEL RELEASE Right 2018    Procedure: RELEASE, CARPAL TUNNEL right;  Surgeon: Anai Choudhary MD;  Location: [facility] OR [address] [place] [address];  Service: Orthopedics;  Laterality: Right;  stretcher, supine, hand pan 1 and pan 2     SECTION      times 2    CHOLECYSTECTOMY      DIAGNOSTIC LAPAROSCOPY N/A 3/8/2019    Procedure: LAPAROSCOPY, DIAGNOSTIC;  Surgeon: Stone Mei MD;  Location: [facility] OR [address] [place] [address];  Service: General;  Laterality: N/A;    ESOPHAGOGASTRODUODENOSCOPY N/A  2019    Procedure: EGD (ESOPHAGOGASTRODUODENOSCOPY);  Surgeon: Stone Mei MD;  [facility];  Service: General;  Laterality: N/A;    GANGLION CYST EXCISION      LAPAROSCOPIC RESECTION OF SMALL INTESTINE  3/8/2019    Procedure: EXCISION, SMALL INTESTINE, LAPAROSCOPIC;  Surgeon: Stone Mei MD;  [facility];  Service: General;;    LAPAROSCOPIC SLEEVE GASTRECTOMY N/A 2019    Procedure: GASTRECTOMY, SLEEVE, LAPAROSCOPIC wiht intra op EGD;  Surgeon: Stone Mei MD;  [facility];  Service: General;  Laterality: N/A;    LYSIS OF ADHESIONS N/A 2019    Procedure: LYSIS, ADHESIONS;  Surgeon: Stone Mei MD;  [facility];  Service: General;  Laterality: N/A;    small bowel resection (lipoma)      TONSILLECTOMY      TRIGGER FINGER RELEASE Left 4/15/2019    Procedure: RELEASE, TRIGGER FINGER left thumb;  Surgeon: Anai Choudhary MD;  Location: [facility];  Service: Orthopedics;  Laterality: Left;  stretcher, supine, hand pan 1 and 2    TUBAL LIGATION      UVULOPALATOPHARYNGOPLASTY       Family History   Problem Relation Name Age of Onset    Hypertension Father      Diabetes Father      Heart disease Maternal Grandfather          CAD    Heart disease Paternal Grandmother          CAD    Diabetes Paternal Grandmother      Heart disease Paternal Grandfather          CAD     Social History     Socioeconomic History    Marital status:    Tobacco Use    Smoking status: Former     Current packs/day: 0.00     Types: Cigarettes     Quit date: 12/15/1993     Years since quittin.6    Smokeless tobacco: Never   Substance and Sexual Activity    Alcohol use: Not Currently     Comment: occasionally    Drug use: No    Sexual activity: Yes     Partners: Male     Social Determinants of Health     Financial Resource Strain: Low Risk  (2024)    Overall Financial Resource Strain (CARDIA)     Difficulty of Paying  Living Expenses: Not hard at all   Food Insecurity: No Food Insecurity (7/16/2024)    Hunger Vital Sign     Worried About Running Out of Food in the Last Year: Never true     Ran Out of Food in the Last Year: Never true   Physical Activity: Insufficiently Active (7/16/2024)    Exercise Vital Sign     Days of Exercise per Week: 4 days     Minutes of Exercise per Session: 30 min   Stress: No Stress Concern Present (7/16/2024)    [employer] - Occupational Stress Questionnaire     Feeling of Stress : Only a little   Housing Stability: Unknown (7/16/2024)    Housing Stability Vital Sign     Unable to Pay for Housing in the Last Year: No       Current Outpatient Medications   Medication Sig Dispense Refill    adalimumab-adaz (HYRIMOZ,CF, PEN) 40 mg/0.4 mL PnIj Inject 0.4 mLs (40 mg total) into the skin every 14 (fourteen) days. 2 pen 11    ammonium lactate (LAC-HYDRIN) 12 % lotion Apply topically once daily. 400 g 2    b complex vitamins tablet Take 1 tablet by mouth once daily.      DULoxetine (CYMBALTA) 60 MG capsule Take 1 capsule (60 mg total) by mouth once daily. 90 capsule 1    ergocalciferol (ERGOCALCIFEROL) 50,000 unit Cap Take 1 capsule (50,000 Units total) by mouth once a week. 12 capsule 4    ezetimibe (ZETIA) 10 mg tablet Take 1 tablet (10 mg total) by mouth once daily. 90 tablet 1    levothyroxine (SYNTHROID) 88 MCG tablet Take 1 tablet (88 mcg total) by mouth before breakfast. 90 tablet 1    loratadine (CLARITIN) 10 mg tablet Take 10 mg by mouth daily as needed.       losartan (COZAAR) 50 MG tablet Take 1 tablet (50 mg total) by mouth once daily. 90 tablet 1    magnesium 30 mg Tab Take by mouth once.      multivit with minerals/lutein (MULTIVITAMIN 50 PLUS ORAL) multivitamin   1 po daily      omeprazole (PRILOSEC) 40 MG capsule Take 1 capsule (40 mg total) by mouth every morning. 90 capsule 1    ondansetron (ZOFRAN) 4 MG tablet Take 1 tablet (4 mg total) by mouth every 8 (eight)  hours as needed for Nausea. 30 tablet 0    potassium chloride SA (K-DUR,KLOR-CON M) 10 MEQ tablet Take 1 tablet (10 mEq total) by mouth once daily. 90 tablet 1    clonazePAM (KLONOPIN) 0.5 MG tablet clonazepam 0.5 mg tablet   TAKE 1 TABLET BY MOUTH TWICE A DAY AS NEEDED       No current facility-administered medications for this visit.     Facility-Administered Medications Ordered in Other Visits   Medication Dose Route Frequency Provider Last Rate Last Admin    0.9%  NaCl infusion   Intravenous Continuous David Chisholm MD   Stopped at 12/30/19 1251    mupirocin 2 % ointment   Nasal On Call Procedure David Chisholm MD   Given at 04/15/19 2887     Review of patient's allergies indicates:   Allergen Reactions    Morphine Hives and Itching    Avelox [moxifloxacin] Rash       Review of Systems        Objective:      There were no vitals filed for this visit.    Physical Exam  Cardiovascular:      Pulses:           Radial pulses are Normal on the right side and Normal on the left side.       Hand/Wrist Musculoskeletal Exam    Inspection    Right      Erythema: none      Ecchymosis: none      Edema: none      Deformity: none    Left      Erythema: none      Ecchymosis: none      Edema: none      Deformity: none    Palpation    Left      Triggering: ring      Ring tenderness to palpation: A1 pulley    Range of Motion      Right Wrist      Right wrist range of motion is normal.      Left Wrist      Left wrist range of motion is normal.      Range of motion additional comments: Able to make a composite fist.  Active triggering of the left ring finger.     Neurovascular    Right       Radial pulse: normal      Capillary refill: brisk and <3 sec      Ulnar nerve sensory distribution: normal      Median nerve sensory distribution: normal      Superficial radial nerve sensory distribution: normal    Left       Radial pulse: normal      Capillary refill: brisk and <3 sec      Ulnar nerve sensory  distribution: normal      Median nerve sensory distribution: normal      Superficial radial nerve sensory distribution: normal      Diagnostics Review: X-Ray: Reviewed  Personal interpretation of the XR reveals no signs of fractures or dislocations.  Basilar thumb arthritis noted.       Assessment:       No diagnosis found.      Plan:       We discussed with the patient at length all the different treatment options available including anti-inflammatories, acetaminophen, rest, ice, physical therapy to include strengthening, range of motion exercise, splinting, occasional cortisone injections for temporary relief, or possible surgical interventions.     Treatment options discussed with the patient include injections and surgery.  This time patient would like to proceed with repeat injection of the left Ring finger.  Educated patient on the pathophysiology of trigger finger as well as range-of-motion exercises of the PIP and DIP joint. Patient will follow up in 6 weeks for surgery consideration.                  Tor Hidalgo PA-C, ATC  Hand and Upper Extremity   [facility]      Please be aware that this note has been generated with the assistance of [employer] voice-to-text.  Please excuse any spelling or grammatical errors.

## 2024-08-14 NOTE — LETTER
AUTHORIZATION FOR RELEASE OF   CONFIDENTIAL INFORMATION        We are seeing Ailyn Ortiz, date of birth 1965, in the clinic at [facility]. Joan Edwards MD is the patient's PCP. Ailyn Ortiz has an outstanding lab/procedure at the time we reviewed her chart. In order to help keep her health information updated, she has authorized us to request the following medical record(s):        (  )  MAMMOGRAM                                      (  )  COLONOSCOPY      (  )  PAP SMEAR                                          (  )  OUTSIDE LAB RESULTS     (  )  DEXA SCAN                                          (  XX)  EYE EXAM            (  )  FOOT EXAM                                          (  )  ENTIRE RECORD     (  )  OUTSIDE IMMUNIZATIONS                 (  )  _______________         Please fax records to [facility], Knight, Sarah, MD, [phone]        Patient Name: Ailyn Ortiz  : 1965  Patient Phone #: [phone]

## 2024-08-17 ENCOUNTER — PATIENT MESSAGE (OUTPATIENT)
Dept: ADMINISTRATIVE | Facility: HOSPITAL | Age: 59
End: 2024-08-17
Payer: COMMERCIAL

## 2024-08-18 ENCOUNTER — PATIENT MESSAGE (OUTPATIENT)
Dept: ADMINISTRATIVE | Facility: OTHER | Age: 59
End: 2024-08-18
Payer: COMMERCIAL

## 2024-08-18 ENCOUNTER — LAB VISIT (OUTPATIENT)
Dept: LAB | Facility: HOSPITAL | Age: 59
End: 2024-08-18
Attending: INTERNAL MEDICINE
Payer: COMMERCIAL

## 2024-08-18 DIAGNOSIS — E55.9 VITAMIN D DEFICIENCY: ICD-10-CM

## 2024-08-18 DIAGNOSIS — E66.01 CLASS 2 SEVERE OBESITY DUE TO EXCESS CALORIES WITH SERIOUS COMORBIDITY AND BODY MASS INDEX (BMI) OF 39.0 TO 39.9 IN ADULT: ICD-10-CM

## 2024-08-18 DIAGNOSIS — I10 ESSENTIAL HYPERTENSION: ICD-10-CM

## 2024-08-18 DIAGNOSIS — E03.9 HYPOTHYROIDISM, UNSPECIFIED TYPE: ICD-10-CM

## 2024-08-18 DIAGNOSIS — E11.9 TYPE 2 DIABETES MELLITUS WITHOUT COMPLICATION, WITHOUT LONG-TERM CURRENT USE OF INSULIN: ICD-10-CM

## 2024-08-18 LAB
25(OH)D3+25(OH)D2 SERPL-MCNC: 35 NG/ML (ref 30–96)
ALBUMIN SERPL BCP-MCNC: 4.2 G/DL (ref 3.5–5.2)
ALBUMIN/CREAT UR: 6 UG/MG (ref 0–30)
ALP SERPL-CCNC: 94 U/L (ref 55–135)
ALT SERPL W/O P-5'-P-CCNC: 24 U/L (ref 10–44)
ANION GAP SERPL CALC-SCNC: 12 MMOL/L (ref 8–16)
AST SERPL-CCNC: 15 U/L (ref 10–40)
BASOPHILS # BLD AUTO: 0.07 K/UL (ref 0–0.2)
BASOPHILS NFR BLD: 1.1 % (ref 0–1.9)
BILIRUB SERPL-MCNC: 0.6 MG/DL (ref 0.1–1)
BUN SERPL-MCNC: 18 MG/DL (ref 6–20)
CALCIUM SERPL-MCNC: 9.2 MG/DL (ref 8.7–10.5)
CHLORIDE SERPL-SCNC: 101 MMOL/L (ref 95–110)
CHOLEST SERPL-MCNC: 261 MG/DL (ref 120–199)
CHOLEST/HDLC SERPL: 5.3 {RATIO} (ref 2–5)
CO2 SERPL-SCNC: 25 MMOL/L (ref 23–29)
CREAT SERPL-MCNC: 0.9 MG/DL (ref 0.5–1.4)
CREAT UR-MCNC: 165.5 MG/DL (ref 15–325)
DIFFERENTIAL METHOD BLD: ABNORMAL
EOSINOPHIL # BLD AUTO: 0.3 K/UL (ref 0–0.5)
EOSINOPHIL NFR BLD: 4.5 % (ref 0–8)
ERYTHROCYTE [DISTWIDTH] IN BLOOD BY AUTOMATED COUNT: 11.9 % (ref 11.5–14.5)
EST. GFR  (NO RACE VARIABLE): >60 ML/MIN/1.73 M^2
ESTIMATED AVG GLUCOSE: 160 MG/DL (ref 68–131)
GLUCOSE SERPL-MCNC: 191 MG/DL (ref 70–110)
HBA1C MFR BLD: 7.2 % (ref 4–5.6)
HCT VFR BLD AUTO: 38.6 % (ref 37–48.5)
HDLC SERPL-MCNC: 49 MG/DL (ref 40–75)
HDLC SERPL: 18.8 % (ref 20–50)
HGB BLD-MCNC: 13.1 G/DL (ref 12–16)
IMM GRANULOCYTES # BLD AUTO: 0.02 K/UL (ref 0–0.04)
IMM GRANULOCYTES NFR BLD AUTO: 0.3 % (ref 0–0.5)
LDLC SERPL CALC-MCNC: 181.8 MG/DL (ref 63–159)
LYMPHOCYTES # BLD AUTO: 1.4 K/UL (ref 1–4.8)
LYMPHOCYTES NFR BLD: 22.1 % (ref 18–48)
MCH RBC QN AUTO: 29.8 PG (ref 27–31)
MCHC RBC AUTO-ENTMCNC: 33.9 G/DL (ref 32–36)
MCV RBC AUTO: 88 FL (ref 82–98)
MICROALBUMIN UR DL<=1MG/L-MCNC: 10 UG/ML
MONOCYTES # BLD AUTO: 0.8 K/UL (ref 0.3–1)
MONOCYTES NFR BLD: 12.1 % (ref 4–15)
NEUTROPHILS # BLD AUTO: 3.9 K/UL (ref 1.8–7.7)
NEUTROPHILS NFR BLD: 59.9 % (ref 38–73)
NONHDLC SERPL-MCNC: 212 MG/DL
NRBC BLD-RTO: 0 /100 WBC
PLATELET # BLD AUTO: 273 K/UL (ref 150–450)
PMV BLD AUTO: 9.1 FL (ref 9.2–12.9)
POTASSIUM SERPL-SCNC: 4.2 MMOL/L (ref 3.5–5.1)
PROT SERPL-MCNC: 7.3 G/DL (ref 6–8.4)
RBC # BLD AUTO: 4.4 M/UL (ref 4–5.4)
SODIUM SERPL-SCNC: 138 MMOL/L (ref 136–145)
T4 FREE SERPL-MCNC: 1.04 NG/DL (ref 0.71–1.51)
TRIGL SERPL-MCNC: 151 MG/DL (ref 30–150)
TSH SERPL DL<=0.005 MIU/L-ACNC: 2.2 UIU/ML (ref 0.4–4)
WBC # BLD AUTO: 6.51 K/UL (ref 3.9–12.7)

## 2024-08-18 PROCEDURE — 80053 COMPREHEN METABOLIC PANEL: CPT | Performed by: INTERNAL MEDICINE

## 2024-08-18 PROCEDURE — 80061 LIPID PANEL: CPT | Performed by: INTERNAL MEDICINE

## 2024-08-18 PROCEDURE — 85025 COMPLETE CBC W/AUTO DIFF WBC: CPT | Performed by: INTERNAL MEDICINE

## 2024-08-18 PROCEDURE — 84443 ASSAY THYROID STIM HORMONE: CPT | Performed by: INTERNAL MEDICINE

## 2024-08-18 PROCEDURE — 82043 UR ALBUMIN QUANTITATIVE: CPT | Performed by: INTERNAL MEDICINE

## 2024-08-18 PROCEDURE — 84439 ASSAY OF FREE THYROXINE: CPT | Performed by: INTERNAL MEDICINE

## 2024-08-18 PROCEDURE — 36415 COLL VENOUS BLD VENIPUNCTURE: CPT | Performed by: INTERNAL MEDICINE

## 2024-08-18 PROCEDURE — 82570 ASSAY OF URINE CREATININE: CPT | Performed by: INTERNAL MEDICINE

## 2024-08-18 PROCEDURE — 83036 HEMOGLOBIN GLYCOSYLATED A1C: CPT | Performed by: INTERNAL MEDICINE

## 2024-08-18 PROCEDURE — 82306 VITAMIN D 25 HYDROXY: CPT | Performed by: INTERNAL MEDICINE

## 2024-08-19 ENCOUNTER — PATIENT MESSAGE (OUTPATIENT)
Dept: INTERNAL MEDICINE | Facility: CLINIC | Age: 59
End: 2024-08-19
Payer: COMMERCIAL

## 2024-08-19 ENCOUNTER — PATIENT MESSAGE (OUTPATIENT)
Dept: PODIATRY | Facility: CLINIC | Age: 59
End: 2024-08-19
Payer: COMMERCIAL

## 2024-08-19 DIAGNOSIS — E11.9 TYPE 2 DIABETES MELLITUS WITHOUT COMPLICATION, WITHOUT LONG-TERM CURRENT USE OF INSULIN: ICD-10-CM

## 2024-08-19 DIAGNOSIS — E11.65 TYPE 2 DIABETES MELLITUS WITH HYPERGLYCEMIA, WITHOUT LONG-TERM CURRENT USE OF INSULIN: Primary | ICD-10-CM

## 2024-08-20 RX ORDER — TIRZEPATIDE 2.5 MG/.5ML
2.5 INJECTION, SOLUTION SUBCUTANEOUS
Qty: 2 ML | Refills: 1 | Status: SHIPPED | OUTPATIENT
Start: 2024-08-20 | End: 2024-09-28

## 2024-08-21 ENCOUNTER — TELEPHONE (OUTPATIENT)
Dept: INTERNAL MEDICINE | Facility: CLINIC | Age: 59
End: 2024-08-21
Payer: COMMERCIAL

## 2024-08-21 ENCOUNTER — PATIENT OUTREACH (OUTPATIENT)
Dept: INTERNAL MEDICINE | Facility: CLINIC | Age: 59
End: 2024-08-21
Payer: COMMERCIAL

## 2024-08-21 NOTE — TELEPHONE ENCOUNTER
----- Message from Ashley Ortiz sent at 2024 12:24 PM CDT -----  Contact: clark  Ailyn Ortiz  MRN: [id number]  : 1965  PCP: Joan Edwards  Home Phone      [phone]  Work Phone      Not on file.  Mobile          [phone]      MESSAGE:     Clark from the lab states pt did not bring in urine so they have to cancel orders and would like orders put back in for in case she comes.       lCark   [phone]

## 2024-08-21 NOTE — TELEPHONE ENCOUNTER
Pt confirmed that she didn't receive devices yet.  Advised that mobile [employer] representative will be at [facility] [place] on 8/23/24 from 9a to 3p.  She will try to  then.  Encouraged to call back if any questions.

## 2024-08-22 ENCOUNTER — PATIENT MESSAGE (OUTPATIENT)
Dept: INTERNAL MEDICINE | Facility: CLINIC | Age: 59
End: 2024-08-22
Payer: COMMERCIAL

## 2024-08-23 ENCOUNTER — TELEPHONE (OUTPATIENT)
Dept: INTERNAL MEDICINE | Facility: CLINIC | Age: 59
End: 2024-08-23
Payer: COMMERCIAL

## 2024-08-23 NOTE — TELEPHONE ENCOUNTER
----- Message from Richelle Bishop MA sent at 2024  7:44 AM CDT -----  Ailyn Ortiz  MRN: [id number]  : 1965  PCP: Joan Edwards  Home Phone      [phone]  Work Phone      Not on file.  Mobile          [phone]      MESSAGE:     [facility] in [place] left voicemail stating they need a diagnosis code for Mounjaro.    Please call [phone]

## 2024-09-19 ENCOUNTER — TELEPHONE (OUTPATIENT)
Dept: ORTHOPEDICS | Facility: CLINIC | Age: 59
End: 2024-09-19
Payer: COMMERCIAL

## 2024-09-19 DIAGNOSIS — M06.00 SERONEGATIVE RHEUMATOID ARTHRITIS: ICD-10-CM

## 2024-09-19 RX ORDER — ETANERCEPT 50 MG/ML
50 SOLUTION SUBCUTANEOUS WEEKLY
Qty: 4 ML | Refills: 0 | Status: ACTIVE | OUTPATIENT
Start: 2024-09-19 | End: 2024-10-19

## 2024-10-15 DIAGNOSIS — M06.00 SERONEGATIVE RHEUMATOID ARTHRITIS: ICD-10-CM

## 2024-10-18 RX ORDER — ETANERCEPT 50 MG/ML
50 SOLUTION SUBCUTANEOUS WEEKLY
Qty: 4 ML | Refills: 0 | Status: SHIPPED | OUTPATIENT
Start: 2024-10-18 | End: 2024-11-17

## 2024-10-22 ENCOUNTER — OFFICE VISIT (OUTPATIENT)
Dept: RHEUMATOLOGY | Facility: CLINIC | Age: 59
End: 2024-10-22
Payer: COMMERCIAL

## 2024-10-22 ENCOUNTER — OFFICE VISIT (OUTPATIENT)
Dept: ORTHOPEDICS | Facility: CLINIC | Age: 59
End: 2024-10-22
Payer: COMMERCIAL

## 2024-10-22 VITALS
HEART RATE: 89 BPM | DIASTOLIC BLOOD PRESSURE: 83 MMHG | SYSTOLIC BLOOD PRESSURE: 136 MMHG | BODY MASS INDEX: 38.22 KG/M2 | HEIGHT: 62 IN | WEIGHT: 207.69 LBS

## 2024-10-22 DIAGNOSIS — M06.00 SERONEGATIVE RHEUMATOID ARTHRITIS: Primary | ICD-10-CM

## 2024-10-22 DIAGNOSIS — M65.342 TRIGGER RING FINGER OF LEFT HAND: Primary | ICD-10-CM

## 2024-10-22 DIAGNOSIS — E66.812 CLASS 2 SEVERE OBESITY DUE TO EXCESS CALORIES WITH SERIOUS COMORBIDITY AND BODY MASS INDEX (BMI) OF 37.0 TO 37.9 IN ADULT: Chronic | ICD-10-CM

## 2024-10-22 DIAGNOSIS — E66.01 CLASS 2 SEVERE OBESITY DUE TO EXCESS CALORIES WITH SERIOUS COMORBIDITY AND BODY MASS INDEX (BMI) OF 37.0 TO 37.9 IN ADULT: Chronic | ICD-10-CM

## 2024-10-22 DIAGNOSIS — Z92.25 INCREASED INFECTION RISK STATUS POST IMMUNOSUPPRESSIVE THERAPY: ICD-10-CM

## 2024-10-22 PROBLEM — M65.311 TRIGGER THUMB OF RIGHT HAND: Status: ACTIVE | Noted: 2019-09-09

## 2024-10-22 PROBLEM — M19.90 INFLAMMATORY ARTHRITIS: Status: RESOLVED | Noted: 2018-03-06 | Resolved: 2024-10-22

## 2024-10-22 PROBLEM — Z78.0 POSTMENOPAUSAL STATE: Status: ACTIVE | Noted: 2023-04-17

## 2024-10-22 PROBLEM — M65.311 TRIGGER FINGER OF RIGHT THUMB: Status: RESOLVED | Noted: 2019-02-27 | Resolved: 2024-10-22

## 2024-10-22 PROBLEM — E66.09 CLASS 2 OBESITY DUE TO EXCESS CALORIES WITH BODY MASS INDEX (BMI) OF 37.0 TO 37.9 IN ADULT: Chronic | Status: ACTIVE | Noted: 2017-12-15

## 2024-10-22 PROCEDURE — 4010F ACE/ARB THERAPY RXD/TAKEN: CPT | Mod: CPTII,S$GLB,, | Performed by: INTERNAL MEDICINE

## 2024-10-22 PROCEDURE — 4010F ACE/ARB THERAPY RXD/TAKEN: CPT | Mod: CPTII,S$GLB,, | Performed by: SPECIALIST/TECHNOLOGIST

## 2024-10-22 PROCEDURE — 99214 OFFICE O/P EST MOD 30 MIN: CPT | Mod: S$GLB,,, | Performed by: SPECIALIST/TECHNOLOGIST

## 2024-10-22 PROCEDURE — 3008F BODY MASS INDEX DOCD: CPT | Mod: CPTII,S$GLB,, | Performed by: INTERNAL MEDICINE

## 2024-10-22 PROCEDURE — 3051F HG A1C>EQUAL 7.0%<8.0%: CPT | Mod: CPTII,S$GLB,, | Performed by: INTERNAL MEDICINE

## 2024-10-22 PROCEDURE — 1159F MED LIST DOCD IN RCRD: CPT | Mod: CPTII,S$GLB,, | Performed by: INTERNAL MEDICINE

## 2024-10-22 PROCEDURE — 3066F NEPHROPATHY DOC TX: CPT | Mod: CPTII,S$GLB,, | Performed by: SPECIALIST/TECHNOLOGIST

## 2024-10-22 PROCEDURE — 3079F DIAST BP 80-89 MM HG: CPT | Mod: CPTII,S$GLB,, | Performed by: INTERNAL MEDICINE

## 2024-10-22 PROCEDURE — 3051F HG A1C>EQUAL 7.0%<8.0%: CPT | Mod: CPTII,S$GLB,, | Performed by: SPECIALIST/TECHNOLOGIST

## 2024-10-22 PROCEDURE — 3061F NEG MICROALBUMINURIA REV: CPT | Mod: CPTII,S$GLB,, | Performed by: INTERNAL MEDICINE

## 2024-10-22 PROCEDURE — 99999 PR PBB SHADOW E&M-EST. PATIENT-LVL III: CPT | Mod: PBBFAC,,, | Performed by: SPECIALIST/TECHNOLOGIST

## 2024-10-22 PROCEDURE — 1159F MED LIST DOCD IN RCRD: CPT | Mod: CPTII,S$GLB,, | Performed by: SPECIALIST/TECHNOLOGIST

## 2024-10-22 PROCEDURE — 99999 PR PBB SHADOW E&M-EST. PATIENT-LVL V: CPT | Mod: PBBFAC,,, | Performed by: INTERNAL MEDICINE

## 2024-10-22 PROCEDURE — 99215 OFFICE O/P EST HI 40 MIN: CPT | Mod: S$GLB,,, | Performed by: INTERNAL MEDICINE

## 2024-10-22 PROCEDURE — 3066F NEPHROPATHY DOC TX: CPT | Mod: CPTII,S$GLB,, | Performed by: INTERNAL MEDICINE

## 2024-10-22 PROCEDURE — 3061F NEG MICROALBUMINURIA REV: CPT | Mod: CPTII,S$GLB,, | Performed by: SPECIALIST/TECHNOLOGIST

## 2024-10-22 PROCEDURE — 3075F SYST BP GE 130 - 139MM HG: CPT | Mod: CPTII,S$GLB,, | Performed by: INTERNAL MEDICINE

## 2024-10-22 RX ORDER — ETANERCEPT 50 MG/ML
50 SOLUTION SUBCUTANEOUS WEEKLY
Qty: 4 ML | Refills: 5 | Status: ACTIVE | OUTPATIENT
Start: 2024-10-22 | End: 2025-04-08

## 2024-10-22 ASSESSMENT — ROUTINE ASSESSMENT OF PATIENT INDEX DATA (RAPID3)
AM STIFFNESS SCORE: 1, YES
PAIN SCORE: 7
PSYCHOLOGICAL DISTRESS SCORE: 3.3
MDHAQ FUNCTION SCORE: 0.3
PATIENT GLOBAL ASSESSMENT SCORE: 4
TOTAL RAPID3 SCORE: 4
FATIGUE SCORE: 5
WHEN YOU AWAKENED IN THE MORNING OVER THE LAST WEEK, PLEASE INDICATE THE AMOUNT OF TIME IT TAKES UNTIL YOU ARE AS LIMBER AS YOU WILL BE FOR THE DAY: 0.5

## 2024-10-22 NOTE — ASSESSMENT & PLAN NOTE
Doing well on Enbrel with trigger finger but no active synovitis on Enbrel monotherapy    Will continue this and plan follow up with lab 6 mo

## 2024-10-22 NOTE — PROGRESS NOTES
10/20/2024    10:46 PM   Rapid3 Question Responses and Scores   MDHAQ Score 0.3   Psychologic Score 3.3   Pain Score 7   When you awakened in the morning OVER THE LAST WEEK, did you feel stiff? Yes   If Yes, please indicate the number of hours until you are as limber as you will be for the day 0.5   Fatigue Score 5   Global Health Score 4   RAPID3 Score 4        Answers submitted by the patient for this visit:  Rheumatology Questionnaire (Submitted on 10/20/2024)  fever: No  eye redness: No  mouth sores: No  headaches: Yes  shortness of breath: No  chest pain: No  trouble swallowing: No  diarrhea: No  constipation: No  unexpected weight change: No  genital sore: No  dysuria: No  During the last 3 days, have you had a skin rash?: No  Bruises or bleeds easily: No  cough: No

## 2024-10-22 NOTE — PROGRESS NOTES
Subjective:       Patient ID: Ailyn Ortiz is a 59 y.o. female.    Chief Complaint: Pain of the Left Hand    Interval HPI  10/22/24  Patient reports for follow up of Left ring trigger finger.  She has had 2 injections with minimal relief.  She is interested in surgical intervention.  She notes continued tenderness over the A1 pulley of the left ring finger with active triggering.    Interval HPI  8/14/24  Patient reports 6 weeks status post left ring finger trigger finger injection.  She notes that she got some mild relief from the previous injection although she still notes tenderness and triggering of the left ring finger.  She denies any numbness or tingling.    HPI  7/3/24  58-year-old right-hand dominant female with previous medical history of rheumatoid arthritis presents to clinic today with left ring finger triggering.  She has previous history of carpal tunnel release on the right side and trigger thumb on the left that was done by Dr. Rankin.  She notes that all of her tenderness is localized at the A1 pulley of the left ring finger.  She denies any numbness or tingling.  She also notes that she is some tenderness over her basilar thumb.  She states that paraffin wax as well as Voltaren do help relieve her symptoms.  She denies any history of trauma.    Past Medical History:   Diagnosis Date    Arthritis     zero negative imflammatory arthritis    Asthma     Diabetes mellitus, type 2 since the age of 42    Hypertension since the age of 42    JESSICA (obstructive sleep apnea)     S/P UPP    Partial small bowel obstruction 3/6/2019    Thyroid disease     Ventral hernia without obstruction or gangrene      Past Surgical History:   Procedure Laterality Date    ADENOIDECTOMY      CARPAL TUNNEL RELEASE Right 12/26/2018    Procedure: RELEASE, CARPAL TUNNEL right;  Surgeon: Anai Choudhary MD;  Location: [facility] OR [address] [place] [address];  Service: Orthopedics;  Laterality: Right;  stretcher, supine, hand pan 1 and pan 2      SECTION      times 2    CHOLECYSTECTOMY      DIAGNOSTIC LAPAROSCOPY N/A 3/8/2019    Procedure: LAPAROSCOPY, DIAGNOSTIC;  Surgeon: Stone Mei MD;  Location: [address];  Service: General;  Laterality: N/A;    ESOPHAGOGASTRODUODENOSCOPY N/A 2019    Procedure: EGD (ESOPHAGOGASTRODUODENOSCOPY);  Surgeon: Stone Mei MD;  Location: [address];  Service: General;  Laterality: N/A;    GANGLION CYST EXCISION      LAPAROSCOPIC RESECTION OF SMALL INTESTINE  3/8/2019    Procedure: EXCISION, SMALL INTESTINE, LAPAROSCOPIC;  Surgeon: Stone eMi MD;  Location: [address];  Service: General;;    LAPAROSCOPIC SLEEVE GASTRECTOMY N/A 2019    Procedure: GASTRECTOMY, SLEEVE, LAPAROSCOPIC wiht intra op EGD;  Surgeon: Stone Mei MD;  Location: [facility] OR [address] [place] [address];  Service: General;  Laterality: N/A;    LYSIS OF ADHESIONS N/A 2019    Procedure: LYSIS, ADHESIONS;  Surgeon: Stone Mei MD;  Location: [address];  Service: General;  Laterality: N/A;    small bowel resection (lipoma)      TONSILLECTOMY      TRIGGER FINGER RELEASE Left 4/15/2019    Procedure: RELEASE, TRIGGER FINGER left thumb;  Surgeon: Anai Choudhary MD;  Location: [facility];  Service: Orthopedics;  Laterality: Left;  stretcher, supine, hand pan 1 and 2    TUBAL LIGATION      UVULOPALATOPHARYNGOPLASTY       Family History   Problem Relation Name Age of Onset    Hypertension Father      Diabetes Father      Heart disease Maternal Grandfather          CAD    Heart disease Paternal Grandmother          CAD    Diabetes Paternal Grandmother      Heart disease Paternal Grandfather          CAD     Social History     Socioeconomic History    Marital status:    Tobacco Use    Smoking status: Former     Current packs/day: 0.00     Types: Cigarettes     Quit date: 12/15/1993     Years since quittin.8    Smokeless tobacco: Never   Substance and Sexual Activity     Alcohol use: Not Currently     Comment: occasionally    Drug use: No    Sexual activity: Yes     Partners: Male     Social Drivers of Health     Financial Resource Strain: Low Risk  (7/16/2024)    Overall Financial Resource Strain (CARDIA)     Difficulty of Paying Living Expenses: Not hard at all   Food Insecurity: No Food Insecurity (7/16/2024)    Hunger Vital Sign     Worried About Running Out of Food in the Last Year: Never true     Ran Out of Food in the Last Year: Never true   Physical Activity: Insufficiently Active (7/16/2024)    Exercise Vital Sign     Days of Exercise per Week: 4 days     Minutes of Exercise per Session: 30 min   Stress: No Stress Concern Present (7/16/2024)    [employer] - Occupational Stress Questionnaire     Feeling of Stress : Only a little   Housing Stability: Unknown (7/16/2024)    Housing Stability Vital Sign     Unable to Pay for Housing in the Last Year: No       Current Outpatient Medications   Medication Sig Dispense Refill    ammonium lactate (LAC-HYDRIN) 12 % lotion Apply topically once daily. 400 g 2    b complex vitamins tablet Take 1 tablet by mouth once daily.      DULoxetine (CYMBALTA) 60 MG capsule Take 1 capsule (60 mg total) by mouth once daily. 90 capsule 1    ergocalciferol (ERGOCALCIFEROL) 50,000 unit Cap Take 1 capsule (50,000 Units total) by mouth once a week. 12 capsule 4    etanercept (ENBREL SURECLICK) 50 mg/mL (1 mL) Inject 1 mL (50 mg total) into the skin once a week. 4 mL 5    ezetimibe (ZETIA) 10 mg tablet Take 1 tablet (10 mg total) by mouth once daily. 90 tablet 1    levothyroxine (SYNTHROID) 88 MCG tablet Take 1 tablet (88 mcg total) by mouth before breakfast. 90 tablet 1    loratadine (CLARITIN) 10 mg tablet Take 10 mg by mouth daily as needed.       losartan (COZAAR) 50 MG tablet Take 1 tablet (50 mg total) by mouth once daily. 90 tablet 1    magnesium 30 mg Tab Take by mouth once.      multivit with minerals/lutein  (MULTIVITAMIN 50 PLUS ORAL) multivitamin   1 po daily      omeprazole (PRILOSEC) 40 MG capsule Take 1 capsule (40 mg total) by mouth every morning. 90 capsule 1    ondansetron (ZOFRAN) 4 MG tablet Take 1 tablet (4 mg total) by mouth every 8 (eight) hours as needed for Nausea. 30 tablet 0    potassium chloride SA (K-DUR,KLOR-CON M) 10 MEQ tablet Take 1 tablet (10 mEq total) by mouth once daily. 90 tablet 1    tirzepatide (MOUNJARO) 5 mg/0.5 mL PnIj Inject 5 mg into the skin every 7 days. 4 Pen 1     No current facility-administered medications for this visit.     Facility-Administered Medications Ordered in Other Visits   Medication Dose Route Frequency Provider Last Rate Last Admin    0.9%  NaCl infusion   Intravenous Continuous David Chisholm MD   Stopped at 12/30/19 1251    mupirocin 2 % ointment   Nasal On Call Procedure David Chisholm MD   Given at 04/15/19 9223     Review of patient's allergies indicates:   Allergen Reactions    Statins-hmg-coa reductase inhibitors Other (See Comments)    Morphine Hives and Itching    Avelox [moxifloxacin] Rash       Review of Systems        Objective:      There were no vitals filed for this visit.    Physical Exam  Cardiovascular:      Pulses:           Radial pulses are Normal on the right side and Normal on the left side.       Hand/Wrist Musculoskeletal Exam    Inspection    Right      Erythema: none      Ecchymosis: none      Edema: none      Deformity: none    Left      Erythema: none      Ecchymosis: none      Edema: none      Deformity: none    Palpation    Left      Triggering: ring      Ring tenderness to palpation: A1 pulley    Range of Motion      Right Wrist      Right wrist range of motion is normal.      Left Wrist      Left wrist range of motion is normal.      Range of motion additional comments: Able to make a composite fist.  Active triggering of the left ring finger.     Neurovascular    Right       Radial pulse: normal      Capillary  refill: brisk and <3 sec      Ulnar nerve sensory distribution: normal      Median nerve sensory distribution: normal      Superficial radial nerve sensory distribution: normal    Left       Radial pulse: normal      Capillary refill: brisk and <3 sec      Ulnar nerve sensory distribution: normal      Median nerve sensory distribution: normal      Superficial radial nerve sensory distribution: normal      Diagnostics Review: X-Ray: Reviewed  Personal interpretation of the XR reveals no signs of fractures or dislocations.  Basilar thumb arthritis noted.       Assessment:       1. Trigger ring finger of left hand          Plan:       We discussed with the patient at length all the different treatment options available including anti-inflammatories, acetaminophen, rest, ice, physical therapy to include strengthening, range of motion exercise, splinting, occasional cortisone injections for temporary relief, or possible surgical interventions.     Discussed the nature of left Ring trigger finger release surgery.  Reviewed postoperative care.  Inform patient no lifting pushing or pulling 2 weeks postoperatively.  Patient is to keep the dressing on for 2 weeks' post operatively.  If dressing gets dirty, wet, or irritated, patient will f/u in clinic for a dressing change. She will follow up 2 weeks postoperatively for suture removal.  We have discussed risks of hand surgery which include but are not limited to  blood clots in the legs that can travel to the lungs (pulmonary embolism). Pulmonary embolism can cause shortness of breath, chest pain, and even shock. Other risks include urinary tract infection, nausea and vomiting (usually related to pain medication), chronic pain, nerve damage, blood vessel injury, and infection of the hand which can require re-operation. Furthermore, the risks of anesthesia include potential heart, lung, kidney, and liver damage.  Patient  understands and is ready for the procedure.   Consents  obtained in clinic. Advised patient of no lifting, pushing or pulling with the operative arm. Recommend no driving post operatively until f/u appointment.                    Tor Hidalgo PA-C, ATC  Hand and Upper Extremity   [facility]      Please be aware that this note has been generated with the assistance of MModal voice-to-text.  Please excuse any spelling or grammatical errors.

## 2024-10-22 NOTE — PROGRESS NOTES
"Subjective:      Patient ID: Ailyn Ortiz is a 59 y.o. female.    Chief Complaint: Disease Management    Ailyn Ortiz is a 59 y.o. female who was previously a patient of Dr. Fulton who is seeking to establish care with a new Rheumatologist. She has a PMHx of seronegative RA, HTN, DM2, JESSICA, neuropathy, hypothyroidism, h/o bowel resection, and gastric sleeve. Patient was initially on MTX which caused hair loss and was switched to Humira. After being on Humira for approximately 1 yr summer insurance no longer covered the drug and she was switched to Enbrel. Ms. Ortiz states that she feels as though the Enbrel has been working well, but describes some morning stiffness and trigger finger (s/p 2 injections on L ring finger, DM2 possibly contributory). Patient states that she has been having trouble opening some jars and small items, but otherwise has no major complaints and would like to continue on Enbrel.    Review of Systems   Constitutional:  Negative for fever and unexpected weight change.   HENT:  Negative for mouth sores and trouble swallowing.    Eyes:  Negative for redness.   Respiratory:  Negative for cough and shortness of breath.    Cardiovascular:  Negative for chest pain.   Gastrointestinal:  Negative for constipation and diarrhea.   Genitourinary:  Negative for dysuria and genital sores.   Skin:  Negative for rash.   Neurological:  Positive for headaches.   Hematological:  Does not bruise/bleed easily.        Objective:   /83 (Patient Position: Sitting)   Pulse 89   Ht 5' 2" (1.575 m)   Wt 94.2 kg (207 lb 10.8 oz)   BMI 37.98 kg/m²     Physical Exam   Constitutional: She is oriented to person, place, and time. normal appearance. No distress. She does not appear ill.   HENT:   Head: Normocephalic and atraumatic.   Nose: Nose normal.   Eyes: No scleral icterus.   Musculoskeletal:      Right lower leg: No edema.      Left lower leg: No edema.   Neurological: She is alert and " oriented to person, place, and time.   Skin: Skin is warm and dry. She is not diaphoretic.   Psychiatric: Her behavior is normal. Mood normal.   Vitals reviewed.       10/22/2024   Tender (MARTE-28) 2 / 28    Swollen (MARTE-28) 0 / 28    Provider Global 10 / 100   Patient Global 40 / 100   ESR --   CRP --   MARTE-28 (ESR) --   MARTE-28 (CRP) --   CDAI Score 7            Assessment:     1. Seronegative rheumatoid arthritis    2. Increased infection risk status post immunosuppressive therapy    3. Class 2 severe obesity due to excess calories with serious comorbidity and body mass index (BMI) of 37.0 to 37.9 in adult          Plan:     Problem List Items Addressed This Visit          Active Problems    Class 2 obesity due to excess calories with body mass index (BMI) of 37.0 to 37.9 in adult (Chronic)     Discussed Mediterranean and anti-inflammatory diets          Seronegative rheumatoid arthritis - Primary     Doing well on Enbrel with trigger finger but no active synovitis on Enbrel monotherapy    Will continue this and plan follow up with lab 6 mo         Relevant Medications    etanercept (ENBREL SURECLICK) 50 mg/mL (1 mL)    Other Relevant Orders    C-Reactive Protein    Sedimentation rate    Increased infection risk status post immunosuppressive therapy     She was re-immunized for Hep B and 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

## 2024-10-22 NOTE — ASSESSMENT & PLAN NOTE
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

## 2024-10-29 DIAGNOSIS — M79.642 LEFT HAND PAIN: ICD-10-CM

## 2024-10-29 DIAGNOSIS — M65.342 TRIGGER RING FINGER OF LEFT HAND: Primary | ICD-10-CM

## 2024-11-07 ENCOUNTER — PATIENT MESSAGE (OUTPATIENT)
Dept: OTHER | Facility: OTHER | Age: 59
End: 2024-11-07
Payer: COMMERCIAL

## 2024-11-11 ENCOUNTER — ANESTHESIA EVENT (OUTPATIENT)
Dept: SURGERY | Facility: HOSPITAL | Age: 59
End: 2024-11-11
Payer: COMMERCIAL

## 2024-11-11 NOTE — ANESTHESIA POST-OP PAIN MANAGEMENT
Acute Pain Service Progress Note    Called patient regarding Tirzepatide injection. Patient takes weekly dose of Tirzepatide on Fridays. Reminded patient to hold this week's dose on 11/15 prior to surgery on 11/22.

## 2024-11-13 ENCOUNTER — OFFICE VISIT (OUTPATIENT)
Dept: INTERNAL MEDICINE | Facility: CLINIC | Age: 59
End: 2024-11-13
Payer: COMMERCIAL

## 2024-11-13 ENCOUNTER — LAB VISIT (OUTPATIENT)
Dept: LAB | Facility: HOSPITAL | Age: 59
End: 2024-11-13
Attending: INTERNAL MEDICINE
Payer: COMMERCIAL

## 2024-11-13 VITALS
HEIGHT: 62 IN | SYSTOLIC BLOOD PRESSURE: 116 MMHG | DIASTOLIC BLOOD PRESSURE: 78 MMHG | BODY MASS INDEX: 38.38 KG/M2 | HEART RATE: 90 BPM | OXYGEN SATURATION: 96 % | WEIGHT: 208.56 LBS | RESPIRATION RATE: 20 BRPM

## 2024-11-13 DIAGNOSIS — E66.812 CLASS 2 SEVERE OBESITY DUE TO EXCESS CALORIES WITH SERIOUS COMORBIDITY AND BODY MASS INDEX (BMI) OF 38.0 TO 38.9 IN ADULT: ICD-10-CM

## 2024-11-13 DIAGNOSIS — M06.00 SERONEGATIVE RHEUMATOID ARTHRITIS: ICD-10-CM

## 2024-11-13 DIAGNOSIS — E78.49 OTHER HYPERLIPIDEMIA: Primary | ICD-10-CM

## 2024-11-13 DIAGNOSIS — E11.9 TYPE 2 DIABETES MELLITUS WITHOUT COMPLICATION, WITHOUT LONG-TERM CURRENT USE OF INSULIN: ICD-10-CM

## 2024-11-13 DIAGNOSIS — E66.01 CLASS 2 SEVERE OBESITY DUE TO EXCESS CALORIES WITH SERIOUS COMORBIDITY AND BODY MASS INDEX (BMI) OF 38.0 TO 38.9 IN ADULT: ICD-10-CM

## 2024-11-13 DIAGNOSIS — Z90.3 S/P GASTRIC SLEEVE PROCEDURE: ICD-10-CM

## 2024-11-13 DIAGNOSIS — E11.65 TYPE 2 DIABETES MELLITUS WITH HYPERGLYCEMIA, WITHOUT LONG-TERM CURRENT USE OF INSULIN: ICD-10-CM

## 2024-11-13 LAB
CHOLEST SERPL-MCNC: 234 MG/DL (ref 120–199)
CHOLEST/HDLC SERPL: 5 {RATIO} (ref 2–5)
CRP SERPL-MCNC: 1.9 MG/L (ref 0–8.2)
ERYTHROCYTE [SEDIMENTATION RATE] IN BLOOD BY WESTERGREN METHOD: 26 MM/HR (ref 0–20)
ESTIMATED AVG GLUCOSE: 134 MG/DL (ref 68–131)
HBA1C MFR BLD: 6.3 % (ref 4–5.6)
HDLC SERPL-MCNC: 47 MG/DL (ref 40–75)
HDLC SERPL: 20.1 % (ref 20–50)
LDLC SERPL CALC-MCNC: 158 MG/DL (ref 63–159)
NONHDLC SERPL-MCNC: 187 MG/DL
TRIGL SERPL-MCNC: 145 MG/DL (ref 30–150)

## 2024-11-13 PROCEDURE — 3074F SYST BP LT 130 MM HG: CPT | Mod: CPTII,S$GLB,, | Performed by: INTERNAL MEDICINE

## 2024-11-13 PROCEDURE — 3061F NEG MICROALBUMINURIA REV: CPT | Mod: CPTII,S$GLB,, | Performed by: INTERNAL MEDICINE

## 2024-11-13 PROCEDURE — 86140 C-REACTIVE PROTEIN: CPT | Performed by: INTERNAL MEDICINE

## 2024-11-13 PROCEDURE — 99999 PR PBB SHADOW E&M-EST. PATIENT-LVL IV: CPT | Mod: PBBFAC,,, | Performed by: INTERNAL MEDICINE

## 2024-11-13 PROCEDURE — 36415 COLL VENOUS BLD VENIPUNCTURE: CPT | Performed by: INTERNAL MEDICINE

## 2024-11-13 PROCEDURE — 1160F RVW MEDS BY RX/DR IN RCRD: CPT | Mod: CPTII,S$GLB,, | Performed by: INTERNAL MEDICINE

## 2024-11-13 PROCEDURE — 85651 RBC SED RATE NONAUTOMATED: CPT | Performed by: INTERNAL MEDICINE

## 2024-11-13 PROCEDURE — G2211 COMPLEX E/M VISIT ADD ON: HCPCS | Mod: S$GLB,,, | Performed by: INTERNAL MEDICINE

## 2024-11-13 PROCEDURE — 3078F DIAST BP <80 MM HG: CPT | Mod: CPTII,S$GLB,, | Performed by: INTERNAL MEDICINE

## 2024-11-13 PROCEDURE — 1159F MED LIST DOCD IN RCRD: CPT | Mod: CPTII,S$GLB,, | Performed by: INTERNAL MEDICINE

## 2024-11-13 PROCEDURE — 80061 LIPID PANEL: CPT | Performed by: INTERNAL MEDICINE

## 2024-11-13 PROCEDURE — 3051F HG A1C>EQUAL 7.0%<8.0%: CPT | Mod: CPTII,S$GLB,, | Performed by: INTERNAL MEDICINE

## 2024-11-13 PROCEDURE — 4010F ACE/ARB THERAPY RXD/TAKEN: CPT | Mod: CPTII,S$GLB,, | Performed by: INTERNAL MEDICINE

## 2024-11-13 PROCEDURE — 2023F DILAT RTA XM W/O RTNOPTHY: CPT | Mod: CPTII,S$GLB,, | Performed by: INTERNAL MEDICINE

## 2024-11-13 PROCEDURE — 99214 OFFICE O/P EST MOD 30 MIN: CPT | Mod: S$GLB,,, | Performed by: INTERNAL MEDICINE

## 2024-11-13 PROCEDURE — 3066F NEPHROPATHY DOC TX: CPT | Mod: CPTII,S$GLB,, | Performed by: INTERNAL MEDICINE

## 2024-11-13 PROCEDURE — 83036 HEMOGLOBIN GLYCOSYLATED A1C: CPT | Performed by: INTERNAL MEDICINE

## 2024-11-13 PROCEDURE — 3008F BODY MASS INDEX DOCD: CPT | Mod: CPTII,S$GLB,, | Performed by: INTERNAL MEDICINE

## 2024-11-13 RX ORDER — ATORVASTATIN CALCIUM 40 MG/1
40 TABLET, FILM COATED ORAL DAILY
Qty: 90 TABLET | Refills: 3 | Status: SHIPPED | OUTPATIENT
Start: 2024-11-13 | End: 2025-11-13

## 2024-11-13 NOTE — PROGRESS NOTES
"Subjective:       Patient ID: Ailyn Ortiz is a 59 y.o. female.    Chief Complaint: Follow-up      HPI:    Patient is known to me and presents for follow up HTN, DM type 2, JESSICA, seronegative RA, neuropathy, hypothyroidism, h/o bowel resection and gastric sleeve.   Labs from 11/13/24 reviewed, interpreted and discussed  A1C pending  -->181, improving but not at goal on zetia only     HTN: on losartan 50mg. BP at goal today. Denies med side effects. Due for routine labs     DM type 2: She was on trulicity in the past. She was able to wean off after gastric sleeve and wasn't taking in much PO.   Last A1C was 7.3%, rising so we started Mounjaro.  Dose increased to 7.5mg--just took first dose this week. Home glucose 150- 180s.   Statin myalgias-leg cramps. Now on zetia. However ,she admit maybe her leg pain was from other issues and is agreeable to trying statin again  Eye exam: [facility]-will request records   Following with Poditary for recurrent nail fungus     Hypothyroidism: diagnosed based on labs about 30 years ago.   + fatigue but also noting hyperglycemia last labs  She only take medicine 5x a week on average. Unsure that it is helpful.  TSH was normal however.         RA: follows with rheum. Was on Humira. MTX caused hair loss in the past. Joint sx are currently well controlled. She had to switch to Enbrel due to formulary change but having more joint pains. ESR slightly elevated; CRP pendnig at time of visit.      Neuropathy on cymbalta 60mg. Working well. No med side effects.      She is seeing ortho for trigger finger.     S/p bowel resection for lipoma x2. She has intermittent pain and constipation sx. She is able to reduced her diet to liquid and sx resolve.       JESSICA: diagnosed prior to gastric sleeve. After surgery sx improved, no longer needing CPAP      She has recurrent itching of the right ear. She has seen ENT in the past and told "nothing wrong with " "it". No drainage. NO hearing changes.        Past Medical History:   Diagnosis Date    Arthritis     zero negative imflammatory arthritis    Asthma     Diabetes mellitus, type 2 since the age of 42    Hypertension since the age of 42    JESSICA (obstructive sleep apnea)     S/P UPP    Partial small bowel obstruction 3/6/2019    Thyroid disease     Ventral hernia without obstruction or gangrene        Family History   Problem Relation Name Age of Onset    Hypertension Father Ty     Diabetes Father Ty     Heart disease Maternal Grandfather Christophe         CAD    Heart disease Paternal Grandmother alzire         CAD    Diabetes Paternal Grandmother alzire     Depression Paternal Grandmother alzire     Heart disease Paternal Grandfather [place]         CAD       Social History     Socioeconomic History    Marital status:    Tobacco Use    Smoking status: Former     Current packs/day: 0.00     Types: Cigarettes     Quit date: 12/15/1993     Years since quittin.9    Smokeless tobacco: Never   Substance and Sexual Activity    Alcohol use: Yes     Alcohol/week: 1.0 standard drink of alcohol     Types: 1 Glasses of wine per week     Comment: occasional    Drug use: No    Sexual activity: Yes     Partners: Male     Birth control/protection: Post-menopausal     Social Drivers of Health     Financial Resource Strain: Low Risk  (2024)    Overall Financial Resource Strain (CARDIA)     Difficulty of Paying Living Expenses: Not hard at all   Food Insecurity: No Food Insecurity (2024)    Hunger Vital Sign     Worried About Running Out of Food in the Last Year: Never true     Ran Out of Food in the Last Year: Never true   Physical Activity: Insufficiently Active (2024)    Exercise Vital Sign     Days of Exercise per Week: 4 days     Minutes of Exercise per Session: 30 min   Stress: No Stress Concern Present (2024)    [place] [employer] - Occupational Stress Questionnaire     " Feeling of Stress : Only a little   Housing Stability: Unknown (7/16/2024)    Housing Stability Vital Sign     Unable to Pay for Housing in the Last Year: No       Review of Systems   Constitutional:  Negative for activity change, fatigue, fever and unexpected weight change.   HENT:  Negative for congestion, ear pain, hearing loss, rhinorrhea and sore throat.    Eyes:  Negative for redness and visual disturbance.   Respiratory:  Negative for cough, shortness of breath and wheezing.    Cardiovascular:  Negative for chest pain, palpitations and leg swelling.   Gastrointestinal:  Negative for abdominal pain, constipation, diarrhea, nausea and vomiting.   Genitourinary:  Negative for dysuria and frequency.   Musculoskeletal:  Negative for back pain, joint swelling and neck pain.   Skin:  Negative for color change, rash and wound.   Neurological:  Negative for dizziness, light-headedness and headaches.         Objective:      Physical Exam  Vitals reviewed.   Constitutional:       General: She is not in acute distress.     Appearance: She is well-developed. She is obese.   HENT:      Head: Normocephalic and atraumatic.      Right Ear: External ear normal.      Left Ear: External ear normal.      Nose: Nose normal.   Eyes:      General:         Right eye: No discharge.         Left eye: No discharge.   Neck:      Thyroid: No thyromegaly.   Cardiovascular:      Rate and Rhythm: Normal rate and regular rhythm.   Pulmonary:      Effort: Pulmonary effort is normal. No respiratory distress.      Breath sounds: No rales.   Skin:     General: Skin is warm and dry.   Neurological:      Mental Status: She is alert and oriented to person, place, and time.   Psychiatric:         Behavior: Behavior normal.         Thought Content: Thought content normal.         Assessment:       1. Other hyperlipidemia    2. Type 2 diabetes mellitus without complication, without long-term current use of insulin    3. Class 2 severe obesity due to  excess calories with serious comorbidity and body mass index (BMI) of 38.0 to 38.9 in adult    4. S/P gastric sleeve procedure    5. Seronegative rheumatoid arthritis        Plan:       1. Other hyperlipidemia  Chornic uncontrolled  Improving  Cont zetia  Agreeable to trying statin again. If myalgias develop again let me know  Diet, exercise  -     atorvastatin (LIPITOR) 40 MG tablet; Take 1 tablet (40 mg total) by mouth once daily.  Dispense: 90 tablet; Refill: 3  -     CBC Auto Differential; Future; Expected date: 05/12/2025  -     Comprehensive Metabolic Panel; Future; Expected date: 05/12/2025  -     TSH; Future; Expected date: 05/12/2025  -     Lipid Panel; Future; Expected date: 05/12/2025  -     Hemoglobin A1C; Future; Expected date: 05/12/2025    2. Type 2 diabetes mellitus without complication, without long-term current use of insulin  Chronic improving  Cont mounjaro  Cont digital med  Increase mounjaro pending response to current dose  ADA diet  Check sugars and keep log  -     CBC Auto Differential; Future; Expected date: 05/12/2025  -     Comprehensive Metabolic Panel; Future; Expected date: 05/12/2025  -     TSH; Future; Expected date: 05/12/2025  -     Lipid Panel; Future; Expected date: 05/12/2025  -     Hemoglobin A1C; Future; Expected date: 05/12/2025    3. Class 2 severe obesity due to excess calories with serious comorbidity and body mass index (BMI) of 38.0 to 38.9 in adult  Chronic stable  Diet, exercise  Cont GLP-1  -     CBC Auto Differential; Future; Expected date: 05/12/2025  -     Comprehensive Metabolic Panel; Future; Expected date: 05/12/2025  -     TSH; Future; Expected date: 05/12/2025  -     Lipid Panel; Future; Expected date: 05/12/2025  -     Hemoglobin A1C; Future; Expected date: 05/12/2025  -     Microalbumin/Creatinine Ratio, Urine; Future; Expected date: 05/12/2025    4. S/P gastric sleeve procedure  History of  Diet, exercise,  GLP-1  -     CBC Auto Differential; Future;  Expected date: 05/12/2025  -     Comprehensive Metabolic Panel; Future; Expected date: 05/12/2025  -     Hemoglobin A1C; Future; Expected date: 05/12/2025    5. Seronegative rheumatoid arthritis  Chronic stable  Med management per rheum    Orders:  -     Comprehensive Metabolic Panel; Future; Expected date: 05/12/2025        RTC 6 months with labs and PRN

## 2024-11-17 ENCOUNTER — PATIENT MESSAGE (OUTPATIENT)
Dept: ADMINISTRATIVE | Facility: OTHER | Age: 59
End: 2024-11-17
Payer: COMMERCIAL

## 2024-11-20 RX ORDER — TRAMADOL HYDROCHLORIDE 50 MG/1
50 TABLET ORAL EVERY 6 HOURS PRN
Qty: 10 TABLET | Refills: 0 | Status: SHIPPED | OUTPATIENT
Start: 2024-11-20

## 2024-11-20 RX ORDER — ONDANSETRON 4 MG/1
4 TABLET, ORALLY DISINTEGRATING ORAL EVERY 6 HOURS PRN
Qty: 14 TABLET | Refills: 0 | Status: SHIPPED | OUTPATIENT
Start: 2024-11-20

## 2024-11-21 ENCOUNTER — TELEPHONE (OUTPATIENT)
Dept: ORTHOPEDICS | Facility: CLINIC | Age: 59
End: 2024-11-21
Payer: COMMERCIAL

## 2024-11-21 NOTE — H&P
Subjective:       Patient ID: Ailyn Ortiz is a 59 y.o. female.    Chief Complaint:  Left ring trigger finger    11/22/2024:  Patient is here today for left ring finger trigger finger release.  No interval change in symptoms.    Interval HPI  10/22/24  Patient reports for follow up of Left ring trigger finger.  She has had 2 injections with minimal relief.  She is interested in surgical intervention.  She notes continued tenderness over the A1 pulley of the left ring finger with active triggering.    Interval HPI  8/14/24  Patient reports 6 weeks status post left ring finger trigger finger injection.  She notes that she got some mild relief from the previous injection although she still notes tenderness and triggering of the left ring finger.  She denies any numbness or tingling.    HPI  7/3/24  58-year-old right-hand dominant female with previous medical history of rheumatoid arthritis presents to clinic today with left ring finger triggering.  She has previous history of carpal tunnel release on the right side and trigger thumb on the left that was done by Dr. Rankin.  She notes that all of her tenderness is localized at the A1 pulley of the left ring finger.  She denies any numbness or tingling.  She also notes that she is some tenderness over her basilar thumb.  She states that paraffin wax as well as Voltaren do help relieve her symptoms.  She denies any history of trauma.    Past Medical History:   Diagnosis Date    Arthritis     zero negative imflammatory arthritis    Asthma     Diabetes mellitus, type 2 since the age of 42    Hypertension since the age of 42    JESSICA (obstructive sleep apnea)     S/P UPP    Partial small bowel obstruction 3/6/2019    Thyroid disease     Ventral hernia without obstruction or gangrene      Past Surgical History:   Procedure Laterality Date    ADENOIDECTOMY      CARPAL TUNNEL RELEASE Right 12/26/2018    Procedure: RELEASE, CARPAL TUNNEL right;  Surgeon: Anai Choudhary,  MD;  Location: [facility] OR 1ST FLR;  Service: Orthopedics;  Laterality: Right;  stretcher, supine, hand pan 1 and pan 2     SECTION      times 2    CHOLECYSTECTOMY      DIAGNOSTIC LAPAROSCOPY N/A 2019    Procedure: LAPAROSCOPY, DIAGNOSTIC;  Surgeon: Stone Mei MD;  Location: [facility] OR 2ND FLR;  Service: General;  Laterality: N/A;    ESOPHAGOGASTRODUODENOSCOPY N/A 2019    Procedure: EGD (ESOPHAGOGASTRODUODENOSCOPY);  Surgeon: Stone Mei MD;  Location: [facility] OR 2ND FLR;  Service: General;  Laterality: N/A;    GANGLION CYST EXCISION      HERNIA REPAIR      LAPAROSCOPIC RESECTION OF SMALL INTESTINE  2019    Procedure: EXCISION, SMALL INTESTINE, LAPAROSCOPIC;  Surgeon: Stone Mei MD;  Location: [facility] OR 2ND FLR;  Service: General;;    LAPAROSCOPIC SLEEVE GASTRECTOMY N/A 2019    Procedure: GASTRECTOMY, SLEEVE, LAPAROSCOPIC wiht intra op EGD;  Surgeon: Stone Mei MD;  Location: [facility] OR 2ND FLR;  Service: General;  Laterality: N/A;    LYSIS OF ADHESIONS N/A 2019    Procedure: LYSIS, ADHESIONS;  Surgeon: Stone Mei MD;  Location: [facility] OR 2ND FLR;  Service: General;  Laterality: N/A;    small bowel resection (lipoma)      SMALL INTESTINE SURGERY  ,     small bowel resection x2    TONSILLECTOMY      TRIGGER FINGER RELEASE Left 04/15/2019    Procedure: RELEASE, TRIGGER FINGER left thumb;  Surgeon: Anai Choudhary MD;  Location: [facility];  Service: Orthopedics;  Laterality: Left;  stretcher, supine, hand pan 1 and 2    TUBAL LIGATION      UVULOPALATOPHARYNGOPLASTY       Family History   Problem Relation Name Age of Onset    Hypertension Father Ty     Diabetes Father Ty     Heart disease Maternal Grandfather Christophe         CAD    Heart disease Paternal Grandmother alzire         CAD    Diabetes Paternal Grandmother alzire     Depression Paternal Grandmother alzire     Heart disease Paternal Grandfather Clark          CAD     Social History     Socioeconomic History    Marital status:    Tobacco Use    Smoking status: Former     Current packs/day: 0.00     Types: Cigarettes     Quit date: 12/15/1993     Years since quittin.9    Smokeless tobacco: Never   Substance and Sexual Activity    Alcohol use: Yes     Alcohol/week: 1.0 standard drink of alcohol     Types: 1 Glasses of wine per week     Comment: occasional    Drug use: No    Sexual activity: Yes     Partners: Male     Birth control/protection: Post-menopausal     Social Drivers of Health     Financial Resource Strain: Low Risk  (2024)    Overall Financial Resource Strain (CARDIA)     Difficulty of Paying Living Expenses: Not hard at all   Food Insecurity: No Food Insecurity (2024)    Hunger Vital Sign     Worried About Running Out of Food in the Last Year: Never true     Ran Out of Food in the Last Year: Never true   Physical Activity: Insufficiently Active (2024)    Exercise Vital Sign     Days of Exercise per Week: 4 days     Minutes of Exercise per Session: 30 min   Stress: No Stress Concern Present (2024)    [place] [place] [employer] - Occupational Stress Questionnaire     Feeling of Stress : Only a little   Housing Stability: Unknown (2024)    Housing Stability Vital Sign     Unable to Pay for Housing in the Last Year: No       No current facility-administered medications for this encounter.     Current Outpatient Medications   Medication Sig Dispense Refill    ammonium lactate (LAC-HYDRIN) 12 % lotion Apply topically once daily. 400 g 2    atorvastatin (LIPITOR) 40 MG tablet Take 1 tablet (40 mg total) by mouth once daily. 90 tablet 3    b complex vitamins tablet Take 1 tablet by mouth once daily.      DULoxetine (CYMBALTA) 60 MG capsule Take 1 capsule (60 mg total) by mouth once daily. 90 capsule 1    ergocalciferol (ERGOCALCIFEROL) 50,000 unit Cap Take 1 capsule (50,000 Units total) by mouth once a week. 12  capsule 4    etanercept (ENBREL SURECLICK) 50 mg/mL (1 mL) Inject 1 mL (50 mg total) into the skin once a week. 4 mL 5    ezetimibe (ZETIA) 10 mg tablet Take 1 tablet (10 mg total) by mouth once daily. 90 tablet 1    levothyroxine (SYNTHROID) 88 MCG tablet Take 1 tablet (88 mcg total) by mouth before breakfast. 90 tablet 1    loratadine (CLARITIN) 10 mg tablet Take 10 mg by mouth daily as needed.       losartan (COZAAR) 50 MG tablet Take 1 tablet (50 mg total) by mouth once daily. 90 tablet 1    multivit with minerals/lutein (MULTIVITAMIN 50 PLUS ORAL) multivitamin   1 po daily      omeprazole (PRILOSEC) 40 MG capsule Take 1 capsule (40 mg total) by mouth every morning. 90 capsule 1    ondansetron (ZOFRAN) 4 MG tablet Take 1 tablet (4 mg total) by mouth every 8 (eight) hours as needed for Nausea. 30 tablet 0    ondansetron (ZOFRAN-ODT) 4 MG TbDL Dissolve 1 tablet (4 mg total) by mouth every 6 (six) hours as needed (For Nausea). Bedside Delivery 14 tablet 0    potassium chloride SA (K-DUR,KLOR-CON M) 10 MEQ tablet Take 1 tablet (10 mEq total) by mouth once daily. 90 tablet 1    tirzepatide 7.5 mg/0.5 mL PnIj Inject 7.5 mg into the skin every 7 days. 4 Pen 1    traMADoL (ULTRAM) 50 mg tablet Take 1 tablet (50 mg total) by mouth every 6 (six) hours as needed for Pain. 10 tablet 0     Facility-Administered Medications Ordered in Other Encounters   Medication Dose Route Frequency Provider Last Rate Last Admin    0.9%  NaCl infusion   Intravenous Continuous David Chisholm MD   Stopped at 12/30/19 1251    mupirocin 2 % ointment   Nasal On Call Procedure David Chisholm MD   Given at 04/15/19 0964     Review of patient's allergies indicates:   Allergen Reactions    Statins-hmg-coa reductase inhibitors Other (See Comments)    Morphine Hives and Itching    Avelox [moxifloxacin] Rash       Review of Systems        Objective:      There were no vitals filed for this visit.    Physical Exam  Cardiovascular:       Pulses:           Radial pulses are Normal on the right side and Normal on the left side.       Hand/Wrist Musculoskeletal Exam    Inspection    Right      Erythema: none      Ecchymosis: none      Edema: none      Deformity: none    Left      Erythema: none      Ecchymosis: none      Edema: none      Deformity: none    Palpation    Left      Triggering: ring      Ring tenderness to palpation: A1 pulley    Range of Motion      Right Wrist      Right wrist range of motion is normal.      Left Wrist      Left wrist range of motion is normal.      Range of motion additional comments: Able to make a composite fist.  Active triggering of the left ring finger.     Neurovascular    Right       Radial pulse: normal      Capillary refill: brisk and <3 sec      Ulnar nerve sensory distribution: normal      Median nerve sensory distribution: normal      Superficial radial nerve sensory distribution: normal    Left       Radial pulse: normal      Capillary refill: brisk and <3 sec      Ulnar nerve sensory distribution: normal      Median nerve sensory distribution: normal      Superficial radial nerve sensory distribution: normal      Diagnostics Review: X-Ray: Reviewed  Personal interpretation of the XR reveals no signs of fractures or dislocations.  Basilar thumb arthritis noted.       Assessment:       Left ring finger trigger finger      Plan:       To OR for left ring finger trigger finger release.

## 2024-11-21 NOTE — TELEPHONE ENCOUNTER
Spoke c pt. Informed pt of a  7:00 a.m. arrival time for surgery at the [facility]. Confirmed no scratches or open wounds on their hands. Reminded pt of NPO status & PO appt. Pt expressed understanding & was thankful.

## 2024-11-22 ENCOUNTER — HOSPITAL ENCOUNTER (OUTPATIENT)
Facility: HOSPITAL | Age: 59
Discharge: HOME | End: 2024-11-22
Attending: ORTHOPAEDIC SURGERY | Admitting: ORTHOPAEDIC SURGERY
Payer: COMMERCIAL

## 2024-11-22 ENCOUNTER — ANESTHESIA (OUTPATIENT)
Dept: SURGERY | Facility: HOSPITAL | Age: 59
End: 2024-11-22
Payer: COMMERCIAL

## 2024-11-22 VITALS
OXYGEN SATURATION: 95 % | DIASTOLIC BLOOD PRESSURE: 68 MMHG | BODY MASS INDEX: 38.28 KG/M2 | TEMPERATURE: 98 F | WEIGHT: 208 LBS | HEIGHT: 62 IN | SYSTOLIC BLOOD PRESSURE: 140 MMHG | RESPIRATION RATE: 16 BRPM | HEART RATE: 78 BPM

## 2024-11-22 DIAGNOSIS — M65.312 TRIGGER FINGER OF LEFT THUMB: ICD-10-CM

## 2024-11-22 DIAGNOSIS — M65.342 ACQUIRED TRIGGER FINGER OF LEFT RING FINGER: Primary | ICD-10-CM

## 2024-11-22 LAB
POCT GLUCOSE: 102 MG/DL (ref 70–110)
POCT GLUCOSE: 96 MG/DL (ref 70–110)

## 2024-11-22 PROCEDURE — 25000003 PHARM REV CODE 250: Performed by: NURSE ANESTHETIST, CERTIFIED REGISTERED

## 2024-11-22 PROCEDURE — 25000003 PHARM REV CODE 250: Performed by: ORTHOPAEDIC SURGERY

## 2024-11-22 PROCEDURE — 36000707: Performed by: ORTHOPAEDIC SURGERY

## 2024-11-22 PROCEDURE — 71000033 HC RECOVERY, INTIAL HOUR: Performed by: ORTHOPAEDIC SURGERY

## 2024-11-22 PROCEDURE — 26055 INCISE FINGER TENDON SHEATH: CPT | Mod: F3,,, | Performed by: ORTHOPAEDIC SURGERY

## 2024-11-22 PROCEDURE — 94761 N-INVAS EAR/PLS OXIMETRY MLT: CPT

## 2024-11-22 PROCEDURE — 82962 GLUCOSE BLOOD TEST: CPT | Performed by: ORTHOPAEDIC SURGERY

## 2024-11-22 PROCEDURE — 25000003 PHARM REV CODE 250: Performed by: STUDENT IN AN ORGANIZED HEALTH CARE EDUCATION/TRAINING PROGRAM

## 2024-11-22 PROCEDURE — 71000015 HC POSTOP RECOV 1ST HR: Performed by: ORTHOPAEDIC SURGERY

## 2024-11-22 PROCEDURE — 37000008 HC ANESTHESIA 1ST 15 MINUTES: Performed by: ORTHOPAEDIC SURGERY

## 2024-11-22 PROCEDURE — 63600175 PHARM REV CODE 636 W HCPCS: Performed by: ORTHOPAEDIC SURGERY

## 2024-11-22 PROCEDURE — 25000003 PHARM REV CODE 250: Performed by: PHYSICIAN ASSISTANT

## 2024-11-22 PROCEDURE — 36000706: Performed by: ORTHOPAEDIC SURGERY

## 2024-11-22 PROCEDURE — 63600175 PHARM REV CODE 636 W HCPCS: Performed by: NURSE ANESTHETIST, CERTIFIED REGISTERED

## 2024-11-22 PROCEDURE — 99900035 HC TECH TIME PER 15 MIN (STAT)

## 2024-11-22 PROCEDURE — 37000009 HC ANESTHESIA EA ADD 15 MINS: Performed by: ORTHOPAEDIC SURGERY

## 2024-11-22 RX ORDER — PROPOFOL 10 MG/ML
VIAL (ML) INTRAVENOUS
Status: DISCONTINUED | OUTPATIENT
Start: 2024-11-22 | End: 2024-11-22

## 2024-11-22 RX ORDER — SODIUM CHLORIDE 0.9 % (FLUSH) 0.9 %
10 SYRINGE (ML) INJECTION
Status: DISCONTINUED | OUTPATIENT
Start: 2024-11-22 | End: 2024-11-22 | Stop reason: HOSPADM

## 2024-11-22 RX ORDER — CEFAZOLIN SODIUM 1 G/3ML
INJECTION, POWDER, FOR SOLUTION INTRAMUSCULAR; INTRAVENOUS
Status: DISCONTINUED | OUTPATIENT
Start: 2024-11-22 | End: 2024-11-22

## 2024-11-22 RX ORDER — PROPOFOL 10 MG/ML
VIAL (ML) INTRAVENOUS CONTINUOUS PRN
Status: DISCONTINUED | OUTPATIENT
Start: 2024-11-22 | End: 2024-11-22

## 2024-11-22 RX ORDER — FENTANYL CITRATE 50 UG/ML
INJECTION, SOLUTION INTRAMUSCULAR; INTRAVENOUS
Status: DISCONTINUED | OUTPATIENT
Start: 2024-11-22 | End: 2024-11-22

## 2024-11-22 RX ORDER — BUPIVACAINE HYDROCHLORIDE 2.5 MG/ML
INJECTION, SOLUTION EPIDURAL; INFILTRATION; INTRACAUDAL
Status: DISCONTINUED | OUTPATIENT
Start: 2024-11-22 | End: 2024-11-22 | Stop reason: HOSPADM

## 2024-11-22 RX ORDER — GLUCAGON 1 MG
1 KIT INJECTION
Status: DISCONTINUED | OUTPATIENT
Start: 2024-11-22 | End: 2024-11-22 | Stop reason: HOSPADM

## 2024-11-22 RX ORDER — ACETAMINOPHEN 500 MG
1000 TABLET ORAL
Status: COMPLETED | OUTPATIENT
Start: 2024-11-22 | End: 2024-11-22

## 2024-11-22 RX ORDER — FAMOTIDINE 10 MG/ML
INJECTION INTRAVENOUS
Status: DISCONTINUED | OUTPATIENT
Start: 2024-11-22 | End: 2024-11-22

## 2024-11-22 RX ORDER — MUPIROCIN 20 MG/G
OINTMENT TOPICAL
Status: DISCONTINUED | OUTPATIENT
Start: 2024-11-22 | End: 2024-11-22 | Stop reason: HOSPADM

## 2024-11-22 RX ORDER — BACITRACIN ZINC 500 UNIT/G
OINTMENT (GRAM) TOPICAL
Status: DISCONTINUED | OUTPATIENT
Start: 2024-11-22 | End: 2024-11-22 | Stop reason: HOSPADM

## 2024-11-22 RX ORDER — CEFAZOLIN 2 G/1
2 INJECTION, POWDER, FOR SOLUTION INTRAMUSCULAR; INTRAVENOUS
Status: DISCONTINUED | OUTPATIENT
Start: 2024-11-22 | End: 2024-11-22 | Stop reason: HOSPADM

## 2024-11-22 RX ORDER — MIDAZOLAM HYDROCHLORIDE 1 MG/ML
INJECTION, SOLUTION INTRAMUSCULAR; INTRAVENOUS
Status: DISCONTINUED | OUTPATIENT
Start: 2024-11-22 | End: 2024-11-22

## 2024-11-22 RX ORDER — LIDOCAINE HYDROCHLORIDE 20 MG/ML
INJECTION INTRAVENOUS
Status: DISCONTINUED | OUTPATIENT
Start: 2024-11-22 | End: 2024-11-22

## 2024-11-22 RX ORDER — ONDANSETRON HYDROCHLORIDE 2 MG/ML
INJECTION, SOLUTION INTRAVENOUS
Status: DISCONTINUED | OUTPATIENT
Start: 2024-11-22 | End: 2024-11-22

## 2024-11-22 RX ORDER — LIDOCAINE HYDROCHLORIDE 10 MG/ML
INJECTION, SOLUTION EPIDURAL; INFILTRATION; INTRACAUDAL; PERINEURAL
Status: DISCONTINUED | OUTPATIENT
Start: 2024-11-22 | End: 2024-11-22 | Stop reason: HOSPADM

## 2024-11-22 RX ADMIN — ACETAMINOPHEN 1000 MG: 500 TABLET ORAL at 07:11

## 2024-11-22 RX ADMIN — ONDANSETRON 4 MG: 2 INJECTION INTRAMUSCULAR; INTRAVENOUS at 08:11

## 2024-11-22 RX ADMIN — CEFAZOLIN 2 G: 330 INJECTION, POWDER, FOR SOLUTION INTRAMUSCULAR; INTRAVENOUS at 08:11

## 2024-11-22 RX ADMIN — MIDAZOLAM HYDROCHLORIDE 2 MG: 2 INJECTION, SOLUTION INTRAMUSCULAR; INTRAVENOUS at 08:11

## 2024-11-22 RX ADMIN — PROPOFOL 50 MG: 10 INJECTION, EMULSION INTRAVENOUS at 08:11

## 2024-11-22 RX ADMIN — FAMOTIDINE 20 MG: 10 INJECTION, SOLUTION INTRAVENOUS at 08:11

## 2024-11-22 RX ADMIN — PROPOFOL 75 MCG/KG/MIN: 10 INJECTION, EMULSION INTRAVENOUS at 08:11

## 2024-11-22 RX ADMIN — FENTANYL CITRATE 50 MCG: 50 INJECTION, SOLUTION INTRAMUSCULAR; INTRAVENOUS at 08:11

## 2024-11-22 RX ADMIN — MUPIROCIN: 20 OINTMENT TOPICAL at 07:11

## 2024-11-22 RX ADMIN — LIDOCAINE HYDROCHLORIDE 50 MG: 20 INJECTION INTRAVENOUS at 08:11

## 2024-11-22 NOTE — TRANSFER OF CARE
"Anesthesia Transfer of Care Note    Patient: Ailyn Ortiz    Procedure(s) Performed: Procedure(s) (LRB):  LEFT RING FINGER RELEASE, TRIGGER FINGER (Left)    Patient location: PACU    Anesthesia Type: general    Transport from OR: Transported from OR on 100% O2 by closed face mask with adequate spontaneous ventilation    Post pain: adequate analgesia    Post assessment: no apparent anesthetic complications and tolerated procedure well    Post vital signs: stable    Level of consciousness: awake and alert    Nausea/Vomiting: no nausea/vomiting    Complications: none    Transfer of care protocol was followed    Last vitals: Visit Vitals  /70 (BP Location: Right arm, Patient Position: Lying)   Pulse 87   Temp 36.6 °C (97.9 °F) (Oral)   Resp 16   Ht 5' 2" (1.575 m)   Wt 94.3 kg (208 lb)   SpO2 98%   Breastfeeding No   BMI 38.04 kg/m²     "

## 2024-11-22 NOTE — PLAN OF CARE
Pre op complete. Waiting on anesth consent. Pt's  at bedside; pt's belongings will be in locker 10 during procedure. Pt resting comfortably with all questions addressed at this time and call light in reach.

## 2024-11-22 NOTE — PROGRESS NOTES
Discharge Summary:      Mini Howe    Admit Date:   10/18/2020  Discharge Date:  10/21/2020     Admitting diagnosis:  Pregnancy  Labor and delivery indication for care or intervention  Discharge Diagnosis: Status post vaginal, spontaneous.  Pregnancy Complications: preeclampsia  Tubal Ligation:  no        History:  No past medical history on file.  OB History    Para Term  AB Living   1 1 0 1 0 1   SAB TAB Ectopic Molar Multiple Live Births   0 0 0 0 0 1      # Outcome Date GA Lbr Misha/2nd Weight Sex Delivery Anes PTL Lv   1  10/19/20 36w5d / 01:19 3.245 kg (7 lb 2.5 oz) M Vag-Spont EPI N ASHA        Gluten meal  Patient Active Problem List    Diagnosis Date Noted   • Celiac disease 2019   • Rosacea 2019   • Acute cystitis without hematuria 2019        Hospital Course:   34 y.o. , now para 1, was admitted with the above mentioned diagnosis, underwent Induction of Labor, vaginal, spontaneous. Patient postpartum course was unremarkable, with progressive advancement in diet , ambulation and toleration of oral analgesia. Patient without complaints today and desires discharge.      Vitals:    10/19/20 1100 10/19/20 1800 10/20/20 0600 10/21/20 0551   BP: 110/69 100/65 110/75 100/62   Pulse: 86 79 63 74   Resp: 18 18 18 18   Temp: 36.6 °C (97.9 °F) 37.2 °C (99 °F) 36.4 °C (97.5 °F) 36.7 °C (98 °F)   TempSrc: Temporal Temporal Temporal Temporal   SpO2: 95% 97% 100% 96%   Weight:       Height:           Current Facility-Administered Medications   Medication Dose   • tetanus-dipth-acell pertussis (Tdap) inj 0.5 mL  0.5 mL   • measles, mumps and rubella vaccine (MMR) injection 0.5 mL  0.5 mL   • ondansetron (ZOFRAN ODT) dispertab 4 mg  4 mg    Or   • ondansetron (ZOFRAN) syringe/vial injection 4 mg  4 mg   • oxytocin (PITOCIN) infusion (for postpartum)   mL/hr   • ibuprofen (MOTRIN) tablet 600 mg  600 mg   • oxyCODONE-acetaminophen (PERCOCET) 5-325 MG per tablet 1  Glucose 102   Tab  1 Tab   • oxyCODONE-acetaminophen (PERCOCET-10)  MG per tablet 1 Tab  1 Tab   • LR infusion     • PRN oxytocin (PITOCIN) (20 Units/1000 mL) PRN for excessive uterine bleeding - See Admin Instr  125-999 mL/hr   • miSOPROStol (CYTOTEC) tablet 800 mcg  800 mcg   • docusate sodium (COLACE) capsule 100 mg  100 mg   • prenatal plus vitamin (STUARTNATAL 1+1) 27-1 MG tablet 1 Tab  1 Tab       Exam:  Breast Exam: negative  Abdomen: Abdomen soft, non-tender. BS normal. No masses,  No organomegaly  Fundus Non Tender: yes  Incision: none  Perineum: perineum intact  Extremity: extremities, peripheral pulses and reflexes normal     Labs:  Recent Labs     10/18/20  1030 10/19/20  1115   WBC 8.1 14.3*   RBC 4.15* 4.07*   HEMOGLOBIN 12.1 12.0   HEMATOCRIT 35.8* 34.9*   MCV 86.3 85.7   MCH 29.2 29.5   MCHC 33.8 34.4   RDW 38.8 38.9   PLATELETCT 269 267   MPV 10.1 10.2        Activity:   Discharge to home  Pelvic Rest x 6 weeks    Assessment:  normal postpartum course  Discharge Assessment: No areas of skin breakdown/redness; surgical incision intact/healing     Follow up: 1 week Danilo BP check      Discharge Meds:   Current Outpatient Medications   Medication Sig Dispense Refill   • ibuprofen (MOTRIN) 600 MG Tab Take 1 Tab by mouth every 6 hours as needed (For cramping after delivery; do not give if patient is receiving ketorolac (Toradol)). 30 Tab 1       Candis Palomares M.D.

## 2024-11-22 NOTE — ANESTHESIA PREPROCEDURE EVALUATION
11/22/2024  Ailyn Ortiz is a 59 y.o., female with DM off GLP1 for a week, sp gastric sleeve, here for below    Pre-operative evaluation for Procedure(s) (LRB):  LEFT RING FINGER RELEASE, TRIGGER FINGER (Left)    Ailyn Ortiz is a 59 y.o. female     Patient Active Problem List   Diagnosis    Type 2 diabetes mellitus without complication, without long-term current use of insulin    Essential hypertension    Class 2 obesity due to excess calories with body mass index (BMI) of 38.0 to 38.9 in adult    Sleep apnea    Family history of psoriatic arthritis    Need for vaccination against hepatitis B virus    Need for vaccination for DTaP    History of carpal tunnel release    Small bowel mass    Incisional hernia, without obstruction or gangrene    Dehydration symptoms    Hypopotassemia    Seronegative rheumatoid arthritis    S/P gastric sleeve procedure    Trigger thumb of right hand    Postmenopausal state    Increased infection risk status post immunosuppressive therapy    Trigger ring finger of left hand       Review of patient's allergies indicates:   Allergen Reactions    Statins-hmg-coa reductase inhibitors Other (See Comments)    Morphine Hives and Itching    Avelox [moxifloxacin] Rash       Current Facility-Administered Medications on File Prior to Encounter   Medication Dose Route Frequency Provider Last Rate Last Admin    0.9%  NaCl infusion   Intravenous Continuous David Chisholm MD   Stopped at 12/30/19 1251    mupirocin 2 % ointment   Nasal On Call Procedure David Chisholm MD   Given at 04/15/19 1228     Current Outpatient Medications on File Prior to Encounter   Medication Sig Dispense Refill    b complex vitamins tablet Take 1 tablet by mouth once daily.      DULoxetine (CYMBALTA) 60 MG capsule Take 1 capsule (60 mg total) by mouth once daily. 90 capsule 1     ergocalciferol (ERGOCALCIFEROL) 50,000 unit Cap Take 1 capsule (50,000 Units total) by mouth once a week. 12 capsule 4    ezetimibe (ZETIA) 10 mg tablet Take 1 tablet (10 mg total) by mouth once daily. 90 tablet 1    levothyroxine (SYNTHROID) 88 MCG tablet Take 1 tablet (88 mcg total) by mouth before breakfast. 90 tablet 1    loratadine (CLARITIN) 10 mg tablet Take 10 mg by mouth daily as needed.       losartan (COZAAR) 50 MG tablet Take 1 tablet (50 mg total) by mouth once daily. 90 tablet 1    multivit with minerals/lutein (MULTIVITAMIN 50 PLUS ORAL) multivitamin   1 po daily      omeprazole (PRILOSEC) 40 MG capsule Take 1 capsule (40 mg total) by mouth every morning. 90 capsule 1    potassium chloride SA (K-DUR,KLOR-CON M) 10 MEQ tablet Take 1 tablet (10 mEq total) by mouth once daily. 90 tablet 1    ammonium lactate (LAC-HYDRIN) 12 % lotion Apply topically once daily. 400 g 2    etanercept (ENBREL SURECLICK) 50 mg/mL (1 mL) Inject 1 mL (50 mg total) into the skin once a week. 4 mL 5    ondansetron (ZOFRAN) 4 MG tablet Take 1 tablet (4 mg total) by mouth every 8 (eight) hours as needed for Nausea. 30 tablet 0    tirzepatide 7.5 mg/0.5 mL PnIj Inject 7.5 mg into the skin every 7 days. 4 Pen 1    [DISCONTINUED] certolizumab pegol (CIMZIA) 400 mg/2 mL (200 mg/mL x 2) SyKt Inject 1 mL (200 mg total) into the skin every 14 (fourteen) days. 2 each 11       Past Surgical History:   Procedure Laterality Date    ADENOIDECTOMY      CARPAL TUNNEL RELEASE Right 2018    Procedure: RELEASE, CARPAL TUNNEL right;  Surgeon: Anai Choudhary MD;  Location: [facility] OR [address] [place] [address];  Service: Orthopedics;  Laterality: Right;  stretcher, supine, hand pan 1 and pan 2     SECTION      times 2    CHOLECYSTECTOMY      DIAGNOSTIC LAPAROSCOPY N/A 2019    Procedure: LAPAROSCOPY, DIAGNOSTIC;  Surgeon: Stone Mei MD;  Location: [facility] OR 2ND [facility];  Service: General;  Laterality: N/A;    ESOPHAGOGASTRODUODENOSCOPY N/A  2019    Procedure: EGD (ESOPHAGOGASTRODUODENOSCOPY);  Surgeon: Stone Mei MD;  Location: [facility] OR [address] [place] [address];  Service: General;  Laterality: N/A;    GANGLION CYST EXCISION      HERNIA REPAIR      LAPAROSCOPIC RESECTION OF SMALL INTESTINE  2019    Procedure: EXCISION, SMALL INTESTINE, LAPAROSCOPIC;  Surgeon: Stone Mei MD;  Location: [address];  Service: General;;    LAPAROSCOPIC SLEEVE GASTRECTOMY N/A 2019    Procedure: GASTRECTOMY, SLEEVE, LAPAROSCOPIC wiht intra op EGD;  Surgeon: Stone Mei MD;  [facility];  Service: General;  Laterality: N/A;    LYSIS OF ADHESIONS N/A 2019    Procedure: LYSIS, ADHESIONS;  Surgeon: Stone Mei MD;  Location: [facility] OR [address] [place] [address];  Service: General;  Laterality: N/A;    small bowel resection (lipoma)      SMALL INTESTINE SURGERY  ,     small bowel resection x2    TONSILLECTOMY      TRIGGER FINGER RELEASE Left 04/15/2019    Procedure: RELEASE, TRIGGER FINGER left thumb;  Surgeon: Anai Choudhary MD;  Location: [facility];  Service: Orthopedics;  Laterality: Left;  stretcher, supine, hand pan 1 and 2    TUBAL LIGATION      UVULOPALATOPHARYNGOPLASTY         Social History     Socioeconomic History    Marital status:    Tobacco Use    Smoking status: Former     Current packs/day: 0.00     Types: Cigarettes     Quit date: 12/15/1993     Years since quittin.9    Smokeless tobacco: Never   Substance and Sexual Activity    Alcohol use: Yes     Alcohol/week: 1.0 standard drink of alcohol     Types: 1 Glasses of wine per week     Comment: occasional    Drug use: No    Sexual activity: Yes     Partners: Male     Birth control/protection: Post-menopausal     Social Drivers of Health     Financial Resource Strain: Low Risk  (2024)    Overall Financial Resource Strain (CARDIA)     Difficulty of Paying Living Expenses: Not hard at all   Food Insecurity: No Food Insecurity  (7/16/2024)    Hunger Vital Sign     Worried About Running Out of Food in the Last Year: Never true     Ran Out of Food in the Last Year: Never true   Physical Activity: Insufficiently Active (7/16/2024)    Exercise Vital Sign     Days of Exercise per Week: 4 days     Minutes of Exercise per Session: 30 min   Stress: No Stress Concern Present (7/16/2024)    [place] [place] [employer] - Occupational Stress Questionnaire     Feeling of Stress : Only a little   Housing Stability: Unknown (7/16/2024)    Housing Stability Vital Sign     Unable to Pay for Housing in the Last Year: No       Pre-op Assessment    I have reviewed the Patient Summary Reports.    I have reviewed the NPO Status.   I have reviewed the Medications.     Review of Systems  Anesthesia Hx:  No problems with previous Anesthesia   History of prior surgery of interest to airway management or planning:            Denies Personal Hx of Anesthesia complications.                    Social:  Former Smoker       Cardiovascular:     Hypertension                                          Pulmonary:    Asthma                    Hepatic/GI:     GERD, well controlled    Taking GLP-1 Agonists Instructed to Hold for 7 Days           Neurological:  Neurology Normal                                      Endocrine:  Diabetes         Obesity / BMI > 30      Physical Exam  General: Well nourished, Cooperative and Alert    Airway:  Mallampati: III   Mouth Opening: Normal  TM Distance: Normal  Tongue: Normal    Dental:  Intact        Anesthesia Plan  Type of Anesthesia, risks & benefits discussed:    Anesthesia Type: Gen Natural Airway, Gen Supraglottic Airway  Intra-op Monitoring Plan: Standard ASA Monitors  Post Op Pain Control Plan: multimodal analgesia  Induction:  IV  Airway Plan: Direct, Post-Induction  Informed Consent: Informed consent signed with the Patient and all parties understand the risks and agree with anesthesia plan.  All questions answered.    ASA Score: 3    Ready For Surgery From Anesthesia Perspective.     .       No Residual Tumor Seen Histology Text: There were no malignant cells seen in the sections examined.

## 2024-11-22 NOTE — ANESTHESIA POSTPROCEDURE EVALUATION
Anesthesia Post Evaluation    Patient: Ailyn Ortiz    Procedure(s) Performed: Procedure(s) (LRB):  LEFT RING FINGER RELEASE, TRIGGER FINGER (Left)    Final Anesthesia Type: general      Patient location during evaluation: PACU  Patient participation: Yes- Able to Participate  Level of consciousness: awake and alert  Post-procedure vital signs: reviewed and stable  Pain management: adequate  Airway patency: patent    PONV status at discharge: No PONV  Anesthetic complications: no      Cardiovascular status: stable  Respiratory status: unassisted and spontaneous ventilation  Hydration status: euvolemic  Follow-up not needed.              Vitals Value Taken Time   /68 11/22/24 0927   Temp 36.6 °C (97.8 °F) 11/22/24 0927   Pulse 75 11/22/24 0929   Resp 10 11/22/24 0929   SpO2 97 % 11/22/24 0929   Vitals shown include unfiled device data.      Event Time   Out of Recovery 09:35:35         Pain/Buddy Score: Pain Rating Prior to Med Admin: 0 (11/22/2024  8:52 AM)  Pain Rating Post Med Admin: 0 (11/22/2024  9:29 AM)  Buddy Score: 10 (11/22/2024  9:29 AM)

## 2024-11-22 NOTE — BRIEF OP NOTE
[place] - Surgery ([facility])  Brief Operative Note    Surgery Date: 11/22/2024     Surgeons and Role:     * Anai Choudhary MD - Primary    Assisting Surgeon: None    Pre-op Diagnosis:  Trigger ring finger of left hand [M65.342]  Left hand pain [M79.642]    Post-op Diagnosis:  Post-Op Diagnosis Codes:     * Trigger ring finger of left hand [M65.342]     * Left hand pain [M79.642]    Procedure(s) (LRB):  LEFT RING FINGER RELEASE, TRIGGER FINGER (Left)    Anesthesia: Local MAC    Operative Findings: Uncomplicated trigger finger release.    Estimated Blood Loss: 0 mL    Specimens:   Specimen (24h ago, onward)      None              Discharge Note    OUTCOME: Patient tolerated treatment/procedure well without complication and is now ready for discharge.    DISPOSITION: Home or Self Care    FINAL DIAGNOSIS:  Trigger ring finger of left hand    FOLLOWUP: In clinic    DISCHARGE INSTRUCTIONS:    Discharge Procedure Orders   Diet general     Call MD for:  temperature >100.4     Call MD for:  persistent nausea and vomiting     Call MD for:  severe uncontrolled pain     Call MD for:  difficulty breathing, headache or visual disturbances     Call MD for:  redness, tenderness, or signs of infection (pain, swelling, redness, odor or green/yellow discharge around incision site)     Call MD for:  hives     Call MD for:  persistent dizziness or light-headedness     Call MD for:  extreme fatigue     Lifting restrictions   Order Comments: No lifting, pushing, or pulling for 2 weeks.     Leave dressing on - Keep it clean, dry, and intact until clinic visit

## 2024-11-22 NOTE — H&P
Subjective:         Subjective  Patient ID: Ailyn Ortiz is a 59 y.o. female.     Chief Complaint:  Left ring trigger finger     11/22/2024:  Patient is here today for left ring finger trigger finger release.  No interval change in symptoms.     Interval HPI  10/22/24  Patient reports for follow up of Left ring trigger finger.  She has had 2 injections with minimal relief.  She is interested in surgical intervention.  She notes continued tenderness over the A1 pulley of the left ring finger with active triggering.     Interval HPI  8/14/24  Patient reports 6 weeks status post left ring finger trigger finger injection.  She notes that she got some mild relief from the previous injection although she still notes tenderness and triggering of the left ring finger.  She denies any numbness or tingling.     HPI  7/3/24  58-year-old right-hand dominant female with previous medical history of rheumatoid arthritis presents to clinic today with left ring finger triggering.  She has previous history of carpal tunnel release on the right side and trigger thumb on the left that was done by Dr. Rankin.  She notes that all of her tenderness is localized at the A1 pulley of the left ring finger.  She denies any numbness or tingling.  She also notes that she is some tenderness over her basilar thumb.  She states that paraffin wax as well as Voltaren do help relieve her symptoms.  She denies any history of trauma.          Past Medical History:   Diagnosis Date    Arthritis       zero negative imflammatory arthritis    Asthma      Diabetes mellitus, type 2 since the age of 42    Hypertension since the age of 42    JESSICA (obstructive sleep apnea)       S/P UPP    Partial small bowel obstruction 3/6/2019    Thyroid disease      Ventral hernia without obstruction or gangrene              Past Surgical History:   Procedure Laterality Date    ADENOIDECTOMY        CARPAL TUNNEL RELEASE Right 12/26/2018     Procedure: RELEASE, CARPAL  TUNNEL right;  Surgeon: Anai Choudhary MD;  Location: [facility] OR 1ST FLR;  Service: Orthopedics;  Laterality: Right;  stretcher, supine, hand pan 1 and pan 2     SECTION         times 2    CHOLECYSTECTOMY        DIAGNOSTIC LAPAROSCOPY N/A 2019     Procedure: LAPAROSCOPY, DIAGNOSTIC;  Surgeon: Stone Mei MD;  Location: [facility] OR 2ND FLR;  Service: General;  Laterality: N/A;    ESOPHAGOGASTRODUODENOSCOPY N/A 2019     Procedure: EGD (ESOPHAGOGASTRODUODENOSCOPY);  Surgeon: Stone Mei MD;  Location: [facility] OR 2ND FLR;  Service: General;  Laterality: N/A;    GANGLION CYST EXCISION        HERNIA REPAIR       LAPAROSCOPIC RESECTION OF SMALL INTESTINE   2019     Procedure: EXCISION, SMALL INTESTINE, LAPAROSCOPIC;  Surgeon: Stone Mei MD;  Location: [facility] OR 2ND FLR;  Service: General;;    LAPAROSCOPIC SLEEVE GASTRECTOMY N/A 2019     Procedure: GASTRECTOMY, SLEEVE, LAPAROSCOPIC wiht intra op EGD;  Surgeon: Stone Mei MD;  Location: [facility] OR 2ND FLR;  Service: General;  Laterality: N/A;    LYSIS OF ADHESIONS N/A 2019     Procedure: LYSIS, ADHESIONS;  Surgeon: Stone Mei MD;  [facility];  Service: General;  Laterality: N/A;    small bowel resection (lipoma)        SMALL INTESTINE SURGERY   2019     small bowel resection x2    TONSILLECTOMY        TRIGGER FINGER RELEASE Left 04/15/2019     Procedure: RELEASE, TRIGGER FINGER left thumb;  Surgeon: Anai Choudhary MD;  Location: [facility];  Service: Orthopedics;  Laterality: Left;  stretcher, supine, hand pan 1 and 2    TUBAL LIGATION        UVULOPALATOPHARYNGOPLASTY                 Family History   Problem Relation Name Age of Onset    Hypertension Father Ty      Diabetes Father Ty      Heart disease Maternal Grandfather Christophe           CAD    Heart disease Paternal Grandmother alzire           CAD    Diabetes Paternal Grandmother alzire       Depression Paternal Grandmother alzire      Heart disease Paternal Grandfather Clark           CAD      Social History            Socioeconomic History    Marital status:    Tobacco Use    Smoking status: Former       Current packs/day: 0.00       Types: Cigarettes       Quit date: 12/15/1993       Years since quittin.9    Smokeless tobacco: Never   Substance and Sexual Activity    Alcohol use: Yes       Alcohol/week: 1.0 standard drink of alcohol       Types: 1 Glasses of wine per week       Comment: occasional    Drug use: No    Sexual activity: Yes       Partners: Male       Birth control/protection: Post-menopausal      Social Drivers of Health           Financial Resource Strain: Low Risk  (2024)     Overall Financial Resource Strain (CARDIA)      Difficulty of Paying Living Expenses: Not hard at all   Food Insecurity: No Food Insecurity (2024)     Hunger Vital Sign      Worried About Running Out of Food in the Last Year: Never true      Ran Out of Food in the Last Year: Never true   Physical Activity: Insufficiently Active (2024)     Exercise Vital Sign      Days of Exercise per Week: 4 days      Minutes of Exercise per Session: 30 min   Stress: No Stress Concern Present (2024)     [employer] [place] [employer] - Occupational Stress Questionnaire      Feeling of Stress : Only a little   Housing Stability: Unknown (2024)     Housing Stability Vital Sign      Unable to Pay for Housing in the Last Year: No         Current Medications   No current facility-administered medications for this encounter.             Current Outpatient Medications   Medication Sig Dispense Refill    ammonium lactate (LAC-HYDRIN) 12 % lotion Apply topically once daily. 400 g 2    atorvastatin (LIPITOR) 40 MG tablet Take 1 tablet (40 mg total) by mouth once daily. 90 tablet 3    b complex vitamins tablet Take 1 tablet by mouth once daily.        DULoxetine (CYMBALTA) 60 MG capsule Take  1 capsule (60 mg total) by mouth once daily. 90 capsule 1    ergocalciferol (ERGOCALCIFEROL) 50,000 unit Cap Take 1 capsule (50,000 Units total) by mouth once a week. 12 capsule 4    etanercept (ENBREL SURECLICK) 50 mg/mL (1 mL) Inject 1 mL (50 mg total) into the skin once a week. 4 mL 5    ezetimibe (ZETIA) 10 mg tablet Take 1 tablet (10 mg total) by mouth once daily. 90 tablet 1    levothyroxine (SYNTHROID) 88 MCG tablet Take 1 tablet (88 mcg total) by mouth before breakfast. 90 tablet 1    loratadine (CLARITIN) 10 mg tablet Take 10 mg by mouth daily as needed.         losartan (COZAAR) 50 MG tablet Take 1 tablet (50 mg total) by mouth once daily. 90 tablet 1    multivit with minerals/lutein (MULTIVITAMIN 50 PLUS ORAL) multivitamin   1 po daily        omeprazole (PRILOSEC) 40 MG capsule Take 1 capsule (40 mg total) by mouth every morning. 90 capsule 1    ondansetron (ZOFRAN) 4 MG tablet Take 1 tablet (4 mg total) by mouth every 8 (eight) hours as needed for Nausea. 30 tablet 0    ondansetron (ZOFRAN-ODT) 4 MG TbDL Dissolve 1 tablet (4 mg total) by mouth every 6 (six) hours as needed (For Nausea). Bedside Delivery 14 tablet 0    potassium chloride SA (K-DUR,KLOR-CON M) 10 MEQ tablet Take 1 tablet (10 mEq total) by mouth once daily. 90 tablet 1    tirzepatide 7.5 mg/0.5 mL PnIj Inject 7.5 mg into the skin every 7 days. 4 Pen 1    traMADoL (ULTRAM) 50 mg tablet Take 1 tablet (50 mg total) by mouth every 6 (six) hours as needed for Pain. 10 tablet 0                Facility-Administered Medications Ordered in Other Encounters   Medication Dose Route Frequency Provider Last Rate Last Admin    0.9%  NaCl infusion   Intravenous Continuous David Chisholm MD   Stopped at 12/30/19 1251    mupirocin 2 % ointment   Nasal On Call Procedure David Chisholm MD   Given at 04/15/19 3343              Review of patient's allergies indicates:   Allergen Reactions    Statins-hmg-coa reductase inhibitors Other (See  Comments)    Morphine Hives and Itching    Avelox [moxifloxacin] Rash         Review of Systems           Objective:      Objective  Vitals   There were no vitals filed for this visit.        Physical Exam  Cardiovascular:      Pulses:           Radial pulses are Normal on the right side and Normal on the left side.         Hand/Wrist Musculoskeletal Exam     Inspection    Right      Erythema: none      Ecchymosis: none      Edema: none      Deformity: none    Left      Erythema: none      Ecchymosis: none      Edema: none      Deformity: none     Palpation    Left      Triggering: ring      Ring tenderness to palpation: A1 pulley     Range of Motion      Right Wrist      Right wrist range of motion is normal.      Left Wrist      Left wrist range of motion is normal.      Range of motion additional comments: Able to make a composite fist.  Active triggering of the left ring finger.      Neurovascular    Right       Radial pulse: normal      Capillary refill: brisk and <3 sec      Ulnar nerve sensory distribution: normal      Median nerve sensory distribution: normal      Superficial radial nerve sensory distribution: normal    Left       Radial pulse: normal      Capillary refill: brisk and <3 sec      Ulnar nerve sensory distribution: normal      Median nerve sensory distribution: normal      Superficial radial nerve sensory distribution: normal        Diagnostics Review: X-Ray: Reviewed  Personal interpretation of the XR reveals no signs of fractures or dislocations.  Basilar thumb arthritis noted.           Assessment:      Assessment  Left ring finger trigger finger        Plan:      Plan  To OR for left ring finger trigger finger release.

## 2024-12-06 ENCOUNTER — OFFICE VISIT (OUTPATIENT)
Dept: ORTHOPEDICS | Facility: CLINIC | Age: 59
End: 2024-12-06
Payer: COMMERCIAL

## 2024-12-06 DIAGNOSIS — M65.342 TRIGGER RING FINGER OF LEFT HAND: ICD-10-CM

## 2024-12-06 DIAGNOSIS — Z98.890 POST-OPERATIVE STATE: Primary | ICD-10-CM

## 2024-12-06 PROCEDURE — 99999 PR PBB SHADOW E&M-EST. PATIENT-LVL III: CPT | Mod: PBBFAC,,, | Performed by: SPECIALIST/TECHNOLOGIST

## 2024-12-06 NOTE — PROGRESS NOTES
Ms. Ortiz is here today for a post-operative visit.  She is 14 days status post Left Trigger Finger Release of the ring finger by Dr. Choudhary on 11/22/24. She reports that she is minimal pain.  She denies fever, chills, and sweats since the time of the surgery.     Physical exam:    Vitals:    12/06/24 1307   PainSc: 0-No pain     Vital signs are stable, patient is afebrile.  Patient is well dressed and well groomed, no acute distress.  Alert and oriented to person, place, and time.  Post op dressing taken down.  Incision is clean, dry and intact.  There is no erythema or exudate.  There is no sign of any infection. She is NVI. Sutures removed without difficulty.  Able to make a composite fist.     Assessment:  status post Left Trigger Finger Release of the ring finger        Plan:  Ailyn was seen today for post-op evaluation.    Diagnoses and all orders for this visit:    Post-operative state    Trigger ring finger of left hand      - PO instruction reviewed and provided to patient  - Educated patient on HEP ROM of the Elbow, Wrist and Hand for 15 minutes.  - Educated patient on scar massage  - Patient will f/u in clinic PRN      Jean Claude Hidalgo PA-C, ATC  [facility]  [facility] [place], [place]    Disclaimer: This note has been generated using voice-recognition software. There may be typographical errors that have been missed during proof-reading.

## 2024-12-27 ENCOUNTER — OFFICE VISIT (OUTPATIENT)
Dept: INTERNAL MEDICINE | Facility: CLINIC | Age: 59
End: 2024-12-27
Payer: COMMERCIAL

## 2024-12-27 ENCOUNTER — TELEPHONE (OUTPATIENT)
Dept: INTERNAL MEDICINE | Facility: CLINIC | Age: 59
End: 2024-12-27
Payer: COMMERCIAL

## 2024-12-27 ENCOUNTER — OFFICE VISIT (OUTPATIENT)
Dept: URGENT CARE | Facility: CLINIC | Age: 59
End: 2024-12-27
Payer: COMMERCIAL

## 2024-12-27 VITALS
RESPIRATION RATE: 16 BRPM | HEART RATE: 86 BPM | HEIGHT: 62 IN | BODY MASS INDEX: 36.71 KG/M2 | SYSTOLIC BLOOD PRESSURE: 138 MMHG | WEIGHT: 199.5 LBS | DIASTOLIC BLOOD PRESSURE: 90 MMHG | OXYGEN SATURATION: 97 %

## 2024-12-27 DIAGNOSIS — R05.9 COUGH, UNSPECIFIED TYPE: ICD-10-CM

## 2024-12-27 DIAGNOSIS — J10.1 INFLUENZA A: Primary | ICD-10-CM

## 2024-12-27 LAB
CTP QC/QA: YES
CTP QC/QA: YES
POC MOLECULAR INFLUENZA A AGN: POSITIVE
POC MOLECULAR INFLUENZA B AGN: NEGATIVE
SARS-COV-2 AG RESP QL IA.RAPID: NEGATIVE

## 2024-12-27 PROCEDURE — 99999 PR PBB SHADOW E&M-EST. PATIENT-LVL IV: CPT | Mod: PBBFAC,,, | Performed by: FAMILY MEDICINE

## 2024-12-27 RX ORDER — CETIRIZINE HYDROCHLORIDE 10 MG/1
10 TABLET ORAL DAILY
Qty: 30 TABLET | Refills: 0 | Status: SHIPPED | OUTPATIENT
Start: 2024-12-27

## 2024-12-27 RX ORDER — OSELTAMIVIR PHOSPHATE 75 MG/1
75 CAPSULE ORAL 2 TIMES DAILY
Qty: 10 CAPSULE | Refills: 0 | Status: SHIPPED | OUTPATIENT
Start: 2024-12-27 | End: 2025-01-01

## 2024-12-27 RX ORDER — PROMETHAZINE HYDROCHLORIDE AND DEXTROMETHORPHAN HYDROBROMIDE 6.25; 15 MG/5ML; MG/5ML
5 SYRUP ORAL 4 TIMES DAILY PRN
Qty: 120 ML | Refills: 3 | Status: SHIPPED | OUTPATIENT
Start: 2024-12-27

## 2024-12-27 RX ORDER — GUAIFENESIN, PSEUDOEPHEDRINE HYDROCHLORIDE 600; 60 MG/1; MG/1
1 TABLET, EXTENDED RELEASE ORAL 2 TIMES DAILY PRN
Qty: 20 TABLET | Refills: 0 | Status: SHIPPED | OUTPATIENT
Start: 2024-12-27

## 2024-12-27 NOTE — LETTER
December 27, 2024      [facility] Medicine  [address] [place] [address]  Phone: [phone]  Fax: [phone]       Patient: Ailyn Ortiz   YOB: 1965  Date of Visit: 12/27/2024    To Whom It May Concern:    Farida Ortiz  was at [facility] on 12/27/2024. The patient may return to work/school on 12/30/2024 with no restrictions. If you have any questions or concerns, or if I can be of further assistance, please do not hesitate to contact me.    Sincerely,          Shannan Arnold LPN

## 2024-12-27 NOTE — TELEPHONE ENCOUNTER
----- Message from Jihan sent at 2024  1:23 PM CST -----  Contact: pt  Ailyn Ortiz  MRN: [id number]  : 1965  PCP: Joan Edwards  Home Phone      [phone]  Work Phone      Not on file.  Mobile          [phone]      MESSAGE: pt states her daughter has a bad cold and tested negative for flu and covid. She states if something can please be called in for the cough it would be appreciated. Pt states she is not sleeping at night and has to work all weekend     I forgot to ask what pharmacy she wanted to use.       [phone]

## 2025-01-11 NOTE — LETTER
[facility] Hospitalist Progress Note                                                                     [facility]   [facility] DARREN Montalvo  9/14/1955    SUBJECTIVE: Pt more awake today. Pt denies chest pain, palpitations, shortness of breath, cough, nausea, vomiting. Pt o2 requirement decreased     OBJECTIVE:  Temp:  [98.1 °F (36.7 °C)-98.6 °F (37 °C)] 98.6 °F (37 °C)  Pulse:  [] 82  Resp:  [17-34] 18  BP: ()/() 144/89  SpO2:  [90 %-100 %] 92 %  Exam  Gen: No acute distress, alert and oriented to name  Pulm: Lungs clear bilaterally, normal respiratory effort, no crackles, no wheezing  CV: Heart with regular rate and rhythm, no murmur.  Abd: Abdomen soft, nontender, nondistended, bowel sounds present  MSK: no significant pitting edema or tenderness of the LE  Skin: no new rashes or lesions    Labs:   Recent Labs   Lab 01/07/25  1317 01/07/25  1318 01/08/25  0359 01/09/25  0447 01/10/25  0408 01/11/25  1031   WBC 18.7*  --  18.1* 18.2* 15.9* 16.1*   HGB 12.2  --  11.6* 9.7* 9.0* 10.4*   .5*  --  94.0 93.6 90.0 94.4   .0*  --  121.0* 82.0* 77.0* 64.0*   BAND  --   --   --  9  --   --    INR  --  1.12  --   --   --   --        Recent Labs   Lab 01/08/25  2030 01/09/25  0027 01/09/25  0447 01/09/25  0905 01/09/25  1511 01/10/25  0408 01/10/25  1655 01/11/25  0438 01/11/25  1031   * 132* 133*  133*  133* 132* 135* 134*  --   --  135*   K 3.8 4.2 3.4*  3.4*  3.4*  3.4* 3.8 3.4*  3.4* 3.4*  3.4* 3.2* 4.0 3.9    105 106  106  106 109 108 107  --   --  107   CO2 15.0* 14.0* 16.0*  16.0*  16.0* 18.0* 18.0* 19.0*  --   --  18.0*   BUN 25* 26* 25*  25*  25* 25* 23 21  --   --  25*   CREATSERUM 1.26* 1.38* 1.30*  1.30*  1.30* 1.20* 1.21* 1.30*  --   --  1.38*   CA 9.3 9.4 9.2  9.2  9.2 9.0 9.0 8.9  --   --  8.9   MG 1.8 1.7 1.7 2.5  --  1.9  --   --   --    PHOS 2.0* 2.0* 1.6*  1.6* 2.8  --  2.1*   March 26, 2019      Brodie Fulton MD  [address] [facility] [address]           [facility]  [address]  [place] [address]  Phone: [phone]  Fax: [phone]          Patient: Ailyn Ortiz   MR Number: [id number]   YOB: 1965   Date of Visit: 3/26/2019       Dear Dr. Brodie Fulton:    Thank you for referring Ailyn Ortiz to me for evaluation. Attached you will find relevant portions of my assessment and plan of care.    If you have questions, please do not hesitate to call me. I look forward to following Ailyn Ortiz along with you.    Sincerely,    ALMA ROSA Zamarripaosure  CC:  No Recipients    If you would like to receive this communication electronically, please contact [email or web address] or [phone] to request more information on [employer] Link access.    For providers and/or their staff who would like to refer a patient to [facility], please contact us through our one-stop-shop provider referral line, [facility], at [phone].    If you feel you have received this communication in error or would no longer like to receive these types of communications, please e-mail [email or web address]          2.1*  --  2.5 2.4   * 242* 149*  149*  149* 196* 137* 309*  --   --  235*       Recent Labs   Lab 01/07/25  1318 01/07/25  1514 01/07/25  1602 01/09/25  0447 01/09/25  1511 01/10/25  0408   ALT 19  --  16 44 41  --    AST  --  25 22 134* 107*  --    ALB 4.0  --  3.5 2.7*  2.7* 2.4* 2.8*       Recent Labs   Lab 01/10/25  1621 01/10/25  2047 01/10/25  2354 01/11/25  0000 01/11/25  0429   PGLU 141* 157* 360* 193* 228*       Meds:   Scheduled:    insulin aspart  1-50 Units Subcutaneous TID CC and HS    enoxaparin  30 mg Subcutaneous Daily    insulin degludec  6 Units Subcutaneous Daily    insulin aspart  1-68 Units Subcutaneous TID CC    sodium bicarbonate  650 mg Oral QID    metoprolol succinate ER  12.5 mg Oral Daily Beta Blocker    cefTRIAXone  1 g Intravenous Daily    tamoxifen  20 mg Oral Daily    escitalopram  20 mg Oral Daily    multivitamin  1 tablet Oral Daily     Continuous Infusions:    dextrose 5%-sodium chloride 0.45% Stopped (01/09/25 1009)     PRN:   metoprolol    glucose **OR** glucose **OR** glucose-vitamin C **OR** dextrose **OR** glucose **OR** glucose **OR** glucose-vitamin C    dextrose 5%-sodium chloride 0.45%    melatonin    polyethylene glycol (PEG 3350)    sennosides    bisacodyl    ondansetron    acetaminophen    ASSESSMENT / PLAN:   69 year old female with history of acoustic neuroma with left facial droop after surgery, anxiety, diabetic retinopathy, history of type 1 diabetes, hyperlipidemia, hypertension, osteoporosis, history of pancreatitis, peripheral vascular disease, right breast cancer, heart failure preserved EF, nonobstructive coronary disease presenting with hyperglycemia.     DKA--> resolved  -Hold home meds  -ICU admission, Critical Care following  -Insulin drip--> tresiba + carb counting coverage + sliding scale  -electrolyte monitoring and replacement per protocol  -endo following     Leukocytosis  -etiology uncertain  -likely reactive to dka  -no obvious  infection  -cx pending  -IV abx per Critical Care     DAVID on CKD  -creat baseline low to mid 1s  -secondary to dehydration from dka  -ivf were given, hold now  -I/o  -trend creat  -renal following      Abnormal EKG  -pt denies chest pain  -trop wnl  -cardiology following --> prelim echo wnl, EKG abn secondary to electrolyte abnormalities and acidosis.     AMS--> improved  -baseline unknown  -CT brain pending --> no acute pathology     Acute respiratory failure with hypoxia  -CTA chest--> no PE, effusion present, ground glass opacities  -iv diuretics given   -iv abx continued  -pulm/critical care following  -titrate off o2 as tolerates    Metabolic acidosis  -renal following  -bicarb started    Hx Breast Ca  -con tamoxifen      Quality:  DVT Prophylaxis: scd, lovenox sq  CODE status: Full per chart  Harvey:      Plan of care discussed with patient and staff     Dispo: no discharge     Fabian Miranda MD  [facility]  [phone]

## 2025-01-27 ENCOUNTER — PATIENT OUTREACH (OUTPATIENT)
Dept: ADMINISTRATIVE | Facility: HOSPITAL | Age: 60
End: 2025-01-27
Payer: COMMERCIAL

## 2025-01-27 NOTE — PROGRESS NOTES
Care Coordination Encounter Details:       MyChart Portal Status:         [x]  Reviewed MyChart Portal Status offered / enrolled if applicable        Additional Notes:     MyChart Outcomes: Pt is enrolled & active          Updates Requested / Reviewed:        Updated Care Coordination Note, Care Everywhere, , External Sources: [facility], Care Team Updated, Removed  or Duplicate Orders, and Immunizations Reconciliation Completed or Queried: [place]         Health Maintenance Screening(s) Due:      Health Maintenance Topics Overdue:      VBHM Score: 2     Foot Exam  Uncontrolled BP    Pneumonia Vaccine  RSV Vaccine                  Health Maintenance Topic(s) Outreach Outcomes & Actions Taken:    Blood Pressure - Outreach Outcomes & Actions Taken  : Patient Will Call Back or Send Portal Message with Reading    Lab(s) - Outreach Outcomes & Actions Taken  : Overdue Lab(s) Scheduled    Primary Care Appt - Outreach Outcomes & Actions Taken  : Primary Care Appt Scheduled    Medication Adherence / Statins - Outreach Outcomes & Actions Taken  : pt compliant    Additional Notes:             Chronic Disease Management:     Diabetes Measures        Lab Results   Component Value Date    HGBA1C 6.3 (H) 2024           [x]  Reviewed chart for active Diabetes diagnosis     []  Scheduled necessary follow up appointments if needed         Additional Notes:             Hypertension Measures        BP Readings from Last 1 Encounters:   24 (!) 138/90           [x]  Reviewed chart for active Hypertension diagnosis     []  Reviewed & documented Home BP Cuff     []  Documented a Remote BP if needed & applicable     []  Scheduled necessary follow up appointments with Primary Care if needed         Additional Notes:             Provider Team Continuity:     Last PCP Visit Date: 2024          [x]  Reviewed Primary Care Provider Visits, Annual Wellness Visit, and Future          Appointments  to ensure appointments have been scheduled and/or           completed        Additional Notes:             Social Determinants of Health          [x]  Reviewed, completed, and/or updated the following sections:                  Food Insecurity, Transportation Needs, Financial Resource Strain,                 Tobacco Use        Additional Notes:             Care Management, Digital Medicine, and/or Education Referrals    OPCM Risk Score: 17.3                 Additional Notes:

## 2025-02-11 DIAGNOSIS — E66.812 CLASS 2 SEVERE OBESITY DUE TO EXCESS CALORIES WITH SERIOUS COMORBIDITY AND BODY MASS INDEX (BMI) OF 39.0 TO 39.9 IN ADULT: ICD-10-CM

## 2025-02-11 DIAGNOSIS — I10 ESSENTIAL HYPERTENSION: ICD-10-CM

## 2025-02-11 DIAGNOSIS — Z90.3 S/P GASTRIC SLEEVE PROCEDURE: ICD-10-CM

## 2025-02-11 DIAGNOSIS — E66.01 CLASS 2 SEVERE OBESITY DUE TO EXCESS CALORIES WITH SERIOUS COMORBIDITY AND BODY MASS INDEX (BMI) OF 39.0 TO 39.9 IN ADULT: ICD-10-CM

## 2025-02-11 RX ORDER — OMEPRAZOLE 40 MG/1
40 CAPSULE, DELAYED RELEASE ORAL EVERY MORNING
Qty: 90 CAPSULE | Refills: 3 | Status: SHIPPED | OUTPATIENT
Start: 2025-02-11

## 2025-02-11 RX ORDER — EZETIMIBE 10 MG/1
10 TABLET ORAL DAILY
Qty: 90 TABLET | Refills: 1 | Status: SHIPPED | OUTPATIENT
Start: 2025-02-11

## 2025-02-11 NOTE — TELEPHONE ENCOUNTER
No care due was identified.  Health [facility] Embedded Care Due Messages. Reference number: [id number].   2/11/2025 5:24:51 PM CST

## 2025-02-12 RX ORDER — LOSARTAN POTASSIUM 50 MG/1
50 TABLET ORAL DAILY
Qty: 90 TABLET | Refills: 0 | Status: SHIPPED | OUTPATIENT
Start: 2025-02-12

## 2025-02-12 RX ORDER — DULOXETIN HYDROCHLORIDE 60 MG/1
60 CAPSULE, DELAYED RELEASE ORAL DAILY
Qty: 90 CAPSULE | Refills: 1 | Status: SHIPPED | OUTPATIENT
Start: 2025-02-12

## 2025-02-12 NOTE — TELEPHONE ENCOUNTER
Refill Routing Note   Medication(s) are not appropriate for processing by [facility] for the following reason(s):        Required vitals abnormal: 138/90     [facility] action(s):  Defer  Approve             Appointments  past 12m or future 3m with PCP    Date Provider   Last Visit   11/13/2024 Joan Edwards MD   Next Visit   5/14/2025 Joan Edwards MD   ED visits in past 90 days: 0        Note composed:6:28 PM 02/11/2025

## 2025-02-17 ENCOUNTER — OFFICE VISIT (OUTPATIENT)
Facility: CLINIC | Age: 60
End: 2025-02-17
Payer: COMMERCIAL

## 2025-02-17 VITALS
HEART RATE: 97 BPM | WEIGHT: 196.44 LBS | BODY MASS INDEX: 35.93 KG/M2 | SYSTOLIC BLOOD PRESSURE: 125 MMHG | DIASTOLIC BLOOD PRESSURE: 77 MMHG

## 2025-02-17 DIAGNOSIS — L72.0 INCLUSION CYST: Primary | ICD-10-CM

## 2025-02-17 RX ORDER — CEPHALEXIN 500 MG/1
500 CAPSULE ORAL EVERY 6 HOURS
Qty: 28 CAPSULE | Refills: 0 | Status: SHIPPED | OUTPATIENT
Start: 2025-02-17 | End: 2025-02-25

## 2025-02-17 NOTE — PROGRESS NOTES
CC: Problem visit    Ailyn Ortiz is a 59 y.o. female  presents for problem visit.  Patient has a left gluteal cyst that appeared yesterday.  She tried a hot compress, hot shower and various creams/ointments.  She also stuck a needle in it to drain and squeezed the area to see if it would drain.    OB History          3    Para   3    Term   3            AB        Living   3         SAB        IAB        Ectopic        Multiple        Live Births                   Gynecology   Past Medical History:   Diagnosis Date    Arthritis     zero negative imflammatory arthritis    Asthma     Diabetes mellitus, type 2 since the age of 42    Hypertension since the age of 42    JESSICA (obstructive sleep apnea)     S/P UPP    Partial small bowel obstruction 3/6/2019    Thyroid disease     Ventral hernia without obstruction or gangrene      Past Surgical History:   Procedure Laterality Date    ADENOIDECTOMY      CARPAL TUNNEL RELEASE Right 2018    Procedure: RELEASE, CARPAL TUNNEL right;  Surgeon: Anai Choudhary MD;  Location: [facility] OR [address] [place] [address];  Service: Orthopedics;  Laterality: Right;  stretcher, supine, hand pan 1 and pan 2     SECTION      times 2    CHOLECYSTECTOMY      DIAGNOSTIC LAPAROSCOPY N/A 2019    Procedure: LAPAROSCOPY, DIAGNOSTIC;  Surgeon: Stone Mei MD;  Location: [facility] OR [address] [place] [address];  Service: General;  Laterality: N/A;    ESOPHAGOGASTRODUODENOSCOPY N/A 2019    Procedure: EGD (ESOPHAGOGASTRODUODENOSCOPY);  Surgeon: Stone Mei MD;  Location: [facility] OR [address] [place] [address];  Service: General;  Laterality: N/A;    GANGLION CYST EXCISION      HERNIA REPAIR      LAPAROSCOPIC RESECTION OF SMALL INTESTINE  2019    Procedure: EXCISION, SMALL INTESTINE, LAPAROSCOPIC;  Surgeon: Stone Mei MD;  Location: [facility] OR [address] [place] [address];  Service: General;;    LAPAROSCOPIC SLEEVE GASTRECTOMY N/A 2019    Procedure: GASTRECTOMY, SLEEVE, LAPAROSCOPIC wiht  intra op EGD;  Surgeon: Stone Mei MD;  Location: [facility] OR 2ND FLR;  Service: General;  Laterality: N/A;    LYSIS OF ADHESIONS N/A 12/30/2019    Procedure: LYSIS, ADHESIONS;  Surgeon: Stone Mei MD;  Location: [facility] OR 2ND FLR;  Service: General;  Laterality: N/A;    small bowel resection (lipoma)      SMALL INTESTINE SURGERY  2018, 2019    small bowel resection x2    TONSILLECTOMY      TRIGGER FINGER RELEASE Left 04/15/2019    Procedure: RELEASE, TRIGGER FINGER left thumb;  Surgeon: Anai Choudhary MD;  Location: [facility] OR;  Service: Orthopedics;  Laterality: Left;  stretcher, supine, hand pan 1 and 2    TRIGGER FINGER RELEASE Left 11/22/2024    Procedure: LEFT RING FINGER RELEASE, TRIGGER FINGER;  Surgeon: Anai Choudhary MD;  Location: [facility] OR;  Service: Orthopedics;  Laterality: Left;    TUBAL LIGATION      UVULOPALATOPHARYNGOPLASTY       Social History[1]  Family History   Problem Relation Name Age of Onset    Hypertension Father Ty     Diabetes Father Ty     Heart disease Maternal Grandfather Christophe         CAD    Heart disease Paternal Grandmother alzire         CAD    Diabetes Paternal Grandmother alzire     Depression Paternal Grandmother alzire     Heart disease Paternal Grandfather [place]         CAD         /77 (Patient Position: Sitting)   Pulse 97   Wt 89.1 kg (196 lb 6.9 oz)   BMI 35.93 kg/m²       ROS  Review of Systems   Constitutional:  Negative for chills and fever.   Gastrointestinal:  Negative for abdominal pain, nausea and vomiting.   Genitourinary: Negative.    Psychiatric/Behavioral: Negative.            PHYSICAL EXAM:  Physical Exam  Genitourinary:     General: Normal vulva.      Exam position: Lithotomy position.      Labia:         Right: No rash, tenderness or lesion.         Left: No rash, tenderness or lesion.           Comments: 3cm cyst with a small superficial scab and slight erythema around the area           Inclusion  cyst    3x3cm without fluctuance  Slight erythema, unclear if from patient placing a needle and manipulation or from infection  Offered drain with a needle after using 1% lidocaine for local anesthesia  Patient declined  Rx Keflex 500mg QID x 7days  Hot compresses TID  Loose or no underwear at night  RTO prn or if symptoms get worse       [1]   Social History  Socioeconomic History    Marital status:    Tobacco Use    Smoking status: Former     Current packs/day: 0.00     Types: Cigarettes     Quit date: 12/15/1993     Years since quittin.1    Smokeless tobacco: Never   Substance and Sexual Activity    Alcohol use: Yes     Alcohol/week: 1.0 standard drink of alcohol     Types: 1 Glasses of wine per week     Comment: occasional    Drug use: No    Sexual activity: Yes     Partners: Male     Birth control/protection: Post-menopausal     Social Drivers of Health     Financial Resource Strain: Low Risk  (2024)    Overall Financial Resource Strain (CARDIA)     Difficulty of Paying Living Expenses: Not hard at all   Food Insecurity: No Food Insecurity (2024)    Hunger Vital Sign     Worried About Running Out of Food in the Last Year: Never true     Ran Out of Food in the Last Year: Never true   Physical Activity: Insufficiently Active (2024)    Exercise Vital Sign     Days of Exercise per Week: 4 days     Minutes of Exercise per Session: 30 min   Stress: No Stress Concern Present (2024)    [employer] - Occupational Stress Questionnaire     Feeling of Stress : Only a little   Housing Stability: Unknown (2024)    Housing Stability Vital Sign     Unable to Pay for Housing in the Last Year: No

## 2025-03-31 ENCOUNTER — PATIENT MESSAGE (OUTPATIENT)
Dept: ADMINISTRATIVE | Facility: HOSPITAL | Age: 60
End: 2025-03-31
Payer: COMMERCIAL

## 2025-04-15 DIAGNOSIS — M06.00 SERONEGATIVE RHEUMATOID ARTHRITIS: ICD-10-CM

## 2025-04-16 RX ORDER — ETANERCEPT 50 MG/ML
50 SOLUTION SUBCUTANEOUS WEEKLY
Qty: 4 ML | Refills: 5 | Status: ACTIVE | OUTPATIENT
Start: 2025-04-16 | End: 2025-10-01

## 2025-05-12 ENCOUNTER — PATIENT MESSAGE (OUTPATIENT)
Dept: ADMINISTRATIVE | Facility: HOSPITAL | Age: 60
End: 2025-05-12
Payer: COMMERCIAL

## 2025-05-13 ENCOUNTER — LAB VISIT (OUTPATIENT)
Dept: LAB | Facility: HOSPITAL | Age: 60
End: 2025-05-13
Attending: INTERNAL MEDICINE
Payer: COMMERCIAL

## 2025-05-13 ENCOUNTER — RESULTS FOLLOW-UP (OUTPATIENT)
Dept: HEPATOLOGY | Facility: HOSPITAL | Age: 60
End: 2025-05-13

## 2025-05-13 DIAGNOSIS — E66.01 CLASS 2 SEVERE OBESITY DUE TO EXCESS CALORIES WITH SERIOUS COMORBIDITY AND BODY MASS INDEX (BMI) OF 38.0 TO 38.9 IN ADULT: ICD-10-CM

## 2025-05-13 DIAGNOSIS — E78.49 OTHER HYPERLIPIDEMIA: ICD-10-CM

## 2025-05-13 DIAGNOSIS — M06.00 SERONEGATIVE RHEUMATOID ARTHRITIS: ICD-10-CM

## 2025-05-13 DIAGNOSIS — Z90.3 S/P GASTRIC SLEEVE PROCEDURE: ICD-10-CM

## 2025-05-13 DIAGNOSIS — E66.812 CLASS 2 SEVERE OBESITY DUE TO EXCESS CALORIES WITH SERIOUS COMORBIDITY AND BODY MASS INDEX (BMI) OF 38.0 TO 38.9 IN ADULT: ICD-10-CM

## 2025-05-13 DIAGNOSIS — E11.9 TYPE 2 DIABETES MELLITUS WITHOUT COMPLICATION, WITHOUT LONG-TERM CURRENT USE OF INSULIN: ICD-10-CM

## 2025-05-13 LAB
ABSOLUTE EOSINOPHIL (OHS): 0.28 K/UL
ABSOLUTE MONOCYTE (OHS): 0.55 K/UL (ref 0.3–1)
ABSOLUTE NEUTROPHIL COUNT (OHS): 2.7 K/UL (ref 1.8–7.7)
ALBUMIN SERPL BCP-MCNC: 3.9 G/DL (ref 3.5–5.2)
ALBUMIN/CREAT UR: 5.1 UG/MG
ALP SERPL-CCNC: 108 UNIT/L (ref 40–150)
ALT SERPL W/O P-5'-P-CCNC: 16 UNIT/L (ref 10–44)
ANION GAP (OHS): 8 MMOL/L (ref 8–16)
AST SERPL-CCNC: 14 UNIT/L (ref 11–45)
BASOPHILS # BLD AUTO: 0.05 K/UL
BASOPHILS NFR BLD AUTO: 1 %
BILIRUB SERPL-MCNC: 0.7 MG/DL (ref 0.1–1)
BUN SERPL-MCNC: 19 MG/DL (ref 6–20)
CALCIUM SERPL-MCNC: 9.2 MG/DL (ref 8.7–10.5)
CHLORIDE SERPL-SCNC: 105 MMOL/L (ref 95–110)
CHOLEST SERPL-MCNC: 278 MG/DL (ref 120–199)
CHOLEST/HDLC SERPL: 5.6 {RATIO} (ref 2–5)
CO2 SERPL-SCNC: 26 MMOL/L (ref 23–29)
CREAT SERPL-MCNC: 0.9 MG/DL (ref 0.5–1.4)
CREAT UR-MCNC: 138 MG/DL (ref 15–325)
EAG (OHS): 131 MG/DL (ref 68–131)
ERYTHROCYTE [DISTWIDTH] IN BLOOD BY AUTOMATED COUNT: 12.1 % (ref 11.5–14.5)
GFR SERPLBLD CREATININE-BSD FMLA CKD-EPI: >60 ML/MIN/1.73/M2
GLUCOSE SERPL-MCNC: 182 MG/DL (ref 70–110)
HBA1C MFR BLD: 6.2 % (ref 4–5.6)
HCT VFR BLD AUTO: 37.7 % (ref 37–48.5)
HDLC SERPL-MCNC: 50 MG/DL (ref 40–75)
HDLC SERPL: 18 % (ref 20–50)
HGB BLD-MCNC: 12.5 GM/DL (ref 12–16)
IMM GRANULOCYTES # BLD AUTO: 0.01 K/UL (ref 0–0.04)
IMM GRANULOCYTES NFR BLD AUTO: 0.2 % (ref 0–0.5)
LDLC SERPL CALC-MCNC: 203.6 MG/DL (ref 63–159)
LYMPHOCYTES # BLD AUTO: 1.31 K/UL (ref 1–4.8)
MCH RBC QN AUTO: 29.4 PG (ref 27–31)
MCHC RBC AUTO-ENTMCNC: 33.2 G/DL (ref 32–36)
MCV RBC AUTO: 89 FL (ref 82–98)
MICROALBUMIN UR-MCNC: 7 UG/ML (ref ?–5000)
NONHDLC SERPL-MCNC: 228 MG/DL
NUCLEATED RBC (/100WBC) (OHS): 0 /100 WBC
PLATELET # BLD AUTO: 273 K/UL (ref 150–450)
PMV BLD AUTO: 9.2 FL (ref 9.2–12.9)
POTASSIUM SERPL-SCNC: 4.2 MMOL/L (ref 3.5–5.1)
PROT SERPL-MCNC: 7.3 GM/DL (ref 6–8.4)
RBC # BLD AUTO: 4.25 M/UL (ref 4–5.4)
RELATIVE EOSINOPHIL (OHS): 5.7 %
RELATIVE LYMPHOCYTE (OHS): 26.7 % (ref 18–48)
RELATIVE MONOCYTE (OHS): 11.2 % (ref 4–15)
RELATIVE NEUTROPHIL (OHS): 55.2 % (ref 38–73)
SODIUM SERPL-SCNC: 139 MMOL/L (ref 136–145)
TRIGL SERPL-MCNC: 122 MG/DL (ref 30–150)
TSH SERPL-ACNC: 3.54 UIU/ML (ref 0.4–4)
WBC # BLD AUTO: 4.9 K/UL (ref 3.9–12.7)

## 2025-05-13 PROCEDURE — 84443 ASSAY THYROID STIM HORMONE: CPT

## 2025-05-13 PROCEDURE — 83036 HEMOGLOBIN GLYCOSYLATED A1C: CPT

## 2025-05-13 PROCEDURE — 84460 ALANINE AMINO (ALT) (SGPT): CPT

## 2025-05-13 PROCEDURE — 83718 ASSAY OF LIPOPROTEIN: CPT

## 2025-05-13 PROCEDURE — 36415 COLL VENOUS BLD VENIPUNCTURE: CPT

## 2025-05-13 PROCEDURE — 82043 UR ALBUMIN QUANTITATIVE: CPT

## 2025-05-13 PROCEDURE — 85025 COMPLETE CBC W/AUTO DIFF WBC: CPT

## 2025-05-14 ENCOUNTER — OFFICE VISIT (OUTPATIENT)
Dept: INTERNAL MEDICINE | Facility: CLINIC | Age: 60
End: 2025-05-14
Payer: COMMERCIAL

## 2025-05-14 VITALS
BODY MASS INDEX: 37.21 KG/M2 | DIASTOLIC BLOOD PRESSURE: 88 MMHG | OXYGEN SATURATION: 95 % | WEIGHT: 202.19 LBS | HEIGHT: 62 IN | HEART RATE: 97 BPM | RESPIRATION RATE: 18 BRPM | SYSTOLIC BLOOD PRESSURE: 136 MMHG

## 2025-05-14 DIAGNOSIS — E55.9 VITAMIN D DEFICIENCY: ICD-10-CM

## 2025-05-14 DIAGNOSIS — Z12.31 ENCOUNTER FOR SCREENING MAMMOGRAM FOR MALIGNANT NEOPLASM OF BREAST: ICD-10-CM

## 2025-05-14 DIAGNOSIS — E78.49 OTHER HYPERLIPIDEMIA: ICD-10-CM

## 2025-05-14 DIAGNOSIS — M06.00 SERONEGATIVE RHEUMATOID ARTHRITIS: ICD-10-CM

## 2025-05-14 DIAGNOSIS — E11.42 TYPE 2 DIABETES MELLITUS WITH DIABETIC POLYNEUROPATHY, WITHOUT LONG-TERM CURRENT USE OF INSULIN: ICD-10-CM

## 2025-05-14 DIAGNOSIS — E66.812 CLASS 2 SEVERE OBESITY DUE TO EXCESS CALORIES WITH SERIOUS COMORBIDITY AND BODY MASS INDEX (BMI) OF 39.0 TO 39.9 IN ADULT: ICD-10-CM

## 2025-05-14 DIAGNOSIS — E66.01 CLASS 2 SEVERE OBESITY DUE TO EXCESS CALORIES WITH SERIOUS COMORBIDITY AND BODY MASS INDEX (BMI) OF 39.0 TO 39.9 IN ADULT: ICD-10-CM

## 2025-05-14 DIAGNOSIS — I10 ESSENTIAL HYPERTENSION: Primary | ICD-10-CM

## 2025-05-14 PROCEDURE — 4010F ACE/ARB THERAPY RXD/TAKEN: CPT | Mod: CPTII,S$GLB,, | Performed by: INTERNAL MEDICINE

## 2025-05-14 PROCEDURE — 3061F NEG MICROALBUMINURIA REV: CPT | Mod: CPTII,S$GLB,, | Performed by: INTERNAL MEDICINE

## 2025-05-14 PROCEDURE — 3075F SYST BP GE 130 - 139MM HG: CPT | Mod: CPTII,S$GLB,, | Performed by: INTERNAL MEDICINE

## 2025-05-14 PROCEDURE — 1159F MED LIST DOCD IN RCRD: CPT | Mod: CPTII,S$GLB,, | Performed by: INTERNAL MEDICINE

## 2025-05-14 PROCEDURE — 3044F HG A1C LEVEL LT 7.0%: CPT | Mod: CPTII,S$GLB,, | Performed by: INTERNAL MEDICINE

## 2025-05-14 PROCEDURE — 3066F NEPHROPATHY DOC TX: CPT | Mod: CPTII,S$GLB,, | Performed by: INTERNAL MEDICINE

## 2025-05-14 PROCEDURE — G2211 COMPLEX E/M VISIT ADD ON: HCPCS | Mod: S$GLB,,, | Performed by: INTERNAL MEDICINE

## 2025-05-14 PROCEDURE — 1160F RVW MEDS BY RX/DR IN RCRD: CPT | Mod: CPTII,S$GLB,, | Performed by: INTERNAL MEDICINE

## 2025-05-14 PROCEDURE — 99214 OFFICE O/P EST MOD 30 MIN: CPT | Mod: S$GLB,,, | Performed by: INTERNAL MEDICINE

## 2025-05-14 PROCEDURE — 99999 PR PBB SHADOW E&M-EST. PATIENT-LVL IV: CPT | Mod: PBBFAC,,, | Performed by: INTERNAL MEDICINE

## 2025-05-14 PROCEDURE — 3079F DIAST BP 80-89 MM HG: CPT | Mod: CPTII,S$GLB,, | Performed by: INTERNAL MEDICINE

## 2025-05-14 PROCEDURE — 3008F BODY MASS INDEX DOCD: CPT | Mod: CPTII,S$GLB,, | Performed by: INTERNAL MEDICINE

## 2025-05-14 RX ORDER — TIRZEPATIDE 12.5 MG/.5ML
12.5 INJECTION, SOLUTION SUBCUTANEOUS
Qty: 4 PEN | Refills: 1 | Status: ACTIVE | OUTPATIENT
Start: 2025-05-14 | End: 2025-05-16 | Stop reason: SDUPTHER

## 2025-05-14 RX ORDER — ATORVASTATIN CALCIUM 40 MG/1
40 TABLET, FILM COATED ORAL DAILY
Start: 2025-05-14 | End: 2026-05-14

## 2025-05-14 RX ORDER — EZETIMIBE 10 MG/1
10 TABLET ORAL DAILY
Start: 2025-05-14

## 2025-05-14 NOTE — PROGRESS NOTES
Subjective:       Patient ID: Ailyn Ortiz is a 59 y.o. female.    Chief Complaint: Follow-up and Knee Pain      HPI:  Patient is known to me and presents for follow up HTN, DM type 2, JESSICA, seronegative RA, neuropathy, hypothyroidism, h/o bowel resection and gastric sleeve.   Labs from 05/14/2025 reviewed, interpreted and discussed  A1C 6.2% controlled  GFR greater than 60 normal   uncontrolled; increasing from 158 prior  Microalbumin 05/2025 normal       HTN: on losartan 50mg. BP elevated but did not take medications today. Denies med side effects.  On medication her blood pressure is less than 140/90     DM type 2: She was on trulicity in the past. She was able to wean off after gastric sleeve and wasn't taking in much PO.   A1C mana again to 7.3%, rising so we started Mounjaro.  Dose increased to 7.5mg--tolerating well.  However, states she got frustrated having to take all these medications so stopped everything and has been off Mounjaro for about 1 month.  A1c remains control but her glucose was 180 on her fasting labs   Was on statin and Zetia but stopped both.  LDL greater than 200.  Agreeable to resuming medications    Hypothyroidism: diagnosed based on labs about 30 years ago.   She stopped her Synthroid since last visit.  TSH remains normal without medication.  She had even questioned at last visit if this was something she actually needed to be taking.        RA: follows with rheum.  Remains on Enbrel.      Neuropathy on cymbalta 60mg. Working well. No med side effects.      She is seeing ortho for trigger finger.     S/p bowel resection for lipoma x2. She has intermittent pain and constipation sx. She is able to reduced her diet to liquid and sx resolve.       JESSICA: diagnosed prior to gastric sleeve. After surgery sx improved, no longer needing CPAP        Past Medical History:   Diagnosis Date    Arthritis     zero negative imflammatory arthritis    Asthma     Diabetes mellitus, type 2 since  the age of 42    Hypertension since the age of 42    JESSICA (obstructive sleep apnea)     S/P UPP    Partial small bowel obstruction 3/6/2019    Thyroid disease     Ventral hernia without obstruction or gangrene        Family History   Problem Relation Name Age of Onset    Hypertension Father Ty     Diabetes Father Ty     Heart disease Maternal Grandfather Christophe         CAD    Heart disease Paternal Grandmother alzire         CAD    Diabetes Paternal Grandmother alzire     Depression Paternal Grandmother alzire     Heart disease Paternal Grandfather [place]         CAD       Social History[1]    Review of Systems   Constitutional:  Negative for activity change, fatigue, fever and unexpected weight change.   HENT:  Negative for congestion, ear pain (itching, dry skin ear canal; chronic), hearing loss, rhinorrhea and sore throat.    Eyes:  Negative for redness and visual disturbance.   Respiratory:  Negative for cough, shortness of breath and wheezing.    Cardiovascular:  Negative for chest pain, palpitations and leg swelling.   Gastrointestinal:  Negative for abdominal pain, constipation, diarrhea, nausea and vomiting.   Musculoskeletal:  Negative for back pain, joint swelling and neck pain.   Skin:  Negative for color change, rash and wound.   Neurological:  Negative for dizziness, light-headedness and headaches.         Objective:      Physical Exam  Vitals reviewed.   Constitutional:       General: She is not in acute distress.     Appearance: She is well-developed. She is obese.   HENT:      Head: Normocephalic and atraumatic.      Right Ear: External ear normal.      Left Ear: External ear normal.      Nose: Nose normal.   Eyes:      General:         Right eye: No discharge.         Left eye: No discharge.      Conjunctiva/sclera: Conjunctivae normal.   Neck:      Thyroid: No thyromegaly.   Cardiovascular:      Rate and Rhythm: Normal rate and regular rhythm.      Heart sounds: No murmur  heard.  Pulmonary:      Effort: Pulmonary effort is normal. No respiratory distress.      Breath sounds: Normal breath sounds. No wheezing.   Abdominal:      General: There is no distension.      Palpations: Abdomen is soft.      Tenderness: There is no abdominal tenderness.   Skin:     General: Skin is warm and dry.   Neurological:      Mental Status: She is alert and oriented to person, place, and time.   Psychiatric:         Behavior: Behavior normal.         Thought Content: Thought content normal.         Assessment:       1. Essential hypertension    2. Type 2 diabetes mellitus with diabetic polyneuropathy, without long-term current use of insulin    3. Class 2 severe obesity due to excess calories with serious comorbidity and body mass index (BMI) of 39.0 to 39.9 in adult    4. Other hyperlipidemia    5. Vitamin D deficiency    6. Seronegative rheumatoid arthritis    7. Encounter for screening mammogram for malignant neoplasm of breast        Plan:       1. Essential hypertension  Chronic stable  Elevated initially today but did not take medications home blood pressure is better controlled on medications and also documented today  Continue meds same dose  Low-sodium diet  Check blood pressure and keep log    2. Type 2 diabetes mellitus with diabetic polyneuropathy, without long-term current use of insulin  Chronic controlled  However glucose was 180 off of medication and A1c likely reflects some of her time taking her medication  Resume Mounjaro  ADA diet  Check sugar and keep log  -     tirzepatide (MOUNJARO) 12.5 mg/0.5 mL PnIj; Inject 12.5 mg into the skin every 7 days.  Dispense: 4 Pen; Refill: 1  -     CBC Auto Differential; Future; Expected date: 08/12/2025  -     Comprehensive Metabolic Panel; Future; Expected date: 08/12/2025  -     Hemoglobin A1C; Future; Expected date: 08/12/2025  -     Lipid Panel; Future; Expected date: 08/12/2025  -     TSH; Future; Expected date: 08/12/2025  -     T4, Free;  Future; Expected date: 2025    3. Class 2 severe obesity due to excess calories with serious comorbidity and body mass index (BMI) of 36.0 to 36.9 in adult  Chronic stable  Resume GLP 1  Resume statin Zetia  Diet and exercise discussed  -     ezetimibe (ZETIA) 10 mg tablet; Take 1 tablet (10 mg total) by mouth once daily.    4. Other hyperlipidemia  Chronic uncontrolled  Resume statin  Resume Zetia  Diet exercise and weight loss discussed  -     atorvastatin (LIPITOR) 40 MG tablet; Take 1 tablet (40 mg total) by mouth once daily.    5. Vitamin D deficiency  Chronic stable  Continue supplement  Update labs next visit  -     Vitamin D; Future; Expected date: 2025    6. Seronegative rheumatoid arthritis  Chronic stable  Continue medications per Rheumatology recommendations      7. Encounter for screening mammogram for malignant neoplasm of breast  Scheduled  -     Mammo Digital Screening Bilat w/ Cj (XPD); Future; Expected date: 2025       Return to clinic 3 months with labs and PRN               [1]   Social History  Socioeconomic History    Marital status:    Tobacco Use    Smoking status: Former     Current packs/day: 0.00     Types: Cigarettes     Quit date: 12/15/1993     Years since quittin.4    Smokeless tobacco: Never   Substance and Sexual Activity    Alcohol use: Yes     Alcohol/week: 1.0 standard drink of alcohol     Types: 1 Glasses of wine per week     Comment: occasional    Drug use: No    Sexual activity: Yes     Partners: Male     Birth control/protection: Post-menopausal     Social Drivers of Health     Financial Resource Strain: Low Risk  (2024)    Overall Financial Resource Strain (CARDIA)     Difficulty of Paying Living Expenses: Not hard at all   Food Insecurity: No Food Insecurity (2024)    Hunger Vital Sign     Worried About Running Out of Food in the Last Year: Never true     Ran Out of Food in the Last Year: Never true   Physical Activity:  Insufficiently Active (7/16/2024)    Exercise Vital Sign     Days of Exercise per Week: 4 days     Minutes of Exercise per Session: 30 min   Stress: No Stress Concern Present (7/16/2024)    [employer] - Occupational Stress Questionnaire     Feeling of Stress : Only a little   Housing Stability: Unknown (7/16/2024)    Housing Stability Vital Sign     Unable to Pay for Housing in the Last Year: No

## 2025-05-15 ENCOUNTER — PATIENT MESSAGE (OUTPATIENT)
Dept: INTERNAL MEDICINE | Facility: CLINIC | Age: 60
End: 2025-05-15
Payer: COMMERCIAL

## 2025-05-15 ENCOUNTER — PATIENT MESSAGE (OUTPATIENT)
Dept: ADMINISTRATIVE | Facility: OTHER | Age: 60
End: 2025-05-15
Payer: COMMERCIAL

## 2025-05-15 DIAGNOSIS — E11.42 TYPE 2 DIABETES MELLITUS WITH DIABETIC POLYNEUROPATHY, WITHOUT LONG-TERM CURRENT USE OF INSULIN: ICD-10-CM

## 2025-05-16 RX ORDER — TIRZEPATIDE 12.5 MG/.5ML
12.5 INJECTION, SOLUTION SUBCUTANEOUS
Qty: 12 PEN | Refills: 1 | Status: SHIPPED | OUTPATIENT
Start: 2025-05-16

## 2025-05-16 NOTE — TELEPHONE ENCOUNTER
Care Due:                  Date            Visit Type   Department     Provider  --------------------------------------------------------------------------------                                EP -                              PRIMARY      [facility] INTERNAL  Last Visit: 05-      CARE ([facility])   MEDICINE II    Joan Edwards                              EP -                              PRIMARY      [facility] INTERNAL  Next Visit: 09-      CARE ([facility])   MEDICINE II    Joan Edwards                                                            Last  Test          Frequency    Reason                     Performed    Due Date  --------------------------------------------------------------------------------    Vitamin D...  12 months..  ergocalciferol...........  08- 08-    [employer] Embedded Care Due Messages. Reference number: [id number].   5/16/2025 8:05:35 AM CDT

## 2025-06-02 ENCOUNTER — PATIENT OUTREACH (OUTPATIENT)
Dept: ADMINISTRATIVE | Facility: HOSPITAL | Age: 60
End: 2025-06-02
Payer: COMMERCIAL

## 2025-06-04 ENCOUNTER — HOSPITAL ENCOUNTER (OUTPATIENT)
Dept: RADIOLOGY | Facility: HOSPITAL | Age: 60
Discharge: HOME | End: 2025-06-04
Attending: INTERNAL MEDICINE
Payer: COMMERCIAL

## 2025-06-04 VITALS — WEIGHT: 202 LBS | BODY MASS INDEX: 37.17 KG/M2 | HEIGHT: 62 IN

## 2025-06-04 DIAGNOSIS — Z12.31 ENCOUNTER FOR SCREENING MAMMOGRAM FOR MALIGNANT NEOPLASM OF BREAST: ICD-10-CM

## 2025-06-04 PROCEDURE — 77067 SCR MAMMO BI INCL CAD: CPT | Mod: TC

## 2025-06-10 ENCOUNTER — RESULTS FOLLOW-UP (OUTPATIENT)
Dept: HEPATOLOGY | Facility: HOSPITAL | Age: 60
End: 2025-06-10

## 2025-07-29 DIAGNOSIS — I10 ESSENTIAL HYPERTENSION: ICD-10-CM

## 2025-07-29 RX ORDER — LOSARTAN POTASSIUM 50 MG/1
50 TABLET ORAL DAILY
Qty: 90 TABLET | Refills: 3 | Status: SHIPPED | OUTPATIENT
Start: 2025-07-29

## 2025-07-29 NOTE — TELEPHONE ENCOUNTER
Care Due:                  Date            Visit Type   Department     Provider  --------------------------------------------------------------------------------                                EP -                              PRIMARY      [facility] INTERNAL  Last Visit: 05-      CARE ([facility])   MEDICINE II    Joan Edwards                              EP -                              PRIMARY      [facility] INTERNAL  Next Visit: 09-      CARE ([facility])   MEDICINE II    Joan Edwards                                                            Last  Test          Frequency    Reason                     Performed    Due Date  --------------------------------------------------------------------------------    Vitamin D...  12 months..  ergocalciferol...........  08- 08-    [employer] Embedded Care Due Messages. Reference number: [id number].   7/29/2025 11:20:13 AM CDT

## 2025-07-30 NOTE — TELEPHONE ENCOUNTER
Provider Staff:  Action required for this patient    Requires labs      Please see care gap opportunities below in Care Due Message.    Thanks!  [facility]     Appointments      Date Provider   Last Visit   5/14/2025 Joan Edwards MD   Next Visit   9/3/2025 Joan Edwards MD     Refill Decision Note   Ailyn Ortiz  is requesting a refill authorization.  Brief Assessment and Rationale for Refill:  Approve     Medication Therapy Plan:         Comments:     Note composed:8:02 PM 07/29/2025             Appointments     Last Visit   5/14/2025 Joan Edwards MD   Next Visit   9/3/2025 Joan Edwards MD

## 2025-08-19 ENCOUNTER — LAB VISIT (OUTPATIENT)
Dept: LAB | Facility: HOSPITAL | Age: 60
End: 2025-08-19
Attending: INTERNAL MEDICINE
Payer: COMMERCIAL

## 2025-08-19 DIAGNOSIS — E11.42 TYPE 2 DIABETES MELLITUS WITH DIABETIC POLYNEUROPATHY, WITHOUT LONG-TERM CURRENT USE OF INSULIN: ICD-10-CM

## 2025-08-19 DIAGNOSIS — E55.9 VITAMIN D DEFICIENCY: ICD-10-CM

## 2025-08-19 LAB
25(OH)D3+25(OH)D2 SERPL-MCNC: 32 NG/ML (ref 30–96)
ABSOLUTE EOSINOPHIL (OHS): 0.19 K/UL
ABSOLUTE MONOCYTE (OHS): 0.64 K/UL (ref 0.3–1)
ABSOLUTE NEUTROPHIL COUNT (OHS): 2.74 K/UL (ref 1.8–7.7)
ALBUMIN SERPL BCP-MCNC: 4.4 G/DL (ref 3.5–5.2)
ALP SERPL-CCNC: 104 UNIT/L (ref 40–150)
ALT SERPL W/O P-5'-P-CCNC: 12 UNIT/L (ref 10–44)
ANION GAP (OHS): 8 MMOL/L (ref 8–16)
AST SERPL-CCNC: 22 UNIT/L (ref 11–45)
BASOPHILS # BLD AUTO: 0.08 K/UL
BASOPHILS NFR BLD AUTO: 1.5 %
BILIRUB SERPL-MCNC: 0.6 MG/DL (ref 0.1–1)
BUN SERPL-MCNC: 19 MG/DL (ref 6–20)
CALCIUM SERPL-MCNC: 9.2 MG/DL (ref 8.7–10.5)
CHLORIDE SERPL-SCNC: 104 MMOL/L (ref 95–110)
CHOLEST SERPL-MCNC: 269 MG/DL (ref 120–199)
CHOLEST/HDLC SERPL: 6 {RATIO} (ref 2–5)
CO2 SERPL-SCNC: 28 MMOL/L (ref 23–29)
CREAT SERPL-MCNC: 0.9 MG/DL (ref 0.5–1.4)
EAG (OHS): 123 MG/DL (ref 68–131)
ERYTHROCYTE [DISTWIDTH] IN BLOOD BY AUTOMATED COUNT: 12 % (ref 11.5–14.5)
GFR SERPLBLD CREATININE-BSD FMLA CKD-EPI: >60 ML/MIN/1.73/M2
GLUCOSE SERPL-MCNC: 102 MG/DL (ref 70–110)
HBA1C MFR BLD: 5.9 % (ref 4–5.6)
HCT VFR BLD AUTO: 38.4 % (ref 37–48.5)
HDLC SERPL-MCNC: 45 MG/DL (ref 40–75)
HDLC SERPL: 16.7 % (ref 20–50)
HGB BLD-MCNC: 12.7 GM/DL (ref 12–16)
IMM GRANULOCYTES # BLD AUTO: 0.01 K/UL (ref 0–0.04)
IMM GRANULOCYTES NFR BLD AUTO: 0.2 % (ref 0–0.5)
LDLC SERPL CALC-MCNC: 196.4 MG/DL (ref 63–159)
LYMPHOCYTES # BLD AUTO: 1.52 K/UL (ref 1–4.8)
MCH RBC QN AUTO: 28.7 PG (ref 27–31)
MCHC RBC AUTO-ENTMCNC: 33.1 G/DL (ref 32–36)
MCV RBC AUTO: 87 FL (ref 82–98)
NONHDLC SERPL-MCNC: 224 MG/DL
NUCLEATED RBC (/100WBC) (OHS): 0 /100 WBC
PLATELET # BLD AUTO: 294 K/UL (ref 150–450)
PMV BLD AUTO: 9 FL (ref 9.2–12.9)
POTASSIUM SERPL-SCNC: 4 MMOL/L (ref 3.5–5.1)
PROT SERPL-MCNC: 7.3 GM/DL (ref 6–8.4)
RBC # BLD AUTO: 4.43 M/UL (ref 4–5.4)
RELATIVE EOSINOPHIL (OHS): 3.7 %
RELATIVE LYMPHOCYTE (OHS): 29.3 % (ref 18–48)
RELATIVE MONOCYTE (OHS): 12.4 % (ref 4–15)
RELATIVE NEUTROPHIL (OHS): 52.9 % (ref 38–73)
SODIUM SERPL-SCNC: 140 MMOL/L (ref 136–145)
T4 FREE SERPL-MCNC: 1 NG/DL (ref 0.71–1.51)
T4 FREE SERPL-MCNC: 1 NG/DL (ref 0.71–1.51)
TRIGL SERPL-MCNC: 138 MG/DL (ref 30–150)
TSH SERPL-ACNC: 3.06 UIU/ML (ref 0.4–4)
WBC # BLD AUTO: 5.18 K/UL (ref 3.9–12.7)

## 2025-08-19 PROCEDURE — 82306 VITAMIN D 25 HYDROXY: CPT

## 2025-08-19 PROCEDURE — 36415 COLL VENOUS BLD VENIPUNCTURE: CPT

## 2025-08-19 PROCEDURE — 80053 COMPREHEN METABOLIC PANEL: CPT

## 2025-08-19 PROCEDURE — 85025 COMPLETE CBC W/AUTO DIFF WBC: CPT

## 2025-08-19 PROCEDURE — 83036 HEMOGLOBIN GLYCOSYLATED A1C: CPT

## 2025-08-19 PROCEDURE — 80061 LIPID PANEL: CPT

## 2025-08-19 PROCEDURE — 84439 ASSAY OF FREE THYROXINE: CPT

## (undated) DEVICE — Device

## (undated) DEVICE — TROCAR ENDOPATH XCEL 5MM 7.5CM

## (undated) DEVICE — ENDOSTITCH INSTRUMENT

## (undated) DEVICE — BANDAGE MATRIX HK LOOP 2IN 5YD

## (undated) DEVICE — RELOAD ECHELON FLEX GRN 60MM

## (undated) DEVICE — DRESSING TRANS 4X4 TEGADERM

## (undated) DEVICE — ADHESIVE MASTISOL VIAL 48/BX

## (undated) DEVICE — BANDAGE ADHESIVE

## (undated) DEVICE — BLADE SURG CARBON STEEL SZ11

## (undated) DEVICE — LUBRICANT SURGILUBE 2 OZ

## (undated) DEVICE — NDL SAFETY 22G X 1.5 ECLIPSE

## (undated) DEVICE — DRESSING N ADH OIL EMUL 3X3

## (undated) DEVICE — SPONGE COTTON TRAY 4X4IN

## (undated) DEVICE — NDL 18GA X1 1/2 REG BEVEL

## (undated) DEVICE — ELECTRODE REM PLYHSV RETURN 9

## (undated) DEVICE — TOURNIQUET SB QC SP 18X4IN

## (undated) DEVICE — ADHESIVE DERMABOND ADVANCED

## (undated) DEVICE — TUBING HF INSUFFLATION W/ FLTR

## (undated) DEVICE — SEE MEDLINE ITEM 157117

## (undated) DEVICE — IRRIGATOR ENDOSCOPY DISP.

## (undated) DEVICE — TRAY FOLEY 16FR INFECTION CONT

## (undated) DEVICE — SUT 4/0 18IN ETHILON BL P3

## (undated) DEVICE — GAUZE SPONGE 4X4 12PLY

## (undated) DEVICE — FORCEP BIPOLAR ADSON 1MM TIP

## (undated) DEVICE — NDL 22GA X1 1/2 REG BEVEL

## (undated) DEVICE — GLOVE BIOGEL PI MICRO INDIC 7

## (undated) DEVICE — DRAPE STERI-DRAPE 1000 17X11IN

## (undated) DEVICE — WARMER DRAPE STERILE LF

## (undated) DEVICE — POWDER ARISTA AH 3G

## (undated) DEVICE — TOURNIQUET SB QC DP 18X4IN

## (undated) DEVICE — CLOSURE SKIN STERI STRIP 1/2X4

## (undated) DEVICE — TROCAR ENDOPATH XCEL 12X100MM

## (undated) DEVICE — DRAPE SURG W/TWL 17 5/8X23

## (undated) DEVICE — NDL MONOJECT BLACK 22GA 1.5IN

## (undated) DEVICE — SUT STRATAFIX PDO 2-0 SH

## (undated) DEVICE — APPLICATOR CHLORAPREP ORN 26ML

## (undated) DEVICE — SUT GUT PL. 4-0 27 FS-2

## (undated) DEVICE — SEE MEDLINE ITEM 157131

## (undated) DEVICE — TROCAR ENDOPATH XCEL 11MM 10CM

## (undated) DEVICE — TRAY MINOR GEN SURG

## (undated) DEVICE — CORD BIPOLAR 12 FOOT

## (undated) DEVICE — PAD UNDERPAD 30X30

## (undated) DEVICE — BANDAGE BULKEE II 2.25INX3YD

## (undated) DEVICE — SCISSOR 5MMX35CM DIRECT DRIVE

## (undated) DEVICE — STAPLER ECHELON FLEX 60MM 44CM

## (undated) DEVICE — SUT 0 VICRYL / UR6 (J603)

## (undated) DEVICE — RELOAD ECHELON FLEX WHT 60MM

## (undated) DEVICE — NDL HYPO STD REG BVL 18GX1.5IN

## (undated) DEVICE — TROCAR ENDOPATH XCEL 15MM 10CM

## (undated) DEVICE — RELOAD ECHELON FLEX BLU 60MM

## (undated) DEVICE — BAG TISS RETRV MONARCH 10MM

## (undated) DEVICE — SHEARS HARMONIC 5CM 36CM

## (undated) DEVICE — TOWEL OR DISP STRL BLUE 4/PK

## (undated) DEVICE — TROCAR ENDOPATH XCEL 5X100MM

## (undated) DEVICE — GOWN SURGICAL X-LARGE

## (undated) DEVICE — SYR B-D DISP CONTROL 10CC100/C

## (undated) DEVICE — DRESSING N ADH OIL EMUL 3X8

## (undated) DEVICE — BANDAGE KERLIX P/P 2.25IN STER

## (undated) DEVICE — CANNULA ENDOPATH XCEL 5X100MM

## (undated) DEVICE — SOL POVIDONE SCRUB IODINE 4 OZ

## (undated) DEVICE — MARKER SKIN STND TIP BLUE BARR

## (undated) DEVICE — SUT GORE-TEX CV-5 TTC-13 36

## (undated) DEVICE — PACK UPPER EXTREMITY BAPTIST

## (undated) DEVICE — SYR 10CC LUER LOCK

## (undated) DEVICE — TAPE SURG DURAPORE 2 SGL USE

## (undated) DEVICE — SCRUB 10% POVIDONE IODINE 4OZ

## (undated) DEVICE — SOL IRR SOD CHL .9% POUR

## (undated) DEVICE — SOL 9P NACL IRR PIC IL

## (undated) DEVICE — BANDAGE ELASTIC ACE 2IN 10/CA

## (undated) DEVICE — APPLICATOR ARISTA FLEX XL

## (undated) DEVICE — BANDAGE CONFORM 3IN STRL

## (undated) DEVICE — FORCEP STRAIGHT DISP

## (undated) DEVICE — DRESSING GAUZE 6PLY 4X4

## (undated) DEVICE — COVER CAMERA OPERATING ROOM

## (undated) DEVICE — NDL HYPO REG 25G X 1 1/2

## (undated) DEVICE — BANDAGE ACE NON LATEX 2IN

## (undated) DEVICE — SEE MEDLINE ITEM 152487

## (undated) DEVICE — SUT SURGIDAC ENDO STITCH2-0

## (undated) DEVICE — TROCAR ENDOPATH XCEL 12MM 10CM

## (undated) DEVICE — GLOVE BIOGEL ECLIPSE SZ 7

## (undated) DEVICE — GOWN ECLIPSE REINF LVL4 TWL XL

## (undated) DEVICE — BLADE SURG #15 CARBON STEEL

## (undated) DEVICE — BANDAGE ELASTIC 2X5 VELCRO ST

## (undated) DEVICE — SOL NS 1000CC

## (undated) DEVICE — GLOVE BIOGEL SKINSENSE PI 7.0

## (undated) DEVICE — DRESSING LEUKOPLAST FLEX 1X3IN

## (undated) DEVICE — SEE MEDLINE ITEM 146313

## (undated) DEVICE — UNDERGLOVES BIOGEL PI SZ 7 LF